# Patient Record
Sex: FEMALE | Race: WHITE | NOT HISPANIC OR LATINO | URBAN - METROPOLITAN AREA
[De-identification: names, ages, dates, MRNs, and addresses within clinical notes are randomized per-mention and may not be internally consistent; named-entity substitution may affect disease eponyms.]

---

## 2018-06-15 ENCOUNTER — OUTPATIENT (OUTPATIENT)
Dept: OUTPATIENT SERVICES | Facility: HOSPITAL | Age: 73
LOS: 1 days | Discharge: HOME | End: 2018-06-15

## 2018-06-15 DIAGNOSIS — I82.409 ACUTE EMBOLISM AND THROMBOSIS OF UNSPECIFIED DEEP VEINS OF UNSPECIFIED LOWER EXTREMITY: ICD-10-CM

## 2018-06-15 DIAGNOSIS — E10.9 TYPE 1 DIABETES MELLITUS WITHOUT COMPLICATIONS: ICD-10-CM

## 2018-06-15 DIAGNOSIS — E78.5 HYPERLIPIDEMIA, UNSPECIFIED: ICD-10-CM

## 2018-06-16 DIAGNOSIS — E78.5 HYPERLIPIDEMIA, UNSPECIFIED: ICD-10-CM

## 2018-06-19 ENCOUNTER — OUTPATIENT (OUTPATIENT)
Dept: OUTPATIENT SERVICES | Facility: HOSPITAL | Age: 73
LOS: 1 days | Discharge: HOME | End: 2018-06-19

## 2018-06-19 DIAGNOSIS — E11.9 TYPE 2 DIABETES MELLITUS WITHOUT COMPLICATIONS: ICD-10-CM

## 2018-09-11 ENCOUNTER — OUTPATIENT (OUTPATIENT)
Dept: OUTPATIENT SERVICES | Facility: HOSPITAL | Age: 73
LOS: 1 days | Discharge: HOME | End: 2018-09-11

## 2018-09-11 DIAGNOSIS — E11.9 TYPE 2 DIABETES MELLITUS WITHOUT COMPLICATIONS: ICD-10-CM

## 2018-11-14 ENCOUNTER — OUTPATIENT (OUTPATIENT)
Dept: OUTPATIENT SERVICES | Facility: HOSPITAL | Age: 73
LOS: 1 days | Discharge: HOME | End: 2018-11-14

## 2018-11-14 DIAGNOSIS — M81.0 AGE-RELATED OSTEOPOROSIS WITHOUT CURRENT PATHOLOGICAL FRACTURE: ICD-10-CM

## 2018-12-18 ENCOUNTER — OUTPATIENT (OUTPATIENT)
Dept: OUTPATIENT SERVICES | Facility: HOSPITAL | Age: 73
LOS: 1 days | Discharge: HOME | End: 2018-12-18

## 2018-12-18 DIAGNOSIS — E10.9 TYPE 1 DIABETES MELLITUS WITHOUT COMPLICATIONS: ICD-10-CM

## 2018-12-18 DIAGNOSIS — M81.0 AGE-RELATED OSTEOPOROSIS WITHOUT CURRENT PATHOLOGICAL FRACTURE: ICD-10-CM

## 2018-12-18 DIAGNOSIS — J40 BRONCHITIS, NOT SPECIFIED AS ACUTE OR CHRONIC: ICD-10-CM

## 2018-12-18 DIAGNOSIS — N13.9 OBSTRUCTIVE AND REFLUX UROPATHY, UNSPECIFIED: ICD-10-CM

## 2018-12-18 DIAGNOSIS — E78.5 HYPERLIPIDEMIA, UNSPECIFIED: ICD-10-CM

## 2019-01-02 ENCOUNTER — OUTPATIENT (OUTPATIENT)
Dept: OUTPATIENT SERVICES | Facility: HOSPITAL | Age: 74
LOS: 1 days | Discharge: HOME | End: 2019-01-02

## 2019-01-02 DIAGNOSIS — E10.9 TYPE 1 DIABETES MELLITUS WITHOUT COMPLICATIONS: ICD-10-CM

## 2019-02-22 ENCOUNTER — OUTPATIENT (OUTPATIENT)
Dept: OUTPATIENT SERVICES | Facility: HOSPITAL | Age: 74
LOS: 1 days | Discharge: HOME | End: 2019-02-22

## 2019-02-22 DIAGNOSIS — R50.9 FEVER, UNSPECIFIED: ICD-10-CM

## 2019-04-02 ENCOUNTER — OUTPATIENT (OUTPATIENT)
Dept: OUTPATIENT SERVICES | Facility: HOSPITAL | Age: 74
LOS: 1 days | Discharge: HOME | End: 2019-04-02

## 2019-04-02 DIAGNOSIS — N39.0 URINARY TRACT INFECTION, SITE NOT SPECIFIED: ICD-10-CM

## 2019-04-23 ENCOUNTER — OUTPATIENT (OUTPATIENT)
Dept: OUTPATIENT SERVICES | Facility: HOSPITAL | Age: 74
LOS: 1 days | Discharge: HOME | End: 2019-04-23

## 2019-04-23 DIAGNOSIS — E78.5 HYPERLIPIDEMIA, UNSPECIFIED: ICD-10-CM

## 2019-06-14 ENCOUNTER — OUTPATIENT (OUTPATIENT)
Dept: OUTPATIENT SERVICES | Facility: HOSPITAL | Age: 74
LOS: 1 days | Discharge: HOME | End: 2019-06-14

## 2019-06-14 DIAGNOSIS — N39.0 URINARY TRACT INFECTION, SITE NOT SPECIFIED: ICD-10-CM

## 2019-06-22 ENCOUNTER — OUTPATIENT (OUTPATIENT)
Dept: OUTPATIENT SERVICES | Facility: HOSPITAL | Age: 74
LOS: 1 days | Discharge: HOME | End: 2019-06-22

## 2019-06-22 DIAGNOSIS — R05 COUGH: ICD-10-CM

## 2019-06-25 ENCOUNTER — OUTPATIENT (OUTPATIENT)
Dept: OUTPATIENT SERVICES | Facility: HOSPITAL | Age: 74
LOS: 1 days | Discharge: HOME | End: 2019-06-25

## 2019-06-25 DIAGNOSIS — N17.9 ACUTE KIDNEY FAILURE, UNSPECIFIED: ICD-10-CM

## 2019-07-05 ENCOUNTER — OUTPATIENT (OUTPATIENT)
Dept: OUTPATIENT SERVICES | Facility: HOSPITAL | Age: 74
LOS: 1 days | Discharge: HOME | End: 2019-07-05

## 2019-07-05 DIAGNOSIS — N17.9 ACUTE KIDNEY FAILURE, UNSPECIFIED: ICD-10-CM

## 2019-07-16 ENCOUNTER — OUTPATIENT (OUTPATIENT)
Dept: OUTPATIENT SERVICES | Facility: HOSPITAL | Age: 74
LOS: 1 days | Discharge: HOME | End: 2019-07-16

## 2019-07-16 DIAGNOSIS — E78.5 HYPERLIPIDEMIA, UNSPECIFIED: ICD-10-CM

## 2019-07-18 ENCOUNTER — OUTPATIENT (OUTPATIENT)
Dept: OUTPATIENT SERVICES | Facility: HOSPITAL | Age: 74
LOS: 1 days | Discharge: HOME | End: 2019-07-18

## 2019-07-18 DIAGNOSIS — L02.31 CUTANEOUS ABSCESS OF BUTTOCK: ICD-10-CM

## 2019-07-27 ENCOUNTER — OUTPATIENT (OUTPATIENT)
Dept: OUTPATIENT SERVICES | Facility: HOSPITAL | Age: 74
LOS: 1 days | Discharge: HOME | End: 2019-07-27

## 2019-07-27 DIAGNOSIS — M79.661 PAIN IN RIGHT LOWER LEG: ICD-10-CM

## 2019-07-27 DIAGNOSIS — L02.31 CUTANEOUS ABSCESS OF BUTTOCK: ICD-10-CM

## 2019-08-24 ENCOUNTER — OUTPATIENT (OUTPATIENT)
Dept: OUTPATIENT SERVICES | Facility: HOSPITAL | Age: 74
LOS: 1 days | Discharge: HOME | End: 2019-08-24

## 2019-08-24 DIAGNOSIS — E10.9 TYPE 1 DIABETES MELLITUS WITHOUT COMPLICATIONS: ICD-10-CM

## 2019-08-24 DIAGNOSIS — D64.9 ANEMIA, UNSPECIFIED: ICD-10-CM

## 2019-09-01 ENCOUNTER — INPATIENT (INPATIENT)
Facility: HOSPITAL | Age: 74
LOS: 4 days | Discharge: NURSING, REHAB OR LONG-TERM CARE | End: 2019-09-06
Attending: INTERNAL MEDICINE | Admitting: INTERNAL MEDICINE
Payer: MEDICAID

## 2019-09-01 VITALS
TEMPERATURE: 98 F | RESPIRATION RATE: 18 BRPM | HEART RATE: 64 BPM | SYSTOLIC BLOOD PRESSURE: 115 MMHG | DIASTOLIC BLOOD PRESSURE: 58 MMHG | OXYGEN SATURATION: 99 %

## 2019-09-01 DIAGNOSIS — Z98.890 OTHER SPECIFIED POSTPROCEDURAL STATES: Chronic | ICD-10-CM

## 2019-09-01 LAB
ALBUMIN SERPL ELPH-MCNC: 4 G/DL — SIGNIFICANT CHANGE UP (ref 3.5–5.2)
ALP SERPL-CCNC: 63 U/L — SIGNIFICANT CHANGE UP (ref 30–115)
ALT FLD-CCNC: 10 U/L — SIGNIFICANT CHANGE UP (ref 0–41)
ANION GAP SERPL CALC-SCNC: 12 MMOL/L — SIGNIFICANT CHANGE UP (ref 7–14)
APTT BLD: 30.9 SEC — SIGNIFICANT CHANGE UP (ref 27–39.2)
AST SERPL-CCNC: 12 U/L — SIGNIFICANT CHANGE UP (ref 0–41)
BASOPHILS # BLD AUTO: 0.07 K/UL — SIGNIFICANT CHANGE UP (ref 0–0.2)
BASOPHILS NFR BLD AUTO: 0.6 % — SIGNIFICANT CHANGE UP (ref 0–1)
BILIRUB SERPL-MCNC: 0.5 MG/DL — SIGNIFICANT CHANGE UP (ref 0.2–1.2)
BLD GP AB SCN SERPL QL: SIGNIFICANT CHANGE UP
BUN SERPL-MCNC: 13 MG/DL — SIGNIFICANT CHANGE UP (ref 10–20)
CALCIUM SERPL-MCNC: 10 MG/DL — SIGNIFICANT CHANGE UP (ref 8.5–10.1)
CHLORIDE SERPL-SCNC: 104 MMOL/L — SIGNIFICANT CHANGE UP (ref 98–110)
CO2 SERPL-SCNC: 28 MMOL/L — SIGNIFICANT CHANGE UP (ref 17–32)
CREAT SERPL-MCNC: 1.3 MG/DL — SIGNIFICANT CHANGE UP (ref 0.7–1.5)
EOSINOPHIL # BLD AUTO: 0.31 K/UL — SIGNIFICANT CHANGE UP (ref 0–0.7)
EOSINOPHIL NFR BLD AUTO: 2.7 % — SIGNIFICANT CHANGE UP (ref 0–8)
GLUCOSE SERPL-MCNC: 88 MG/DL — SIGNIFICANT CHANGE UP (ref 70–99)
HCT VFR BLD CALC: 35.1 % — LOW (ref 37–47)
HGB BLD-MCNC: 10.9 G/DL — LOW (ref 12–16)
IMM GRANULOCYTES NFR BLD AUTO: 0.4 % — HIGH (ref 0.1–0.3)
INR BLD: 1.04 RATIO — SIGNIFICANT CHANGE UP (ref 0.65–1.3)
LYMPHOCYTES # BLD AUTO: 2.98 K/UL — SIGNIFICANT CHANGE UP (ref 1.2–3.4)
LYMPHOCYTES # BLD AUTO: 26.3 % — SIGNIFICANT CHANGE UP (ref 20.5–51.1)
MCHC RBC-ENTMCNC: 26.5 PG — LOW (ref 27–31)
MCHC RBC-ENTMCNC: 31.1 G/DL — LOW (ref 32–37)
MCV RBC AUTO: 85.4 FL — SIGNIFICANT CHANGE UP (ref 81–99)
MONOCYTES # BLD AUTO: 0.84 K/UL — HIGH (ref 0.1–0.6)
MONOCYTES NFR BLD AUTO: 7.4 % — SIGNIFICANT CHANGE UP (ref 1.7–9.3)
NEUTROPHILS # BLD AUTO: 7.08 K/UL — HIGH (ref 1.4–6.5)
NEUTROPHILS NFR BLD AUTO: 62.6 % — SIGNIFICANT CHANGE UP (ref 42.2–75.2)
NRBC # BLD: 0 /100 WBCS — SIGNIFICANT CHANGE UP (ref 0–0)
PLATELET # BLD AUTO: 263 K/UL — SIGNIFICANT CHANGE UP (ref 130–400)
POTASSIUM SERPL-MCNC: 4.3 MMOL/L — SIGNIFICANT CHANGE UP (ref 3.5–5)
POTASSIUM SERPL-SCNC: 4.3 MMOL/L — SIGNIFICANT CHANGE UP (ref 3.5–5)
PROT SERPL-MCNC: 7.2 G/DL — SIGNIFICANT CHANGE UP (ref 6–8)
PROTHROM AB SERPL-ACNC: 12 SEC — SIGNIFICANT CHANGE UP (ref 9.95–12.87)
RBC # BLD: 4.11 M/UL — LOW (ref 4.2–5.4)
RBC # FLD: 14.5 % — SIGNIFICANT CHANGE UP (ref 11.5–14.5)
SODIUM SERPL-SCNC: 144 MMOL/L — SIGNIFICANT CHANGE UP (ref 135–146)
WBC # BLD: 11.33 K/UL — HIGH (ref 4.8–10.8)
WBC # FLD AUTO: 11.33 K/UL — HIGH (ref 4.8–10.8)

## 2019-09-01 PROCEDURE — 99285 EMERGENCY DEPT VISIT HI MDM: CPT

## 2019-09-01 PROCEDURE — 72170 X-RAY EXAM OF PELVIS: CPT | Mod: 26

## 2019-09-01 PROCEDURE — 99223 1ST HOSP IP/OBS HIGH 75: CPT | Mod: AI

## 2019-09-01 PROCEDURE — 71045 X-RAY EXAM CHEST 1 VIEW: CPT | Mod: 26

## 2019-09-01 RX ORDER — LEVOTHYROXINE SODIUM 125 MCG
50 TABLET ORAL DAILY
Refills: 0 | Status: DISCONTINUED | OUTPATIENT
Start: 2019-09-01 | End: 2019-09-06

## 2019-09-01 RX ORDER — CEFEPIME 1 G/1
2000 INJECTION, POWDER, FOR SOLUTION INTRAMUSCULAR; INTRAVENOUS EVERY 12 HOURS
Refills: 0 | Status: DISCONTINUED | OUTPATIENT
Start: 2019-09-01 | End: 2019-09-02

## 2019-09-01 RX ORDER — ATORVASTATIN CALCIUM 80 MG/1
20 TABLET, FILM COATED ORAL AT BEDTIME
Refills: 0 | Status: DISCONTINUED | OUTPATIENT
Start: 2019-09-01 | End: 2019-09-06

## 2019-09-01 RX ORDER — ACETAMINOPHEN 500 MG
650 TABLET ORAL EVERY 6 HOURS
Refills: 0 | Status: DISCONTINUED | OUTPATIENT
Start: 2019-09-01 | End: 2019-09-01

## 2019-09-01 RX ORDER — OLANZAPINE 15 MG/1
20 TABLET, FILM COATED ORAL DAILY
Refills: 0 | Status: DISCONTINUED | OUTPATIENT
Start: 2019-09-01 | End: 2019-09-06

## 2019-09-01 RX ORDER — IBUPROFEN 200 MG
400 TABLET ORAL ONCE
Refills: 0 | Status: COMPLETED | OUTPATIENT
Start: 2019-09-01 | End: 2019-09-01

## 2019-09-01 RX ORDER — SENNA PLUS 8.6 MG/1
2 TABLET ORAL AT BEDTIME
Refills: 0 | Status: DISCONTINUED | OUTPATIENT
Start: 2019-09-01 | End: 2019-09-06

## 2019-09-01 RX ORDER — ACETAMINOPHEN 500 MG
2 TABLET ORAL
Qty: 0 | Refills: 0 | DISCHARGE

## 2019-09-01 RX ORDER — LANOLIN ALCOHOL/MO/W.PET/CERES
5 CREAM (GRAM) TOPICAL AT BEDTIME
Refills: 0 | Status: DISCONTINUED | OUTPATIENT
Start: 2019-09-01 | End: 2019-09-06

## 2019-09-01 RX ORDER — CHLORHEXIDINE GLUCONATE 213 G/1000ML
1 SOLUTION TOPICAL
Refills: 0 | Status: DISCONTINUED | OUTPATIENT
Start: 2019-09-01 | End: 2019-09-06

## 2019-09-01 RX ORDER — ACETAMINOPHEN 500 MG
650 TABLET ORAL EVERY 6 HOURS
Refills: 0 | Status: DISCONTINUED | OUTPATIENT
Start: 2019-09-01 | End: 2019-09-06

## 2019-09-01 RX ORDER — DOCUSATE SODIUM 100 MG
100 CAPSULE ORAL
Refills: 0 | Status: DISCONTINUED | OUTPATIENT
Start: 2019-09-01 | End: 2019-09-06

## 2019-09-01 RX ORDER — FAMOTIDINE 10 MG/ML
20 INJECTION INTRAVENOUS DAILY
Refills: 0 | Status: DISCONTINUED | OUTPATIENT
Start: 2019-09-01 | End: 2019-09-06

## 2019-09-01 RX ORDER — CEFEPIME 1 G/1
2000 INJECTION, POWDER, FOR SOLUTION INTRAMUSCULAR; INTRAVENOUS ONCE
Refills: 0 | Status: COMPLETED | OUTPATIENT
Start: 2019-09-01 | End: 2019-09-01

## 2019-09-01 RX ORDER — VANCOMYCIN HCL 1 G
1000 VIAL (EA) INTRAVENOUS ONCE
Refills: 0 | Status: COMPLETED | OUTPATIENT
Start: 2019-09-01 | End: 2019-09-01

## 2019-09-01 RX ORDER — ENOXAPARIN SODIUM 100 MG/ML
40 INJECTION SUBCUTANEOUS DAILY
Refills: 0 | Status: DISCONTINUED | OUTPATIENT
Start: 2019-09-01 | End: 2019-09-06

## 2019-09-01 RX ORDER — ASPIRIN/CALCIUM CARB/MAGNESIUM 324 MG
81 TABLET ORAL DAILY
Refills: 0 | Status: DISCONTINUED | OUTPATIENT
Start: 2019-09-01 | End: 2019-09-06

## 2019-09-01 RX ORDER — METRONIDAZOLE 500 MG
500 TABLET ORAL ONCE
Refills: 0 | Status: COMPLETED | OUTPATIENT
Start: 2019-09-01 | End: 2019-09-01

## 2019-09-01 RX ADMIN — Medication 5 MILLIGRAM(S): at 22:16

## 2019-09-01 RX ADMIN — ATORVASTATIN CALCIUM 20 MILLIGRAM(S): 80 TABLET, FILM COATED ORAL at 22:16

## 2019-09-01 RX ADMIN — Medication 500 MILLIGRAM(S): at 18:22

## 2019-09-01 RX ADMIN — Medication 250 MILLIGRAM(S): at 20:09

## 2019-09-01 RX ADMIN — Medication 100 MILLIGRAM(S): at 17:26

## 2019-09-01 RX ADMIN — CEFEPIME 100 MILLIGRAM(S): 1 INJECTION, POWDER, FOR SOLUTION INTRAMUSCULAR; INTRAVENOUS at 18:26

## 2019-09-01 RX ADMIN — CEFEPIME 2000 MILLIGRAM(S): 1 INJECTION, POWDER, FOR SOLUTION INTRAMUSCULAR; INTRAVENOUS at 19:15

## 2019-09-01 RX ADMIN — Medication 400 MILLIGRAM(S): at 22:26

## 2019-09-01 RX ADMIN — Medication 400 MILLIGRAM(S): at 22:16

## 2019-09-01 RX ADMIN — SENNA PLUS 2 TABLET(S): 8.6 TABLET ORAL at 22:16

## 2019-09-01 NOTE — H&P ADULT - ASSESSMENT
74 year old female with PMH of recent right buttock abscess s/p debridement, insulin dependent diabetes mellitus, schizophrenia, dementia, anxiety, CAD, hyperlipidemia, hypothyroidism, constipation, ovarian cyst brought in by EMS [facility] for foul smelling and expanding right buttock ulcer    # Worsening right buttock ulcer wound  - Sepsis ruled out on admission, WBC minimally elevated, afebrile  - s/p Cefepime, Flagyl and Vancomycin in ED  - Check ESR and CRP  - Will cover for Pseudomonas and E faecalis seen on wound culture in July  - Burn eval - needs source control with possible debridement  - ID eval to eval need for abx  - Tylenol for pain    # Insulin dependent diabetes mellitus  - On Januvia and Insulin at NH  - Hypoglycemic in ED  - Check fingerstick, and start insulin FSBG >180  - Carb consistent diet    # Normocytic anemia  - No evidence of active bleed  - Check Retic ct, iron and ferritin    # CAD - continue ASA  # Dyslipidemia - Continue Lipitor  # H/o Paranoid schizophrenia/Anxiety - Continue Olanzapine. On Haldol IM once monthly  # Hypothyroidism - Continue L-thyroxine    GI ppx/GERD - Famotidine  DVT ppx - Lovenox    CODE: FULL per NH documentation    Dispo: from [facility] [place] 74 year old female with PMH of recent right buttock abscess s/p debridement, insulin dependent diabetes mellitus, schizophrenia, dementia, anxiety, CAD, hyperlipidemia, hypothyroidism, constipation, ovarian cyst brought in by EMS from [facility], operated by [facility] for foul smelling and expanding right buttock ulcer    # Worsening right buttock ulcer wound  - Sepsis ruled out on admission, WBC minimally elevated, afebrile  - s/p Cefepime, Flagyl and Vancomycin in ED  - Check ESR and CRP  - Will cover for Pseudomonas and E faecalis seen on wound culture in July  - Burn eval - needs source control with possible debridement  - ID eval to eval need for abx  - Tylenol for pain    # Insulin dependent diabetes mellitus  - On Januvia and Insulin at NH  - Hypoglycemic in ED  - Check fingerstick, and start insulin FSBG >180  - Carb consistent diet    # Normocytic anemia  - No evidence of active bleed  - Check Retic ct, iron and ferritin    # CKD 3b - Monitor Cr    # CAD - continue ASA  # Dyslipidemia - Continue Lipitor  # H/o Paranoid schizophrenia/Anxiety - Continue Olanzapine. On Haldol IM once monthly  # Hypothyroidism - Continue L-thyroxine    GI ppx/GERD - Famotidine  DVT ppx - Lovenox    CODE: FULL per NH documentation    Dispo: from [facility], operated by [facility]

## 2019-09-01 NOTE — ED ADULT NURSE NOTE - INTERVENTIONS DEFINITIONS
[place] to call system/Provide visual cue, wrist band, yellow gown, etc./Monitor gait and stability/Call bell, personal items and telephone within reach/Monitor for mental status changes and reorient to person, place, and time/Physically safe environment: no spills, clutter or unnecessary equipment

## 2019-09-01 NOTE — ED PROVIDER NOTE - ATTENDING CONTRIBUTION TO CARE
73 yo female with PMH DM, schizophrenia,  sacral ulcer, DVT, HTN, HLD presented for evaluation of her right ischial wound. Pt with increasing erythema and foul smelling d/c.  + chills without any fevers, abd pain, N/V/D.  Pt without any c/o CP, SOB or palpitations. VS noted, agree with exam as above.  A/P: Ischial ulcer -labs, XR, IV abx, admit

## 2019-09-01 NOTE — H&P ADULT - ATTENDING COMMENTS
PHYSICAL EXAM:    CONSTITUTIONAL: NAD, nontoxic appearing  ENMT: PERRLA, No tonsillar erythema, exudates, or enlargement, neck supple, No JVD  PSYCH: Alert & Oriented X3  RESPIRATORY: Clear to percussion bilaterally; No rales, rhonchi, wheezing, or rubs  CARDIOVASCULAR: Regular rate and rhythm; No murmurs, rubs, or gallops, negative edema  GASTROINTESTINAL: Soft, Nontender, Nondistended; Bowel sounds present  EXTREMITIES:  2+ Peripheral Pulses, No clubbing, cyanosis  SKIN: 3x3 stage III ulcer over right buttock with surrounding erythema, malodorous, negative purulent drainage expressed    75 yo F with PMHx of Right Buttock Decubitus Ulcer and Abscess s/p  recent debridement, insulin dependent diabetes mellitus, schizophrenia, dementia, anxiety, CAD, CKD III, hyperlipidemia, hypothyroidism, constipation, ovarian cyst sent in from [facility] at request of PMD for further evaluation of nonhealing, expanding and malodorous right buttock decubitus ulcer. Patient is a poor historian, endorses pain in her right leg, unable to further elaborate, states she fell 2 days ago at the nursing home while trying to reach for something. Patient denies any other complaints.     #Right Buttock Decubitus Ulcer likely infected   -Culture previously grew Enterococcus faecalis sensitive to Cefepime  -Awaiting Burn Surgery evaluation  -Awaiting Infectious Disease  -F/U ESR, CRP  -Pain control    #Insulin dependent diabetes mellitus  -Hypoglycemic in ED down to 54  -Monitor fingersticks, start basal bolus if fingersticks are greater than 180    #Normocytic anemia  -No evidence of active bleed  -F/U Iron studies, maintain active type and screen    #CKD III  -Creatinine stable baseline (1.3-1.5)   -Monitor BMP, avoid nephrotoxins    #CAD  -Continue ASA    #Dyslipidemia   -Continue Lipitor    #Paranoid schizophrenia/Anxiety   -Continue Olanzapine. On Haldol IM once monthly    # Hypothyroidism   -Continue L-thyroxine    Code Status: Full Code    Disposition: Return to Nursing Home once medically stable. PHYSICAL EXAM:    CONSTITUTIONAL: NAD, nontoxic appearing  ENMT: PERRLA, No tonsillar erythema, exudates, or enlargement, neck supple, No JVD  PSYCH: Alert & Oriented X3  RESPIRATORY: Clear to percussion bilaterally; No rales, rhonchi, wheezing, or rubs  CARDIOVASCULAR: Regular rate and rhythm; No murmurs, rubs, or gallops, negative edema  GASTROINTESTINAL: Soft, Nontender, Nondistended; Bowel sounds present  EXTREMITIES:  2+ Peripheral Pulses, No clubbing, cyanosis  SKIN: 3x3 stage III ulcer over right buttock with surrounding erythema, malodorous, negative purulent drainage expressed    73 yo F with PMHx of Right Buttock Decubitus Ulcer and Abscess s/p  recent debridement, insulin dependent diabetes mellitus, schizophrenia, dementia, anxiety, CAD, CKD III, hyperlipidemia, hypothyroidism, constipation, ovarian cyst sent in from [facility] at request of PMD for further evaluation of nonhealing, expanding and malodorous right buttock decubitus ulcer. Patient is a poor historian, endorses pain in her right leg, unable to further elaborate, states she fell 2 days ago at the nursing home while trying to reach for something. Patient denies any other complaints.     #Right Buttock Decubitus Ulcer likely infected   -Culture previously grew Enterococcus faecalis sensitive to Cefepime  -Awaiting Burn Surgery evaluation  -Awaiting Infectious Disease  -F/U ESR, CRP  -Pain control    #Insulin dependent diabetes mellitus  -Hypoglycemic in ED down to 54  -Monitor fingersticks, start basal bolus if fingersticks are greater than 180    #Normocytic anemia  -No evidence of active bleed  -F/U Iron studies, maintain active type and screen    #CKD III  -Creatinine stable baseline (1.3-1.5)   -Monitor BMP, avoid nephrotoxins    #CAD  -Continue ASA    #Dyslipidemia   -Continue Lipitor    #Paranoid schizophrenia/Anxiety   -Continue Olanzapine. On Haldol IM once monthly    # Hypothyroidism   -Continue L-thyroxine    Code Status: Full Code.    Disposition: Return to Nursing Home once medically stable.

## 2019-09-01 NOTE — ED PROVIDER NOTE - CLINICAL SUMMARY MEDICAL DECISION MAKING FREE TEXT BOX
pt seen for right ischial wound increasing  and with d/c sent in for evaluation. labs unremarkable, pt started on IV abx and admitted for further workup and treatment

## 2019-09-01 NOTE — ED PROVIDER NOTE - OBJECTIVE STATEMENT
74 y.o female w/ hx of sacral ulcer, DM I, schizophrenia, DVT, HTN, HLD presents to the ED for evaluation of sacral ulcer.  Per paperwork pt has had ulcer of right buttock that they have debrided.  However ulcer still increasing in size now with surrounding erythema and discharge prompting visit to the ED. Admits to chills, denies fever, hip pain, abd pain, N/V/D.  No further complaints at this time.  pt is a poor historian.  I am unable to obtain a comprehensive history, review of systems, past medical history, and/or physical exam due to mental status.

## 2019-09-01 NOTE — ED PROVIDER NOTE - PHYSICAL EXAMINATION
CONST: Well appearing in NAD  EYES: Sclera and conjunctiva clear.  CARD: Normal S1 S2; Normal rate and rhythm  RESP: Equal BS B/L, No wheezes, rhonchi or rales. No distress  GI: Soft, non-tender, non-distended, no CVA tenderness  MS: Normal ROM in all extremities. No edema of lower extremities, no calf pain, radial pulses 2+ bilaterally  SKIN: half dollar size stage 3 pressure ulcer right buttocks, + surrounding erythema, no discharge  NEURO: A&Ox3, No focal deficits. Strength 5/5 with no sensory deficits.

## 2019-09-01 NOTE — H&P ADULT - NSHPREVIEWOFSYSTEMS_GEN_ALL_CORE
CONSTITUTIONAL: No weakness, fevers or chills  EYES/ENT: No visual changes;  No vertigo or throat pain   RESPIRATORY: No cough, wheezing, hemoptysis; No shortness of breath  CARDIOVASCULAR: No chest pain or palpitations  GASTROINTESTINAL: No abdominal or epigastric pain. No nausea, vomiting, or hematemesis; No diarrhea. Complains of constipation. No melena or hematochezia.  GENITOURINARY: No dysuria, frequency or hematuria  NEUROLOGICAL: No numbness or weakness  SKIN: No itching, rashes

## 2019-09-01 NOTE — H&P ADULT - NSHPPHYSICALEXAM_GEN_ALL_CORE
Vital Signs Last 24 Hrs  T(C): 37.6 (01 Sep 2019 16:39), Max: 37.6 (01 Sep 2019 16:39)  T(F): 99.6 (01 Sep 2019 16:39), Max: 99.6 (01 Sep 2019 16:39)  HR: 64 (01 Sep 2019 14:57) (64 - 64)  BP: 115/58 (01 Sep 2019 14:57) (115/58 - 115/58)  RR: 18 (01 Sep 2019 14:57) (18 - 18)  SpO2: 99% (01 Sep 2019 14:57) (99% - 99%)    ---------------    PHYSICAL EXAM:  GENERAL: NAD, speaks in full sentences, no signs of respiratory distress  HEAD:  Atraumatic, Normocephalic  EYES: EOMI, PERRLA, anicteric sclera  NECK: Supple, No JVD  CHEST/LUNG: Clear to auscultation bilaterally; No wheeze; No crackles; No accessory muscles used  HEART: Regular rate and rhythm; grade 2/6 systolic murmur  ABDOMEN: Soft, Nontender, Nondistended; Bowel sounds present; No guarding  EXTREMITIES:  2+ Peripheral Pulses, No cyanosis or edema  PSYCH: AAOx3  NEUROLOGY: non-focal  SKIN: Right buttock ulcer 2x3 cm, foul smelling, surrounding erythema, central necrosis

## 2019-09-01 NOTE — H&P ADULT - NSICDXPASTMEDICALHX_GEN_ALL_CORE_FT
PAST MEDICAL HISTORY:  Abscess of right buttock     Anxiety     Coronary artery disease     Dementia     Hyperlipidemia     Hypothyroidism     Insulin dependent diabetes mellitus     Paranoid schizophrenia

## 2019-09-01 NOTE — H&P ADULT - HISTORY OF PRESENT ILLNESS
74 year old female with PMH of recent right buttock abscess s/p debridement, insulin dependent diabetes mellitus, schizophrenia, dementia, anxiety, CAD, hyperlipidemia, hypothyroidism, constipation, ovarian cyst brought in by EMS from [facility] for foul smelling and expanding right buttock ulcer. Patient is a poor historian but as per documentation she had a debridement in July 2019 for a right buttock abscess. Patient is only complaining of some pain in the area of concern. She denies any fever, chills, dyspnea, chest pain, nausea, vomiting, diarrhea.     In ED: Vital signs stable, afebrile, WBC 11.33, XRay pelvis with no abnormalities. Given Cefepime, Flagyl and vancomycin x1

## 2019-09-01 NOTE — ED PROVIDER NOTE - NS ED ROS FT
I am unable to obtain a comprehensive history, review of systems, past medical history, and/or physical exam due to mental status.

## 2019-09-01 NOTE — H&P ADULT - NSHPLABSRESULTS_GEN_ALL_CORE
10.9   11.33 )-----------( 263      ( 01 Sep 2019 17:20 )             35.1       09-01    144  |  104  |  13  ----------------------------<  88  4.3   |  28  |  1.3    Ca    10.0      01 Sep 2019 17:20    TPro  7.2  /  Alb  4.0  /  TBili  0.5  /  DBili  x   /  AST  12  /  ALT  10  /  AlkPhos  63  09-01          PT/INR - ( 01 Sep 2019 17:20 )   PT: 12.00 sec;   INR: 1.04 ratio      PTT - ( 01 Sep 2019 17:20 )  PTT:30.9 sec    CAPILLARY BLOOD GLUCOSE  POCT Blood Glucose.: 79 mg/dL (01 Sep 2019 17:42)    -------    < from: Xray Pelvis AP only (09.01.19 @ 17:45) >    Findings: No bony or softtissue or articular abnormality is seen.   However, it should be noted that osteomyelitis/septic arthritis cannot be   excluded on this plain film study.    Impression:     No abnormality seen.    < end of copied text >

## 2019-09-02 LAB
ANION GAP SERPL CALC-SCNC: 13 MMOL/L — SIGNIFICANT CHANGE UP (ref 7–14)
BASOPHILS # BLD AUTO: 0.08 K/UL — SIGNIFICANT CHANGE UP (ref 0–0.2)
BASOPHILS NFR BLD AUTO: 0.8 % — SIGNIFICANT CHANGE UP (ref 0–1)
BUN SERPL-MCNC: 13 MG/DL — SIGNIFICANT CHANGE UP (ref 10–20)
CALCIUM SERPL-MCNC: 9.1 MG/DL — SIGNIFICANT CHANGE UP (ref 8.5–10.1)
CHLORIDE SERPL-SCNC: 106 MMOL/L — SIGNIFICANT CHANGE UP (ref 98–110)
CO2 SERPL-SCNC: 24 MMOL/L — SIGNIFICANT CHANGE UP (ref 17–32)
CREAT SERPL-MCNC: 1.1 MG/DL — SIGNIFICANT CHANGE UP (ref 0.7–1.5)
EOSINOPHIL # BLD AUTO: 0.37 K/UL — SIGNIFICANT CHANGE UP (ref 0–0.7)
EOSINOPHIL NFR BLD AUTO: 3.7 % — SIGNIFICANT CHANGE UP (ref 0–8)
ERYTHROCYTE [SEDIMENTATION RATE] IN BLOOD: 60 MM/HR — HIGH (ref 0–20)
ESTIMATED AVERAGE GLUCOSE: 137 MG/DL — HIGH (ref 68–114)
GLUCOSE BLDC GLUCOMTR-MCNC: 113 MG/DL — HIGH (ref 70–99)
GLUCOSE BLDC GLUCOMTR-MCNC: 155 MG/DL — HIGH (ref 70–99)
GLUCOSE BLDC GLUCOMTR-MCNC: 97 MG/DL — SIGNIFICANT CHANGE UP (ref 70–99)
GLUCOSE SERPL-MCNC: 109 MG/DL — HIGH (ref 70–99)
HBA1C BLD-MCNC: 6.4 % — HIGH (ref 4–5.6)
HCT VFR BLD CALC: 32 % — LOW (ref 37–47)
HGB BLD-MCNC: 9.8 G/DL — LOW (ref 12–16)
IMM GRANULOCYTES NFR BLD AUTO: 0.6 % — HIGH (ref 0.1–0.3)
LYMPHOCYTES # BLD AUTO: 2.98 K/UL — SIGNIFICANT CHANGE UP (ref 1.2–3.4)
LYMPHOCYTES # BLD AUTO: 29.8 % — SIGNIFICANT CHANGE UP (ref 20.5–51.1)
MAGNESIUM SERPL-MCNC: 1.9 MG/DL — SIGNIFICANT CHANGE UP (ref 1.8–2.4)
MCHC RBC-ENTMCNC: 26.1 PG — LOW (ref 27–31)
MCHC RBC-ENTMCNC: 30.6 G/DL — LOW (ref 32–37)
MCV RBC AUTO: 85.3 FL — SIGNIFICANT CHANGE UP (ref 81–99)
MONOCYTES # BLD AUTO: 0.91 K/UL — HIGH (ref 0.1–0.6)
MONOCYTES NFR BLD AUTO: 9.1 % — SIGNIFICANT CHANGE UP (ref 1.7–9.3)
NEUTROPHILS # BLD AUTO: 5.61 K/UL — SIGNIFICANT CHANGE UP (ref 1.4–6.5)
NEUTROPHILS NFR BLD AUTO: 56 % — SIGNIFICANT CHANGE UP (ref 42.2–75.2)
NRBC # BLD: 0 /100 WBCS — SIGNIFICANT CHANGE UP (ref 0–0)
PLATELET # BLD AUTO: 193 K/UL — SIGNIFICANT CHANGE UP (ref 130–400)
POTASSIUM SERPL-MCNC: 4.5 MMOL/L — SIGNIFICANT CHANGE UP (ref 3.5–5)
POTASSIUM SERPL-SCNC: 4.5 MMOL/L — SIGNIFICANT CHANGE UP (ref 3.5–5)
RBC # BLD: 3.75 M/UL — LOW (ref 4.2–5.4)
RBC # BLD: 3.75 M/UL — LOW (ref 4.2–5.4)
RBC # FLD: 14.5 % — SIGNIFICANT CHANGE UP (ref 11.5–14.5)
RETICS #: 51.8 K/UL — SIGNIFICANT CHANGE UP (ref 25–125)
RETICS/RBC NFR: 1.4 % — SIGNIFICANT CHANGE UP (ref 0.5–1.5)
SODIUM SERPL-SCNC: 143 MMOL/L — SIGNIFICANT CHANGE UP (ref 135–146)
WBC # BLD: 10.01 K/UL — SIGNIFICANT CHANGE UP (ref 4.8–10.8)
WBC # FLD AUTO: 10.01 K/UL — SIGNIFICANT CHANGE UP (ref 4.8–10.8)

## 2019-09-02 PROCEDURE — 99222 1ST HOSP IP/OBS MODERATE 55: CPT | Mod: 25

## 2019-09-02 PROCEDURE — 97602 WOUND(S) CARE NON-SELECTIVE: CPT

## 2019-09-02 PROCEDURE — 99233 SBSQ HOSP IP/OBS HIGH 50: CPT

## 2019-09-02 RX ORDER — KETOROLAC TROMETHAMINE 30 MG/ML
15 SYRINGE (ML) INJECTION ONCE
Refills: 0 | Status: DISCONTINUED | OUTPATIENT
Start: 2019-09-02 | End: 2019-09-02

## 2019-09-02 RX ORDER — SODIUM HYPOCHLORITE 0.125 %
1 SOLUTION, NON-ORAL MISCELLANEOUS
Refills: 0 | Status: DISCONTINUED | OUTPATIENT
Start: 2019-09-02 | End: 2019-09-06

## 2019-09-02 RX ORDER — KETOROLAC TROMETHAMINE 30 MG/ML
10 SYRINGE (ML) INJECTION DAILY
Refills: 0 | Status: DISCONTINUED | OUTPATIENT
Start: 2019-09-02 | End: 2019-09-02

## 2019-09-02 RX ORDER — COLLAGENASE CLOSTRIDIUM HIST. 250 UNIT/G
1 OINTMENT (GRAM) TOPICAL
Refills: 0 | Status: DISCONTINUED | OUTPATIENT
Start: 2019-09-02 | End: 2019-09-06

## 2019-09-02 RX ORDER — CEFEPIME 1 G/1
2000 INJECTION, POWDER, FOR SOLUTION INTRAMUSCULAR; INTRAVENOUS EVERY 24 HOURS
Refills: 0 | Status: DISCONTINUED | OUTPATIENT
Start: 2019-09-02 | End: 2019-09-04

## 2019-09-02 RX ADMIN — Medication 1 TABLET(S): at 11:08

## 2019-09-02 RX ADMIN — SENNA PLUS 2 TABLET(S): 8.6 TABLET ORAL at 21:02

## 2019-09-02 RX ADMIN — CEFEPIME 100 MILLIGRAM(S): 1 INJECTION, POWDER, FOR SOLUTION INTRAMUSCULAR; INTRAVENOUS at 05:30

## 2019-09-02 RX ADMIN — Medication 1 APPLICATION(S): at 17:18

## 2019-09-02 RX ADMIN — Medication 100 MILLIGRAM(S): at 05:30

## 2019-09-02 RX ADMIN — Medication 100 MILLIGRAM(S): at 17:17

## 2019-09-02 RX ADMIN — Medication 50 MICROGRAM(S): at 05:30

## 2019-09-02 RX ADMIN — CHLORHEXIDINE GLUCONATE 1 APPLICATION(S): 213 SOLUTION TOPICAL at 05:30

## 2019-09-02 RX ADMIN — Medication 81 MILLIGRAM(S): at 11:07

## 2019-09-02 RX ADMIN — Medication 5 MILLIGRAM(S): at 21:02

## 2019-09-02 RX ADMIN — Medication 15 MILLIGRAM(S): at 22:00

## 2019-09-02 RX ADMIN — OLANZAPINE 20 MILLIGRAM(S): 15 TABLET, FILM COATED ORAL at 11:08

## 2019-09-02 RX ADMIN — ENOXAPARIN SODIUM 40 MILLIGRAM(S): 100 INJECTION SUBCUTANEOUS at 11:08

## 2019-09-02 RX ADMIN — FAMOTIDINE 20 MILLIGRAM(S): 10 INJECTION INTRAVENOUS at 11:08

## 2019-09-02 RX ADMIN — Medication 1 TABLET(S): at 11:07

## 2019-09-02 RX ADMIN — Medication 15 MILLIGRAM(S): at 21:02

## 2019-09-02 RX ADMIN — Medication 1 APPLICATION(S): at 17:17

## 2019-09-02 RX ADMIN — ATORVASTATIN CALCIUM 20 MILLIGRAM(S): 80 TABLET, FILM COATED ORAL at 21:02

## 2019-09-02 NOTE — CONSULT NOTE ADULT - SUBJECTIVE AND OBJECTIVE BOX
bedridden pt with right buttock pressure sore    PE: right buttock--. [id number] pressure sore with adherent necrotic tissue--> culture sent

## 2019-09-02 NOTE — CONSULT NOTE ADULT - ASSESSMENT
right buttock pressure sore--.     soap and water bid, santyl ointment and dakins wet to dry dressing change bid, check culture, will debride this week

## 2019-09-02 NOTE — PHARMACOTHERAPY INTERVENTION NOTE - COMMENTS
Spoke with Dr. Llanos (ext: [phone]) regarding cefepime order. Patient's CrCl = 48 ( weight 142 lb as per flowsheets) recommended a dose reduction from cefepime 2 grams every 12 hours to cefepime 2 grams every 24 hours to treat a soft tissue infection on buttocks.

## 2019-09-02 NOTE — PROGRESS NOTE ADULT - SUBJECTIVE AND OBJECTIVE BOX
CC.  Right Buttock ulcer  HPI.  Patient appears to be pleasantly confused  unable to obtain ROS/HX    Vital Signs Last 24 Hrs  T(C): 36.4 (02 Sep 2019 07:24), Max: 37.7 (01 Sep 2019 22:00)  T(F): 97.5 (02 Sep 2019 07:24), Max: 99.9 (01 Sep 2019 22:00)  HR: 55 (02 Sep 2019 07:24) (55 - 72)  BP: 135/58 (02 Sep 2019 07:24) (115/58 - 138/63)  BP(mean): --  RR: 18 (02 Sep 2019 07:24) (18 - 19)  SpO2: 99% (01 Sep 2019 14:57) (99% - 99%)      PHYSICAL EXAM-  GENERAL: NAD Chronic ill appearing female  HEAD:  Atraumatic, Normocephalic  EYES: EOMI, PERRLA, conjunctiva and sclera clear  NECK: Supple, No JVD, Normal thyroid  NERVOUS SYSTEM:  Alert & Oriented X2.  Moving all extremities  CHEST/LUNG: Clear to percussion bilaterally; No rales, rhonchi, wheezing, or rubs  HEART: Regular rate and rhythm; No murmurs, rubs, or gallops  ABDOMEN: Soft, Nontender, Nondistended; Bowel sounds present  EXTREMITIES: No clubbing, cyanosis, or edema  SKIN: No rashes or lesions                              9.8    10.01 )-----------( 193      ( 02 Sep 2019 05:39 )             32.0     09-02    143  |  106  |  13  ----------------------------<  109<H>  4.5   |  24  |  1.1    Ca    9.1      02 Sep 2019 05:39  Mg     1.9     09-02    TPro  7.2  /  Alb  4.0  /  TBili  0.5  /  DBili  x   /  AST  12  /  ALT  10  /  AlkPhos  63  09-01            PT/INR - ( 01 Sep 2019 17:20 )   PT: 12.00 sec;   INR: 1.04 ratio         PTT - ( 01 Sep 2019 17:20 )  PTT:30.9 sec        MEDICATIONS  (STANDING):  aspirin enteric coated 81 milliGRAM(s) Oral daily  atorvastatin 20 milliGRAM(s) Oral at bedtime  calcium carbonate 1250 mG  + Vitamin D (OsCal 500 + D) 1 Tablet(s) Oral daily  cefepime   IVPB 2000 milliGRAM(s) IV Intermittent every 24 hours  chlorhexidine 4% Liquid 1 Application(s) Topical <User Schedule>  collagenase Ointment 1 Application(s) Topical two times a day  Dakins Solution - 1/2 Strength 1 Application(s) Topical two times a day  docusate sodium 100 milliGRAM(s) Oral two times a day  enoxaparin Injectable 40 milliGRAM(s) SubCutaneous daily  famotidine    Tablet 20 milliGRAM(s) Oral daily  levothyroxine 50 MICROGram(s) Oral daily  melatonin 5 milliGRAM(s) Oral at bedtime  multivitamin 1 Tablet(s) Oral daily  OLANZapine Disintegrating Tablet 20 milliGRAM(s) Oral daily  senna 2 Tablet(s) Oral at bedtime    MEDICATIONS  (PRN):  acetaminophen   Tablet .. 650 milliGRAM(s) Oral every 6 hours PRN Mild Pain (1 - 3)

## 2019-09-02 NOTE — PROGRESS NOTE ADULT - ASSESSMENT
74 year old female with PMH of recent right buttock abscess s/p debridement, insulin dependent diabetes mellitus, schizophrenia, dementia, anxiety, CAD, hyperlipidemia, hypothyroidism, constipation, ovarian cyst brought in by EMS from [facility] for foul smelling and expanding right buttock ulcer    # Worsening right buttock ulcer wound  -Continue with current antibiotic pending culture, and ID consult  -Wound care as per burn  -Possible wound debridement as per surgery  -     # Insulin dependent diabetes mellitus  - On Januvia and Insulin at NH  - Hypoglycemic in ED  - ISS, and monitor POC      # Normocytic anemia  - No evidence of active bleed  - Check Retic ct, iron and ferritin    # CKD 3b - Monitor Cr    # CAD - continue ASA  # Dyslipidemia - Continue Lipitor  # H/o Paranoid schizophrenia/Anxiety - Continue Olanzapine. On Haldol IM once monthly  # Hypothyroidism - Continue L-thyroxine    GI ppx/GERD - Famotidine  DVT ppx - Lovenox    CODE: FULL per NH documentation    Dispo: from [facility]

## 2019-09-02 NOTE — PROGRESS NOTE ADULT - SUBJECTIVE AND OBJECTIVE BOX
LENGTH OF HOSPITAL STAY: 1d      CHIEF COMPLAINT:   Patient is a 74y old  Female who presents with a chief complaint of Worsening right buttock ulcer (02 Sep 2019 11:05)      OVER Past 24hrs:  The patient was seen and examined at bedside there were no acute events during the night. Patient complains of sacral pain around ulcer.  She denies fever chills chest pain.         PAST MEDICAL & SURGICAL HISTORY  PAST MEDICAL & SURGICAL HISTORY:  Abscess of right buttock  Hypothyroidism  Hyperlipidemia  Paranoid schizophrenia  Insulin dependent diabetes mellitus  Dementia  Anxiety  Coronary artery disease  H/O abdominal surgery        REVIEW OF SYSTEMS  CONSTITUTIONAL: No fever  RESPIRATORY: No cough, wheezing, chills or hemoptysis; No shortness of breath  CARDIOVASCULAR: No chest pain, palpitations, dizziness, or leg swelling  GASTROINTESTINAL: No abdominal or epigastric pain.   GENITOURINARY: No dysuria,  SKIN: sacral ulcer pain        ALLERGIES:  No Known Allergies    MEDICATIONS:  STANDING MEDICATIONS  aspirin enteric coated 81 milliGRAM(s) Oral daily  atorvastatin 20 milliGRAM(s) Oral at bedtime  calcium carbonate 1250 mG  + Vitamin D (OsCal 500 + D) 1 Tablet(s) Oral daily  cefepime   IVPB 2000 milliGRAM(s) IV Intermittent every 24 hours  chlorhexidine 4% Liquid 1 Application(s) Topical <User Schedule>  collagenase Ointment 1 Application(s) Topical two times a day  Dakins Solution - 1/2 Strength 1 Application(s) Topical two times a day  docusate sodium 100 milliGRAM(s) Oral two times a day  enoxaparin Injectable 40 milliGRAM(s) SubCutaneous daily  famotidine    Tablet 20 milliGRAM(s) Oral daily  levothyroxine 50 MICROGram(s) Oral daily  melatonin 5 milliGRAM(s) Oral at bedtime  multivitamin 1 Tablet(s) Oral daily  OLANZapine Disintegrating Tablet 20 milliGRAM(s) Oral daily  senna 2 Tablet(s) Oral at bedtime    PRN MEDICATIONS  acetaminophen   Tablet .. 650 milliGRAM(s) Oral every 6 hours PRN    VITALS:   T(F): 97.5  HR: 55  BP: 135/58  RR: 18  SpO2: 99%    PHYSICAL EXAM:  General: No acute distress.  Alert, oriented, interactive, nonfocal. Elderly   Female     HEENT: Pupils equal, reactive to light symmetrically.    PULM: Clear to auscultation bilaterally, no significant sputum production.    CVS: Regular rate and rhythm, no murmurs, rubs, or gallops.    ABD: Soft, nondistended, nontender, no masses.    EXT: No edema, nontender.    SKIN:  3cm stage 3 sacral Ulcer foul smelling with purulence at base, NO surrounding erythema or signs of abscess formation.     LABS:                        9.8    10.01 )-----------( 193      ( 02 Sep 2019 05:39 )             32.0     09-02    143  |  106  |  13  ----------------------------<  109<H>  4.5   |  24  |  1.1    Ca    9.1      02 Sep 2019 05:39  Mg     1.9     09-02    TPro  7.2  /  Alb  4.0  /  TBili  0.5  /  DBili  x   /  AST  12  /  ALT  10  /  AlkPhos  63  09-01    PT/INR - ( 01 Sep 2019 17:20 )   PT: 12.00 sec;   INR: 1.04 ratio         PTT - ( 01 Sep 2019 17:20 )  PTT:30.9 sec              RADIOLOGY:        < from: Xray Pelvis AP only (09.01.19 @ 17:45) >    Impression:     No abnormality seen.    < end of copied text >

## 2019-09-02 NOTE — PROGRESS NOTE ADULT - ASSESSMENT
74 year old female with PMH of recent right buttock abscess s/p debridement, insulin dependent diabetes mellitus, schizophrenia, dementia, anxiety, CAD, hyperlipidemia, hypothyroidism, constipation, ovarian cyst brought in by EMS from [facility] for foul smelling and expanding right buttock ulcer. Patient is a poor historian but as per documentation she had a debridement in July 2019 for a right buttock abscess. Patient is only complaining of some pain in the area of concern. Admitted for  worsening Sacral Ulcer.  In ED: Vital signs stable, afebrile, WBC 11.33, XRay pelvis with no abnormalities. Given Cefepime, Flagyl and vancomycin x1 (01 Sep 2019 20:50)    IMPRESSION  Stage 3 Sacral Ulcer with tissue necrosis and mild purulence at base.   HO diabetes mellitus  HO schizophrenia  HO  dementia  HO  anxiety  HO  CAD  HO hyperlipidemia  HO  hypothyroidism    Plan  Stage 3 Sacral Ulcer with tissue necrosis and mild purulence at base.   -Continue with current antibiotic pending culture  - ID consult  -Wound care as per burn Burn saw patient today -  will debride this week  - Wound Care-soap and water bid, santyl ointment and dakins wet to dry dressing change bid, check culture,       HO  Insulin dependent diabetes mellitus ep of Hypo glycemia   - On Januvia and Insulin at NH  - Hypoglycemic in ED  CAPILLARY BLOOD GLUCOSE  POCT Blood Glucose.: 97 (09-02-19 @ 11:48)  POCT Blood Glucose.: 79 (09-01-19 @ 17:42)  POCT Blood Glucose.: 54 (09-01-19 @ 16:28)      Normocytic anemia  - f/u AM iron and ferritin studies   -Retic ct 1.1% 0.71 retic index raghavendra hypoproliferative possible MDS component        HO CKD 3b  now at baseline   - Monitor Cr    HO CAD   - continue ASA    HO  Dyslipidemia   -Continue Lipitor    HO Paranoid schizophrenia/Anxiety  - Continue Olanzapine. On Haldol IM once monthly    HO Hypothyroidism  - Continue L-thyroxine          Electrolyte Imbalances: Correct as needed  []  Hyponatremia   /   Hypernatremia  []   []  Hypokalemia   /   Hyperkalemia  []   []  Hypochlorhydria   /    Hypochlorhydria  []   []  Hypomagnesemia   /   Hypermagnesemia  []   []  Hypophosphatemia   /   Hyperphosphatemia  []       GI ppx:       on famotidine                             [] Not indicated   /   [] Pantoprazole 40mg PO Daily    DVT ppx:  [] Not indicated / [] Heparin 5000mg SubQ / [x] Lovenox 40mg SubQ / [] SCDs    Fluids:   [x] PO  |  [] IVF    Activity:  [X] Assisatnce needed   [X] Increase as Tolerated  /  [] OOB w/ assist  /  [] Bed Rest    BMI:  Height (cm): 160.02 (09-02)        DISPO:  Patient to be discharged when condition(s) optimized.  [ ] From Home     [x ] NH/SNF   [ ] 4A Rehab  [ ] Detox Clinic  [X] Plan Discussion with patient and/or family.  [X] Discussed Case and Plan with the Medical Attending.    CODE STATUS  [X] FULL   /    [] DNR

## 2019-09-03 LAB
ANION GAP SERPL CALC-SCNC: 14 MMOL/L — SIGNIFICANT CHANGE UP (ref 7–14)
BASOPHILS # BLD AUTO: 0.08 K/UL — SIGNIFICANT CHANGE UP (ref 0–0.2)
BASOPHILS NFR BLD AUTO: 0.8 % — SIGNIFICANT CHANGE UP (ref 0–1)
BUN SERPL-MCNC: 16 MG/DL — SIGNIFICANT CHANGE UP (ref 10–20)
CALCIUM SERPL-MCNC: 9.4 MG/DL — SIGNIFICANT CHANGE UP (ref 8.5–10.1)
CHLORIDE SERPL-SCNC: 103 MMOL/L — SIGNIFICANT CHANGE UP (ref 98–110)
CO2 SERPL-SCNC: 23 MMOL/L — SIGNIFICANT CHANGE UP (ref 17–32)
CREAT SERPL-MCNC: 1.3 MG/DL — SIGNIFICANT CHANGE UP (ref 0.7–1.5)
CRP SERPL-MCNC: 1.97 MG/DL — HIGH (ref 0–0.4)
EOSINOPHIL # BLD AUTO: 0.43 K/UL — SIGNIFICANT CHANGE UP (ref 0–0.7)
EOSINOPHIL NFR BLD AUTO: 4.3 % — SIGNIFICANT CHANGE UP (ref 0–8)
GLUCOSE BLDC GLUCOMTR-MCNC: 119 MG/DL — HIGH (ref 70–99)
GLUCOSE BLDC GLUCOMTR-MCNC: 150 MG/DL — HIGH (ref 70–99)
GLUCOSE BLDC GLUCOMTR-MCNC: 202 MG/DL — HIGH (ref 70–99)
GLUCOSE BLDC GLUCOMTR-MCNC: 204 MG/DL — HIGH (ref 70–99)
GLUCOSE BLDC GLUCOMTR-MCNC: 214 MG/DL — HIGH (ref 70–99)
GLUCOSE SERPL-MCNC: 124 MG/DL — HIGH (ref 70–99)
HCT VFR BLD CALC: 32.4 % — LOW (ref 37–47)
HCV AB S/CO SERPL IA: 0.26 S/CO — SIGNIFICANT CHANGE UP (ref 0–0.99)
HCV AB SERPL-IMP: SIGNIFICANT CHANGE UP
HGB BLD-MCNC: 10.2 G/DL — LOW (ref 12–16)
IMM GRANULOCYTES NFR BLD AUTO: 0.5 % — HIGH (ref 0.1–0.3)
LYMPHOCYTES # BLD AUTO: 3.52 K/UL — HIGH (ref 1.2–3.4)
LYMPHOCYTES # BLD AUTO: 35.4 % — SIGNIFICANT CHANGE UP (ref 20.5–51.1)
MCHC RBC-ENTMCNC: 26.5 PG — LOW (ref 27–31)
MCHC RBC-ENTMCNC: 31.5 G/DL — LOW (ref 32–37)
MCV RBC AUTO: 84.2 FL — SIGNIFICANT CHANGE UP (ref 81–99)
MONOCYTES # BLD AUTO: 0.93 K/UL — HIGH (ref 0.1–0.6)
MONOCYTES NFR BLD AUTO: 9.4 % — HIGH (ref 1.7–9.3)
NEUTROPHILS # BLD AUTO: 4.93 K/UL — SIGNIFICANT CHANGE UP (ref 1.4–6.5)
NEUTROPHILS NFR BLD AUTO: 49.6 % — SIGNIFICANT CHANGE UP (ref 42.2–75.2)
NRBC # BLD: 0 /100 WBCS — SIGNIFICANT CHANGE UP (ref 0–0)
PLATELET # BLD AUTO: 248 K/UL — SIGNIFICANT CHANGE UP (ref 130–400)
POTASSIUM SERPL-MCNC: 4.5 MMOL/L — SIGNIFICANT CHANGE UP (ref 3.5–5)
POTASSIUM SERPL-SCNC: 4.5 MMOL/L — SIGNIFICANT CHANGE UP (ref 3.5–5)
RBC # BLD: 3.85 M/UL — LOW (ref 4.2–5.4)
RBC # FLD: 14.1 % — SIGNIFICANT CHANGE UP (ref 11.5–14.5)
SODIUM SERPL-SCNC: 140 MMOL/L — SIGNIFICANT CHANGE UP (ref 135–146)
WBC # BLD: 9.94 K/UL — SIGNIFICANT CHANGE UP (ref 4.8–10.8)
WBC # FLD AUTO: 9.94 K/UL — SIGNIFICANT CHANGE UP (ref 4.8–10.8)

## 2019-09-03 PROCEDURE — 99233 SBSQ HOSP IP/OBS HIGH 50: CPT

## 2019-09-03 RX ORDER — METRONIDAZOLE 500 MG
500 TABLET ORAL EVERY 8 HOURS
Refills: 0 | Status: DISCONTINUED | OUTPATIENT
Start: 2019-09-03 | End: 2019-09-04

## 2019-09-03 RX ORDER — METRONIDAZOLE 500 MG
500 TABLET ORAL EVERY 8 HOURS
Refills: 0 | Status: DISCONTINUED | OUTPATIENT
Start: 2019-09-03 | End: 2019-09-03

## 2019-09-03 RX ADMIN — Medication 100 MILLIGRAM(S): at 21:44

## 2019-09-03 RX ADMIN — FAMOTIDINE 20 MILLIGRAM(S): 10 INJECTION INTRAVENOUS at 11:12

## 2019-09-03 RX ADMIN — ENOXAPARIN SODIUM 40 MILLIGRAM(S): 100 INJECTION SUBCUTANEOUS at 11:12

## 2019-09-03 RX ADMIN — SENNA PLUS 2 TABLET(S): 8.6 TABLET ORAL at 21:41

## 2019-09-03 RX ADMIN — Medication 100 MILLIGRAM(S): at 05:18

## 2019-09-03 RX ADMIN — OLANZAPINE 20 MILLIGRAM(S): 15 TABLET, FILM COATED ORAL at 11:12

## 2019-09-03 RX ADMIN — CEFEPIME 100 MILLIGRAM(S): 1 INJECTION, POWDER, FOR SOLUTION INTRAMUSCULAR; INTRAVENOUS at 05:17

## 2019-09-03 RX ADMIN — Medication 1 TABLET(S): at 11:12

## 2019-09-03 RX ADMIN — Medication 1 APPLICATION(S): at 05:18

## 2019-09-03 RX ADMIN — CHLORHEXIDINE GLUCONATE 1 APPLICATION(S): 213 SOLUTION TOPICAL at 05:18

## 2019-09-03 RX ADMIN — Medication 50 MICROGRAM(S): at 05:18

## 2019-09-03 RX ADMIN — Medication 5 MILLIGRAM(S): at 21:42

## 2019-09-03 RX ADMIN — Medication 1 APPLICATION(S): at 17:05

## 2019-09-03 RX ADMIN — ATORVASTATIN CALCIUM 20 MILLIGRAM(S): 80 TABLET, FILM COATED ORAL at 21:40

## 2019-09-03 RX ADMIN — Medication 100 MILLIGRAM(S): at 17:05

## 2019-09-03 RX ADMIN — Medication 81 MILLIGRAM(S): at 11:12

## 2019-09-03 RX ADMIN — Medication 1 APPLICATION(S): at 17:06

## 2019-09-03 NOTE — CONSULT NOTE ADULT - SUBJECTIVE AND OBJECTIVE BOX
GLORIA WATERS  74y, Female  Allergy: No Known Allergies      CHIEF COMPLAINT: Worsening right buttock ulcer (03 Sep 2019 09:58)      HPI:  74 year old female with PMH of recent right buttock abscess s/p debridement, insulin dependent diabetes mellitus, schizophrenia, dementia, anxiety, CAD, hyperlipidemia, hypothyroidism, constipation, ovarian cyst brought in by EMS from [facility] for foul smelling and expanding right buttock ulcer. Patient is a poor historian but as per documentation she had a debridement in July 2019 for a right buttock abscess. Patient is only complaining of some pain in the area of concern. She denies any fever, chills, dyspnea, chest pain, nausea, vomiting, diarrhea.     In ED: Vital signs stable, afebrile, WBC 11.33, XRay pelvis with no abnormalities. Given Cefepime, Flagyl and vancomycin x1 (01 Sep 2019 20:50)      INFECTIOUS DISEASE HISTORY:    PAST MEDICAL & SURGICAL HISTORY:  Abscess of right buttock  Hypothyroidism  Hyperlipidemia  Paranoid schizophrenia  Insulin dependent diabetes mellitus  Dementia  Anxiety  Coronary artery disease  H/O abdominal surgery      FAMILY HISTORY  No pertinent family history in first degree relatives      SOCIAL HISTORY    Social History (marital status, living situation, occupation, tobacco use, alcohol and drug use, and sexual history): Denies smoking, alcohol or drug use      ROS  General: Denies rigors, nightsweats  HEENT: Denies headache, rhinorrhea, sore throat, eye pain  CV: Denies CP, palpitations  PULM: Denies SOB, wheezing  GI: Denies hematemesis, hematochezia, melena  : Denies discharge, hematuria  MSK: Denies arthralgias, myalgias  SKIN: Denies rash, lesions  NEURO: Denies paresthesias, weakness  PSYCH: Denies depression, anxiety    VITALS:  T(F): 98.2, Max: 99.5 (09-03-19 @ 00:04)  HR: 55  BP: 130/62  RR: 18Vital Signs Last 24 Hrs  T(C): 36.8 (03 Sep 2019 07:30), Max: 37.5 (03 Sep 2019 00:04)  T(F): 98.2 (03 Sep 2019 07:30), Max: 99.5 (03 Sep 2019 00:04)  HR: 55 (03 Sep 2019 07:30) (55 - 65)  BP: 130/62 (03 Sep 2019 07:30) (129/59 - 134/63)  BP(mean): --  RR: 18 (03 Sep 2019 07:30) (18 - 18)  SpO2: 95% (02 Sep 2019 23:48) (95% - 95%)    PHYSICAL EXAM:  Gen: Elderly F NAD, resting in bed  HEENT: Normocephalic, atraumatic  Neck: supple, no lymphadenopathy  CV: Regular rate & regular rhythm  Lungs: decreased BS at bases, no fremitus  Abdomen: Soft, BS present  Ext: Warm, well perfused  Neuro: non focal, awake  Skin: necrotic sacral ulcer, s/p debridement  Lines: no phlebitis    TESTS & MEASUREMENTS:                        10.2   9.94  )-----------( 248      ( 03 Sep 2019 06:03 )             32.4     09-03    140  |  103  |  16  ----------------------------<  124<H>  4.5   |  23  |  1.3    Ca    9.4      03 Sep 2019 06:03  Mg     1.9     09-02    TPro  7.2  /  Alb  4.0  /  TBili  0.5  /  DBili  x   /  AST  12  /  ALT  10  /  AlkPhos  63  09-01    eGFR if Non African American: 41 mL/min/1.73M2 (09-03-19 @ 06:03)  eGFR if African American: 47 mL/min/1.73M2 (09-03-19 @ 06:03)    LIVER FUNCTIONS - ( 01 Sep 2019 17:20 )  Alb: 4.0 g/dL / Pro: 7.2 g/dL / ALK PHOS: 63 U/L / ALT: 10 U/L / AST: 12 U/L / GGT: x               Culture - Abscess with Gram Stain (collected 09-01-19 @ 21:55)  Source: .Abscess Buttock right  Preliminary Report (09-03-19 @ 11:23):    Moderate Proteus mirabilis    Unable to evaluate further due to Proteus overgrowth            INFECTIOUS DISEASES TESTING      RADIOLOGY & ADDITIONAL TESTS:  I have personally reviewed the last Chest xray  CXR  Xray Chest 1 View AP/PA:   EXAM:  XR CHEST FRONTAL 1V            PROCEDURE DATE:  09/01/2019            INTERPRETATION:  Clinical History / Reason for exam: Cough    Comparison : Chest radiograph None.    Technique/Positioning: Frontal chest radiograph.    Findings:    Support devices: None.    Cardiac/mediastinum/hilum: Unremarkable.    Lung parenchyma/Pleura: Within normal limits.    Skeleton/soft tissues: Unremarkable.    Impression:      No radiographic evidence of acute cardiopulmonary disease.                      PAMELA GUNN M.D., ATTENDING RADIOLOGIST  This document has been electronically signed. Sep  2 2019 12:58PM             (09-01-19 @ 17:45)      CT      CARDIOLOGY TESTING      MEDICATIONS  aspirin enteric coated 81  atorvastatin 20  calcium carbonate 1250 mG  + Vitamin D (OsCal 500 + D) 1  cefepime   IVPB 2000  chlorhexidine 4% Liquid 1  collagenase Ointment 1  Dakins Solution - 1/2 Strength 1  docusate sodium 100  enoxaparin Injectable 40  famotidine    Tablet 20  levothyroxine 50  melatonin 5  multivitamin 1  OLANZapine Disintegrating Tablet 20  senna 2      ANTIBIOTICS:  cefepime   IVPB 2000 milliGRAM(s) IV Intermittent every 24 hours      ALLERGIES:  No Known Allergies GLORIA WATERS  74y, Female  Allergy: No Known Allergies      CHIEF COMPLAINT: Worsening right buttock ulcer (03 Sep 2019 09:58)      HPI:  74 year old female with PMH of recent right buttock abscess s/p debridement, insulin dependent diabetes mellitus, schizophrenia, dementia, anxiety, CAD, hyperlipidemia, hypothyroidism, constipation, ovarian cyst brought in by EMS from [facility] for foul smelling and expanding right buttock ulcer. Patient is a poor historian but as per documentation she had a debridement in July 2019 for a right buttock abscess. Patient is only complaining of some pain in the area of concern. She denies any fever, chills, dyspnea, chest pain, nausea, vomiting, diarrhea.     In ED: Vital signs stable, afebrile, WBC 11.33, XRay pelvis with no abnormalities. Given Cefepime, Flagyl and vancomycin x1 (01 Sep 2019 20:50)      INFECTIOUS DISEASE HISTORY:  wcx proteus    PAST MEDICAL & SURGICAL HISTORY:  Abscess of right buttock  Hypothyroidism  Hyperlipidemia  Paranoid schizophrenia  Insulin dependent diabetes mellitus  Dementia  Anxiety  Coronary artery disease  H/O abdominal surgery      FAMILY HISTORY  No pertinent family history in first degree relatives      SOCIAL HISTORY    Social History (marital status, living situation, occupation, tobacco use, alcohol and drug use, and sexual history): Denies smoking, alcohol or drug use      ROS  General: Denies rigors, nightsweats  HEENT: Denies headache, rhinorrhea, sore throat, eye pain  CV: Denies CP, palpitations  PULM: Denies SOB, wheezing  GI: Denies hematemesis, hematochezia, melena  : Denies discharge, hematuria  MSK: Denies arthralgias, myalgias  SKIN: Denies rash, lesions  NEURO: Denies paresthesias, weakness  PSYCH: Denies depression, anxiety    VITALS:  T(F): 98.2, Max: 99.5 (09-03-19 @ 00:04)  HR: 55  BP: 130/62  RR: 18Vital Signs Last 24 Hrs  T(C): 36.8 (03 Sep 2019 07:30), Max: 37.5 (03 Sep 2019 00:04)  T(F): 98.2 (03 Sep 2019 07:30), Max: 99.5 (03 Sep 2019 00:04)  HR: 55 (03 Sep 2019 07:30) (55 - 65)  BP: 130/62 (03 Sep 2019 07:30) (129/59 - 134/63)  BP(mean): --  RR: 18 (03 Sep 2019 07:30) (18 - 18)  SpO2: 95% (02 Sep 2019 23:48) (95% - 95%)    PHYSICAL EXAM:  Gen: Elderly F NAD, resting in bed  HEENT: Normocephalic, atraumatic  Neck: supple, no lymphadenopathy  CV: Regular rate & regular rhythm  Lungs: decreased BS at bases, no fremitus  Abdomen: Soft, BS present  Ext: Warm, well perfused  Neuro: non focal, awake  Skin: necrotic sacral ulcer, s/p debridement  Lines: no phlebitis    TESTS & MEASUREMENTS:                        10.2   9.94  )-----------( 248      ( 03 Sep 2019 06:03 )             32.4     09-03    140  |  103  |  16  ----------------------------<  124<H>  4.5   |  23  |  1.3    Ca    9.4      03 Sep 2019 06:03  Mg     1.9     09-02    TPro  7.2  /  Alb  4.0  /  TBili  0.5  /  DBili  x   /  AST  12  /  ALT  10  /  AlkPhos  63  09-01    eGFR if Non African American: 41 mL/min/1.73M2 (09-03-19 @ 06:03)  eGFR if African American: 47 mL/min/1.73M2 (09-03-19 @ 06:03)    LIVER FUNCTIONS - ( 01 Sep 2019 17:20 )  Alb: 4.0 g/dL / Pro: 7.2 g/dL / ALK PHOS: 63 U/L / ALT: 10 U/L / AST: 12 U/L / GGT: x               Culture - Abscess with Gram Stain (collected 09-01-19 @ 21:55)  Source: .Abscess Buttock right  Preliminary Report (09-03-19 @ 11:23):    Moderate Proteus mirabilis    Unable to evaluate further due to Proteus overgrowth            INFECTIOUS DISEASES TESTING      RADIOLOGY & ADDITIONAL TESTS:  I have personally reviewed the last Chest xray  CXR  Xray Chest 1 View AP/PA:   EXAM:  XR CHEST FRONTAL 1V            PROCEDURE DATE:  09/01/2019            INTERPRETATION:  Clinical History / Reason for exam: Cough    Comparison : Chest radiograph None.    Technique/Positioning: Frontal chest radiograph.    Findings:    Support devices: None.    Cardiac/mediastinum/hilum: Unremarkable.    Lung parenchyma/Pleura: Within normal limits.    Skeleton/soft tissues: Unremarkable.    Impression:      No radiographic evidence of acute cardiopulmonary disease.                      PAMELA GUNN M.D., ATTENDING RADIOLOGIST  This document has been electronically signed. Sep  2 2019 12:58PM             (09-01-19 @ 17:45)      CT      CARDIOLOGY TESTING      MEDICATIONS  aspirin enteric coated 81  atorvastatin 20  calcium carbonate 1250 mG  + Vitamin D (OsCal 500 + D) 1  cefepime   IVPB 2000  chlorhexidine 4% Liquid 1  collagenase Ointment 1  Dakins Solution - 1/2 Strength 1  docusate sodium 100  enoxaparin Injectable 40  famotidine    Tablet 20  levothyroxine 50  melatonin 5  multivitamin 1  OLANZapine Disintegrating Tablet 20  senna 2      ANTIBIOTICS:  cefepime   IVPB 2000 milliGRAM(s) IV Intermittent every 24 hours      ALLERGIES:  No Known Allergies

## 2019-09-03 NOTE — CONSULT NOTE ADULT - ASSESSMENT
ASSESSMENT  74 year old female with PMH of recent right buttock abscess s/p debridement, insulin dependent diabetes mellitus, schizophrenia, dementia, anxiety, CAD, hyperlipidemia, hypothyroidism, constipation, ovarian cyst brought in by EMS from [facility] for foul smelling and expanding right buttock ulcer    IMPRESSION  #Sacral decub, Sepsis ruled out on admission     s/p debridement 9/1, WCX proteus    xray no OM    RECOMMENDATIONS  - Continue cefepie 2g q24h IV  - ADD flagyl 500mg q8h IV  - f/u proteus sensitivities   - likely plan for PO course to complete  - Burn following    Spectra [id number] ASSESSMENT  74 year old female with PMH of recent right buttock abscess s/p debridement, insulin dependent diabetes mellitus, schizophrenia, dementia, anxiety, CAD, hyperlipidemia, hypothyroidism, constipation, ovarian cyst brought in by EMS from [facility] for foul smelling and expanding right buttock ulcer    IMPRESSION  #Sacral decub, Sepsis ruled out on admission     s/p debridement 9/1, WCX proteus    xray no OM    RECOMMENDATIONS  - Continue cefepime 2g q24h IV  - ADD flagyl 500mg q8h IV  - f/u proteus sensitivities   - likely plan for PO course to complete  - Burn following- debridement    Spectra [id number]

## 2019-09-03 NOTE — DIETITIAN INITIAL EVALUATION ADULT. - ENERGY NEEDS
Calories: 0748-2498 kcal/day (30-35 kcal/kg IBW)--increased needs to promote wound healing   Protein: 62-78 gm/day (1.2-1.5 gm/kg IBW)--same as mentioned above   Fluid: 1 ml/kcal or per LIP

## 2019-09-03 NOTE — DIETITIAN INITIAL EVALUATION ADULT. - FEEDING SKILL
independent/Pt reports that her appetite is good, but she doesn't really like the food in the hospital. Pt is currently consuming <50% of meal trays at this time; agreeable to trying oral supplements for adequate nutrition.

## 2019-09-03 NOTE — PROGRESS NOTE ADULT - SUBJECTIVE AND OBJECTIVE BOX
LENGTH OF HOSPITAL STAY: 2d      CHIEF COMPLAINT:   Patient is a 74y old  Female who presents with a chief complaint of Worsening right buttock ulcer (03 Sep 2019 14:55)      OVER Past 24hrs:  The patient was seen and examined at bedside there were there were no acute events during the night. Patient still has sacral discomfort.       PAST MEDICAL & SURGICAL HISTORY  PAST MEDICAL & SURGICAL HISTORY:  Abscess of right buttock  Hypothyroidism  Hyperlipidemia  Paranoid schizophrenia  Insulin dependent diabetes mellitus  Dementia  Anxiety  Coronary artery disease  H/O abdominal surgery        REVIEW OF SYSTEMS  CONSTITUTIONAL: No fever  RESPIRATORY: No cough, wheezing, chills or hemoptysis; No shortness of breath  CARDIOVASCULAR: No chest pain, palpitations, dizziness, or leg swelling  GASTROINTESTINAL: No abdominal or epigastric pain.   GENITOURINARY: No dysuria,    ALLERGIES:  No Known Allergies    MEDICATIONS:  STANDING MEDICATIONS  aspirin enteric coated 81 milliGRAM(s) Oral daily  atorvastatin 20 milliGRAM(s) Oral at bedtime  calcium carbonate 1250 mG  + Vitamin D (OsCal 500 + D) 1 Tablet(s) Oral daily  cefepime   IVPB 2000 milliGRAM(s) IV Intermittent every 24 hours  chlorhexidine 4% Liquid 1 Application(s) Topical <User Schedule>  collagenase Ointment 1 Application(s) Topical two times a day  Dakins Solution - 1/2 Strength 1 Application(s) Topical two times a day  docusate sodium 100 milliGRAM(s) Oral two times a day  enoxaparin Injectable 40 milliGRAM(s) SubCutaneous daily  famotidine    Tablet 20 milliGRAM(s) Oral daily  levothyroxine 50 MICROGram(s) Oral daily  melatonin 5 milliGRAM(s) Oral at bedtime  metroNIDAZOLE    Tablet 500 milliGRAM(s) Oral every 8 hours  multivitamin 1 Tablet(s) Oral daily  OLANZapine Disintegrating Tablet 20 milliGRAM(s) Oral daily  senna 2 Tablet(s) Oral at bedtime    PRN MEDICATIONS  acetaminophen   Tablet .. 650 milliGRAM(s) Oral every 6 hours PRN    VITALS:   T(F): 98.2  HR: 55  BP: 130/62  RR: 18  SpO2: 95%    PHYSICAL EXAM:  General: No acute distress.  Alert, oriented, interactive, nonfocal.    General: No acute distress.  Alert, oriented, interactive, nonfocal. Elderly   Female     HEENT: Pupils equal, reactive to light symmetrically.    PULM: Clear to auscultation bilaterally, no significant sputum production.    CVS: Regular rate and rhythm, no murmurs, rubs, or gallops.    ABD: Soft, nondistended, nontender, no masses.    EXT: No edema, nontender.    SKIN:  Unstageable on Right gluteal  Ulcer foul smelling with purulence at base, NO surrounding erythema or signs of abscess formation.       LABS:                        10.2   9.94  )-----------( 248      ( 03 Sep 2019 06:03 )             32.4     09-03    140  |  103  |  16  ----------------------------<  124<H>  4.5   |  23  |  1.3    Ca    9.4      03 Sep 2019 06:03  Mg     1.9     09-02    TPro  7.2  /  Alb  4.0  /  TBili  0.5  /  DBili  x   /  AST  12  /  ALT  10  /  AlkPhos  63  09-01    PT/INR - ( 01 Sep 2019 17:20 )   PT: 12.00 sec;   INR: 1.04 ratio         PTT - ( 01 Sep 2019 17:20 )  PTT:30.9 sec          Culture - Abscess with Gram Stain (collected 01 Sep 2019 21:55)  Source: .Abscess Buttock right  Preliminary Report (03 Sep 2019 11:23):    Moderate Proteus mirabilis    Unable to evaluate further due to Proteus overgrowth          RADIOLOGY: LENGTH OF HOSPITAL STAY: 2d      CHIEF COMPLAINT:   Patient is a 74y old  Female who presents with a chief complaint of Worsening right buttock ulcer (03 Sep 2019 14:55)      OVER Past 24hrs:  The patient was seen and examined at bedside there were there were no acute events during the night. Patient still has sacral discomfort.       PAST MEDICAL & SURGICAL HISTORY  PAST MEDICAL & SURGICAL HISTORY:  Abscess of right buttock  Hypothyroidism  Hyperlipidemia  Paranoid schizophrenia  Insulin dependent diabetes mellitus  Dementia  Anxiety  Coronary artery disease  H/O abdominal surgery        REVIEW OF SYSTEMS  CONSTITUTIONAL: No fever  RESPIRATORY: No cough, wheezing, chills or hemoptysis; No shortness of breath  CARDIOVASCULAR: No chest pain, palpitations, dizziness, or leg swelling  GASTROINTESTINAL: No abdominal or epigastric pain.   GENITOURINARY: No dysuria,    ALLERGIES:  No Known Allergies    MEDICATIONS:  STANDING MEDICATIONS  aspirin enteric coated 81 milliGRAM(s) Oral daily  atorvastatin 20 milliGRAM(s) Oral at bedtime  calcium carbonate 1250 mG  + Vitamin D (OsCal 500 + D) 1 Tablet(s) Oral daily  cefepime   IVPB 2000 milliGRAM(s) IV Intermittent every 24 hours  chlorhexidine 4% Liquid 1 Application(s) Topical <User Schedule>  collagenase Ointment 1 Application(s) Topical two times a day  Dakins Solution - 1/2 Strength 1 Application(s) Topical two times a day  docusate sodium 100 milliGRAM(s) Oral two times a day  enoxaparin Injectable 40 milliGRAM(s) SubCutaneous daily  famotidine    Tablet 20 milliGRAM(s) Oral daily  levothyroxine 50 MICROGram(s) Oral daily  melatonin 5 milliGRAM(s) Oral at bedtime  metroNIDAZOLE    Tablet 500 milliGRAM(s) Oral every 8 hours  multivitamin 1 Tablet(s) Oral daily  OLANZapine Disintegrating Tablet 20 milliGRAM(s) Oral daily  senna 2 Tablet(s) Oral at bedtime    PRN MEDICATIONS  acetaminophen   Tablet .. 650 milliGRAM(s) Oral every 6 hours PRN    VITALS:   T(F): 98.2  HR: 55  BP: 130/62  RR: 18  SpO2: 95%    PHYSICAL EXAM:  General: No acute distress.  Alert, oriented, interactive, nonfocal.    General: No acute distress.  Alert, oriented, interactive, nonfocal. Elderly   Female     HEENT: Pupils equal, reactive to light symmetrically.    PULM: Clear to auscultation bilaterally, no significant sputum production.    CVS: Regular rate and rhythm, no murmurs, rubs, or gallops.    ABD: Soft, nondistended, nontender, no masses.    EXT: No edema, nontender.    SKIN:  Unstageable on Right gluteal  Ulcer foul smelling with purulence at base on abdmission, no surrounding erythema or signs of abscess formation.      LABS:                        10.2   9.94  )-----------( 248      ( 03 Sep 2019 06:03 )             32.4     09-03    140  |  103  |  16  ----------------------------<  124<H>  4.5   |  23  |  1.3    Ca    9.4      03 Sep 2019 06:03  Mg     1.9     09-02    TPro  7.2  /  Alb  4.0  /  TBili  0.5  /  DBili  x   /  AST  12  /  ALT  10  /  AlkPhos  63  09-01    PT/INR - ( 01 Sep 2019 17:20 )   PT: 12.00 sec;   INR: 1.04 ratio         PTT - ( 01 Sep 2019 17:20 )  PTT:30.9 sec          Culture - Abscess with Gram Stain (collected 01 Sep 2019 21:55)  Source: .Abscess Buttock right  Preliminary Report (03 Sep 2019 11:23):    Moderate Proteus mirabilis    Unable to evaluate further due to Proteus overgrowth LENGTH OF HOSPITAL STAY: 2d      CHIEF COMPLAINT:   Patient is a 74y old  Female who presents with a chief complaint of Worsening right buttock ulcer (03 Sep 2019 14:55)      OVER Past 24hrs:  The patient was seen and examined at bedside there were there were no acute events during the night. Patient still has sacral discomfort.       PAST MEDICAL & SURGICAL HISTORY  PAST MEDICAL & SURGICAL HISTORY:  Abscess of right buttock  Hypothyroidism  Hyperlipidemia  Paranoid schizophrenia  Insulin dependent diabetes mellitus  Dementia  Anxiety  Coronary artery disease  H/O abdominal surgery        REVIEW OF SYSTEMS  CONSTITUTIONAL: No fever  RESPIRATORY: No cough, wheezing, chills or hemoptysis; No shortness of breath  CARDIOVASCULAR: No chest pain, palpitations, dizziness, or leg swelling  GASTROINTESTINAL: No abdominal or epigastric pain.   GENITOURINARY: No dysuria,    ALLERGIES:  No Known Allergies    MEDICATIONS:  STANDING MEDICATIONS  aspirin enteric coated 81 milliGRAM(s) Oral daily  atorvastatin 20 milliGRAM(s) Oral at bedtime  calcium carbonate 1250 mG  + Vitamin D (OsCal 500 + D) 1 Tablet(s) Oral daily  cefepime   IVPB 2000 milliGRAM(s) IV Intermittent every 24 hours  chlorhexidine 4% Liquid 1 Application(s) Topical <User Schedule>  collagenase Ointment 1 Application(s) Topical two times a day  Dakins Solution - 1/2 Strength 1 Application(s) Topical two times a day  docusate sodium 100 milliGRAM(s) Oral two times a day  enoxaparin Injectable 40 milliGRAM(s) SubCutaneous daily  famotidine    Tablet 20 milliGRAM(s) Oral daily  levothyroxine 50 MICROGram(s) Oral daily  melatonin 5 milliGRAM(s) Oral at bedtime  metroNIDAZOLE    Tablet 500 milliGRAM(s) Oral every 8 hours  multivitamin 1 Tablet(s) Oral daily  OLANZapine Disintegrating Tablet 20 milliGRAM(s) Oral daily  senna 2 Tablet(s) Oral at bedtime    PRN MEDICATIONS  acetaminophen   Tablet .. 650 milliGRAM(s) Oral every 6 hours PRN    VITALS:   T(F): 98.2  HR: 55  BP: 130/62  RR: 18  SpO2: 95%    PHYSICAL EXAM:  General: No acute distress.  Alert, oriented, interactive, nonfocal.    General: No acute distress.  Alert, oriented, interactive, nonfocal. Elderly   Female     HEENT: Pupils equal, reactive to light symmetrically.    PULM: Clear to auscultation bilaterally, no significant sputum production.    CVS: Regular rate and rhythm, no murmurs, rubs, or gallops.    ABD: Soft, nondistended, nontender, no masses.    EXT: No edema, nontender.    SKIN:  Unstageable on Right gluteal  Ulcer foul smelling with purulence at base on admission no surrounding erythema or signs of abscess formation.      LABS:                        10.2   9.94  )-----------( 248      ( 03 Sep 2019 06:03 )             32.4     09-03    140  |  103  |  16  ----------------------------<  124<H>  4.5   |  23  |  1.3    Ca    9.4      03 Sep 2019 06:03  Mg     1.9     09-02    TPro  7.2  /  Alb  4.0  /  TBili  0.5  /  DBili  x   /  AST  12  /  ALT  10  /  AlkPhos  63  09-01    PT/INR - ( 01 Sep 2019 17:20 )   PT: 12.00 sec;   INR: 1.04 ratio         PTT - ( 01 Sep 2019 17:20 )  PTT:30.9 sec          Culture - Abscess with Gram Stain (collected 01 Sep 2019 21:55)  Source: .Abscess Buttock right  Preliminary Report (03 Sep 2019 11:23):    Moderate Proteus mirabilis    Unable to evaluate further due to Proteus overgrowth

## 2019-09-03 NOTE — PROGRESS NOTE ADULT - ASSESSMENT
74 year old female with PMH of recent right buttock abscess s/p debridement, insulin dependent diabetes mellitus, schizophrenia, dementia, anxiety, CAD, hyperlipidemia, hypothyroidism, constipation, ovarian cyst brought in by EMS from [facility] for foul smelling and expanding right buttock ulcer. Patient is a poor historian but as per documentation she had a debridement in July 2019 for a right buttock abscess. Patient is only complaining of some pain in the area of concern. Admitted for  worsening Sacral Ulcer.  In ED: Vital signs stable, afebrile, WBC 11.33, XRay pelvis with no abnormalities. Given Cefepime, Flagyl and vancomycin x1 (01 Sep 2019 20:50)    Todays Events:  Awaiting Burn debridement adding flagyl  for coverage. ID notes ronaldo    IMPRESSION  Unstageable on Right gluteal  Ulcer  HO diabetes mellitus  HO schizophrenia  HO  dementia  HO  anxiety  HO  CAD  HO hyperlipidemia  HO  hypothyroidism    Plan  Stage 3 Sacral Ulcer with tissue necrosis and mild purulence at base.   -Continue with current antibiotic pending culture  - ID consult ronaldo adding flagyl   - Wound care as per burn Burn saw patient today -  will debride today at bedside   - Wound Care-soap and water bid, santyl ointment and dakins wet to dry dressing change bid, check culture,       HO  Insulin dependent diabetes mellitus ep of Hypo glycemia   - On Januvia and Insulin at NH  - Hypoglycemic in ED  CAPILLARY BLOOD GLUCOSE  POCT Blood Glucose.: 202 (09-03-19 @ 11:28)  POCT Blood Glucose.: 204 (09-03-19 @ 11:12)  POCT Blood Glucose.: 119 (09-03-19 @ 07:43)  POCT Blood Glucose.: 155 (09-02-19 @ 21:02)  POCT Blood Glucose.: 113 (09-02-19 @ 16:48)  POCT Blood Glucose.: 97 (09-02-19 @ 11:48)  POCT Blood Glucose.: 79 (09-01-19 @ 17:42)  POCT Blood Glucose.: 54 (09-01-19 @ 16:28)        Normocytic anemia  - iron and ferritin studies   -Retic ct 1.1% 0.71 retic index raghavendra hypoproliferative possible MDS component        HO CKD 3b  now at baseline   - Monitor Cr    HO CAD   - continue ASA    HO  Dyslipidemia   -Continue Lipitor    HO Paranoid schizophrenia/Anxiety  - Continue Olanzapine. On Haldol IM once monthly    HO Hypothyroidism  - Continue L-thyroxine          Electrolyte Imbalances: Correct as needed  []  Hyponatremia   /   Hypernatremia  []   []  Hypokalemia   /   Hyperkalemia  []   []  Hypochlorhydria   /    Hypochlorhydria  []   []  Hypomagnesemia   /   Hypermagnesemia  []   []  Hypophosphatemia   /   Hyperphosphatemia  []       GI ppx:       on famotidine                             [] Not indicated   /   [] Pantoprazole 40mg PO Daily    DVT ppx:  [] Not indicated / [] Heparin 5000mg SubQ / [x] Lovenox 40mg SubQ / [] SCDs    Fluids:   [x] PO  |  [] IVF    Activity:  [X] Assisatnce needed   [X] Increase as Tolerated  /  [] OOB w/ assist  /  [] Bed Rest    BMI:  Height (cm): 160.02 (09-02)        DISPO:  Patient to be discharged when condition(s) optimized.  [ ] From Home     [x ] NH/SNF   [ ] 4A Rehab  [ ] Detox Clinic  [X] Plan Discussion with patient and/or family.  [X] Discussed Case and Plan with the Medical Attending.    CODE STATUS  [X] FULL   /    [] DNR 74 year old female with PMH of recent right buttock abscess s/p debridement, insulin dependent diabetes mellitus, schizophrenia, dementia, anxiety, CAD, hyperlipidemia, hypothyroidism, constipation, ovarian cyst brought in by EMS from [facility] for foul smelling and expanding right buttock ulcer. Patient is a poor historian but as per documentation she had a debridement in July 2019 for a right buttock abscess. Patient is only complaining of some pain in the area of concern. Admitted for  worsening Sacral Ulcer.  In ED: Vital signs stable, afebrile, WBC 11.33, XRay pelvis with no abnormalities. Given Cefepime, Flagyl and vancomycin x1 (01 Sep 2019 20:50)    Todays Events:  Awaiting Burn debridement adding flagyl  for coverage. ID notes ronaldo    IMPRESSION  Unstageable on Right gluteal  Ulcer  HO diabetes mellitus  HO schizophrenia  HO  dementia  HO  anxiety  HO  CAD  HO hyperlipidemia  HO  hypothyroidism    Plan  Stage 3 Sacral Ulcer with tissue necrosis and mild purulence at base.   -Continue with current antibiotic pending culture  - ID consult ronaldo adding flagyl   - Wound care as per burn Burn  -  will debride today at bedside   - Wound Care-soap and water bid, santyl ointment and dakins wet to dry dressing change bid, check culture,       HO  Insulin dependent diabetes mellitus ep of Hypo glycemia   - On Januvia and Insulin at NH  - Hypoglycemic in ED  CAPILLARY BLOOD GLUCOSE  POCT Blood Glucose.: 202 (09-03-19 @ 11:28)  POCT Blood Glucose.: 204 (09-03-19 @ 11:12)  POCT Blood Glucose.: 119 (09-03-19 @ 07:43)  POCT Blood Glucose.: 155 (09-02-19 @ 21:02)  POCT Blood Glucose.: 113 (09-02-19 @ 16:48)  POCT Blood Glucose.: 97 (09-02-19 @ 11:48)  POCT Blood Glucose.: 79 (09-01-19 @ 17:42)  POCT Blood Glucose.: 54 (09-01-19 @ 16:28)        Normocytic anemia  - iron and ferritin studies   -Retic ct 1.1% 0.71 retic index raghavendra hypoproliferative possible MDS component        HO CKD 3b  now at baseline   - Monitor Cr    HO CAD   - continue ASA    HO  Dyslipidemia   -Continue Lipitor    HO Paranoid schizophrenia/Anxiety  - Continue Olanzapine. On Haldol IM once monthly    HO Hypothyroidism  - Continue L-thyroxine          Electrolyte Imbalances: Correct as needed  []  Hyponatremia   /   Hypernatremia  []   []  Hypokalemia   /   Hyperkalemia  []   []  Hypochlorhydria   /    Hypochlorhydria  []   []  Hypomagnesemia   /   Hypermagnesemia  []   []  Hypophosphatemia   /   Hyperphosphatemia  []       GI ppx:       on famotidine                             [] Not indicated   /   [] Pantoprazole 40mg PO Daily    DVT ppx:  [] Not indicated / [] Heparin 5000mg SubQ / [x] Lovenox 40mg SubQ / [] SCDs    Fluids:   [x] PO  |  [] IVF    Activity:  [X] Assisatnce needed   [X] Increase as Tolerated  /  [] OOB w/ assist  /  [] Bed Rest    BMI:  Height (cm): 160.02 (09-02)        DISPO:  Patient to be discharged when condition(s) optimized.  [ ] From Home     [x ] NH/SNF   [ ] 4A Rehab  [ ] Detox Clinic  [X] Plan Discussion with patient and/or family.  [X] Discussed Case and Plan with the Medical Attending.    CODE STATUS  [X] FULL   /    [] DNR 74 year old female with PMH of recent right buttock abscess s/p debridement, insulin dependent diabetes mellitus, schizophrenia, dementia, anxiety, CAD, hyperlipidemia, hypothyroidism, constipation, ovarian cyst brought in by EMS from [facility] for foul smelling and expanding right buttock ulcer. Patient is a poor historian but as per documentation she had a debridement in July 2019 for a right buttock abscess. Patient is only complaining of some pain in the area of concern. Admitted for  worsening Sacral Ulcer.  In ED: Vital signs stable, afebrile, WBC 11.33, XRay pelvis with no abnormalities. Given Cefepime, Flagyl and vancomycin x1 (01 Sep 2019 20:50)    Today's Events:  Awaiting Burn debridement  today,  DC current  abx  coverage will start meropenem . ID notes ronaldo sensitivities obtained ESBL possible.     IMPRESSION  Unstageable on Right gluteal  Ulcer growing ESBL   HO diabetes mellitus  HO schizophrenia  HO  dementia  HO  anxiety  HO  CAD  HO hyperlipidemia  HO  hypothyroidism    Plan  Unstageable on Right gluteal  Ulcer  growing esnle   - starting Meropenem 1g q12h IV  likely need midline for Out patient ertapenem   -Continue with current antibiotic pending culture  - ID consult ronaldo starting   - Wound care as per burn Burn  -  will debride today at bedside   - Wound Care-soap and water bid, santyl ointment and dakins wet to dry dressing change bid, check culture,       HO  Insulin dependent diabetes mellitus ep of Hypo glycemia   - On Januvia and Insulin at NH  - Hypoglycemic in ED  CAPILLARY BLOOD GLUCOSE  POCT Blood Glucose.: 162 (09-04-19 @ 07:47)  POCT Blood Glucose.: 214 (09-03-19 @ 20:58)  POCT Blood Glucose.: 150 (09-03-19 @ 17:19)  POCT Blood Glucose.: 202 (09-03-19 @ 11:28)  POCT Blood Glucose.: 204 (09-03-19 @ 11:12)  POCT Blood Glucose.: 119 (09-03-19 @ 07:43)  POCT Blood Glucose.: 155 (09-02-19 @ 21:02)  POCT Blood Glucose.: 113 (09-02-19 @ 16:48)      Normocytic anemia  -Retic ct 1.1% 0.71 retic index kobeley hypoproliferative possible MDS component        HO CKD 3b  now at baseline   - Monitor Cr    HO CAD   - continue ASA    HO  Dyslipidemia   -Continue Lipitor    HO Paranoid schizophrenia/Anxiety  - Continue Olanzapine. On Haldol IM once monthly    HO Hypothyroidism  - Continue L-thyroxine          Electrolyte Imbalances: Correct as needed  []  Hyponatremia   /   Hypernatremia  []   []  Hypokalemia   /   Hyperkalemia  []   []  Hypochlorhydria   /    Hypochlorhydria  []   []  Hypomagnesemia   /   Hypermagnesemia  []   []  Hypophosphatemia   /   Hyperphosphatemia  []       GI ppx:       on famotidine                             [] Not indicated   /   [] Pantoprazole 40mg PO Daily    DVT ppx:  [] Not indicated / [] Heparin 5000mg SubQ / [x] Lovenox 40mg SubQ / [] SCDs    Fluids:   [x] PO  |  [] IVF    Activity:  [X] Assisatnce needed   [X] Increase as Tolerated  /  [] OOB w/ assist  /  [] Bed Rest    BMI:  Height (cm): 160.02 (09-02)        DISPO:  Patient to be discharged when condition(s) optimized.  [ ] From Home     [x ] NH/SNF   [ ] 4A Rehab  [ ] Detox Clinic  [X] Plan Discussion with patient and/or family.  [X] Discussed Case and Plan with the Medical Attending.    CODE STATUS  [X] FULL   /    [] DNR

## 2019-09-03 NOTE — PROGRESS NOTE ADULT - SUBJECTIVE AND OBJECTIVE BOX
GLORIA WATERS [id number]    Hospitalist Note  75yo F with Past Medical History Chronic Right buttock Abscess status post debridement, DMII, schizophrenia, dementia, anxiety, CAD, hyperlipidemia, hypothyroidism, chronic constipation, and prior ovarian cyst admitted from [facility] for worsening right buttock ulcer.    Overnight events/Updates: Wound cultures were found to be positive for Proteus.  The patient is scheduled to undergo bedside debridement by Burn Sx later this afternoon.    Vital Signs Last 24 Hrs  T(C): 37.4 (03 Sep 2019 15:00), Max: 37.5 (03 Sep 2019 00:04)  T(F): 99.3 (03 Sep 2019 15:00), Max: 99.5 (03 Sep 2019 00:04)  HR: 65 (03 Sep 2019 15:00) (55 - 65)  BP: 142/63 (03 Sep 2019 15:00) (129/59 - 142/63)  BP(mean): --  RR: 18 (03 Sep 2019 15:00) (18 - 18)  SpO2: 95% (02 Sep 2019 23:48) (95% - 95%)    Physical Examination:  General: AAO x 3  HEENT: PERRLA, EOMI  CV= S1 & S2 appreciated  Lungs= CTA BL  Abdominal Examination= + BS, Soft, NT/ND  Approximately 5-6cm ulcer to the right buttock.  Extremity Examination= No C/C/E    ROS: No chest pain, no shortness of breath.  All other systems reviewed and are within normal limits except for the complaints in the HPI.    MEDICATIONS  (STANDING):  aspirin enteric coated 81 milliGRAM(s) Oral daily  atorvastatin 20 milliGRAM(s) Oral at bedtime  calcium carbonate 1250 mG  + Vitamin D (OsCal 500 + D) 1 Tablet(s) Oral daily  cefepime   IVPB 2000 milliGRAM(s) IV Intermittent every 24 hours  chlorhexidine 4% Liquid 1 Application(s) Topical <User Schedule>  collagenase Ointment 1 Application(s) Topical two times a day  Dakins Solution - 1/2 Strength 1 Application(s) Topical two times a day  docusate sodium 100 milliGRAM(s) Oral two times a day  enoxaparin Injectable 40 milliGRAM(s) SubCutaneous daily  famotidine    Tablet 20 milliGRAM(s) Oral daily  levothyroxine 50 MICROGram(s) Oral daily  melatonin 5 milliGRAM(s) Oral at bedtime  metroNIDAZOLE    Tablet 500 milliGRAM(s) Oral every 8 hours  multivitamin 1 Tablet(s) Oral daily  OLANZapine Disintegrating Tablet 20 milliGRAM(s) Oral daily  senna 2 Tablet(s) Oral at bedtime    MEDICATIONS  (PRN):  acetaminophen   Tablet .. 650 milliGRAM(s) Oral every 6 hours PRN Mild Pain (1 - 3)                            10.2   9.94  )-----------( 248      ( 03 Sep 2019 06:03 )             32.4     09-03    140  |  103  |  16  ----------------------------<  124<H>  4.5   |  23  |  1.3    Ca    9.4      03 Sep 2019 06:03  Mg     1.9     09-02    TPro  7.2  /  Alb  4.0  /  TBili  0.5  /  DBili  x   /  AST  12  /  ALT  10  /  AlkPhos  63  09-01      Case discussed with housestaff & family  ISSAC Grewal [id number]

## 2019-09-03 NOTE — PROGRESS NOTE ADULT - ASSESSMENT
75yo F with Past Medical History Chronic Right buttock Abscess status post debridement, DMII, schizophrenia, dementia, anxiety, CAD, hyperlipidemia, hypothyroidism, chronic constipation, and prior ovarian cyst admitted from [facility] for worsening right buttock ulcer.    Acute on chronic right buttock ulcer: Burn Sx and Infectious Disease recommendations were reviewed; abx were increased to Cefepime and Flagyl.  Follow-up sensitivities from wound cultures.  The patient will benefit from bedside debridement later this afternoon.  DMII: FS have been stable off insulin.  Continue CHO consistent diet and monitor FS with meals.  Schizophrenia: continue Zyprexa 20mg PO q24  CAD: continue ASA and Lipitor as directed  Hypothyroidism: continue Synthroid as directed  GI/DVT prophylaxis    #Progress Note Handoff    Pending:  Consults: Burn Sx for debridement later this afternoon  Test Results: pending wound cx sensitivities    Future Disposition: Return to SNF

## 2019-09-03 NOTE — CONSULT NOTE ADULT - SUBJECTIVE AND OBJECTIVE BOX
NEPHROLOGY CONSULTATION NOTE    Patient is a 74 year old female from NH with PMH of recent right buttock abscess s/p debridement, schizophrenia with paranoid ideation, medicine non compliance, insulin dependent diabetes mellitus, dementia, anxiety, hyperlipidemia, hypothyroidism, constipation, ovarian cyst brought in by EMS from [facility] for foul smelling and expanding right buttock ulcer. Patient is a poor historian but as per documentation she had a debridement in July 2019 for a right buttock abscess. She is alert to baseline.    PAST MEDICAL & SURGICAL HISTORY:  Abscess of right buttock  Hypothyroidism  Hyperlipidemia  Paranoid schizophrenia  Insulin dependent diabetes mellitus  Dementia  Anxiety  Coronary artery disease  H/O abdominal surgery    Allergies:  No Known Allergies    Home Medications Reviewed  Hospital Medications:   MEDICATIONS  (STANDING):  aspirin enteric coated 81 milliGRAM(s) Oral daily  atorvastatin 20 milliGRAM(s) Oral at bedtime  calcium carbonate 1250 mG  + Vitamin D (OsCal 500 + D) 1 Tablet(s) Oral daily  cefepime   IVPB 2000 milliGRAM(s) IV Intermittent every 24 hours  chlorhexidine 4% Liquid 1 Application(s) Topical <User Schedule>  collagenase Ointment 1 Application(s) Topical two times a day  Dakins Solution - 1/2 Strength 1 Application(s) Topical two times a day  docusate sodium 100 milliGRAM(s) Oral two times a day  enoxaparin Injectable 40 milliGRAM(s) SubCutaneous daily  famotidine    Tablet 20 milliGRAM(s) Oral daily  levothyroxine 50 MICROGram(s) Oral daily  melatonin 5 milliGRAM(s) Oral at bedtime  multivitamin 1 Tablet(s) Oral daily  OLANZapine Disintegrating Tablet 20 milliGRAM(s) Oral daily  senna 2 Tablet(s) Oral at bedtime      SOCIAL HISTORY:  Denies ETOH,Smoking,   FAMILY HISTORY:  No pertinent family history in first degree relatives        REVIEW OF SYSTEMS:  no complaints but difficult to evaluate    All other review of systems is negative unless indicated above.    VITALS:  T(F): 98.2 (09-03-19 @ 07:30), Max: 99.5 (09-03-19 @ 00:04)  HR: 55 (09-03-19 @ 07:30)  BP: 130/62 (09-03-19 @ 07:30)  RR: 18 (09-03-19 @ 07:30)  SpO2: 95% (09-02-19 @ 23:48)    09-02 @ 07:01  -  09-03 @ 07:00  --------------------------------------------------------  IN: 0 mL / OUT: 800 mL / NET: -800 mL            I&O's Detail    02 Sep 2019 07:01  -  03 Sep 2019 07:00  --------------------------------------------------------  IN:  Total IN: 0 mL    OUT:    Voided: 800 mL  Total OUT: 800 mL    Total NET: -800 mL            PHYSICAL EXAM:  Constitutional: NAD  HEENT: anicteric sclera, oropharynx clear, MMM  Neck: No JVD  Respiratory: CTAB, no wheezes, rales or rhonchi  Cardiovascular: S1, S2, RRR  Gastrointestinal: BS+, soft, NT/ND  Extremities: No cyanosis or clubbing. No peripheral edema  Neurological: A/O x 3, no focal deficits  Psychiatric: Normal mood, normal affect  : No CVA tenderness. No amin.   Skin: refuses skin exam    LABS:  09-03    140  |  103  |  16  ----------------------------<  124<H>  4.5   |  23  |  1.3    Ca    9.4      03 Sep 2019 06:03  Mg     1.9     09-02    TPro  7.2  /  Alb  4.0  /  TBili  0.5  /  DBili      /  AST  12  /  ALT  10  /  AlkPhos  63  09-01    Creatinine Trend: 1.3 <--, 1.1 <--, 1.3 <--                        10.2   9.94  )-----------( 248      ( 03 Sep 2019 06:03 )             32.4   ·	TELMA mild likley associated with lower BP than usual, although still normotensive  ·	anemia - chronic  ·	BP low/normal ( low DBP)  ·	right buttocks wound - pt refused exam but follwed by Burn surgery  ·	on cefepime - ok with dose per renal function - this based on clx  of abscess aspirate    1) f/u Burn surgery for possible debridement of right buttocks wound  2) am labs  d/w housestaff

## 2019-09-03 NOTE — ADVANCED PRACTICE NURSE CONSULT - ASSESSMENT
Unstageable on Right gluteal   100 percent covered with black eschar  3.4cm X 4 cm .02 cm depth  slight redness surrounding area

## 2019-09-03 NOTE — ADVANCED PRACTICE NURSE CONSULT - RECOMMEDATIONS
Pressure relief -turn and position q2h  Moisture control incontinent care   as per MD orders Dakins and santyl to address eschar removal   Jamilah wound protection with skin Barrier.  Off load heels.  Patient education

## 2019-09-03 NOTE — DIETITIAN INITIAL EVALUATION ADULT. - OTHER INFO
Nutrition Hx PTA: Pt reports she has a good appetite and generally consumes 2 meals daily. States she's always been a picky eater, and denies use of oral nutrition supplements. NKFA. Pt follows a low carb diet at home.     Pt p/w worsening right buttock ulcer--continue with current antibiotic pending culture; ID consult. Wound care as per burn Burn saw patient today -  will debride this week.

## 2019-09-04 LAB
-  AMIKACIN: SIGNIFICANT CHANGE UP
-  AMOXICILLIN/CLAVULANIC ACID: SIGNIFICANT CHANGE UP
-  AMPICILLIN/SULBACTAM: SIGNIFICANT CHANGE UP
-  AMPICILLIN: SIGNIFICANT CHANGE UP
-  AZTREONAM: SIGNIFICANT CHANGE UP
-  CEFAZOLIN: SIGNIFICANT CHANGE UP
-  CEFEPIME: SIGNIFICANT CHANGE UP
-  CEFOXITIN: SIGNIFICANT CHANGE UP
-  CEFTRIAXONE: SIGNIFICANT CHANGE UP
-  CIPROFLOXACIN: SIGNIFICANT CHANGE UP
-  ERTAPENEM: SIGNIFICANT CHANGE UP
-  GENTAMICIN: SIGNIFICANT CHANGE UP
-  LEVOFLOXACIN: SIGNIFICANT CHANGE UP
-  MEROPENEM: SIGNIFICANT CHANGE UP
-  PIPERACILLIN/TAZOBACTAM: SIGNIFICANT CHANGE UP
-  TOBRAMYCIN: SIGNIFICANT CHANGE UP
-  TRIMETHOPRIM/SULFAMETHOXAZOLE: SIGNIFICANT CHANGE UP
CULTURE RESULTS: SIGNIFICANT CHANGE UP
GLUCOSE BLDC GLUCOMTR-MCNC: 162 MG/DL — HIGH (ref 70–99)
GLUCOSE BLDC GLUCOMTR-MCNC: 189 MG/DL — HIGH (ref 70–99)
METHOD TYPE: SIGNIFICANT CHANGE UP
ORGANISM # SPEC MICROSCOPIC CNT: SIGNIFICANT CHANGE UP
ORGANISM # SPEC MICROSCOPIC CNT: SIGNIFICANT CHANGE UP
SPECIMEN SOURCE: SIGNIFICANT CHANGE UP

## 2019-09-04 PROCEDURE — 11043 DBRDMT MUSC&/FSCA 1ST 20/<: CPT

## 2019-09-04 PROCEDURE — 99233 SBSQ HOSP IP/OBS HIGH 50: CPT

## 2019-09-04 PROCEDURE — 10061 I&D ABSCESS COMP/MULTIPLE: CPT | Mod: 59

## 2019-09-04 RX ORDER — MEROPENEM 1 G/30ML
1000 INJECTION INTRAVENOUS EVERY 12 HOURS
Refills: 0 | Status: DISCONTINUED | OUTPATIENT
Start: 2019-09-04 | End: 2019-09-06

## 2019-09-04 RX ORDER — LIDOCAINE HYDROCHLORIDE AND EPINEPHRINE 10; 10 MG/ML; UG/ML
3 INJECTION, SOLUTION INFILTRATION; PERINEURAL ONCE
Refills: 0 | Status: DISCONTINUED | OUTPATIENT
Start: 2019-09-04 | End: 2019-09-06

## 2019-09-04 RX ADMIN — Medication 1 TABLET(S): at 13:15

## 2019-09-04 RX ADMIN — Medication 1 APPLICATION(S): at 05:32

## 2019-09-04 RX ADMIN — Medication 100 MILLIGRAM(S): at 18:07

## 2019-09-04 RX ADMIN — CEFEPIME 100 MILLIGRAM(S): 1 INJECTION, POWDER, FOR SOLUTION INTRAMUSCULAR; INTRAVENOUS at 05:30

## 2019-09-04 RX ADMIN — Medication 100 MILLIGRAM(S): at 05:31

## 2019-09-04 RX ADMIN — OLANZAPINE 20 MILLIGRAM(S): 15 TABLET, FILM COATED ORAL at 13:17

## 2019-09-04 RX ADMIN — Medication 1 APPLICATION(S): at 18:08

## 2019-09-04 RX ADMIN — Medication 100 MILLIGRAM(S): at 05:36

## 2019-09-04 RX ADMIN — MEROPENEM 100 MILLIGRAM(S): 1 INJECTION INTRAVENOUS at 18:07

## 2019-09-04 RX ADMIN — Medication 5 MILLIGRAM(S): at 21:56

## 2019-09-04 RX ADMIN — Medication 50 MICROGRAM(S): at 05:31

## 2019-09-04 RX ADMIN — Medication 81 MILLIGRAM(S): at 13:15

## 2019-09-04 RX ADMIN — SENNA PLUS 2 TABLET(S): 8.6 TABLET ORAL at 21:57

## 2019-09-04 RX ADMIN — Medication 1 APPLICATION(S): at 18:07

## 2019-09-04 RX ADMIN — FAMOTIDINE 20 MILLIGRAM(S): 10 INJECTION INTRAVENOUS at 13:15

## 2019-09-04 RX ADMIN — ATORVASTATIN CALCIUM 20 MILLIGRAM(S): 80 TABLET, FILM COATED ORAL at 21:56

## 2019-09-04 RX ADMIN — ENOXAPARIN SODIUM 40 MILLIGRAM(S): 100 INJECTION SUBCUTANEOUS at 13:15

## 2019-09-04 NOTE — PROGRESS NOTE ADULT - ASSESSMENT
73yo F with Past Medical History Chronic Right buttock Abscess status post debridement, DMII, schizophrenia, dementia, anxiety, CAD, hyperlipidemia, hypothyroidism, chronic constipation, and prior ovarian cyst admitted from [facility] for worsening right buttock ulcer.    Acute on chronic right buttock ulcer (unstageable): Wound cultures were positive for MDR Proteus. Place on contact isolation.  IV abx coverage was switched to Merrem 1gm IV q12.  The patient will likely benefit from Ertapenem x1 week upon discharge.  Follow-up with Burn Sx for debridement of ulcer.  DMII: FS have been stable off insulin.  Continue CHO consistent diet and monitor FS with meals.  Schizophrenia: continue Zyprexa 20mg PO q24  CAD: continue ASA and Lipitor as directed  Hypothyroidism: continue Synthroid as directed  GI/DVT prophylaxis    #Progress Note Handoff    Pending:  Consults: Burn Sx for debridement later this afternoon    Future Disposition: Return to SNF

## 2019-09-04 NOTE — PROGRESS NOTE ADULT - SUBJECTIVE AND OBJECTIVE BOX
GLORIA WATERS  74y, Female  Allergy: No Known Allergies      CHIEF COMPLAINT: Worsening right buttock ulcer (03 Sep 2019 16:20)      INTERVAL EVENTS/HPI  - No acute events overnight, wcx Proteus mirabilis ESBL  - T(F): , Max: 99.3 (09-03-19 @ 15:00)  - Tolerating medication  -     ROS  unable to obtain ROS secondary to sedation or patient's mental status     VITALS:  T(F): 99, Max: 99.3 (09-03-19 @ 15:00)  HR: 65  BP: 135/83  RR: 18Vital Signs Last 24 Hrs  T(C): 37.2 (04 Sep 2019 07:30), Max: 37.4 (03 Sep 2019 15:00)  T(F): 99 (04 Sep 2019 07:30), Max: 99.3 (03 Sep 2019 15:00)  HR: 65 (04 Sep 2019 07:30) (63 - 65)  BP: 135/83 (04 Sep 2019 07:30) (133/65 - 142/63)  BP(mean): --  RR: 18 (04 Sep 2019 07:30) (18 - 18)  SpO2: --    PHYSICAL EXAM:  Gen: Elderly F NAD, resting in bed  HEENT: Normocephalic, atraumatic  Neck: supple, no lymphadenopathy  CV: Regular rate & regular rhythm  Lungs: decreased BS at bases, no fremitus  Abdomen: Soft, BS present  Ext: Warm, well perfused  Neuro: non focal, awake  Skin: necrotic sacral ulcer, s/p debridement  Lines: no phlebitis      FH: Non-contributory  Social Hx: Non-contributory    TESTS & MEASUREMENTS:                        10.2   9.94  )-----------( 248      ( 03 Sep 2019 06:03 )             32.4     09-03    140  |  103  |  16  ----------------------------<  124<H>  4.5   |  23  |  1.3    Ca    9.4      03 Sep 2019 06:03              Culture - Other (collected 09-02-19 @ 16:05)  Source: .Other right buttock wound  Preliminary Report (09-04-19 @ 08:18):    Normal enteric patricia isolated    Culture - Abscess with Gram Stain (collected 09-01-19 @ 21:55)  Source: .Abscess Buttock right  Preliminary Report (09-03-19 @ 11:23):    Moderate Proteus mirabilis    Unable to evaluate further due to Proteus overgrowth  Organism: Proteus mirabilis ESBL (09-04-19 @ 08:02)  Organism: Proteus mirabilis ESBL (09-04-19 @ 08:02)      -  Amikacin: S <=16      -  Amoxicillin/Clavulanic Acid: S <=8/4      -  Ampicillin: R >16 These ampicillin results predict results for amoxicillin      -  Ampicillin/Sulbactam: R <=4/2 Enterobacter, Citrobacter, and Serratia may develop resistance during prolonged therapy (3-4 days)      -  Aztreonam: R >16      -  Cefazolin: R >16 Enterobacter, Citrobacter, and Serratia may develop resistance during prolonged therapy (3-4 days)      -  Cefepime: R >16      -  Cefoxitin: S <=8      -  Ceftriaxone: R >32 Enterobacter, Citrobacter, and Serratia may develop resistance during prolonged therapy      -  Ciprofloxacin: R >2      -  Ertapenem: S <=0.5      -  Gentamicin: S <=2      -  Levofloxacin: I 4      -  Meropenem: S <=1      -  Piperacillin/Tazobactam: R <=8      -  Tobramycin: S <=2      -  Trimethoprim/Sulfamethoxazole: R >2/38      Method Type: [facility]            INFECTIOUS DISEASES TESTING      RADIOLOGY & ADDITIONAL TESTS:  I have personally reviewed the last Chest xray  CXR  Xray Chest 1 View AP/PA:   EXAM:  XR CHEST FRONTAL 1V            PROCEDURE DATE:  09/01/2019            INTERPRETATION:  Clinical History / Reason for exam: Cough    Comparison : Chest radiograph None.    Technique/Positioning: Frontal chest radiograph.    Findings:    Support devices: None.    Cardiac/mediastinum/hilum: Unremarkable.    Lung parenchyma/Pleura: Within normal limits.    Skeleton/soft tissues: Unremarkable.    Impression:      No radiographic evidence of acute cardiopulmonary disease.                      PAMELA GUNN M.D., ATTENDING RADIOLOGIST  This document has been electronically signed. Sep  2 2019 12:58PM             (09-01-19 @ 17:45)      CT      CARDIOLOGY TESTING      MEDICATIONS  aspirin enteric coated 81  atorvastatin 20  calcium carbonate 1250 mG  + Vitamin D (OsCal 500 + D) 1  cefepime   IVPB 2000  chlorhexidine 4% Liquid 1  collagenase Ointment 1  Dakins Solution - 1/2 Strength 1  docusate sodium 100  enoxaparin Injectable 40  famotidine    Tablet 20  levothyroxine 50  melatonin 5  metroNIDAZOLE  IVPB 500  multivitamin 1  OLANZapine Disintegrating Tablet 20  senna 2      ANTIBIOTICS:  cefepime   IVPB 2000 milliGRAM(s) IV Intermittent every 24 hours  metroNIDAZOLE  IVPB 500 milliGRAM(s) IV Intermittent every 8 hours      All available historical records have been reviewed

## 2019-09-04 NOTE — PROCEDURE NOTE - NSICDXPROCEDURE_GEN_ALL_CORE_FT
PROCEDURES:  Irrigation and debridement of perineum or buttock 04-Sep-2019 18:39:40 right buttock ulcer 5 x 4 x 4 cm to and including fascia Ina [place]

## 2019-09-04 NOTE — PROGRESS NOTE ADULT - SUBJECTIVE AND OBJECTIVE BOX
GLORIA WATERS [id number]    Hospitalist Note  73yo F with Past Medical History Chronic Right buttock Abscess status post debridement, DMII, schizophrenia, dementia, anxiety, CAD, hyperlipidemia, hypothyroidism, chronic constipation, and prior ovarian cyst admitted from [facility] for worsening right buttock ulcer.    Overnight events/Updates: Wound cultures were positive for MDR Proteus.   Infectious Disease advised switching to IV Merrem as inpatient.  Pending debridement by Burn Sx.      Vital Signs Last 24 Hrs  T(C): 37.2 (04 Sep 2019 07:30), Max: 37.4 (03 Sep 2019 15:00)  T(F): 99 (04 Sep 2019 07:30), Max: 99.3 (03 Sep 2019 15:00)  HR: 65 (04 Sep 2019 07:30) (63 - 65)  BP: 135/83 (04 Sep 2019 07:30) (133/65 - 142/63)  BP(mean): --  RR: 18 (04 Sep 2019 07:30) (18 - 18)  SpO2: --    Physical Examination:  General: AAO x 3; limited insight  HEENT: PERRLA, EOMI  CV= S1 & S2 appreciated  Lungs= CTA BL  Abdominal Examination= + BS, Soft, NT/ND, open ulcer to the right buttock  Extremity Examination= No C/C/E    ROS: No chest pain, no shortness of breath.  All other systems reviewed and are within normal limits except for the complaints in the HPI.    MEDICATIONS  (STANDING):  aspirin enteric coated 81 milliGRAM(s) Oral daily  atorvastatin 20 milliGRAM(s) Oral at bedtime  calcium carbonate 1250 mG  + Vitamin D (OsCal 500 + D) 1 Tablet(s) Oral daily  chlorhexidine 4% Liquid 1 Application(s) Topical <User Schedule>  collagenase Ointment 1 Application(s) Topical two times a day  Dakins Solution - 1/2 Strength 1 Application(s) Topical two times a day  docusate sodium 100 milliGRAM(s) Oral two times a day  enoxaparin Injectable 40 milliGRAM(s) SubCutaneous daily  famotidine    Tablet 20 milliGRAM(s) Oral daily  levothyroxine 50 MICROGram(s) Oral daily  lidocaine 1%/EPINEPHrine 1:100,000 Inj 3 milliLiter(s) Local Injection once  melatonin 5 milliGRAM(s) Oral at bedtime  meropenem  IVPB 1000 milliGRAM(s) IV Intermittent every 12 hours  multivitamin 1 Tablet(s) Oral daily  OLANZapine Disintegrating Tablet 20 milliGRAM(s) Oral daily  senna 2 Tablet(s) Oral at bedtime    MEDICATIONS  (PRN):  acetaminophen   Tablet .. 650 milliGRAM(s) Oral every 6 hours PRN Mild Pain (1 - 3)                            10.2   9.94  )-----------( 248      ( 03 Sep 2019 06:03 )             32.4     09-03    140  |  103  |  16  ----------------------------<  124<H>  4.5   |  23  |  1.3    Ca    9.4      03 Sep 2019 06:03        Case discussed with housestaff & family  ISSAC Grewal [id number]

## 2019-09-04 NOTE — PROGRESS NOTE ADULT - SUBJECTIVE AND OBJECTIVE BOX
Nephrology progress note     Patient is a 74 year old female from NH with PMH of recent right buttock abscess s/p debridement, schizophrenia with paranoid ideation, medicine non compliance, insulin dependent diabetes mellitus, dementia, anxiety, hyperlipidemia, hypothyroidism, constipation, ovarian cyst brought in by EMS from [facility] for foul smelling and expanding right buttock ulcer. Patient is a poor historian but as per documentation she had a debridement in July 2019 for a right buttock abscess. She is alert to baseline.  She had another debridment of right buttocks wound today and was seen by ID.    ON events/Subjective:     Allergies:  No Known Allergies    Hospital Medications:   MEDICATIONS  (STANDING):  aspirin enteric coated 81 milliGRAM(s) Oral daily  atorvastatin 20 milliGRAM(s) Oral at bedtime  calcium carbonate 1250 mG  + Vitamin D (OsCal 500 + D) 1 Tablet(s) Oral daily  chlorhexidine 4% Liquid 1 Application(s) Topical <User Schedule>  collagenase Ointment 1 Application(s) Topical two times a day  Dakins Solution - 1/2 Strength 1 Application(s) Topical two times a day  docusate sodium 100 milliGRAM(s) Oral two times a day  enoxaparin Injectable 40 milliGRAM(s) SubCutaneous daily  famotidine    Tablet 20 milliGRAM(s) Oral daily  levothyroxine 50 MICROGram(s) Oral daily  lidocaine 1%/EPINEPHrine 1:100,000 Inj 3 milliLiter(s) Local Injection once  melatonin 5 milliGRAM(s) Oral at bedtime  meropenem  IVPB 1000 milliGRAM(s) IV Intermittent every 12 hours  multivitamin 1 Tablet(s) Oral daily  OLANZapine Disintegrating Tablet 20 milliGRAM(s) Oral daily  senna 2 Tablet(s) Oral at bedtime    REVIEW OF SYSTEMS:  unable to obtain  All other review of systems is negative unless indicated above.    VITALS:  T(F): 99.1 (09-04-19 @ 15:24), Max: 99.1 (09-04-19 @ 15:24)  HR: 62 (09-04-19 @ 15:24)  BP: 154/69 (09-04-19 @ 15:24)  RR: 18 (09-04-19 @ 15:24)  SpO2: --  Wt(kg): --    09-02 @ 07:01  -  09-03 @ 07:00  --------------------------------------------------------  IN: 0 mL / OUT: 800 mL / NET: -800 mL    09-03 @ 07:01 - 09-04 @ 07:00  --------------------------------------------------------  IN: 120 mL / OUT: 4 mL / NET: 116 mL    09-04 @ 07:01 - 09-04 @ 21:20  --------------------------------------------------------  IN: 0 mL / OUT: 200 mL / NET: -200 mL        PHYSICAL EXAM:  Constitutional: NAD  HEENT: anicteric sclera, oropharynx clear, MMM  Neck: No JVD  Respiratory: CTAB, no wheezes, rales or rhonchi  Cardiovascular: S1, S2, RRR  Gastrointestinal: BS+, soft, NT/ND  Extremities: No cyanosis or clubbing. No peripheral edema  Neurological: A/O x 3, no focal deficits  Psychiatric: Normal mood, normal affect  : No CVA tenderness. No amin.   Skin: refuses wound exam  Vascular Access:    LABS:  09-03    140  |  103  |  16  ----------------------------<  124<H>  4.5   |  23  |  1.3    Ca    9.4      03 Sep 2019 06:03                            10.2   9.94  )-----------( 248      ( 03 Sep 2019 06:03 )             32.4       Urine Studies:  Creatinine Trend: 1.3<--, 1.1<--, 1.3<--    ·	TELMA mild likely associated with lower BP than usual, although still normotensive - no labs today  ·	anemia - chronic  ·	BP now stable  ·	right buttocks wound - had debridment with Burn surgery pt refused exam but follwed by Burn surgery  ·	per ID abx changed to merrem 1 gr q12e    1)please repeat labs in am

## 2019-09-04 NOTE — PROGRESS NOTE ADULT - ASSESSMENT
ASSESSMENT  74 year old female with PMH of recent right buttock abscess s/p debridement, insulin dependent diabetes mellitus, schizophrenia, dementia, anxiety, CAD, hyperlipidemia, hypothyroidism, constipation, ovarian cyst brought in by EMS from [facility] for foul smelling and expanding right buttock ulcer    IMPRESSION  #Sacral decub, Sepsis ruled out on admission     s/p debridement 9/1, WCX Proteus mirabilis ESBL    xray no OM    RECOMMENDATIONS  - D/C current abx and change to Meropenem 1g q12h IV for ESBL  - no PO option, on d/c will need midline and ertapenem 1g q24h IV, to complete 5-7 days post final debridement   - Burn following- debridement    Spectra [id number]

## 2019-09-05 LAB
ANION GAP SERPL CALC-SCNC: 11 MMOL/L — SIGNIFICANT CHANGE UP (ref 7–14)
BASOPHILS # BLD AUTO: 0.06 K/UL — SIGNIFICANT CHANGE UP (ref 0–0.2)
BASOPHILS NFR BLD AUTO: 0.8 % — SIGNIFICANT CHANGE UP (ref 0–1)
BUN SERPL-MCNC: 13 MG/DL — SIGNIFICANT CHANGE UP (ref 10–20)
CALCIUM SERPL-MCNC: 9.3 MG/DL — SIGNIFICANT CHANGE UP (ref 8.5–10.1)
CHLORIDE SERPL-SCNC: 104 MMOL/L — SIGNIFICANT CHANGE UP (ref 98–110)
CO2 SERPL-SCNC: 26 MMOL/L — SIGNIFICANT CHANGE UP (ref 17–32)
CREAT SERPL-MCNC: 1 MG/DL — SIGNIFICANT CHANGE UP (ref 0.7–1.5)
CULTURE RESULTS: SIGNIFICANT CHANGE UP
EOSINOPHIL # BLD AUTO: 0.41 K/UL — SIGNIFICANT CHANGE UP (ref 0–0.7)
EOSINOPHIL NFR BLD AUTO: 5.7 % — SIGNIFICANT CHANGE UP (ref 0–8)
GLUCOSE BLDC GLUCOMTR-MCNC: 154 MG/DL — HIGH (ref 70–99)
GLUCOSE BLDC GLUCOMTR-MCNC: 161 MG/DL — HIGH (ref 70–99)
GLUCOSE BLDC GLUCOMTR-MCNC: 178 MG/DL — HIGH (ref 70–99)
GLUCOSE BLDC GLUCOMTR-MCNC: 205 MG/DL — HIGH (ref 70–99)
GLUCOSE BLDC GLUCOMTR-MCNC: 220 MG/DL — HIGH (ref 70–99)
GLUCOSE BLDC GLUCOMTR-MCNC: 248 MG/DL — HIGH (ref 70–99)
GLUCOSE SERPL-MCNC: 150 MG/DL — HIGH (ref 70–99)
HCT VFR BLD CALC: 31.2 % — LOW (ref 37–47)
HGB BLD-MCNC: 10 G/DL — LOW (ref 12–16)
IMM GRANULOCYTES NFR BLD AUTO: 1 % — HIGH (ref 0.1–0.3)
LYMPHOCYTES # BLD AUTO: 2.52 K/UL — SIGNIFICANT CHANGE UP (ref 1.2–3.4)
LYMPHOCYTES # BLD AUTO: 35.3 % — SIGNIFICANT CHANGE UP (ref 20.5–51.1)
MAGNESIUM SERPL-MCNC: 2 MG/DL — SIGNIFICANT CHANGE UP (ref 1.8–2.4)
MCHC RBC-ENTMCNC: 26.6 PG — LOW (ref 27–31)
MCHC RBC-ENTMCNC: 32.1 G/DL — SIGNIFICANT CHANGE UP (ref 32–37)
MCV RBC AUTO: 83 FL — SIGNIFICANT CHANGE UP (ref 81–99)
MONOCYTES # BLD AUTO: 0.78 K/UL — HIGH (ref 0.1–0.6)
MONOCYTES NFR BLD AUTO: 10.9 % — HIGH (ref 1.7–9.3)
NEUTROPHILS # BLD AUTO: 3.3 K/UL — SIGNIFICANT CHANGE UP (ref 1.4–6.5)
NEUTROPHILS NFR BLD AUTO: 46.3 % — SIGNIFICANT CHANGE UP (ref 42.2–75.2)
NRBC # BLD: 0 /100 WBCS — SIGNIFICANT CHANGE UP (ref 0–0)
PHOSPHATE SERPL-MCNC: 3.1 MG/DL — SIGNIFICANT CHANGE UP (ref 2.1–4.9)
PLATELET # BLD AUTO: 230 K/UL — SIGNIFICANT CHANGE UP (ref 130–400)
POTASSIUM SERPL-MCNC: 4.7 MMOL/L — SIGNIFICANT CHANGE UP (ref 3.5–5)
POTASSIUM SERPL-SCNC: 4.7 MMOL/L — SIGNIFICANT CHANGE UP (ref 3.5–5)
RBC # BLD: 3.76 M/UL — LOW (ref 4.2–5.4)
RBC # FLD: 13.8 % — SIGNIFICANT CHANGE UP (ref 11.5–14.5)
SODIUM SERPL-SCNC: 141 MMOL/L — SIGNIFICANT CHANGE UP (ref 135–146)
SPECIMEN SOURCE: SIGNIFICANT CHANGE UP
WBC # BLD: 7.14 K/UL — SIGNIFICANT CHANGE UP (ref 4.8–10.8)
WBC # FLD AUTO: 7.14 K/UL — SIGNIFICANT CHANGE UP (ref 4.8–10.8)

## 2019-09-05 PROCEDURE — 99233 SBSQ HOSP IP/OBS HIGH 50: CPT

## 2019-09-05 RX ADMIN — Medication 81 MILLIGRAM(S): at 11:14

## 2019-09-05 RX ADMIN — ATORVASTATIN CALCIUM 20 MILLIGRAM(S): 80 TABLET, FILM COATED ORAL at 21:55

## 2019-09-05 RX ADMIN — FAMOTIDINE 20 MILLIGRAM(S): 10 INJECTION INTRAVENOUS at 11:14

## 2019-09-05 RX ADMIN — CHLORHEXIDINE GLUCONATE 1 APPLICATION(S): 213 SOLUTION TOPICAL at 05:41

## 2019-09-05 RX ADMIN — Medication 1 TABLET(S): at 11:15

## 2019-09-05 RX ADMIN — Medication 1 APPLICATION(S): at 05:41

## 2019-09-05 RX ADMIN — MEROPENEM 100 MILLIGRAM(S): 1 INJECTION INTRAVENOUS at 17:31

## 2019-09-05 RX ADMIN — Medication 5 MILLIGRAM(S): at 21:55

## 2019-09-05 RX ADMIN — Medication 1 APPLICATION(S): at 17:31

## 2019-09-05 RX ADMIN — MEROPENEM 100 MILLIGRAM(S): 1 INJECTION INTRAVENOUS at 05:41

## 2019-09-05 RX ADMIN — Medication 100 MILLIGRAM(S): at 17:31

## 2019-09-05 RX ADMIN — OLANZAPINE 20 MILLIGRAM(S): 15 TABLET, FILM COATED ORAL at 11:14

## 2019-09-05 RX ADMIN — Medication 100 MILLIGRAM(S): at 05:40

## 2019-09-05 RX ADMIN — Medication 650 MILLIGRAM(S): at 05:45

## 2019-09-05 RX ADMIN — SENNA PLUS 2 TABLET(S): 8.6 TABLET ORAL at 21:55

## 2019-09-05 RX ADMIN — ENOXAPARIN SODIUM 40 MILLIGRAM(S): 100 INJECTION SUBCUTANEOUS at 11:14

## 2019-09-05 RX ADMIN — Medication 50 MICROGRAM(S): at 05:40

## 2019-09-05 NOTE — PROGRESS NOTE ADULT - ASSESSMENT
73yo F with Past Medical History Chronic Right buttock Abscess status post debridement, DMII, schizophrenia, dementia, anxiety, CAD, hyperlipidemia, hypothyroidism, chronic constipation, and prior ovarian cyst admitted from [facility] for worsening right buttock ulcer.    Acute on chronic right buttock ulcer (unstageable): Wound cultures were positive for MDR Proteus (on contact isolation).  Continue Merrem 1gm IV q12, place midline, and switch to Ertapenem x1 week upon discharge.  Follow-up with Burn Sx for wound care and packing instructions.  DMII: FS have been stable off insulin.  Continue CHO consistent diet and monitor FS with meals.  Schizophrenia: continue Zyprexa 20mg PO q24  CAD: continue ASA and Lipitor as directed  Hypothyroidism: continue Synthroid as directed  GI/DVT prophylaxis    #Progress Note Handoff    Pending:  Consults: Follow-up with Burn for wound care    Future Disposition: Return to SNF

## 2019-09-05 NOTE — PROGRESS NOTE ADULT - SUBJECTIVE AND OBJECTIVE BOX
S/sp debridement yesterday   Pt stableo/n   Wound site dressing in place changed by nursing   no reported concerns

## 2019-09-05 NOTE — PROGRESS NOTE ADULT - SUBJECTIVE AND OBJECTIVE BOX
Nephrology progress note     Patient is a 74 year old female from NH with PMH of recent right buttock abscess s/p debridement, schizophrenia with paranoid ideation, medicine non compliance, insulin dependent diabetes mellitus, dementia, anxiety, hyperlipidemia, hypothyroidism, constipation, ovarian cyst brought in by EMS from [facility] for foul smelling and expanding right buttock ulcer. Patient is a poor historian but as per documentation she had a debridement in July 2019 for a right buttock abscess. She is alert to baseline.  She had another debridment of right buttocks wound today and is followed ID.    ON events/Subjective:     Allergies:  No Known Allergies    Hospital Medications:   MEDICATIONS  (STANDING):  aspirin enteric coated 81 milliGRAM(s) Oral daily  atorvastatin 20 milliGRAM(s) Oral at bedtime  calcium carbonate 1250 mG  + Vitamin D (OsCal 500 + D) 1 Tablet(s) Oral daily  chlorhexidine 4% Liquid 1 Application(s) Topical <User Schedule>  collagenase Ointment 1 Application(s) Topical two times a day  Dakins Solution - 1/2 Strength 1 Application(s) Topical two times a day  docusate sodium 100 milliGRAM(s) Oral two times a day  enoxaparin Injectable 40 milliGRAM(s) SubCutaneous daily  famotidine    Tablet 20 milliGRAM(s) Oral daily  levothyroxine 50 MICROGram(s) Oral daily  lidocaine 1%/EPINEPHrine 1:100,000 Inj 3 milliLiter(s) Local Injection once  melatonin 5 milliGRAM(s) Oral at bedtime  meropenem  IVPB 1000 milliGRAM(s) IV Intermittent every 12 hours  multivitamin 1 Tablet(s) Oral daily  OLANZapine Disintegrating Tablet 20 milliGRAM(s) Oral daily  senna 2 Tablet(s) Oral at bedtime    REVIEW OF SYSTEMS:  no complaints      All other review of systems is negative unless indicated above.    VITALS:  T(F): 98 (09-05-19 @ 07:30), Max: 99.1 (09-04-19 @ 15:24)  HR: 70 (09-05-19 @ 07:30)  BP: 145/66 (09-05-19 @ 07:30)  RR: 18 (09-05-19 @ 07:30)  SpO2: --  Wt(kg): --    09-03 @ 07:01  -  09-04 @ 07:00  --------------------------------------------------------  IN: 120 mL / OUT: 4 mL / NET: 116 mL    09-04 @ 07:01  -  09-05 @ 07:00  --------------------------------------------------------  IN: 0 mL / OUT: 200 mL / NET: -200 mL        PHYSICAL EXAM:  Constitutional: NAD  HEENT: anicteric sclera, oropharynx clear, MMM  Neck: No JVD  Respiratory: CTAB, no wheezes, rales or rhonchi  Cardiovascular: S1, S2, RRR  Gastrointestinal: BS+, soft, NT/ND  Extremities: No cyanosis or clubbing. No peripheral edema  Neurological: A/O x 3, no focal deficits  Psychiatric: Normal mood, normal affect  : No CVA tenderness. No amin.   Skin: pt refuses evaluation right buttocks wound  Vascular Access:    LABS:  09-05    141  |  104  |  13  ----------------------------<  150<H>  4.7   |  26  |  1.0    Ca    9.3      05 Sep 2019 06:32  Phos  3.1     09-05  Mg     2.0     09-05                            10.0   7.14  )-----------( 230      ( 05 Sep 2019 06:32 )             31.2       Urine Studies:  Creatinine Trend: 1.0<--, 1.3<--, 1.1<--, 1.3<--    ·	TELMA improved  ·	anemia - chronic  ·	BP now stable  ·	right buttocks wound - had debridement with Burn surgery per ID abx changed to merrem 1 gr q12e    1) abx per iD

## 2019-09-05 NOTE — PROCEDURE NOTE - NSINFORMCONSENT_GEN_A_CORE
telephone
Benefits, risks, and possible complications of procedure explained to patient/caregiver who verbalized understanding and gave verbal consent.

## 2019-09-05 NOTE — PROGRESS NOTE ADULT - SUBJECTIVE AND OBJECTIVE BOX
GLORIA WATERS [id number]    Hospitalist Note  73yo F with Past Medical History Chronic Right buttock Abscess status post debridement, DMII, schizophrenia, dementia, anxiety, CAD, hyperlipidemia, hypothyroidism, chronic constipation, and prior ovarian cyst admitted from [facility] for worsening right buttock ulcer.    Overnight events/Updates: Wound cultures were positive for MDR Proteus.  The patient underwent debridement and packing by Burn Sx yesterday afternoon    Vital Signs Last 24 Hrs  T(C): 36.7 (05 Sep 2019 07:30), Max: 37.3 (04 Sep 2019 15:24)  T(F): 98 (05 Sep 2019 07:30), Max: 99.1 (04 Sep 2019 15:24)  HR: 70 (05 Sep 2019 07:30) (62 - 70)  BP: 145/66 (05 Sep 2019 07:30) (145/66 - 154/69)  BP(mean): --  RR: 18 (05 Sep 2019 07:30) (18 - 18)  SpO2: --    Physical Examination:  General: AAO x 2-3; limited insight  HEENT: PERRLA, EOMI  CV= S1 & S2 appreciated  Lungs=CTA BL  Abdominal Examination= + BS, Soft, NT/ND, dressing to the right buttock  Extremity Examination= No C/C/E    ROS: No chest pain, no shortness of breath.  All other systems reviewed and are within normal limits except for the complaints in the HPI.    MEDICATIONS  (STANDING):  aspirin enteric coated 81 milliGRAM(s) Oral daily  atorvastatin 20 milliGRAM(s) Oral at bedtime  calcium carbonate 1250 mG  + Vitamin D (OsCal 500 + D) 1 Tablet(s) Oral daily  chlorhexidine 4% Liquid 1 Application(s) Topical <User Schedule>  collagenase Ointment 1 Application(s) Topical two times a day  Dakins Solution - 1/2 Strength 1 Application(s) Topical two times a day  docusate sodium 100 milliGRAM(s) Oral two times a day  enoxaparin Injectable 40 milliGRAM(s) SubCutaneous daily  famotidine    Tablet 20 milliGRAM(s) Oral daily  levothyroxine 50 MICROGram(s) Oral daily  lidocaine 1%/EPINEPHrine 1:100,000 Inj 3 milliLiter(s) Local Injection once  melatonin 5 milliGRAM(s) Oral at bedtime  meropenem  IVPB 1000 milliGRAM(s) IV Intermittent every 12 hours  multivitamin 1 Tablet(s) Oral daily  OLANZapine Disintegrating Tablet 20 milliGRAM(s) Oral daily  senna 2 Tablet(s) Oral at bedtime    MEDICATIONS  (PRN):  acetaminophen   Tablet .. 650 milliGRAM(s) Oral every 6 hours PRN Mild Pain (1 - 3)                            10.0   7.14  )-----------( 230      ( 05 Sep 2019 06:32 )             31.2     09-05    141  |  104  |  13  ----------------------------<  150<H>  4.7   |  26  |  1.0    Ca    9.3      05 Sep 2019 06:32  Phos  3.1     09-05  Mg     2.0     09-05        Case discussed with housestaff & family  ISSAC Grewal [id number]

## 2019-09-05 NOTE — PROGRESS NOTE ADULT - ASSESSMENT
Pressure ulcer of sacrum   S/P debridement   Continue wound packing and offloading Pressure ulcer of right buttock   S/P debridement   Continue wound packing and offloading

## 2019-09-05 NOTE — CHART NOTE - NSCHARTNOTEFT_GEN_A_CORE
Registered Dietitian Follow-Up     Patient Profile Reviewed                           Yes [x]   No []     Nutrition History Previously Obtained        Yes [x]  No []      Pertinent Medical Interventions: Wound cultures were positive for MDR Proteus.  The patient underwent debridement and packing by Burn Sx yesterday afternoon. Follow-up with Burn for wound care.    Diet order: Dysphagia 3--Soft w/ thins + CHO Consistent/HS--pt remains with ~50% po intake of meals; previous RD recs for oral supplements still not active. Will communicate with LIP to activate pending diet order from 9/3.     Anthropometrics:  - Ht. 63"  - Wt. no new wts   - %wt change  - BMI: 30.4  - IBW: 115#    Pertinent Lab Data: Reviewed (9/5): H/H 10.0/31.2, POCT 248, Glu 150, GFR 56     Pertinent Meds: aspirin, lovenox, abx, lipitor, [id number], colace, pepcid, synthroid, MVI, senna    Physical Findings:  - Appearance: confused, disoriented; no edema noted   - GI function: LBM 9/3   - Oral/Mouth cavity: denies difficulty swallowing/chewing  - Skin: unstageable pressure injury to R buttocks     Nutrition Requirements  Weight Used: IBW: 115#/52kg; needs continued from RD note on 9/2      Calories: 4993-2745 kcal/day (30-35 kcal/kg IBW)--increased needs to promote wound healing   Protein: 62-78 gm/day (1.2-1.5 gm/kg IBW)--same as mentioned above   Fluid: 1 ml/kcal or per LIP     Nutrient Intake: not meeting kcal/pro needs at this time      Previous Nutrition Diagnosis: Increased Nutrient Needs (ongoing)      Nutrition Intervention: meals and snacks, medical food supplements     Recommendations:  1. Activate pending diet order from 9/3     Goal/Expected Outcome: Pt to consume >50% of meals, snacks, and supplements within 4 days.     Indicator/Monitoring: RD to monitor diet order, energy intake, glucose profile, nutrition focused physical findings

## 2019-09-05 NOTE — PROGRESS NOTE ADULT - SUBJECTIVE AND OBJECTIVE BOX
LENGTH OF HOSPITAL STAY: 4d      CHIEF COMPLAINT:   Patient is a 74y old  Female who presents with a chief complaint of Worsening right buttock ulcer (05 Sep 2019 10:12)      OVER Past 24hrs:  The patient was seen and examined at bedside there were no acute events during the night. Patient chills chest  pain.         PAST MEDICAL & SURGICAL HISTORY  PAST MEDICAL & SURGICAL HISTORY:  Abscess of right buttock  Hypothyroidism  Hyperlipidemia  Paranoid schizophrenia  Insulin dependent diabetes mellitus  Dementia  Anxiety  Coronary artery disease  H/O abdominal surgery        REVIEW OF SYSTEMS  CONSTITUTIONAL: No fever  RESPIRATORY: No cough, wheezing, chills or hemoptysis; No shortness of breath  CARDIOVASCULAR: No chest pain, palpitations, dizziness, or leg swelling  GASTROINTESTINAL: No abdominal or epigastric pain.   GENITOURINARY: No dysuria,    ALLERGIES:  No Known Allergies    MEDICATIONS:  STANDING MEDICATIONS  aspirin enteric coated 81 milliGRAM(s) Oral daily  atorvastatin 20 milliGRAM(s) Oral at bedtime  calcium carbonate 1250 mG  + Vitamin D (OsCal 500 + D) 1 Tablet(s) Oral daily  chlorhexidine 4% Liquid 1 Application(s) Topical <User Schedule>  collagenase Ointment 1 Application(s) Topical two times a day  Dakins Solution - 1/2 Strength 1 Application(s) Topical two times a day  docusate sodium 100 milliGRAM(s) Oral two times a day  enoxaparin Injectable 40 milliGRAM(s) SubCutaneous daily  famotidine    Tablet 20 milliGRAM(s) Oral daily  levothyroxine 50 MICROGram(s) Oral daily  lidocaine 1%/EPINEPHrine 1:100,000 Inj 3 milliLiter(s) Local Injection once  melatonin 5 milliGRAM(s) Oral at bedtime  meropenem  IVPB 1000 milliGRAM(s) IV Intermittent every 12 hours  multivitamin 1 Tablet(s) Oral daily  OLANZapine Disintegrating Tablet 20 milliGRAM(s) Oral daily  senna 2 Tablet(s) Oral at bedtime    PRN MEDICATIONS  acetaminophen   Tablet .. 650 milliGRAM(s) Oral every 6 hours PRN    VITALS:   T(F): 98  HR: 70  BP: 145/66  RR: 18  SpO2: --    PHYSICAL EXAM:  General: No acute distress.  Alert, oriented, interactive, nonfocal.    General: No acute distress.  Alert, oriented, interactive, nonfocal. Elderly   Female     HEENT: Pupils equal, reactive to light symmetrically.    PULM: Clear to auscultation bilaterally, no significant sputum production.    CVS: Regular rate and rhythm, no murmurs, rubs, or gallops.    ABD: Soft, nondistended, nontender, no masses.    EXT: No edema, nontender.    SKIN:   Right gluteal  Ulcer s/p debridement 09/04    LABS:                        10.0   7.14  )-----------( 230      ( 05 Sep 2019 06:32 )             31.2     09-05    141  |  104  |  13  ----------------------------<  150<H>  4.7   |  26  |  1.0    Ca    9.3      05 Sep 2019 06:32  Phos  3.1     09-05  Mg     2.0     09-05                Culture - Other (collected 02 Sep 2019 16:05)  Source: .Other right buttock wound  Final Report (05 Sep 2019 06:50):    Normal enteric patricia isolated LENGTH OF HOSPITAL STAY: 4d      CHIEF COMPLAINT:   Patient is a 74y old  Female who presents with a chief complaint of Worsening right buttock ulcer (05 Sep 2019 10:12)      OVER Past 24hrs:  The patient was seen and examined at bedside there were no acute events during the night. Patient chills chest  pain.         PAST MEDICAL & SURGICAL HISTORY  PAST MEDICAL & SURGICAL HISTORY:  Abscess of right buttock  Hypothyroidism  Hyperlipidemia  Paranoid schizophrenia  Insulin dependent diabetes mellitus  Dementia  Anxiety  Coronary artery disease  H/O abdominal surgery        REVIEW OF SYSTEMS  CONSTITUTIONAL: No fever  RESPIRATORY: No cough, wheezing, chills or hemoptysis; No shortness of breath  CARDIOVASCULAR: No chest pain, palpitations, dizziness, or leg swelling  GASTROINTESTINAL: No abdominal or epigastric pain.   GENITOURINARY: No dysuria,    ALLERGIES:  No Known Allergies    MEDICATIONS:  STANDING MEDICATIONS  aspirin enteric coated 81 milliGRAM(s) Oral daily  atorvastatin 20 milliGRAM(s) Oral at bedtime  calcium carbonate 1250 mG  + Vitamin D (OsCal 500 + D) 1 Tablet(s) Oral daily  chlorhexidine 4% Liquid 1 Application(s) Topical <User Schedule>  collagenase Ointment 1 Application(s) Topical two times a day  Dakins Solution - 1/2 Strength 1 Application(s) Topical two times a day  docusate sodium 100 milliGRAM(s) Oral two times a day  enoxaparin Injectable 40 milliGRAM(s) SubCutaneous daily  famotidine    Tablet 20 milliGRAM(s) Oral daily  levothyroxine 50 MICROGram(s) Oral daily  lidocaine 1%/EPINEPHrine 1:100,000 Inj 3 milliLiter(s) Local Injection once  melatonin 5 milliGRAM(s) Oral at bedtime  meropenem  IVPB 1000 milliGRAM(s) IV Intermittent every 12 hours  multivitamin 1 Tablet(s) Oral daily  OLANZapine Disintegrating Tablet 20 milliGRAM(s) Oral daily  senna 2 Tablet(s) Oral at bedtime    PRN MEDICATIONS  acetaminophen   Tablet .. 650 milliGRAM(s) Oral every 6 hours PRN    VITALS:   T(F): 98  HR: 70  BP: 145/66  RR: 18  SpO2: --    PHYSICAL EXAM    General: No acute distress.  confused at times , oriented, interactive, nonfocal. Elderly   Female     HEENT: Pupils equal, reactive to light symmetrically.    PULM: Clear to auscultation bilaterally, no significant sputum production.    CVS: Regular rate and rhythm, no murmurs, rubs, or gallops.    ABD: Soft, nondistended, nontender, no masses.    EXT: No edema, nontender.    SKIN:   Right gluteal  Ulcer s/p debridement 09/04    LABS:                        10.0   7.14  )-----------( 230      ( 05 Sep 2019 06:32 )             31.2     09-05    141  |  104  |  13  ----------------------------<  150<H>  4.7   |  26  |  1.0    Ca    9.3      05 Sep 2019 06:32  Phos  3.1     09-05  Mg     2.0     09-05                Culture - Other (collected 02 Sep 2019 16:05)  Source: .Other right buttock wound  Final Report (05 Sep 2019 06:50):    Normal enteric patricia isolated

## 2019-09-06 ENCOUNTER — TRANSCRIPTION ENCOUNTER (OUTPATIENT)
Age: 74
End: 2019-09-06

## 2019-09-06 VITALS
HEART RATE: 60 BPM | DIASTOLIC BLOOD PRESSURE: 57 MMHG | TEMPERATURE: 98 F | RESPIRATION RATE: 18 BRPM | SYSTOLIC BLOOD PRESSURE: 122 MMHG

## 2019-09-06 LAB
GLUCOSE BLDC GLUCOMTR-MCNC: 142 MG/DL — HIGH (ref 70–99)
GLUCOSE BLDC GLUCOMTR-MCNC: 208 MG/DL — HIGH (ref 70–99)
GLUCOSE BLDC GLUCOMTR-MCNC: 217 MG/DL — HIGH (ref 70–99)

## 2019-09-06 PROCEDURE — 99233 SBSQ HOSP IP/OBS HIGH 50: CPT

## 2019-09-06 RX ORDER — ERTAPENEM SODIUM 1 G/1
1 INJECTION, POWDER, LYOPHILIZED, FOR SOLUTION INTRAMUSCULAR; INTRAVENOUS
Qty: 1000 | Refills: 0
Start: 2019-09-06 | End: 2019-09-10

## 2019-09-06 RX ORDER — SODIUM HYPOCHLORITE 0.125 %
1 SOLUTION, NON-ORAL MISCELLANEOUS
Qty: 0 | Refills: 0 | DISCHARGE
Start: 2019-09-06

## 2019-09-06 RX ORDER — COLLAGENASE CLOSTRIDIUM HIST. 250 UNIT/G
1 OINTMENT (GRAM) TOPICAL
Qty: 0 | Refills: 0 | DISCHARGE
Start: 2019-09-06

## 2019-09-06 RX ORDER — COLLAGENASE CLOSTRIDIUM HIST. 250 UNIT/G
1 OINTMENT (GRAM) TOPICAL
Qty: 0 | Refills: 0 | DISCHARGE

## 2019-09-06 RX ADMIN — Medication 1 TABLET(S): at 13:15

## 2019-09-06 RX ADMIN — Medication 650 MILLIGRAM(S): at 09:38

## 2019-09-06 RX ADMIN — OLANZAPINE 20 MILLIGRAM(S): 15 TABLET, FILM COATED ORAL at 13:15

## 2019-09-06 RX ADMIN — MEROPENEM 100 MILLIGRAM(S): 1 INJECTION INTRAVENOUS at 17:32

## 2019-09-06 RX ADMIN — Medication 1 APPLICATION(S): at 05:25

## 2019-09-06 RX ADMIN — Medication 1 APPLICATION(S): at 05:23

## 2019-09-06 RX ADMIN — MEROPENEM 100 MILLIGRAM(S): 1 INJECTION INTRAVENOUS at 05:23

## 2019-09-06 RX ADMIN — Medication 650 MILLIGRAM(S): at 10:08

## 2019-09-06 RX ADMIN — ENOXAPARIN SODIUM 40 MILLIGRAM(S): 100 INJECTION SUBCUTANEOUS at 13:15

## 2019-09-06 RX ADMIN — CHLORHEXIDINE GLUCONATE 1 APPLICATION(S): 213 SOLUTION TOPICAL at 05:25

## 2019-09-06 RX ADMIN — Medication 1 APPLICATION(S): at 17:20

## 2019-09-06 RX ADMIN — Medication 100 MILLIGRAM(S): at 05:22

## 2019-09-06 RX ADMIN — Medication 81 MILLIGRAM(S): at 13:15

## 2019-09-06 RX ADMIN — Medication 1 APPLICATION(S): at 17:19

## 2019-09-06 RX ADMIN — FAMOTIDINE 20 MILLIGRAM(S): 10 INJECTION INTRAVENOUS at 13:15

## 2019-09-06 RX ADMIN — Medication 100 MILLIGRAM(S): at 17:33

## 2019-09-06 RX ADMIN — Medication 50 MICROGRAM(S): at 05:22

## 2019-09-06 NOTE — DISCHARGE NOTE PROVIDER - NSDCFUADDAPPT_GEN_ALL_CORE_FT
Please, with in two weeks of discharge follow up with Dr. West  Please, with in two weeks of discharge follow up with Dr. Prather

## 2019-09-06 NOTE — PROVIDER CONTACT NOTE (OTHER) - ACTION/TREATMENT ORDERED:
MD aware no further intervention at this time
MD aware, no insulin coverage to be ordered.
MD aware. no further intervention at this time
will order something else
As per MD Llanos, no intervention at this time

## 2019-09-06 NOTE — PROGRESS NOTE ADULT - REASON FOR ADMISSION
Worsening right buttock ulcer

## 2019-09-06 NOTE — CHART NOTE - NSCHARTNOTEFT_GEN_A_CORE
<<<RESIDENT DISCHARGE NOTE>>>     GLORIA WATERS  [id number]  74 year old female with PMH of recent right buttock abscess s/p debridement, insulin dependent diabetes mellitus, schizophrenia, dementia, anxiety, CAD, hyperlipidemia, hypothyroidism, constipation, ovarian cyst brought in by EMS from [facility] for foul smelling and expanding right buttock ulcer. Patient is a poor historian but as per documentation she had a debridement in July 2019 for a right buttock abscess. Patient is only complaining of some pain in the area of concern. Admitted for  worsening Sacral Ulcer.  In ED: Vital signs stable, afebrile, WBC 11.33, XRay pelvis with no abnormalities. Given Cefepime, Flagyl and vancomycin x1 (01 Sep 2019 20:50)    Recent Events: s/p debridement 09/04,    DC on ertapenem 1g q24h IV, to complete 5-7 days post final debridement,  Midline placed, DC to NH. Patient is hemodynamically stable with no systemic signs of infection and stable for discharge today.       IMPRESSION   Right gluteal  Ulcer s/p debridement 09/04 growing ESBL   HO diabetes mellitus  HO schizophrenia  HO  dementia  HO  anxiety  HO  CAD  HO hyperlipidemia  HO  hypothyroidism    Plan  Unstageable on Right gluteal  Ulcer  growing esnle   - starting Meropenem 1g q12h IV  likely need midline for Out patient ertapenem   - ID consult ronaldo DC on ertapenem 1g q24h IV, to complete 5-7 days post final debridement   - Wound Care- clean with soap and water. santyl, dakins wtd, abd, tape       HO  Insulin dependent diabetes mellitus controlled   - On Januvia and Insulin at NH  - Hypoglycemic in ED  CAPILLARY BLOOD GLUCOSE  POCT Blood Glucose.: 208 (09-06-19 @ 12:40)  POCT Blood Glucose.: 142 (09-06-19 @ 07:48)  POCT Blood Glucose.: 178 (09-05-19 @ 20:49)  POCT Blood Glucose.: 161 (09-05-19 @ 16:49)  POCT Blood Glucose.: 248 (09-05-19 @ 11:26)  POCT Blood Glucose.: 154 (09-05-19 @ 07:33)  POCT Blood Glucose.: 205 (09-04-19 @ 21:26)  POCT Blood Glucose.: 220 (09-04-19 @ 21:02)      Normocytic anemia  -Retic ct 1.1% 0.71 retic index kobeley hypoproliferative possible MDS component        HO CKD 3b  now at baseline   - Monitor Cr    HO CAD   - continue ASA    HO  Dyslipidemia   -Continue Lipitor    HO Paranoid schizophrenia/Anxiety  - Continue Olanzapine. On Haldol IM once monthly    HO Hypothyroidism  - Continue L-thyroxine        VITAL SIGNS:  T(F): 98.2 (09-06-19 @ 07:34), Max: 98.2 (09-06-19 @ 07:34)  HR: 59 (09-06-19 @ 07:34)  BP: 134/61 (09-06-19 @ 07:34)  SpO2: --      PHYSICAL EXAMINATION:  General:   Head & Neck:  Pulmonary:  Cardiovascular:  Gastrointestinal/Abdomen & Pelvis:  Neurologic/Motor:    TEST RESULTS:                        10.0   7.14  )-----------( 230      ( 05 Sep 2019 06:32 )             31.2       09-05    141  |  104  |  13  ----------------------------<  150<H>  4.7   |  26  |  1.0    Ca    9.3      05 Sep 2019 06:32  Phos  3.1     09-05  Mg     2.0     09-05        FINAL DISCHARGE INTERVIEW:  Resident(s) Present: (Name:___Flora Coffey __________), RN Present: (Name:  ____Bhavana Alvarado  (RN),______)    DISCHARGE MEDICATION RECONCILIATION  reviewed with Attending (Name:___Dr. Guan ________)    DISPOSITION:   [  ] Home,    [  ] Home with Visiting Nursing Services,   [   x <<<RESIDENT DISCHARGE NOTE>>>     GLORIA WATERS  [id number]  74 year old female with PMH of recent right buttock abscess s/p debridement, insulin dependent diabetes mellitus, schizophrenia, dementia, anxiety, CAD, hyperlipidemia, hypothyroidism, constipation, ovarian cyst brought in by EMS from [facility] for foul smelling and expanding right buttock ulcer. Patient is a poor historian but as per documentation she had a debridement in July 2019 for a right buttock abscess. Patient is only complaining of some pain in the area of concern. Admitted for  worsening Sacral Ulcer.  In ED: Vital signs stable, afebrile, WBC 11.33, XRay pelvis with no abnormalities. Given Cefepime, Flagyl and vancomycin x1 (01 Sep 2019 20:50)    Recent Events: s/p debridement 09/04,    DC on ertapenem 1g q24h IV, to complete 5-7 days post final debridement,  Midline placed, DC to NH. Patient is hemodynamically stable with no systemic signs of infection and stable for discharge today.       IMPRESSION   Right gluteal  Ulcer s/p debridement 09/04 growing ESBL   HO diabetes mellitus  HO schizophrenia  HO  dementia  HO  anxiety  HO  CAD  HO hyperlipidemia  HO  hypothyroidism    Plan  Unstageable on Right gluteal  Ulcer  growing esnle   - starting Meropenem 1g q12h IV  likely need midline for Out patient ertapenem   - ID consult ronaldo DC on ertapenem 1g q24h IV, to complete 5-7 days post final debridement   - Wound Care- clean with soap and water. santyl, dakins wtd, abd, tape       HO  Insulin dependent diabetes mellitus controlled   - On Januvia and Insulin at NH  - Hypoglycemic in ED  CAPILLARY BLOOD GLUCOSE  POCT Blood Glucose.: 208 (09-06-19 @ 12:40)  POCT Blood Glucose.: 142 (09-06-19 @ 07:48)  POCT Blood Glucose.: 178 (09-05-19 @ 20:49)  POCT Blood Glucose.: 161 (09-05-19 @ 16:49)  POCT Blood Glucose.: 248 (09-05-19 @ 11:26)  POCT Blood Glucose.: 154 (09-05-19 @ 07:33)  POCT Blood Glucose.: 205 (09-04-19 @ 21:26)  POCT Blood Glucose.: 220 (09-04-19 @ 21:02)      Normocytic anemia  -Retic ct 1.1% 0.71 retic index raghavendra hypoproliferative possible MDS component        HO CKD 3b  now at baseline   - Monitor Cr    HO CAD   - continue ASA    HO  Dyslipidemia   -Continue Lipitor    HO Paranoid schizophrenia/Anxiety  - Continue Olanzapine. On Haldol IM once monthly    HO Hypothyroidism  - Continue L-thyroxine        VITAL SIGNS:  T(F): 98.2 (09-06-19 @ 07:34), Max: 98.2 (09-06-19 @ 07:34)  HR: 59 (09-06-19 @ 07:34)  BP: 134/61 (09-06-19 @ 07:34)  SpO2: --      PHYSICAL EXAMINATION:  General: No acute distress.  confused at times , oriented, interactive, nonfocal. Elderly   Female     HEENT: Pupils equal, reactive to light symmetrically.    PULM: Clear to auscultation bilaterally, no significant sputum production.    CVS: Regular rate and rhythm, no murmurs, rubs, or gallops.    ABD: Soft, nondistended, nontender, no masses.    EXT: No edema, nontender.    SKIN:   Right gluteal  Ulcer s/p debridement 09/04    TEST RESULTS:                        10.0   7.14  )-----------( 230      ( 05 Sep 2019 06:32 )             31.2       09-05    141  |  104  |  13  ----------------------------<  150<H>  4.7   |  26  |  1.0    Ca    9.3      05 Sep 2019 06:32  Phos  3.1     09-05  Mg     2.0     09-05        FINAL DISCHARGE INTERVIEW:  Resident(s) Present: (Name:___Flora Coffey __________), RN Present: (Name:  ____Bhavana Alvarado  (RN),______)    DISCHARGE MEDICATION RECONCILIATION  reviewed with Attending (Name:___Dr. Guan ________)    DISPOSITION:   [  ] Home,    [  ] Home with Visiting Nursing Services,   [   x

## 2019-09-06 NOTE — DISCHARGE NOTE PROVIDER - HOSPITAL COURSE
74 year old female with PMH of recent right buttock abscess s/p debridement, insulin dependent diabetes mellitus, schizophrenia, dementia, anxiety, CAD, hyperlipidemia, hypothyroidism, constipation, ovarian cyst brought in by EMS from [facility] for foul smelling and expanding right buttock ulcer. Patient is a poor historian but as per documentation she had a debridement in July 2019 for a right buttock abscess. Patient is only complaining of some pain in the area of concern. Admitted for  worsening Sacral Ulcer.    In ED: Vital signs stable, afebrile, WBC 11.33, XRay pelvis with no abnormalities. Given Cefepime, Flagyl and vancomycin x1 (01 Sep 2019 20:50)        Recent Events: s/p debridement 09/04,    DC on ertapenem 1g q24h IV, to complete 5-7 days post final debridement,  Midline placed, DC to NH. Patient is hemodynamically stable with no systemic signs of infection and stable for discharge today.             IMPRESSION     Right gluteal  Ulcer s/p debridement 09/04 growing ESBL     HO diabetes mellitus    HO schizophrenia    HO  dementia    HO  anxiety    HO  CAD    HO hyperlipidemia    HO  hypothyroidism        Plan    Unstageable on Right gluteal  Ulcer  growing esnle     - starting Meropenem 1g q12h IV  likely need midline for Out patient ertapenem     - ID consult ronaldo DC on ertapenem 1g q24h IV, to complete 5-7 days post final debridement     - Wound Care- clean with soap and water. santyl, dakins wtd, abd, tape             HO  Insulin dependent diabetes mellitus controlled     - On Januvia and Insulin at NH    - Hypoglycemic in ED    CAPILLARY BLOOD GLUCOSE    POCT Blood Glucose.: 208 (09-06-19 @ 12:40)    POCT Blood Glucose.: 142 (09-06-19 @ 07:48)    POCT Blood Glucose.: 178 (09-05-19 @ 20:49)    POCT Blood Glucose.: 161 (09-05-19 @ 16:49)    POCT Blood Glucose.: 248 (09-05-19 @ 11:26)    POCT Blood Glucose.: 154 (09-05-19 @ 07:33)    POCT Blood Glucose.: 205 (09-04-19 @ 21:26)    POCT Blood Glucose.: 220 (09-04-19 @ 21:02)            Normocytic anemia    -Retic ct 1.1% 0.71 retic index raghavendra hypoproliferative possible MDS component              HO CKD 3b  now at baseline     - Monitor Cr        HO CAD     - continue ASA        HO  Dyslipidemia     -Continue Lipitor        HO Paranoid schizophrenia/Anxiety    - Continue Olanzapine. On Haldol IM once monthly        HO Hypothyroidism    - Continue L-thyroxine

## 2019-09-06 NOTE — PROGRESS NOTE ADULT - SUBJECTIVE AND OBJECTIVE BOX
GLORIA WATERS [id number]    Hospitalist Note  73yo F with Past Medical History Chronic Right buttock Abscess status post debridement, DMII, schizophrenia, dementia, anxiety, CAD, hyperlipidemia, hypothyroidism, chronic constipation, and prior ovarian cyst admitted from [facility] for worsening right buttock ulcer.    Overnight events/Updates: Wound cultures were positive for MDR Proteus.  No acute events over the past 24 hours.  The patient is confused to self, which, according to her family, is a normal finding.    Vital Signs Last 24 Hrs  T(C): 36.8 (06 Sep 2019 07:34), Max: 36.8 (06 Sep 2019 07:34)  T(F): 98.2 (06 Sep 2019 07:34), Max: 98.2 (06 Sep 2019 07:34)  HR: 59 (06 Sep 2019 07:34) (58 - 67)  BP: 134/61 (06 Sep 2019 07:34) (130/63 - 155/68)  BP(mean): --  RR: 18 (06 Sep 2019 07:34) (18 - 18)  SpO2: --    Physical Examination:  General: AAO x 1 (place)  HEENT: PERRLA, EOMI  CV= S1 & S2 appreciated  Lungs= CTA BL  Abdominal Examination= + BS, dressing to the right buttock, Soft, NT  Extremity Examination= No C/C/E    ROS: No chest pain, no shortness of breath.  All other systems reviewed and are within normal limits except for the complaints in the HPI.    MEDICATIONS  (STANDING):  aspirin enteric coated 81 milliGRAM(s) Oral daily  atorvastatin 20 milliGRAM(s) Oral at bedtime  calcium carbonate 1250 mG  + Vitamin D (OsCal 500 + D) 1 Tablet(s) Oral daily  chlorhexidine 4% Liquid 1 Application(s) Topical <User Schedule>  collagenase Ointment 1 Application(s) Topical two times a day  Dakins Solution - 1/2 Strength 1 Application(s) Topical two times a day  docusate sodium 100 milliGRAM(s) Oral two times a day  enoxaparin Injectable 40 milliGRAM(s) SubCutaneous daily  famotidine    Tablet 20 milliGRAM(s) Oral daily  levothyroxine 50 MICROGram(s) Oral daily  lidocaine 1%/EPINEPHrine 1:100,000 Inj 3 milliLiter(s) Local Injection once  melatonin 5 milliGRAM(s) Oral at bedtime  meropenem  IVPB 1000 milliGRAM(s) IV Intermittent every 12 hours  multivitamin 1 Tablet(s) Oral daily  OLANZapine Disintegrating Tablet 20 milliGRAM(s) Oral daily  senna 2 Tablet(s) Oral at bedtime    MEDICATIONS  (PRN):  acetaminophen   Tablet .. 650 milliGRAM(s) Oral every 6 hours PRN Mild Pain (1 - 3)                            10.0   7.14  )-----------( 230      ( 05 Sep 2019 06:32 )             31.2     09-05    141  |  104  |  13  ----------------------------<  150<H>  4.7   |  26  |  1.0    Ca    9.3      05 Sep 2019 06:32  Phos  3.1     09-05  Mg     2.0     09-05        Case discussed with housestaff & family  LOISZaina Grewal [id number]

## 2019-09-06 NOTE — DISCHARGE NOTE PROVIDER - CARE PROVIDERS DIRECT ADDRESSES
[email or web address]

## 2019-09-06 NOTE — DISCHARGE NOTE PROVIDER - CARE PROVIDER_API CALL
Daljit Prather)  Internal Medicine  [address], [address]  [place] [address]  Phone: [phone]  Fax: [phone]  Follow Up Time:     Ina West)  Plastic Surgery  [address]  [place] [address]  Phone: [phone]  Fax: [phone]  Follow Up Time:

## 2019-09-06 NOTE — PROGRESS NOTE ADULT - SUBJECTIVE AND OBJECTIVE BOX
Nephrology progress note     Patient is a 74 year old female from NH with PMH of recent right buttock abscess s/p debridement, schizophrenia with paranoid ideation, medicine non compliance, insulin dependent diabetes mellitus, dementia, anxiety, hyperlipidemia, hypothyroidism, constipation, ovarian cyst brought in by EMS from [facility] for foul smelling and expanding right buttock ulcer. Patient is a poor historian but as per documentation she had a debridement in July 2019 for a right buttock abscess. She is alert to baseline.  She had another debridment of right buttocks wound today and is followed ID.  she had a midline palced for abx    ON events/Subjective:     Allergies:  No Known Allergies    Hospital Medications:   MEDICATIONS  (STANDING):  aspirin enteric coated 81 milliGRAM(s) Oral daily  atorvastatin 20 milliGRAM(s) Oral at bedtime  calcium carbonate 1250 mG  + Vitamin D (OsCal 500 + D) 1 Tablet(s) Oral daily  chlorhexidine 4% Liquid 1 Application(s) Topical <User Schedule>  collagenase Ointment 1 Application(s) Topical two times a day  Dakins Solution - 1/2 Strength 1 Application(s) Topical two times a day  docusate sodium 100 milliGRAM(s) Oral two times a day  enoxaparin Injectable 40 milliGRAM(s) SubCutaneous daily  famotidine    Tablet 20 milliGRAM(s) Oral daily  levothyroxine 50 MICROGram(s) Oral daily  lidocaine 1%/EPINEPHrine 1:100,000 Inj 3 milliLiter(s) Local Injection once  melatonin 5 milliGRAM(s) Oral at bedtime  meropenem  IVPB 1000 milliGRAM(s) IV Intermittent every 12 hours  multivitamin 1 Tablet(s) Oral daily  OLANZapine Disintegrating Tablet 20 milliGRAM(s) Oral daily  senna 2 Tablet(s) Oral at bedtime    REVIEW OF SYSTEMS:  difficult to obtain but no specific complaints    All other review of systems is negative unless indicated above.    VITALS:  T(F): 98.2 (09-06-19 @ 07:34), Max: 98.2 (09-06-19 @ 07:34)  HR: 59 (09-06-19 @ 07:34)  BP: 134/61 (09-06-19 @ 07:34)  RR: 18 (09-06-19 @ 07:34)  SpO2: --  Wt(kg): --    09-04 @ 07:01 - 09-05 @ 07:00  --------------------------------------------------------  IN: 0 mL / OUT: 200 mL / NET: -200 mL        PHYSICAL EXAM:  Constitutional: NAD  HEENT: anicteric sclera, oropharynx clear, MMM  Neck: No JVD  Respiratory: CTAB, no wheezes, rales or rhonchi  Cardiovascular: S1, S2, RRR  Gastrointestinal: BS+, soft, NT/ND  Extremities: No cyanosis or clubbing. No peripheral edema  Neurological: A/O x 3, no focal deficits  Psychiatric: Normal mood, normal affect  : No CVA tenderness. No amin.   Skin: No rashes  Vascular Access:    LABS:  09-05    141  |  104  |  13  ----------------------------<  150<H>  4.7   |  26  |  1.0    Ca    9.3      05 Sep 2019 06:32  Phos  3.1     09-05  Mg     2.0     09-05                            10.0   7.14  )-----------( 230      ( 05 Sep 2019 06:32 )             31.2       Urine Studies:  Creatinine Trend: 1.0<--, 1.3<--, 1.1<--, 1.3<--      ·	TELMA improved  ·	anemia - chronic  ·	BP now stable  ·	right buttocks wound - had debridement with Burn surgery per ID abx changed to merrem 1 gr q12  ·	now with midline and per housestaff awaiting transfer to NH/rehab with iv bx and wound care    1) abx per iD

## 2019-09-06 NOTE — DISCHARGE NOTE NURSING/CASE MANAGEMENT/SOCIAL WORK - PATIENT PORTAL LINK FT
You can access the FollowMyHealth Patient Portal offered by [facility] by registering at the following website: [email or web address]. By joining [employer]’s FollowMyHealth portal, you will also be able to view your health information using other applications (apps) compatible with our system.

## 2019-09-06 NOTE — PROVIDER CONTACT NOTE (OTHER) - SITUATION
pt complained of pain in her R leg. pt state pain is a 12 on a scale of 0-10. pt is refusing Tylenol stating that Tylenol does not do anything. pt is refusing T&P due to pain
I am calling to let you know that pt's BG was 217. there is no insulin coverage ordered. pt is D/C'ed and awaiting transport to nursing home. afternoon FS was 208 and coverage was not ordered by MD
Pt with , no insulin order noted
pt has refused to get up oob  today despite teaching and education by RN regarding blood clots and pressure ulcers.
pt's BG is currently 208. there is no insulin coverage ordered.

## 2019-09-06 NOTE — DISCHARGE NOTE PROVIDER - NSDCCPCAREPLAN_GEN_ALL_CORE_FT
PRINCIPAL DISCHARGE DIAGNOSIS  Diagnosis: Pressure ulcer of buttock  Assessment and Plan of Treatment: Status post debridement.  Wound cultures were positive for MDR Proteus.  Continue daily dressing changes by cleaning with soap and water, santyl, dakins, wet-to-dry dressing, abdominal pad, and tape.  Complete treatment with IV Ertapenem x1 week.

## 2019-09-06 NOTE — PROGRESS NOTE ADULT - ASSESSMENT
73yo F with Past Medical History Chronic Right buttock Abscess status post debridement, DMII, schizophrenia, dementia, anxiety, CAD, hyperlipidemia, hypothyroidism, chronic constipation, and prior ovarian cyst admitted from [facility] for worsening right buttock ulcer.    Acute on chronic right buttock ulcer (unstageable): Wound cultures were positive for MDR Proteus (on contact isolation).  Status post midline placement.  Complete treatment with IV Ertapenem x1 week duration.  Continue local wound care with clean with soap and water, santyl, dakins, wet-to-dry dressing, abdominal pad, and tape.  Follow-up with Burn Sx-Dr. West as outpatient.  DMII: FS have been stable off insulin.  Continue CHO consistent diet and monitor FS with meals.  Schizophrenia: continue Zyprexa 20mg PO q24  CAD: continue ASA and Lipitor as directed  Hypothyroidism: continue Synthroid as directed  GI/DVT prophylaxis    #Progress Note Handoff    Pending:    Future Disposition: Discharge back to SNF; time spent = 35 minutes

## 2019-09-06 NOTE — PROGRESS NOTE ADULT - PROVIDER SPECIALTY LIST ADULT
Burn
Hospitalist
Infectious Disease
Internal Medicine
Nephrology

## 2019-09-07 LAB
-  AMIKACIN: SIGNIFICANT CHANGE UP
-  AMOXICILLIN/CLAVULANIC ACID: SIGNIFICANT CHANGE UP
-  AMPICILLIN/SULBACTAM: SIGNIFICANT CHANGE UP
-  AMPICILLIN: SIGNIFICANT CHANGE UP
-  AZTREONAM: SIGNIFICANT CHANGE UP
-  CEFAZOLIN: SIGNIFICANT CHANGE UP
-  CEFEPIME: SIGNIFICANT CHANGE UP
-  CEFOXITIN: SIGNIFICANT CHANGE UP
-  CEFTRIAXONE: SIGNIFICANT CHANGE UP
-  CIPROFLOXACIN: SIGNIFICANT CHANGE UP
-  ERTAPENEM: SIGNIFICANT CHANGE UP
-  GENTAMICIN: SIGNIFICANT CHANGE UP
-  LEVOFLOXACIN: SIGNIFICANT CHANGE UP
-  MEROPENEM: SIGNIFICANT CHANGE UP
-  PIPERACILLIN/TAZOBACTAM: SIGNIFICANT CHANGE UP
-  TOBRAMYCIN: SIGNIFICANT CHANGE UP
-  TRIMETHOPRIM/SULFAMETHOXAZOLE: SIGNIFICANT CHANGE UP
CULTURE RESULTS: SIGNIFICANT CHANGE UP
METHOD TYPE: SIGNIFICANT CHANGE UP
ORGANISM # SPEC MICROSCOPIC CNT: SIGNIFICANT CHANGE UP
ORGANISM # SPEC MICROSCOPIC CNT: SIGNIFICANT CHANGE UP
SPECIMEN SOURCE: SIGNIFICANT CHANGE UP

## 2019-09-09 ENCOUNTER — OUTPATIENT (OUTPATIENT)
Dept: OUTPATIENT SERVICES | Facility: HOSPITAL | Age: 74
LOS: 1 days | Discharge: HOME | End: 2019-09-09

## 2019-09-09 DIAGNOSIS — Z98.890 OTHER SPECIFIED POSTPROCEDURAL STATES: Chronic | ICD-10-CM

## 2019-09-09 DIAGNOSIS — E10.9 TYPE 1 DIABETES MELLITUS WITHOUT COMPLICATIONS: ICD-10-CM

## 2019-09-09 PROBLEM — E03.9 HYPOTHYROIDISM, UNSPECIFIED: Chronic | Status: ACTIVE | Noted: 2019-09-01

## 2019-09-09 PROBLEM — F41.9 ANXIETY DISORDER, UNSPECIFIED: Chronic | Status: ACTIVE | Noted: 2019-09-01

## 2019-09-09 PROBLEM — I25.10 ATHEROSCLEROTIC HEART DISEASE OF NATIVE CORONARY ARTERY WITHOUT ANGINA PECTORIS: Chronic | Status: ACTIVE | Noted: 2019-09-01

## 2019-09-09 PROBLEM — E78.5 HYPERLIPIDEMIA, UNSPECIFIED: Chronic | Status: ACTIVE | Noted: 2019-09-01

## 2019-09-09 PROBLEM — F03.90 UNSPECIFIED DEMENTIA WITHOUT BEHAVIORAL DISTURBANCE: Chronic | Status: ACTIVE | Noted: 2019-09-01

## 2019-09-09 PROBLEM — E11.9 TYPE 2 DIABETES MELLITUS WITHOUT COMPLICATIONS: Chronic | Status: ACTIVE | Noted: 2019-09-01

## 2019-09-09 PROBLEM — F20.0 PARANOID SCHIZOPHRENIA: Chronic | Status: ACTIVE | Noted: 2019-09-01

## 2019-09-09 PROBLEM — L02.31 CUTANEOUS ABSCESS OF BUTTOCK: Chronic | Status: ACTIVE | Noted: 2019-09-01

## 2019-09-12 DIAGNOSIS — I25.10 ATHEROSCLEROTIC HEART DISEASE OF NATIVE CORONARY ARTERY WITHOUT ANGINA PECTORIS: ICD-10-CM

## 2019-09-12 DIAGNOSIS — D64.9 ANEMIA, UNSPECIFIED: ICD-10-CM

## 2019-09-12 DIAGNOSIS — F41.9 ANXIETY DISORDER, UNSPECIFIED: ICD-10-CM

## 2019-09-12 DIAGNOSIS — I96 GANGRENE, NOT ELSEWHERE CLASSIFIED: ICD-10-CM

## 2019-09-12 DIAGNOSIS — L98.418 NON-PRESSURE CHRONIC ULCER OF BUTTOCK WITH OTHER SPECIFIED SEVERITY: ICD-10-CM

## 2019-09-12 DIAGNOSIS — I12.9 HYPERTENSIVE CHRONIC KIDNEY DISEASE WITH STAGE 1 THROUGH STAGE 4 CHRONIC KIDNEY DISEASE, OR UNSPECIFIED CHRONIC KIDNEY DISEASE: ICD-10-CM

## 2019-09-12 DIAGNOSIS — E03.9 HYPOTHYROIDISM, UNSPECIFIED: ICD-10-CM

## 2019-09-12 DIAGNOSIS — N17.9 ACUTE KIDNEY FAILURE, UNSPECIFIED: ICD-10-CM

## 2019-09-12 DIAGNOSIS — L02.31 CUTANEOUS ABSCESS OF BUTTOCK: ICD-10-CM

## 2019-09-12 DIAGNOSIS — F20.0 PARANOID SCHIZOPHRENIA: ICD-10-CM

## 2019-09-12 DIAGNOSIS — N18.3 CHRONIC KIDNEY DISEASE, STAGE 3 (MODERATE): ICD-10-CM

## 2019-09-12 DIAGNOSIS — R13.10 DYSPHAGIA, UNSPECIFIED: ICD-10-CM

## 2019-09-12 DIAGNOSIS — K59.00 CONSTIPATION, UNSPECIFIED: ICD-10-CM

## 2019-09-12 DIAGNOSIS — L89.313 PRESSURE ULCER OF RIGHT BUTTOCK, STAGE 3: ICD-10-CM

## 2019-09-12 DIAGNOSIS — F03.90 UNSPECIFIED DEMENTIA WITHOUT BEHAVIORAL DISTURBANCE: ICD-10-CM

## 2019-09-12 DIAGNOSIS — E78.5 HYPERLIPIDEMIA, UNSPECIFIED: ICD-10-CM

## 2019-09-12 DIAGNOSIS — Z79.4 LONG TERM (CURRENT) USE OF INSULIN: ICD-10-CM

## 2019-09-12 DIAGNOSIS — E11.649 TYPE 2 DIABETES MELLITUS WITH HYPOGLYCEMIA WITHOUT COMA: ICD-10-CM

## 2019-09-12 DIAGNOSIS — B96.4 PROTEUS (MIRABILIS) (MORGANII) AS THE CAUSE OF DISEASES CLASSIFIED ELSEWHERE: ICD-10-CM

## 2019-09-12 DIAGNOSIS — Z74.01 BED CONFINEMENT STATUS: ICD-10-CM

## 2019-09-12 DIAGNOSIS — E11.22 TYPE 2 DIABETES MELLITUS WITH DIABETIC CHRONIC KIDNEY DISEASE: ICD-10-CM

## 2019-09-12 DIAGNOSIS — Z16.24 RESISTANCE TO MULTIPLE ANTIBIOTICS: ICD-10-CM

## 2019-10-01 ENCOUNTER — OUTPATIENT (OUTPATIENT)
Dept: OUTPATIENT SERVICES | Facility: HOSPITAL | Age: 74
LOS: 1 days | Discharge: HOME | End: 2019-10-01

## 2019-10-01 DIAGNOSIS — Z98.890 OTHER SPECIFIED POSTPROCEDURAL STATES: Chronic | ICD-10-CM

## 2019-10-01 DIAGNOSIS — D64.9 ANEMIA, UNSPECIFIED: ICD-10-CM

## 2019-10-08 ENCOUNTER — OUTPATIENT (OUTPATIENT)
Dept: OUTPATIENT SERVICES | Facility: HOSPITAL | Age: 74
LOS: 1 days | Discharge: HOME | End: 2019-10-08

## 2019-10-08 DIAGNOSIS — Z98.890 OTHER SPECIFIED POSTPROCEDURAL STATES: Chronic | ICD-10-CM

## 2019-10-08 DIAGNOSIS — D64.9 ANEMIA, UNSPECIFIED: ICD-10-CM

## 2022-01-01 ENCOUNTER — TRANSCRIPTION ENCOUNTER (OUTPATIENT)
Age: 77
End: 2022-01-01

## 2022-01-01 ENCOUNTER — INPATIENT (INPATIENT)
Facility: HOSPITAL | Age: 77
LOS: 1 days | End: 2022-07-20
Attending: INTERNAL MEDICINE | Admitting: INTERNAL MEDICINE

## 2022-01-01 ENCOUNTER — APPOINTMENT (OUTPATIENT)
Dept: BURN CARE | Facility: CLINIC | Age: 77
End: 2022-01-01

## 2022-01-01 ENCOUNTER — RESULT REVIEW (OUTPATIENT)
Age: 77
End: 2022-01-01

## 2022-01-01 ENCOUNTER — INPATIENT (INPATIENT)
Facility: HOSPITAL | Age: 77
LOS: 15 days | Discharge: HOME | End: 2022-06-04
Attending: INTERNAL MEDICINE | Admitting: INTERNAL MEDICINE
Payer: MEDICAID

## 2022-01-01 VITALS
HEART RATE: 78 BPM | HEIGHT: 63 IN | TEMPERATURE: 99 F | DIASTOLIC BLOOD PRESSURE: 39 MMHG | SYSTOLIC BLOOD PRESSURE: 88 MMHG | RESPIRATION RATE: 20 BRPM | OXYGEN SATURATION: 98 %

## 2022-01-01 VITALS
HEIGHT: 63 IN | DIASTOLIC BLOOD PRESSURE: 54 MMHG | SYSTOLIC BLOOD PRESSURE: 106 MMHG | OXYGEN SATURATION: 96 % | RESPIRATION RATE: 16 BRPM | HEART RATE: 87 BPM

## 2022-01-01 VITALS — HEART RATE: 86 BPM | OXYGEN SATURATION: 100 % | DIASTOLIC BLOOD PRESSURE: 70 MMHG | SYSTOLIC BLOOD PRESSURE: 161 MMHG

## 2022-01-01 DIAGNOSIS — Z98.890 OTHER SPECIFIED POSTPROCEDURAL STATES: Chronic | ICD-10-CM

## 2022-01-01 DIAGNOSIS — N17.9 ACUTE KIDNEY FAILURE, UNSPECIFIED: ICD-10-CM

## 2022-01-01 DIAGNOSIS — Z66 DO NOT RESUSCITATE: ICD-10-CM

## 2022-01-01 DIAGNOSIS — U07.1 COVID-19: ICD-10-CM

## 2022-01-01 DIAGNOSIS — F20.0 PARANOID SCHIZOPHRENIA: ICD-10-CM

## 2022-01-01 DIAGNOSIS — N39.0 URINARY TRACT INFECTION, SITE NOT SPECIFIED: ICD-10-CM

## 2022-01-01 DIAGNOSIS — Z51.5 ENCOUNTER FOR PALLIATIVE CARE: ICD-10-CM

## 2022-01-01 DIAGNOSIS — F03.90 UNSPECIFIED DEMENTIA WITHOUT BEHAVIORAL DISTURBANCE: ICD-10-CM

## 2022-01-01 DIAGNOSIS — Z16.21 RESISTANCE TO VANCOMYCIN: ICD-10-CM

## 2022-01-01 DIAGNOSIS — F41.9 ANXIETY DISORDER, UNSPECIFIED: ICD-10-CM

## 2022-01-01 DIAGNOSIS — N17.0 ACUTE KIDNEY FAILURE WITH TUBULAR NECROSIS: ICD-10-CM

## 2022-01-01 DIAGNOSIS — E78.5 HYPERLIPIDEMIA, UNSPECIFIED: ICD-10-CM

## 2022-01-01 DIAGNOSIS — L89.154 PRESSURE ULCER OF SACRAL REGION, STAGE 4: ICD-10-CM

## 2022-01-01 DIAGNOSIS — D62 ACUTE POSTHEMORRHAGIC ANEMIA: ICD-10-CM

## 2022-01-01 DIAGNOSIS — E87.0 HYPEROSMOLALITY AND HYPERNATREMIA: ICD-10-CM

## 2022-01-01 DIAGNOSIS — Z93.1 GASTROSTOMY STATUS: ICD-10-CM

## 2022-01-01 DIAGNOSIS — Z79.4 LONG TERM (CURRENT) USE OF INSULIN: ICD-10-CM

## 2022-01-01 DIAGNOSIS — E11.9 TYPE 2 DIABETES MELLITUS WITHOUT COMPLICATIONS: ICD-10-CM

## 2022-01-01 DIAGNOSIS — A41.9 SEPSIS, UNSPECIFIED ORGANISM: ICD-10-CM

## 2022-01-01 DIAGNOSIS — R65.21 SEVERE SEPSIS WITH SEPTIC SHOCK: ICD-10-CM

## 2022-01-01 DIAGNOSIS — A41.89 OTHER SPECIFIED SEPSIS: ICD-10-CM

## 2022-01-01 DIAGNOSIS — R19.7 DIARRHEA, UNSPECIFIED: ICD-10-CM

## 2022-01-01 DIAGNOSIS — R63.8 OTHER SYMPTOMS AND SIGNS CONCERNING FOOD AND FLUID INTAKE: ICD-10-CM

## 2022-01-01 DIAGNOSIS — E03.9 HYPOTHYROIDISM, UNSPECIFIED: ICD-10-CM

## 2022-01-01 DIAGNOSIS — I25.10 ATHEROSCLEROTIC HEART DISEASE OF NATIVE CORONARY ARTERY WITHOUT ANGINA PECTORIS: ICD-10-CM

## 2022-01-01 DIAGNOSIS — Z74.01 BED CONFINEMENT STATUS: ICD-10-CM

## 2022-01-01 DIAGNOSIS — R53.83 OTHER FATIGUE: ICD-10-CM

## 2022-01-01 DIAGNOSIS — E87.8 OTHER DISORDERS OF ELECTROLYTE AND FLUID BALANCE, NOT ELSEWHERE CLASSIFIED: ICD-10-CM

## 2022-01-01 DIAGNOSIS — E87.1 HYPO-OSMOLALITY AND HYPONATREMIA: ICD-10-CM

## 2022-01-01 DIAGNOSIS — G93.41 METABOLIC ENCEPHALOPATHY: ICD-10-CM

## 2022-01-01 DIAGNOSIS — D50.9 IRON DEFICIENCY ANEMIA, UNSPECIFIED: ICD-10-CM

## 2022-01-01 DIAGNOSIS — J96.01 ACUTE RESPIRATORY FAILURE WITH HYPOXIA: ICD-10-CM

## 2022-01-01 DIAGNOSIS — E87.2 ACIDOSIS: ICD-10-CM

## 2022-01-01 DIAGNOSIS — D64.9 ANEMIA, UNSPECIFIED: ICD-10-CM

## 2022-01-01 DIAGNOSIS — L89.159 PRESSURE ULCER OF SACRAL REGION, UNSPECIFIED STAGE: ICD-10-CM

## 2022-01-01 DIAGNOSIS — E87.5 HYPERKALEMIA: ICD-10-CM

## 2022-01-01 DIAGNOSIS — B96.5 PSEUDOMONAS (AERUGINOSA) (MALLEI) (PSEUDOMALLEI) AS THE CAUSE OF DISEASES CLASSIFIED ELSEWHERE: ICD-10-CM

## 2022-01-01 DIAGNOSIS — J12.82 PNEUMONIA DUE TO CORONAVIRUS DISEASE 2019: ICD-10-CM

## 2022-01-01 DIAGNOSIS — M86.9 OSTEOMYELITIS, UNSPECIFIED: ICD-10-CM

## 2022-01-01 DIAGNOSIS — E11.65 TYPE 2 DIABETES MELLITUS WITH HYPERGLYCEMIA: ICD-10-CM

## 2022-01-01 DIAGNOSIS — R65.20 SEVERE SEPSIS WITHOUT SEPTIC SHOCK: ICD-10-CM

## 2022-01-01 DIAGNOSIS — J96.22 ACUTE AND CHRONIC RESPIRATORY FAILURE WITH HYPERCAPNIA: ICD-10-CM

## 2022-01-01 DIAGNOSIS — R53.2 FUNCTIONAL QUADRIPLEGIA: ICD-10-CM

## 2022-01-01 DIAGNOSIS — Z79.82 LONG TERM (CURRENT) USE OF ASPIRIN: ICD-10-CM

## 2022-01-01 DIAGNOSIS — E86.0 DEHYDRATION: ICD-10-CM

## 2022-01-01 DIAGNOSIS — G91.0 COMMUNICATING HYDROCEPHALUS: ICD-10-CM

## 2022-01-01 DIAGNOSIS — E11.00 TYPE 2 DIABETES MELLITUS WITH HYPEROSMOLARITY WITHOUT NONKETOTIC HYPERGLYCEMIC-HYPEROSMOLAR COMA (NKHHC): ICD-10-CM

## 2022-01-01 DIAGNOSIS — R06.00 DYSPNEA, UNSPECIFIED: ICD-10-CM

## 2022-01-01 LAB
-  AMIKACIN: SIGNIFICANT CHANGE UP
-  AMOXICILLIN/CLAVULANIC ACID: SIGNIFICANT CHANGE UP
-  AMOXICILLIN/CLAVULANIC ACID: SIGNIFICANT CHANGE UP
-  AMPICILLIN/SULBACTAM: SIGNIFICANT CHANGE UP
-  AMPICILLIN/SULBACTAM: SIGNIFICANT CHANGE UP
-  AMPICILLIN: SIGNIFICANT CHANGE UP
-  AZTREONAM: SIGNIFICANT CHANGE UP
-  CEFAZOLIN: SIGNIFICANT CHANGE UP
-  CEFAZOLIN: SIGNIFICANT CHANGE UP
-  CEFEPIME: SIGNIFICANT CHANGE UP
-  CEFOXITIN: SIGNIFICANT CHANGE UP
-  CEFTAZIDIME/AVIBACTAM: SIGNIFICANT CHANGE UP
-  CEFTAZIDIME: SIGNIFICANT CHANGE UP
-  CEFTOLOZANE/TAZOBACTAM: SIGNIFICANT CHANGE UP
-  CEFTRIAXONE: SIGNIFICANT CHANGE UP
-  CEFTRIAXONE: SIGNIFICANT CHANGE UP
-  CIPROFLOXACIN: SIGNIFICANT CHANGE UP
-  DAPTOMYCIN: SIGNIFICANT CHANGE UP
-  DAPTOMYCIN: SIGNIFICANT CHANGE UP
-  ERTAPENEM: SIGNIFICANT CHANGE UP
-  ERTAPENEM: SIGNIFICANT CHANGE UP
-  GENTAMICIN: SIGNIFICANT CHANGE UP
-  IMIPENEM: SIGNIFICANT CHANGE UP
-  IMIPENEM: SIGNIFICANT CHANGE UP
-  LEVOFLOXACIN: SIGNIFICANT CHANGE UP
-  LINEZOLID: SIGNIFICANT CHANGE UP
-  LINEZOLID: SIGNIFICANT CHANGE UP
-  MEROPENEM: SIGNIFICANT CHANGE UP
-  PIPERACILLIN/TAZOBACTAM: SIGNIFICANT CHANGE UP
-  TETRACYCLINE: SIGNIFICANT CHANGE UP
-  TETRACYCLINE: SIGNIFICANT CHANGE UP
-  TOBRAMYCIN: SIGNIFICANT CHANGE UP
-  TRIMETHOPRIM/SULFAMETHOXAZOLE: SIGNIFICANT CHANGE UP
-  TRIMETHOPRIM/SULFAMETHOXAZOLE: SIGNIFICANT CHANGE UP
-  VANCOMYCIN: SIGNIFICANT CHANGE UP
-  VANCOMYCIN: SIGNIFICANT CHANGE UP
A1C WITH ESTIMATED AVERAGE GLUCOSE RESULT: 9.8 % — HIGH (ref 4–5.6)
ABO RH CONFIRMATION: SIGNIFICANT CHANGE UP
ALBUMIN SERPL ELPH-MCNC: 1.6 G/DL — LOW (ref 3.5–5.2)
ALBUMIN SERPL ELPH-MCNC: 2.2 G/DL — LOW (ref 3.5–5.2)
ALBUMIN SERPL ELPH-MCNC: 2.3 G/DL — LOW (ref 3.5–5.2)
ALBUMIN SERPL ELPH-MCNC: 2.4 G/DL — LOW (ref 3.5–5.2)
ALBUMIN SERPL ELPH-MCNC: 2.5 G/DL — LOW (ref 3.5–5.2)
ALBUMIN SERPL ELPH-MCNC: 2.6 G/DL — LOW (ref 3.5–5.2)
ALBUMIN SERPL ELPH-MCNC: 2.6 G/DL — LOW (ref 3.5–5.2)
ALBUMIN SERPL ELPH-MCNC: 2.7 G/DL — LOW (ref 3.5–5.2)
ALP SERPL-CCNC: 100 U/L — SIGNIFICANT CHANGE UP (ref 30–115)
ALP SERPL-CCNC: 109 U/L — SIGNIFICANT CHANGE UP (ref 30–115)
ALP SERPL-CCNC: 114 U/L — SIGNIFICANT CHANGE UP (ref 30–115)
ALP SERPL-CCNC: 115 U/L — SIGNIFICANT CHANGE UP (ref 30–115)
ALP SERPL-CCNC: 157 U/L — HIGH (ref 30–115)
ALP SERPL-CCNC: 191 U/L — HIGH (ref 30–115)
ALP SERPL-CCNC: 65 U/L — SIGNIFICANT CHANGE UP (ref 30–115)
ALP SERPL-CCNC: 65 U/L — SIGNIFICANT CHANGE UP (ref 30–115)
ALP SERPL-CCNC: 67 U/L — SIGNIFICANT CHANGE UP (ref 30–115)
ALP SERPL-CCNC: 69 U/L — SIGNIFICANT CHANGE UP (ref 30–115)
ALP SERPL-CCNC: 74 U/L — SIGNIFICANT CHANGE UP (ref 30–115)
ALP SERPL-CCNC: 77 U/L — SIGNIFICANT CHANGE UP (ref 30–115)
ALP SERPL-CCNC: 78 U/L — SIGNIFICANT CHANGE UP (ref 30–115)
ALP SERPL-CCNC: 81 U/L — SIGNIFICANT CHANGE UP (ref 30–115)
ALP SERPL-CCNC: 81 U/L — SIGNIFICANT CHANGE UP (ref 30–115)
ALP SERPL-CCNC: 84 U/L — SIGNIFICANT CHANGE UP (ref 30–115)
ALP SERPL-CCNC: 85 U/L — SIGNIFICANT CHANGE UP (ref 30–115)
ALP SERPL-CCNC: 87 U/L — SIGNIFICANT CHANGE UP (ref 30–115)
ALP SERPL-CCNC: 88 U/L — SIGNIFICANT CHANGE UP (ref 30–115)
ALP SERPL-CCNC: 94 U/L — SIGNIFICANT CHANGE UP (ref 30–115)
ALP SERPL-CCNC: 96 U/L — SIGNIFICANT CHANGE UP (ref 30–115)
ALP SERPL-CCNC: 99 U/L — SIGNIFICANT CHANGE UP (ref 30–115)
ALT FLD-CCNC: 10 U/L — SIGNIFICANT CHANGE UP (ref 0–41)
ALT FLD-CCNC: 11 U/L — SIGNIFICANT CHANGE UP (ref 0–41)
ALT FLD-CCNC: 12 U/L — SIGNIFICANT CHANGE UP (ref 0–41)
ALT FLD-CCNC: 13 U/L — SIGNIFICANT CHANGE UP (ref 0–41)
ALT FLD-CCNC: 13 U/L — SIGNIFICANT CHANGE UP (ref 0–41)
ALT FLD-CCNC: 14 U/L — SIGNIFICANT CHANGE UP (ref 0–41)
ALT FLD-CCNC: 15 U/L — SIGNIFICANT CHANGE UP (ref 0–41)
ALT FLD-CCNC: 16 U/L — SIGNIFICANT CHANGE UP (ref 0–41)
ALT FLD-CCNC: 18 U/L — SIGNIFICANT CHANGE UP (ref 0–41)
ALT FLD-CCNC: 20 U/L — SIGNIFICANT CHANGE UP (ref 0–41)
ALT FLD-CCNC: 21 U/L — SIGNIFICANT CHANGE UP (ref 0–41)
ALT FLD-CCNC: 8 U/L — SIGNIFICANT CHANGE UP (ref 0–41)
ALT FLD-CCNC: 8 U/L — SIGNIFICANT CHANGE UP (ref 0–41)
ANION GAP SERPL CALC-SCNC: 10 MMOL/L — SIGNIFICANT CHANGE UP (ref 7–14)
ANION GAP SERPL CALC-SCNC: 10 MMOL/L — SIGNIFICANT CHANGE UP (ref 7–14)
ANION GAP SERPL CALC-SCNC: 11 MMOL/L — SIGNIFICANT CHANGE UP (ref 7–14)
ANION GAP SERPL CALC-SCNC: 12 MMOL/L — SIGNIFICANT CHANGE UP (ref 7–14)
ANION GAP SERPL CALC-SCNC: 13 MMOL/L — SIGNIFICANT CHANGE UP (ref 7–14)
ANION GAP SERPL CALC-SCNC: 14 MMOL/L — SIGNIFICANT CHANGE UP (ref 7–14)
ANION GAP SERPL CALC-SCNC: 15 MMOL/L — HIGH (ref 7–14)
ANION GAP SERPL CALC-SCNC: 16 MMOL/L — HIGH (ref 7–14)
ANION GAP SERPL CALC-SCNC: 17 MMOL/L — HIGH (ref 7–14)
ANION GAP SERPL CALC-SCNC: 17 MMOL/L — HIGH (ref 7–14)
ANION GAP SERPL CALC-SCNC: 19 MMOL/L — HIGH (ref 7–14)
ANION GAP SERPL CALC-SCNC: 26 MMOL/L — HIGH (ref 7–14)
ANION GAP SERPL CALC-SCNC: 9 MMOL/L — SIGNIFICANT CHANGE UP (ref 7–14)
ANION GAP SERPL CALC-SCNC: 9 MMOL/L — SIGNIFICANT CHANGE UP (ref 7–14)
ANION GAP SERPL CALC-SCNC: <10 MMOL/L — SIGNIFICANT CHANGE UP (ref 7–14)
ANISOCYTOSIS BLD QL: SIGNIFICANT CHANGE UP
APPEARANCE UR: ABNORMAL
APTT BLD: 23.1 SEC — CRITICAL LOW (ref 27–39.2)
APTT BLD: 29.2 SEC — SIGNIFICANT CHANGE UP (ref 27–39.2)
AST SERPL-CCNC: 14 U/L — SIGNIFICANT CHANGE UP (ref 0–41)
AST SERPL-CCNC: 16 U/L — SIGNIFICANT CHANGE UP (ref 0–41)
AST SERPL-CCNC: 17 U/L — SIGNIFICANT CHANGE UP (ref 0–41)
AST SERPL-CCNC: 18 U/L — SIGNIFICANT CHANGE UP (ref 0–41)
AST SERPL-CCNC: 18 U/L — SIGNIFICANT CHANGE UP (ref 0–41)
AST SERPL-CCNC: 19 U/L — SIGNIFICANT CHANGE UP (ref 0–41)
AST SERPL-CCNC: 20 U/L — SIGNIFICANT CHANGE UP (ref 0–41)
AST SERPL-CCNC: 20 U/L — SIGNIFICANT CHANGE UP (ref 0–41)
AST SERPL-CCNC: 21 U/L — SIGNIFICANT CHANGE UP (ref 0–41)
AST SERPL-CCNC: 27 U/L — SIGNIFICANT CHANGE UP (ref 0–41)
AST SERPL-CCNC: 30 U/L — SIGNIFICANT CHANGE UP (ref 0–41)
AST SERPL-CCNC: 30 U/L — SIGNIFICANT CHANGE UP (ref 0–41)
AST SERPL-CCNC: 33 U/L — SIGNIFICANT CHANGE UP (ref 0–41)
AST SERPL-CCNC: 41 U/L — SIGNIFICANT CHANGE UP (ref 0–41)
AST SERPL-CCNC: 62 U/L — HIGH (ref 0–41)
B-OH-BUTYR SERPL-SCNC: 0.2 MMOL/L — SIGNIFICANT CHANGE UP
B-OH-BUTYR SERPL-SCNC: 0.3 MMOL/L — SIGNIFICANT CHANGE UP
BACTERIA # UR AUTO: ABNORMAL
BASE EXCESS BLDV CALC-SCNC: 2.5 MMOL/L — SIGNIFICANT CHANGE UP (ref -2–3)
BASE EXCESS BLDV CALC-SCNC: 4 MMOL/L — HIGH (ref -2–3)
BASE EXCESS BLDV CALC-SCNC: 7 MMOL/L — HIGH (ref -2–3)
BASOPHILS # BLD AUTO: 0 K/UL — SIGNIFICANT CHANGE UP (ref 0–0.2)
BASOPHILS # BLD AUTO: 0.04 K/UL — SIGNIFICANT CHANGE UP (ref 0–0.2)
BASOPHILS # BLD AUTO: 0.04 K/UL — SIGNIFICANT CHANGE UP (ref 0–0.2)
BASOPHILS # BLD AUTO: 0.05 K/UL — SIGNIFICANT CHANGE UP (ref 0–0.2)
BASOPHILS # BLD AUTO: 0.06 K/UL — SIGNIFICANT CHANGE UP (ref 0–0.2)
BASOPHILS # BLD AUTO: 0.06 K/UL — SIGNIFICANT CHANGE UP (ref 0–0.2)
BASOPHILS # BLD AUTO: 0.08 K/UL — SIGNIFICANT CHANGE UP (ref 0–0.2)
BASOPHILS # BLD AUTO: 0.09 K/UL — SIGNIFICANT CHANGE UP (ref 0–0.2)
BASOPHILS # BLD AUTO: 0.09 K/UL — SIGNIFICANT CHANGE UP (ref 0–0.2)
BASOPHILS # BLD AUTO: 0.1 K/UL — SIGNIFICANT CHANGE UP (ref 0–0.2)
BASOPHILS NFR BLD AUTO: 0 % — SIGNIFICANT CHANGE UP (ref 0–1)
BASOPHILS NFR BLD AUTO: 0.2 % — SIGNIFICANT CHANGE UP (ref 0–1)
BASOPHILS NFR BLD AUTO: 0.2 % — SIGNIFICANT CHANGE UP (ref 0–1)
BASOPHILS NFR BLD AUTO: 0.3 % — SIGNIFICANT CHANGE UP (ref 0–1)
BASOPHILS NFR BLD AUTO: 0.4 % — SIGNIFICANT CHANGE UP (ref 0–1)
BASOPHILS NFR BLD AUTO: 0.5 % — SIGNIFICANT CHANGE UP (ref 0–1)
BASOPHILS NFR BLD AUTO: 0.6 % — SIGNIFICANT CHANGE UP (ref 0–1)
BASOPHILS NFR BLD AUTO: 0.6 % — SIGNIFICANT CHANGE UP (ref 0–1)
BASOPHILS NFR BLD AUTO: 0.7 % — SIGNIFICANT CHANGE UP (ref 0–1)
BILIRUB DIRECT SERPL-MCNC: <0.2 MG/DL — SIGNIFICANT CHANGE UP (ref 0–0.3)
BILIRUB INDIRECT FLD-MCNC: >0 MG/DL — LOW (ref 0.2–1.2)
BILIRUB SERPL-MCNC: 0.2 MG/DL — SIGNIFICANT CHANGE UP (ref 0.2–1.2)
BILIRUB SERPL-MCNC: 0.3 MG/DL — SIGNIFICANT CHANGE UP (ref 0.2–1.2)
BILIRUB SERPL-MCNC: 0.4 MG/DL — SIGNIFICANT CHANGE UP (ref 0.2–1.2)
BILIRUB SERPL-MCNC: 0.5 MG/DL — SIGNIFICANT CHANGE UP (ref 0.2–1.2)
BILIRUB SERPL-MCNC: <0.2 MG/DL — SIGNIFICANT CHANGE UP (ref 0.2–1.2)
BILIRUB UR-MCNC: ABNORMAL
BLD GP AB SCN SERPL QL: SIGNIFICANT CHANGE UP
BLD GP AB SCN SERPL QL: SIGNIFICANT CHANGE UP
BUN SERPL-MCNC: 115 MG/DL — CRITICAL HIGH (ref 10–20)
BUN SERPL-MCNC: 115 MG/DL — CRITICAL HIGH (ref 10–20)
BUN SERPL-MCNC: 117 MG/DL — CRITICAL HIGH (ref 10–20)
BUN SERPL-MCNC: 133 MG/DL — CRITICAL HIGH (ref 10–20)
BUN SERPL-MCNC: 138 MG/DL — CRITICAL HIGH (ref 10–20)
BUN SERPL-MCNC: 14 MG/DL — SIGNIFICANT CHANGE UP (ref 10–20)
BUN SERPL-MCNC: 141 MG/DL — CRITICAL HIGH (ref 10–20)
BUN SERPL-MCNC: 154 MG/DL — CRITICAL HIGH (ref 10–20)
BUN SERPL-MCNC: 20 MG/DL — SIGNIFICANT CHANGE UP (ref 10–20)
BUN SERPL-MCNC: 21 MG/DL — HIGH (ref 10–20)
BUN SERPL-MCNC: 22 MG/DL — HIGH (ref 10–20)
BUN SERPL-MCNC: 24 MG/DL — HIGH (ref 10–20)
BUN SERPL-MCNC: 25 MG/DL — HIGH (ref 10–20)
BUN SERPL-MCNC: 26 MG/DL — HIGH (ref 10–20)
BUN SERPL-MCNC: 27 MG/DL — HIGH (ref 10–20)
BUN SERPL-MCNC: 28 MG/DL — HIGH (ref 10–20)
BUN SERPL-MCNC: 28 MG/DL — HIGH (ref 10–20)
BUN SERPL-MCNC: 41 MG/DL — HIGH (ref 10–20)
BUN SERPL-MCNC: 46 MG/DL — HIGH (ref 10–20)
BUN SERPL-MCNC: 60 MG/DL — HIGH (ref 10–20)
BUN SERPL-MCNC: 88 MG/DL — CRITICAL HIGH (ref 10–20)
BUN SERPL-MCNC: 92 MG/DL — CRITICAL HIGH (ref 10–20)
CA-I SERPL-SCNC: 1.05 MMOL/L — LOW (ref 1.15–1.33)
CA-I SERPL-SCNC: 1.1 MMOL/L — LOW (ref 1.15–1.33)
CA-I SERPL-SCNC: 1.2 MMOL/L — SIGNIFICANT CHANGE UP (ref 1.15–1.33)
CALCIUM SERPL-MCNC: 6.2 MG/DL — LOW (ref 8.5–10.1)
CALCIUM SERPL-MCNC: 7.4 MG/DL — LOW (ref 8.5–10.1)
CALCIUM SERPL-MCNC: 7.9 MG/DL — LOW (ref 8.5–10.1)
CALCIUM SERPL-MCNC: 7.9 MG/DL — LOW (ref 8.5–10.1)
CALCIUM SERPL-MCNC: 8 MG/DL — LOW (ref 8.5–10.1)
CALCIUM SERPL-MCNC: 8.2 MG/DL — LOW (ref 8.5–10.1)
CALCIUM SERPL-MCNC: 8.2 MG/DL — LOW (ref 8.5–10.1)
CALCIUM SERPL-MCNC: 8.3 MG/DL — LOW (ref 8.5–10.1)
CALCIUM SERPL-MCNC: 8.3 MG/DL — LOW (ref 8.5–10.1)
CALCIUM SERPL-MCNC: 8.4 MG/DL — LOW (ref 8.5–10.1)
CALCIUM SERPL-MCNC: 8.5 MG/DL — SIGNIFICANT CHANGE UP (ref 8.5–10.1)
CALCIUM SERPL-MCNC: 8.5 MG/DL — SIGNIFICANT CHANGE UP (ref 8.5–10.1)
CALCIUM SERPL-MCNC: 8.6 MG/DL — SIGNIFICANT CHANGE UP (ref 8.5–10.1)
CALCIUM SERPL-MCNC: 8.7 MG/DL — SIGNIFICANT CHANGE UP (ref 8.5–10.1)
CALCIUM SERPL-MCNC: 8.8 MG/DL — SIGNIFICANT CHANGE UP (ref 8.5–10.1)
CALCIUM SERPL-MCNC: 8.9 MG/DL — SIGNIFICANT CHANGE UP (ref 8.5–10.1)
CALCIUM SERPL-MCNC: 8.9 MG/DL — SIGNIFICANT CHANGE UP (ref 8.5–10.1)
CALCIUM SERPL-MCNC: 9 MG/DL — SIGNIFICANT CHANGE UP (ref 8.5–10.1)
CALCIUM SERPL-MCNC: 9.2 MG/DL — SIGNIFICANT CHANGE UP (ref 8.5–10.1)
CALCIUM SERPL-MCNC: 9.3 MG/DL — SIGNIFICANT CHANGE UP (ref 8.5–10.1)
CHLORIDE SERPL-SCNC: 100 MMOL/L — SIGNIFICANT CHANGE UP (ref 98–110)
CHLORIDE SERPL-SCNC: 100 MMOL/L — SIGNIFICANT CHANGE UP (ref 98–110)
CHLORIDE SERPL-SCNC: 103 MMOL/L — SIGNIFICANT CHANGE UP (ref 98–110)
CHLORIDE SERPL-SCNC: 104 MMOL/L — SIGNIFICANT CHANGE UP (ref 98–110)
CHLORIDE SERPL-SCNC: 106 MMOL/L — SIGNIFICANT CHANGE UP (ref 98–110)
CHLORIDE SERPL-SCNC: 106 MMOL/L — SIGNIFICANT CHANGE UP (ref 98–110)
CHLORIDE SERPL-SCNC: 109 MMOL/L — SIGNIFICANT CHANGE UP (ref 98–110)
CHLORIDE SERPL-SCNC: 110 MMOL/L — SIGNIFICANT CHANGE UP (ref 98–110)
CHLORIDE SERPL-SCNC: 111 MMOL/L — HIGH (ref 98–110)
CHLORIDE SERPL-SCNC: 114 MMOL/L — HIGH (ref 98–110)
CHLORIDE SERPL-SCNC: 115 MMOL/L — HIGH (ref 98–110)
CHLORIDE SERPL-SCNC: 117 MMOL/L — HIGH (ref 98–110)
CHLORIDE SERPL-SCNC: 117 MMOL/L — HIGH (ref 98–110)
CHLORIDE SERPL-SCNC: 94 MMOL/L — LOW (ref 98–110)
CHLORIDE SERPL-SCNC: 95 MMOL/L — LOW (ref 98–110)
CHLORIDE SERPL-SCNC: 95 MMOL/L — LOW (ref 98–110)
CHLORIDE SERPL-SCNC: 96 MMOL/L — LOW (ref 98–110)
CHLORIDE SERPL-SCNC: 97 MMOL/L — LOW (ref 98–110)
CHLORIDE SERPL-SCNC: 98 MMOL/L — SIGNIFICANT CHANGE UP (ref 98–110)
CHLORIDE SERPL-SCNC: 98 MMOL/L — SIGNIFICANT CHANGE UP (ref 98–110)
CHLORIDE SERPL-SCNC: 99 MMOL/L — SIGNIFICANT CHANGE UP (ref 98–110)
CHLORIDE UR-SCNC: <20 — SIGNIFICANT CHANGE UP
CK SERPL-CCNC: 240 U/L — HIGH (ref 0–225)
CK SERPL-CCNC: 39 U/L — SIGNIFICANT CHANGE UP (ref 0–225)
CO2 SERPL-SCNC: 17 MMOL/L — SIGNIFICANT CHANGE UP (ref 17–32)
CO2 SERPL-SCNC: 19 MMOL/L — SIGNIFICANT CHANGE UP (ref 17–32)
CO2 SERPL-SCNC: 20 MMOL/L — SIGNIFICANT CHANGE UP (ref 17–32)
CO2 SERPL-SCNC: 21 MMOL/L — SIGNIFICANT CHANGE UP (ref 17–32)
CO2 SERPL-SCNC: 22 MMOL/L — SIGNIFICANT CHANGE UP (ref 17–32)
CO2 SERPL-SCNC: 23 MMOL/L — SIGNIFICANT CHANGE UP (ref 17–32)
CO2 SERPL-SCNC: 24 MMOL/L — SIGNIFICANT CHANGE UP (ref 17–32)
CO2 SERPL-SCNC: 25 MMOL/L — SIGNIFICANT CHANGE UP (ref 17–32)
CO2 SERPL-SCNC: 26 MMOL/L — SIGNIFICANT CHANGE UP (ref 17–32)
CO2 SERPL-SCNC: 30 MMOL/L — SIGNIFICANT CHANGE UP (ref 17–32)
CO2 SERPL-SCNC: 31 MMOL/L — SIGNIFICANT CHANGE UP (ref 17–32)
CO2 SERPL-SCNC: >50 MMOL/L — CRITICAL HIGH (ref 17–32)
COLOR SPEC: ABNORMAL
CREAT ?TM UR-MCNC: 67 MG/DL — SIGNIFICANT CHANGE UP
CREAT SERPL-MCNC: 0.5 MG/DL — LOW (ref 0.7–1.5)
CREAT SERPL-MCNC: 0.6 MG/DL — LOW (ref 0.7–1.5)
CREAT SERPL-MCNC: 0.7 MG/DL — SIGNIFICANT CHANGE UP (ref 0.7–1.5)
CREAT SERPL-MCNC: 0.7 MG/DL — SIGNIFICANT CHANGE UP (ref 0.7–1.5)
CREAT SERPL-MCNC: 0.8 MG/DL — SIGNIFICANT CHANGE UP (ref 0.7–1.5)
CREAT SERPL-MCNC: 0.9 MG/DL — SIGNIFICANT CHANGE UP (ref 0.7–1.5)
CREAT SERPL-MCNC: 0.9 MG/DL — SIGNIFICANT CHANGE UP (ref 0.7–1.5)
CREAT SERPL-MCNC: 1 MG/DL — SIGNIFICANT CHANGE UP (ref 0.7–1.5)
CREAT SERPL-MCNC: 1 MG/DL — SIGNIFICANT CHANGE UP (ref 0.7–1.5)
CREAT SERPL-MCNC: 1.1 MG/DL — SIGNIFICANT CHANGE UP (ref 0.7–1.5)
CREAT SERPL-MCNC: 1.4 MG/DL — SIGNIFICANT CHANGE UP (ref 0.7–1.5)
CREAT SERPL-MCNC: 1.6 MG/DL — HIGH (ref 0.7–1.5)
CREAT SERPL-MCNC: 1.6 MG/DL — HIGH (ref 0.7–1.5)
CREAT SERPL-MCNC: 2 MG/DL — HIGH (ref 0.7–1.5)
CREAT SERPL-MCNC: 2.1 MG/DL — HIGH (ref 0.7–1.5)
CREAT SERPL-MCNC: 2.4 MG/DL — HIGH (ref 0.7–1.5)
CREAT SERPL-MCNC: 2.8 MG/DL — HIGH (ref 0.7–1.5)
CREAT SERPL-MCNC: 2.9 MG/DL — HIGH (ref 0.7–1.5)
CREAT SERPL-MCNC: 3 MG/DL — HIGH (ref 0.7–1.5)
CREAT SERPL-MCNC: 3.5 MG/DL — HIGH (ref 0.7–1.5)
CRP SERPL-MCNC: 191.5 MG/L — HIGH
CRP SERPL-MCNC: 39 MG/L — HIGH
CULTURE RESULTS: SIGNIFICANT CHANGE UP
D DIMER BLD IA.RAPID-MCNC: 3935 NG/ML DDU — HIGH (ref 0–230)
DIFF PNL FLD: ABNORMAL
EGFR: 13 ML/MIN/1.73M2 — LOW
EGFR: 16 ML/MIN/1.73M2 — LOW
EGFR: 16 ML/MIN/1.73M2 — LOW
EGFR: 17 ML/MIN/1.73M2 — LOW
EGFR: 21 ML/MIN/1.73M2 — LOW
EGFR: 24 ML/MIN/1.73M2 — LOW
EGFR: 26 ML/MIN/1.73M2 — LOW
EGFR: 33 ML/MIN/1.73M2 — LOW
EGFR: 34 ML/MIN/1.73M2 — LOW
EGFR: 39 ML/MIN/1.73M2 — LOW
EGFR: 53 ML/MIN/1.73M2 — LOW
EGFR: 59 ML/MIN/1.73M2 — LOW
EGFR: 59 ML/MIN/1.73M2 — LOW
EGFR: 67 ML/MIN/1.73M2 — SIGNIFICANT CHANGE UP
EGFR: 67 ML/MIN/1.73M2 — SIGNIFICANT CHANGE UP
EGFR: 77 ML/MIN/1.73M2 — SIGNIFICANT CHANGE UP
EGFR: 91 ML/MIN/1.73M2 — SIGNIFICANT CHANGE UP
EGFR: 91 ML/MIN/1.73M2 — SIGNIFICANT CHANGE UP
EGFR: 94 ML/MIN/1.73M2 — SIGNIFICANT CHANGE UP
EGFR: 98 ML/MIN/1.73M2 — SIGNIFICANT CHANGE UP
EOSINOPHIL # BLD AUTO: 0 K/UL — SIGNIFICANT CHANGE UP (ref 0–0.7)
EOSINOPHIL # BLD AUTO: 0 K/UL — SIGNIFICANT CHANGE UP (ref 0–0.7)
EOSINOPHIL # BLD AUTO: 0.02 K/UL — SIGNIFICANT CHANGE UP (ref 0–0.7)
EOSINOPHIL # BLD AUTO: 0.02 K/UL — SIGNIFICANT CHANGE UP (ref 0–0.7)
EOSINOPHIL # BLD AUTO: 0.2 K/UL — SIGNIFICANT CHANGE UP (ref 0–0.7)
EOSINOPHIL # BLD AUTO: 0.23 K/UL — SIGNIFICANT CHANGE UP (ref 0–0.7)
EOSINOPHIL # BLD AUTO: 0.26 K/UL — SIGNIFICANT CHANGE UP (ref 0–0.7)
EOSINOPHIL # BLD AUTO: 0.27 K/UL — SIGNIFICANT CHANGE UP (ref 0–0.7)
EOSINOPHIL # BLD AUTO: 0.29 K/UL — SIGNIFICANT CHANGE UP (ref 0–0.7)
EOSINOPHIL # BLD AUTO: 0.3 K/UL — SIGNIFICANT CHANGE UP (ref 0–0.7)
EOSINOPHIL # BLD AUTO: 0.3 K/UL — SIGNIFICANT CHANGE UP (ref 0–0.7)
EOSINOPHIL # BLD AUTO: 0.33 K/UL — SIGNIFICANT CHANGE UP (ref 0–0.7)
EOSINOPHIL # BLD AUTO: 0.51 K/UL — SIGNIFICANT CHANGE UP (ref 0–0.7)
EOSINOPHIL # BLD AUTO: 0.63 K/UL — SIGNIFICANT CHANGE UP (ref 0–0.7)
EOSINOPHIL # BLD AUTO: 0.69 K/UL — SIGNIFICANT CHANGE UP (ref 0–0.7)
EOSINOPHIL NFR BLD AUTO: 0 % — SIGNIFICANT CHANGE UP (ref 0–8)
EOSINOPHIL NFR BLD AUTO: 0 % — SIGNIFICANT CHANGE UP (ref 0–8)
EOSINOPHIL NFR BLD AUTO: 0.1 % — SIGNIFICANT CHANGE UP (ref 0–8)
EOSINOPHIL NFR BLD AUTO: 0.1 % — SIGNIFICANT CHANGE UP (ref 0–8)
EOSINOPHIL NFR BLD AUTO: 1.5 % — SIGNIFICANT CHANGE UP (ref 0–8)
EOSINOPHIL NFR BLD AUTO: 1.7 % — SIGNIFICANT CHANGE UP (ref 0–8)
EOSINOPHIL NFR BLD AUTO: 1.7 % — SIGNIFICANT CHANGE UP (ref 0–8)
EOSINOPHIL NFR BLD AUTO: 1.8 % — SIGNIFICANT CHANGE UP (ref 0–8)
EOSINOPHIL NFR BLD AUTO: 1.8 % — SIGNIFICANT CHANGE UP (ref 0–8)
EOSINOPHIL NFR BLD AUTO: 1.9 % — SIGNIFICANT CHANGE UP (ref 0–8)
EOSINOPHIL NFR BLD AUTO: 2.1 % — SIGNIFICANT CHANGE UP (ref 0–8)
EOSINOPHIL NFR BLD AUTO: 2.3 % — SIGNIFICANT CHANGE UP (ref 0–8)
EOSINOPHIL NFR BLD AUTO: 4.2 % — SIGNIFICANT CHANGE UP (ref 0–8)
EOSINOPHIL NFR BLD AUTO: 5.8 % — SIGNIFICANT CHANGE UP (ref 0–8)
EOSINOPHIL NFR BLD AUTO: 7.2 % — SIGNIFICANT CHANGE UP (ref 0–8)
EPI CELLS # UR: 1 /HPF — SIGNIFICANT CHANGE UP (ref 0–5)
ERYTHROCYTE [SEDIMENTATION RATE] IN BLOOD: 121 MM/HR — HIGH (ref 0–20)
ESTIMATED AVERAGE GLUCOSE: 235 MG/DL — HIGH (ref 68–114)
FERRITIN SERPL-MCNC: 562 NG/ML — HIGH (ref 15–150)
GAS PNL BLDA: SIGNIFICANT CHANGE UP
GAS PNL BLDV: 136 MMOL/L — SIGNIFICANT CHANGE UP (ref 136–145)
GAS PNL BLDV: 137 MMOL/L — SIGNIFICANT CHANGE UP (ref 136–145)
GAS PNL BLDV: 149 MMOL/L — HIGH (ref 136–145)
GAS PNL BLDV: SIGNIFICANT CHANGE UP
GIANT PLATELETS BLD QL SMEAR: PRESENT — SIGNIFICANT CHANGE UP
GLUCOSE BLDC GLUCOMTR-MCNC: 100 MG/DL — HIGH (ref 70–99)
GLUCOSE BLDC GLUCOMTR-MCNC: 101 MG/DL — HIGH (ref 70–99)
GLUCOSE BLDC GLUCOMTR-MCNC: 103 MG/DL — HIGH (ref 70–99)
GLUCOSE BLDC GLUCOMTR-MCNC: 103 MG/DL — HIGH (ref 70–99)
GLUCOSE BLDC GLUCOMTR-MCNC: 104 MG/DL — HIGH (ref 70–99)
GLUCOSE BLDC GLUCOMTR-MCNC: 105 MG/DL — HIGH (ref 70–99)
GLUCOSE BLDC GLUCOMTR-MCNC: 106 MG/DL — HIGH (ref 70–99)
GLUCOSE BLDC GLUCOMTR-MCNC: 112 MG/DL — HIGH (ref 70–99)
GLUCOSE BLDC GLUCOMTR-MCNC: 112 MG/DL — HIGH (ref 70–99)
GLUCOSE BLDC GLUCOMTR-MCNC: 115 MG/DL — HIGH (ref 70–99)
GLUCOSE BLDC GLUCOMTR-MCNC: 115 MG/DL — HIGH (ref 70–99)
GLUCOSE BLDC GLUCOMTR-MCNC: 116 MG/DL — HIGH (ref 70–99)
GLUCOSE BLDC GLUCOMTR-MCNC: 116 MG/DL — HIGH (ref 70–99)
GLUCOSE BLDC GLUCOMTR-MCNC: 117 MG/DL — HIGH (ref 70–99)
GLUCOSE BLDC GLUCOMTR-MCNC: 119 MG/DL — HIGH (ref 70–99)
GLUCOSE BLDC GLUCOMTR-MCNC: 119 MG/DL — HIGH (ref 70–99)
GLUCOSE BLDC GLUCOMTR-MCNC: 120 MG/DL — HIGH (ref 70–99)
GLUCOSE BLDC GLUCOMTR-MCNC: 121 MG/DL — HIGH (ref 70–99)
GLUCOSE BLDC GLUCOMTR-MCNC: 123 MG/DL — HIGH (ref 70–99)
GLUCOSE BLDC GLUCOMTR-MCNC: 125 MG/DL — HIGH (ref 70–99)
GLUCOSE BLDC GLUCOMTR-MCNC: 126 MG/DL — HIGH (ref 70–99)
GLUCOSE BLDC GLUCOMTR-MCNC: 127 MG/DL — HIGH (ref 70–99)
GLUCOSE BLDC GLUCOMTR-MCNC: 128 MG/DL — HIGH (ref 70–99)
GLUCOSE BLDC GLUCOMTR-MCNC: 129 MG/DL — HIGH (ref 70–99)
GLUCOSE BLDC GLUCOMTR-MCNC: 130 MG/DL — HIGH (ref 70–99)
GLUCOSE BLDC GLUCOMTR-MCNC: 132 MG/DL — HIGH (ref 70–99)
GLUCOSE BLDC GLUCOMTR-MCNC: 132 MG/DL — HIGH (ref 70–99)
GLUCOSE BLDC GLUCOMTR-MCNC: 133 MG/DL — HIGH (ref 70–99)
GLUCOSE BLDC GLUCOMTR-MCNC: 133 MG/DL — HIGH (ref 70–99)
GLUCOSE BLDC GLUCOMTR-MCNC: 134 MG/DL — HIGH (ref 70–99)
GLUCOSE BLDC GLUCOMTR-MCNC: 134 MG/DL — HIGH (ref 70–99)
GLUCOSE BLDC GLUCOMTR-MCNC: 135 MG/DL — HIGH (ref 70–99)
GLUCOSE BLDC GLUCOMTR-MCNC: 135 MG/DL — HIGH (ref 70–99)
GLUCOSE BLDC GLUCOMTR-MCNC: 136 MG/DL — HIGH (ref 70–99)
GLUCOSE BLDC GLUCOMTR-MCNC: 140 MG/DL — HIGH (ref 70–99)
GLUCOSE BLDC GLUCOMTR-MCNC: 141 MG/DL — HIGH (ref 70–99)
GLUCOSE BLDC GLUCOMTR-MCNC: 141 MG/DL — HIGH (ref 70–99)
GLUCOSE BLDC GLUCOMTR-MCNC: 143 MG/DL — HIGH (ref 70–99)
GLUCOSE BLDC GLUCOMTR-MCNC: 144 MG/DL — HIGH (ref 70–99)
GLUCOSE BLDC GLUCOMTR-MCNC: 145 MG/DL — HIGH (ref 70–99)
GLUCOSE BLDC GLUCOMTR-MCNC: 147 MG/DL — HIGH (ref 70–99)
GLUCOSE BLDC GLUCOMTR-MCNC: 149 MG/DL — HIGH (ref 70–99)
GLUCOSE BLDC GLUCOMTR-MCNC: 162 MG/DL — HIGH (ref 70–99)
GLUCOSE BLDC GLUCOMTR-MCNC: 163 MG/DL — HIGH (ref 70–99)
GLUCOSE BLDC GLUCOMTR-MCNC: 163 MG/DL — HIGH (ref 70–99)
GLUCOSE BLDC GLUCOMTR-MCNC: 166 MG/DL — HIGH (ref 70–99)
GLUCOSE BLDC GLUCOMTR-MCNC: 168 MG/DL — HIGH (ref 70–99)
GLUCOSE BLDC GLUCOMTR-MCNC: 168 MG/DL — HIGH (ref 70–99)
GLUCOSE BLDC GLUCOMTR-MCNC: 170 MG/DL — HIGH (ref 70–99)
GLUCOSE BLDC GLUCOMTR-MCNC: 173 MG/DL — HIGH (ref 70–99)
GLUCOSE BLDC GLUCOMTR-MCNC: 174 MG/DL — HIGH (ref 70–99)
GLUCOSE BLDC GLUCOMTR-MCNC: 179 MG/DL — HIGH (ref 70–99)
GLUCOSE BLDC GLUCOMTR-MCNC: 181 MG/DL — HIGH (ref 70–99)
GLUCOSE BLDC GLUCOMTR-MCNC: 182 MG/DL — HIGH (ref 70–99)
GLUCOSE BLDC GLUCOMTR-MCNC: 183 MG/DL — HIGH (ref 70–99)
GLUCOSE BLDC GLUCOMTR-MCNC: 184 MG/DL — HIGH (ref 70–99)
GLUCOSE BLDC GLUCOMTR-MCNC: 193 MG/DL — HIGH (ref 70–99)
GLUCOSE BLDC GLUCOMTR-MCNC: 202 MG/DL — HIGH (ref 70–99)
GLUCOSE BLDC GLUCOMTR-MCNC: 206 MG/DL — HIGH (ref 70–99)
GLUCOSE BLDC GLUCOMTR-MCNC: 208 MG/DL — HIGH (ref 70–99)
GLUCOSE BLDC GLUCOMTR-MCNC: 211 MG/DL — HIGH (ref 70–99)
GLUCOSE BLDC GLUCOMTR-MCNC: 214 MG/DL — HIGH (ref 70–99)
GLUCOSE BLDC GLUCOMTR-MCNC: 219 MG/DL — HIGH (ref 70–99)
GLUCOSE BLDC GLUCOMTR-MCNC: 231 MG/DL — HIGH (ref 70–99)
GLUCOSE BLDC GLUCOMTR-MCNC: 236 MG/DL — HIGH (ref 70–99)
GLUCOSE BLDC GLUCOMTR-MCNC: 239 MG/DL — HIGH (ref 70–99)
GLUCOSE BLDC GLUCOMTR-MCNC: 240 MG/DL — HIGH (ref 70–99)
GLUCOSE BLDC GLUCOMTR-MCNC: 242 MG/DL — HIGH (ref 70–99)
GLUCOSE BLDC GLUCOMTR-MCNC: 245 MG/DL — HIGH (ref 70–99)
GLUCOSE BLDC GLUCOMTR-MCNC: 245 MG/DL — HIGH (ref 70–99)
GLUCOSE BLDC GLUCOMTR-MCNC: 260 MG/DL — HIGH (ref 70–99)
GLUCOSE BLDC GLUCOMTR-MCNC: 267 MG/DL — HIGH (ref 70–99)
GLUCOSE BLDC GLUCOMTR-MCNC: 277 MG/DL — HIGH (ref 70–99)
GLUCOSE BLDC GLUCOMTR-MCNC: 293 MG/DL — HIGH (ref 70–99)
GLUCOSE BLDC GLUCOMTR-MCNC: 306 MG/DL — HIGH (ref 70–99)
GLUCOSE BLDC GLUCOMTR-MCNC: 320 MG/DL — HIGH (ref 70–99)
GLUCOSE BLDC GLUCOMTR-MCNC: 366 MG/DL — HIGH (ref 70–99)
GLUCOSE BLDC GLUCOMTR-MCNC: 397 MG/DL — HIGH (ref 70–99)
GLUCOSE BLDC GLUCOMTR-MCNC: 410 MG/DL — HIGH (ref 70–99)
GLUCOSE BLDC GLUCOMTR-MCNC: 415 MG/DL — HIGH (ref 70–99)
GLUCOSE BLDC GLUCOMTR-MCNC: 445 MG/DL — HIGH (ref 70–99)
GLUCOSE BLDC GLUCOMTR-MCNC: 446 MG/DL — HIGH (ref 70–99)
GLUCOSE BLDC GLUCOMTR-MCNC: 459 MG/DL — CRITICAL HIGH (ref 70–99)
GLUCOSE BLDC GLUCOMTR-MCNC: 462 MG/DL — CRITICAL HIGH (ref 70–99)
GLUCOSE BLDC GLUCOMTR-MCNC: 480 MG/DL — CRITICAL HIGH (ref 70–99)
GLUCOSE BLDC GLUCOMTR-MCNC: 494 MG/DL — CRITICAL HIGH (ref 70–99)
GLUCOSE BLDC GLUCOMTR-MCNC: 50 MG/DL — CRITICAL LOW (ref 70–99)
GLUCOSE BLDC GLUCOMTR-MCNC: 515 MG/DL — CRITICAL HIGH (ref 70–99)
GLUCOSE BLDC GLUCOMTR-MCNC: 518 MG/DL — CRITICAL HIGH (ref 70–99)
GLUCOSE BLDC GLUCOMTR-MCNC: 57 MG/DL — LOW (ref 70–99)
GLUCOSE BLDC GLUCOMTR-MCNC: 70 MG/DL — SIGNIFICANT CHANGE UP (ref 70–99)
GLUCOSE BLDC GLUCOMTR-MCNC: 75 MG/DL — SIGNIFICANT CHANGE UP (ref 70–99)
GLUCOSE BLDC GLUCOMTR-MCNC: 76 MG/DL — SIGNIFICANT CHANGE UP (ref 70–99)
GLUCOSE BLDC GLUCOMTR-MCNC: 84 MG/DL — SIGNIFICANT CHANGE UP (ref 70–99)
GLUCOSE BLDC GLUCOMTR-MCNC: 84 MG/DL — SIGNIFICANT CHANGE UP (ref 70–99)
GLUCOSE BLDC GLUCOMTR-MCNC: 85 MG/DL — SIGNIFICANT CHANGE UP (ref 70–99)
GLUCOSE BLDC GLUCOMTR-MCNC: 86 MG/DL — SIGNIFICANT CHANGE UP (ref 70–99)
GLUCOSE BLDC GLUCOMTR-MCNC: 88 MG/DL — SIGNIFICANT CHANGE UP (ref 70–99)
GLUCOSE BLDC GLUCOMTR-MCNC: 94 MG/DL — SIGNIFICANT CHANGE UP (ref 70–99)
GLUCOSE BLDC GLUCOMTR-MCNC: 95 MG/DL — SIGNIFICANT CHANGE UP (ref 70–99)
GLUCOSE BLDC GLUCOMTR-MCNC: 96 MG/DL — SIGNIFICANT CHANGE UP (ref 70–99)
GLUCOSE BLDC GLUCOMTR-MCNC: 97 MG/DL — SIGNIFICANT CHANGE UP (ref 70–99)
GLUCOSE SERPL-MCNC: 101 MG/DL — HIGH (ref 70–99)
GLUCOSE SERPL-MCNC: 105 MG/DL — HIGH (ref 70–99)
GLUCOSE SERPL-MCNC: 125 MG/DL — HIGH (ref 70–99)
GLUCOSE SERPL-MCNC: 125 MG/DL — HIGH (ref 70–99)
GLUCOSE SERPL-MCNC: 133 MG/DL — HIGH (ref 70–99)
GLUCOSE SERPL-MCNC: 133 MG/DL — HIGH (ref 70–99)
GLUCOSE SERPL-MCNC: 146 MG/DL — HIGH (ref 70–99)
GLUCOSE SERPL-MCNC: 149 MG/DL — HIGH (ref 70–99)
GLUCOSE SERPL-MCNC: 157 MG/DL — HIGH (ref 70–99)
GLUCOSE SERPL-MCNC: 176 MG/DL — HIGH (ref 70–99)
GLUCOSE SERPL-MCNC: 201 MG/DL — HIGH (ref 70–99)
GLUCOSE SERPL-MCNC: 203 MG/DL — HIGH (ref 70–99)
GLUCOSE SERPL-MCNC: 212 MG/DL — HIGH (ref 70–99)
GLUCOSE SERPL-MCNC: 228 MG/DL — HIGH (ref 70–99)
GLUCOSE SERPL-MCNC: 294 MG/DL — HIGH (ref 70–99)
GLUCOSE SERPL-MCNC: 302 MG/DL — HIGH (ref 70–99)
GLUCOSE SERPL-MCNC: 384 MG/DL — HIGH (ref 70–99)
GLUCOSE SERPL-MCNC: 477 MG/DL — CRITICAL HIGH (ref 70–99)
GLUCOSE SERPL-MCNC: 498 MG/DL — CRITICAL HIGH (ref 70–99)
GLUCOSE SERPL-MCNC: 525 MG/DL — CRITICAL HIGH (ref 70–99)
GLUCOSE SERPL-MCNC: 563 MG/DL — CRITICAL HIGH (ref 70–99)
GLUCOSE SERPL-MCNC: 72 MG/DL — SIGNIFICANT CHANGE UP (ref 70–99)
GLUCOSE SERPL-MCNC: 75 MG/DL — SIGNIFICANT CHANGE UP (ref 70–99)
GLUCOSE SERPL-MCNC: 76 MG/DL — SIGNIFICANT CHANGE UP (ref 70–99)
GLUCOSE SERPL-MCNC: 91 MG/DL — SIGNIFICANT CHANGE UP (ref 70–99)
GLUCOSE SERPL-MCNC: 92 MG/DL — SIGNIFICANT CHANGE UP (ref 70–99)
GLUCOSE SERPL-MCNC: >1400 MG/DL — CRITICAL HIGH (ref 70–99)
GLUCOSE UR QL: ABNORMAL
GRAM STN FLD: SIGNIFICANT CHANGE UP
HCO3 BLDV-SCNC: 28 MMOL/L — SIGNIFICANT CHANGE UP (ref 22–29)
HCO3 BLDV-SCNC: 31 MMOL/L — HIGH (ref 22–29)
HCO3 BLDV-SCNC: 33 MMOL/L — HIGH (ref 22–29)
HCT VFR BLD CALC: 23.3 % — LOW (ref 37–47)
HCT VFR BLD CALC: 24.3 % — LOW (ref 37–47)
HCT VFR BLD CALC: 24.3 % — LOW (ref 37–47)
HCT VFR BLD CALC: 24.5 % — LOW (ref 37–47)
HCT VFR BLD CALC: 24.9 % — LOW (ref 37–47)
HCT VFR BLD CALC: 25.3 % — LOW (ref 37–47)
HCT VFR BLD CALC: 25.3 % — LOW (ref 37–47)
HCT VFR BLD CALC: 26.2 % — LOW (ref 37–47)
HCT VFR BLD CALC: 26.5 % — LOW (ref 37–47)
HCT VFR BLD CALC: 26.7 % — LOW (ref 37–47)
HCT VFR BLD CALC: 26.9 % — LOW (ref 37–47)
HCT VFR BLD CALC: 27.6 % — LOW (ref 37–47)
HCT VFR BLD CALC: 27.9 % — LOW (ref 37–47)
HCT VFR BLD CALC: 27.9 % — LOW (ref 37–47)
HCT VFR BLD CALC: 28 % — LOW (ref 37–47)
HCT VFR BLD CALC: 28.1 % — LOW (ref 37–47)
HCT VFR BLD CALC: 28.2 % — LOW (ref 37–47)
HCT VFR BLD CALC: 28.4 % — LOW (ref 37–47)
HCT VFR BLD CALC: 28.7 % — LOW (ref 37–47)
HCT VFR BLD CALC: 28.8 % — LOW (ref 37–47)
HCT VFR BLD CALC: 29.4 % — LOW (ref 37–47)
HCT VFR BLDA CALC: 23 % — LOW (ref 39–51)
HCT VFR BLDA CALC: 40 % — SIGNIFICANT CHANGE UP (ref 39–51)
HCT VFR BLDA CALC: 41 % — SIGNIFICANT CHANGE UP (ref 39–51)
HGB BLD CALC-MCNC: 13.4 G/DL — SIGNIFICANT CHANGE UP (ref 12.6–17.4)
HGB BLD CALC-MCNC: 13.6 G/DL — SIGNIFICANT CHANGE UP (ref 12.6–17.4)
HGB BLD CALC-MCNC: 7.7 G/DL — LOW (ref 12.6–17.4)
HGB BLD-MCNC: 6.4 G/DL — CRITICAL LOW (ref 12–16)
HGB BLD-MCNC: 7.2 G/DL — LOW (ref 12–16)
HGB BLD-MCNC: 7.4 G/DL — LOW (ref 12–16)
HGB BLD-MCNC: 7.6 G/DL — LOW (ref 12–16)
HGB BLD-MCNC: 7.7 G/DL — LOW (ref 12–16)
HGB BLD-MCNC: 7.8 G/DL — LOW (ref 12–16)
HGB BLD-MCNC: 7.8 G/DL — LOW (ref 12–16)
HGB BLD-MCNC: 7.9 G/DL — LOW (ref 12–16)
HGB BLD-MCNC: 7.9 G/DL — LOW (ref 12–16)
HGB BLD-MCNC: 8 G/DL — LOW (ref 12–16)
HGB BLD-MCNC: 8.1 G/DL — LOW (ref 12–16)
HGB BLD-MCNC: 8.1 G/DL — LOW (ref 12–16)
HGB BLD-MCNC: 8.2 G/DL — LOW (ref 12–16)
HGB BLD-MCNC: 8.2 G/DL — LOW (ref 12–16)
HGB BLD-MCNC: 8.4 G/DL — LOW (ref 12–16)
HGB BLD-MCNC: 8.9 G/DL — LOW (ref 12–16)
HYALINE CASTS # UR AUTO: 7 /LPF — SIGNIFICANT CHANGE UP (ref 0–7)
IMM GRANULOCYTES NFR BLD AUTO: 0.9 % — HIGH (ref 0.1–0.3)
IMM GRANULOCYTES NFR BLD AUTO: 1 % — HIGH (ref 0.1–0.3)
IMM GRANULOCYTES NFR BLD AUTO: 1.3 % — HIGH (ref 0.1–0.3)
IMM GRANULOCYTES NFR BLD AUTO: 1.7 % — HIGH (ref 0.1–0.3)
IMM GRANULOCYTES NFR BLD AUTO: 2 % — HIGH (ref 0.1–0.3)
IMM GRANULOCYTES NFR BLD AUTO: 2.1 % — HIGH (ref 0.1–0.3)
IMM GRANULOCYTES NFR BLD AUTO: 2.2 % — HIGH (ref 0.1–0.3)
IMM GRANULOCYTES NFR BLD AUTO: 2.3 % — HIGH (ref 0.1–0.3)
IMM GRANULOCYTES NFR BLD AUTO: 2.3 % — HIGH (ref 0.1–0.3)
IMM GRANULOCYTES NFR BLD AUTO: 2.4 % — HIGH (ref 0.1–0.3)
IMM GRANULOCYTES NFR BLD AUTO: 2.7 % — HIGH (ref 0.1–0.3)
IMM GRANULOCYTES NFR BLD AUTO: 3.5 % — HIGH (ref 0.1–0.3)
IMM GRANULOCYTES NFR BLD AUTO: 3.8 % — HIGH (ref 0.1–0.3)
IMM GRANULOCYTES NFR BLD AUTO: 5.2 % — HIGH (ref 0.1–0.3)
INR BLD: 0.9 RATIO — SIGNIFICANT CHANGE UP (ref 0.65–1.3)
INR BLD: 0.95 RATIO — SIGNIFICANT CHANGE UP (ref 0.65–1.3)
INR BLD: 1.15 RATIO — SIGNIFICANT CHANGE UP (ref 0.65–1.3)
IRON SATN MFR SERPL: 21 UG/DL — LOW (ref 35–150)
IRON SATN MFR SERPL: 9 % — LOW (ref 15–50)
KETONES UR-MCNC: SIGNIFICANT CHANGE UP
LACTATE BLDV-MCNC: 2.7 MMOL/L — HIGH (ref 0.5–2)
LACTATE BLDV-MCNC: 2.7 MMOL/L — HIGH (ref 0.5–2)
LACTATE BLDV-MCNC: 3.1 MMOL/L — HIGH (ref 0.5–2)
LACTATE SERPL-SCNC: 2.2 MMOL/L — HIGH (ref 0.7–2)
LACTATE SERPL-SCNC: 2.9 MMOL/L — HIGH (ref 0.7–2)
LDH SERPL L TO P-CCNC: 472 — HIGH (ref 50–242)
LEUKOCYTE ESTERASE UR-ACNC: ABNORMAL
LYMPHOCYTES # BLD AUTO: 1.5 K/UL — SIGNIFICANT CHANGE UP (ref 1.2–3.4)
LYMPHOCYTES # BLD AUTO: 1.61 K/UL — SIGNIFICANT CHANGE UP (ref 1.2–3.4)
LYMPHOCYTES # BLD AUTO: 1.77 K/UL — SIGNIFICANT CHANGE UP (ref 1.2–3.4)
LYMPHOCYTES # BLD AUTO: 1.83 K/UL — SIGNIFICANT CHANGE UP (ref 1.2–3.4)
LYMPHOCYTES # BLD AUTO: 10.6 % — LOW (ref 20.5–51.1)
LYMPHOCYTES # BLD AUTO: 11 % — LOW (ref 20.5–51.1)
LYMPHOCYTES # BLD AUTO: 11.6 % — LOW (ref 20.5–51.1)
LYMPHOCYTES # BLD AUTO: 12.9 % — LOW (ref 20.5–51.1)
LYMPHOCYTES # BLD AUTO: 13 % — LOW (ref 20.5–51.1)
LYMPHOCYTES # BLD AUTO: 14.9 % — LOW (ref 20.5–51.1)
LYMPHOCYTES # BLD AUTO: 16 % — LOW (ref 20.5–51.1)
LYMPHOCYTES # BLD AUTO: 18.8 % — LOW (ref 20.5–51.1)
LYMPHOCYTES # BLD AUTO: 19.2 % — LOW (ref 20.5–51.1)
LYMPHOCYTES # BLD AUTO: 19.8 % — LOW (ref 20.5–51.1)
LYMPHOCYTES # BLD AUTO: 19.9 % — LOW (ref 20.5–51.1)
LYMPHOCYTES # BLD AUTO: 2.03 K/UL — SIGNIFICANT CHANGE UP (ref 1.2–3.4)
LYMPHOCYTES # BLD AUTO: 2.03 K/UL — SIGNIFICANT CHANGE UP (ref 1.2–3.4)
LYMPHOCYTES # BLD AUTO: 2.22 K/UL — SIGNIFICANT CHANGE UP (ref 1.2–3.4)
LYMPHOCYTES # BLD AUTO: 2.31 K/UL — SIGNIFICANT CHANGE UP (ref 1.2–3.4)
LYMPHOCYTES # BLD AUTO: 2.36 K/UL — SIGNIFICANT CHANGE UP (ref 1.2–3.4)
LYMPHOCYTES # BLD AUTO: 2.41 K/UL — SIGNIFICANT CHANGE UP (ref 1.2–3.4)
LYMPHOCYTES # BLD AUTO: 2.5 K/UL — SIGNIFICANT CHANGE UP (ref 1.2–3.4)
LYMPHOCYTES # BLD AUTO: 2.52 K/UL — SIGNIFICANT CHANGE UP (ref 1.2–3.4)
LYMPHOCYTES # BLD AUTO: 2.6 K/UL — SIGNIFICANT CHANGE UP (ref 1.2–3.4)
LYMPHOCYTES # BLD AUTO: 2.71 K/UL — SIGNIFICANT CHANGE UP (ref 1.2–3.4)
LYMPHOCYTES # BLD AUTO: 2.75 K/UL — SIGNIFICANT CHANGE UP (ref 1.2–3.4)
LYMPHOCYTES # BLD AUTO: 21.2 % — SIGNIFICANT CHANGE UP (ref 20.5–51.1)
LYMPHOCYTES # BLD AUTO: 25.3 % — SIGNIFICANT CHANGE UP (ref 20.5–51.1)
LYMPHOCYTES # BLD AUTO: 6.9 % — LOW (ref 20.5–51.1)
LYMPHOCYTES # BLD AUTO: 9.7 % — LOW (ref 20.5–51.1)
MAGNESIUM SERPL-MCNC: 1.9 MG/DL — SIGNIFICANT CHANGE UP (ref 1.8–2.4)
MAGNESIUM SERPL-MCNC: 1.9 MG/DL — SIGNIFICANT CHANGE UP (ref 1.8–2.4)
MAGNESIUM SERPL-MCNC: 2 MG/DL — SIGNIFICANT CHANGE UP (ref 1.8–2.4)
MAGNESIUM SERPL-MCNC: 2.1 MG/DL — SIGNIFICANT CHANGE UP (ref 1.8–2.4)
MAGNESIUM SERPL-MCNC: 2.2 MG/DL — SIGNIFICANT CHANGE UP (ref 1.8–2.4)
MAGNESIUM SERPL-MCNC: 2.4 MG/DL — SIGNIFICANT CHANGE UP (ref 1.8–2.4)
MAGNESIUM SERPL-MCNC: 3 MG/DL — HIGH (ref 1.8–2.4)
MAGNESIUM SERPL-MCNC: 3.2 MG/DL — CRITICAL HIGH (ref 1.8–2.4)
MAGNESIUM SERPL-MCNC: 3.3 MG/DL — CRITICAL HIGH (ref 1.8–2.4)
MAGNESIUM SERPL-MCNC: 3.3 MG/DL — CRITICAL HIGH (ref 1.8–2.4)
MANUAL SMEAR VERIFICATION: SIGNIFICANT CHANGE UP
MCHC RBC-ENTMCNC: 24.2 PG — LOW (ref 27–31)
MCHC RBC-ENTMCNC: 24.5 PG — LOW (ref 27–31)
MCHC RBC-ENTMCNC: 24.5 PG — LOW (ref 27–31)
MCHC RBC-ENTMCNC: 24.7 PG — LOW (ref 27–31)
MCHC RBC-ENTMCNC: 24.7 PG — LOW (ref 27–31)
MCHC RBC-ENTMCNC: 24.8 PG — LOW (ref 27–31)
MCHC RBC-ENTMCNC: 24.8 PG — LOW (ref 27–31)
MCHC RBC-ENTMCNC: 24.9 PG — LOW (ref 27–31)
MCHC RBC-ENTMCNC: 25 PG — LOW (ref 27–31)
MCHC RBC-ENTMCNC: 25.1 PG — LOW (ref 27–31)
MCHC RBC-ENTMCNC: 25.2 PG — LOW (ref 27–31)
MCHC RBC-ENTMCNC: 25.2 PG — LOW (ref 27–31)
MCHC RBC-ENTMCNC: 25.3 PG — LOW (ref 27–31)
MCHC RBC-ENTMCNC: 25.4 PG — LOW (ref 27–31)
MCHC RBC-ENTMCNC: 25.5 PG — LOW (ref 27–31)
MCHC RBC-ENTMCNC: 25.5 PG — LOW (ref 27–31)
MCHC RBC-ENTMCNC: 26 PG — LOW (ref 27–31)
MCHC RBC-ENTMCNC: 26.3 PG — LOW (ref 27–31)
MCHC RBC-ENTMCNC: 26.5 PG — LOW (ref 27–31)
MCHC RBC-ENTMCNC: 27.5 G/DL — LOW (ref 32–37)
MCHC RBC-ENTMCNC: 27.8 G/DL — LOW (ref 32–37)
MCHC RBC-ENTMCNC: 27.9 G/DL — LOW (ref 32–37)
MCHC RBC-ENTMCNC: 28.6 G/DL — LOW (ref 32–37)
MCHC RBC-ENTMCNC: 28.7 G/DL — LOW (ref 32–37)
MCHC RBC-ENTMCNC: 29 G/DL — LOW (ref 32–37)
MCHC RBC-ENTMCNC: 29.2 G/DL — LOW (ref 32–37)
MCHC RBC-ENTMCNC: 29.4 G/DL — LOW (ref 32–37)
MCHC RBC-ENTMCNC: 29.7 G/DL — LOW (ref 32–37)
MCHC RBC-ENTMCNC: 29.9 G/DL — LOW (ref 32–37)
MCHC RBC-ENTMCNC: 30 G/DL — LOW (ref 32–37)
MCHC RBC-ENTMCNC: 30 G/DL — LOW (ref 32–37)
MCHC RBC-ENTMCNC: 30.1 G/DL — LOW (ref 32–37)
MCHC RBC-ENTMCNC: 30.2 G/DL — LOW (ref 32–37)
MCHC RBC-ENTMCNC: 30.2 G/DL — LOW (ref 32–37)
MCHC RBC-ENTMCNC: 30.5 G/DL — LOW (ref 32–37)
MCHC RBC-ENTMCNC: 30.5 G/DL — LOW (ref 32–37)
MCHC RBC-ENTMCNC: 31.2 G/DL — LOW (ref 32–37)
MCHC RBC-ENTMCNC: 31.3 G/DL — LOW (ref 32–37)
MCHC RBC-ENTMCNC: 31.3 G/DL — LOW (ref 32–37)
MCHC RBC-ENTMCNC: 31.7 G/DL — LOW (ref 32–37)
MCV RBC AUTO: 80.3 FL — LOW (ref 81–99)
MCV RBC AUTO: 81.3 FL — SIGNIFICANT CHANGE UP (ref 81–99)
MCV RBC AUTO: 82.3 FL — SIGNIFICANT CHANGE UP (ref 81–99)
MCV RBC AUTO: 82.9 FL — SIGNIFICANT CHANGE UP (ref 81–99)
MCV RBC AUTO: 83 FL — SIGNIFICANT CHANGE UP (ref 81–99)
MCV RBC AUTO: 83.5 FL — SIGNIFICANT CHANGE UP (ref 81–99)
MCV RBC AUTO: 83.5 FL — SIGNIFICANT CHANGE UP (ref 81–99)
MCV RBC AUTO: 83.8 FL — SIGNIFICANT CHANGE UP (ref 81–99)
MCV RBC AUTO: 83.9 FL — SIGNIFICANT CHANGE UP (ref 81–99)
MCV RBC AUTO: 84.5 FL — SIGNIFICANT CHANGE UP (ref 81–99)
MCV RBC AUTO: 84.5 FL — SIGNIFICANT CHANGE UP (ref 81–99)
MCV RBC AUTO: 84.6 FL — SIGNIFICANT CHANGE UP (ref 81–99)
MCV RBC AUTO: 84.6 FL — SIGNIFICANT CHANGE UP (ref 81–99)
MCV RBC AUTO: 84.9 FL — SIGNIFICANT CHANGE UP (ref 81–99)
MCV RBC AUTO: 86 FL — SIGNIFICANT CHANGE UP (ref 81–99)
MCV RBC AUTO: 86.3 FL — SIGNIFICANT CHANGE UP (ref 81–99)
MCV RBC AUTO: 86.6 FL — SIGNIFICANT CHANGE UP (ref 81–99)
MCV RBC AUTO: 86.7 FL — SIGNIFICANT CHANGE UP (ref 81–99)
MCV RBC AUTO: 87 FL — SIGNIFICANT CHANGE UP (ref 81–99)
MCV RBC AUTO: 87.8 FL — SIGNIFICANT CHANGE UP (ref 81–99)
MCV RBC AUTO: 91 FL — SIGNIFICANT CHANGE UP (ref 81–99)
METAMYELOCYTES # FLD: 1.8 % — HIGH (ref 0–0)
METHOD TYPE: SIGNIFICANT CHANGE UP
MICROCYTES BLD QL: SIGNIFICANT CHANGE UP
MONOCYTES # BLD AUTO: 0.27 K/UL — SIGNIFICANT CHANGE UP (ref 0.1–0.6)
MONOCYTES # BLD AUTO: 0.35 K/UL — SIGNIFICANT CHANGE UP (ref 0.1–0.6)
MONOCYTES # BLD AUTO: 0.42 K/UL — SIGNIFICANT CHANGE UP (ref 0.1–0.6)
MONOCYTES # BLD AUTO: 0.71 K/UL — HIGH (ref 0.1–0.6)
MONOCYTES # BLD AUTO: 0.74 K/UL — HIGH (ref 0.1–0.6)
MONOCYTES # BLD AUTO: 0.75 K/UL — HIGH (ref 0.1–0.6)
MONOCYTES # BLD AUTO: 0.75 K/UL — HIGH (ref 0.1–0.6)
MONOCYTES # BLD AUTO: 0.81 K/UL — HIGH (ref 0.1–0.6)
MONOCYTES # BLD AUTO: 0.81 K/UL — HIGH (ref 0.1–0.6)
MONOCYTES # BLD AUTO: 0.83 K/UL — HIGH (ref 0.1–0.6)
MONOCYTES # BLD AUTO: 0.84 K/UL — HIGH (ref 0.1–0.6)
MONOCYTES # BLD AUTO: 0.91 K/UL — HIGH (ref 0.1–0.6)
MONOCYTES # BLD AUTO: 0.93 K/UL — HIGH (ref 0.1–0.6)
MONOCYTES # BLD AUTO: 0.97 K/UL — HIGH (ref 0.1–0.6)
MONOCYTES # BLD AUTO: 1.05 K/UL — HIGH (ref 0.1–0.6)
MONOCYTES NFR BLD AUTO: 1.8 % — SIGNIFICANT CHANGE UP (ref 1.7–9.3)
MONOCYTES NFR BLD AUTO: 1.9 % — SIGNIFICANT CHANGE UP (ref 1.7–9.3)
MONOCYTES NFR BLD AUTO: 2 % — SIGNIFICANT CHANGE UP (ref 1.7–9.3)
MONOCYTES NFR BLD AUTO: 2.8 % — SIGNIFICANT CHANGE UP (ref 1.7–9.3)
MONOCYTES NFR BLD AUTO: 5.1 % — SIGNIFICANT CHANGE UP (ref 1.7–9.3)
MONOCYTES NFR BLD AUTO: 5.3 % — SIGNIFICANT CHANGE UP (ref 1.7–9.3)
MONOCYTES NFR BLD AUTO: 5.3 % — SIGNIFICANT CHANGE UP (ref 1.7–9.3)
MONOCYTES NFR BLD AUTO: 5.5 % — SIGNIFICANT CHANGE UP (ref 1.7–9.3)
MONOCYTES NFR BLD AUTO: 5.6 % — SIGNIFICANT CHANGE UP (ref 1.7–9.3)
MONOCYTES NFR BLD AUTO: 6 % — SIGNIFICANT CHANGE UP (ref 1.7–9.3)
MONOCYTES NFR BLD AUTO: 6.3 % — SIGNIFICANT CHANGE UP (ref 1.7–9.3)
MONOCYTES NFR BLD AUTO: 6.8 % — SIGNIFICANT CHANGE UP (ref 1.7–9.3)
MONOCYTES NFR BLD AUTO: 7 % — SIGNIFICANT CHANGE UP (ref 1.7–9.3)
MONOCYTES NFR BLD AUTO: 8.4 % — SIGNIFICANT CHANGE UP (ref 1.7–9.3)
MONOCYTES NFR BLD AUTO: 8.6 % — SIGNIFICANT CHANGE UP (ref 1.7–9.3)
MRSA PCR RESULT.: POSITIVE
MYELOCYTES NFR BLD: 0.9 % — HIGH (ref 0–0)
NEUTROPHILS # BLD AUTO: 10.28 K/UL — HIGH (ref 1.4–6.5)
NEUTROPHILS # BLD AUTO: 10.51 K/UL — HIGH (ref 1.4–6.5)
NEUTROPHILS # BLD AUTO: 11.93 K/UL — HIGH (ref 1.4–6.5)
NEUTROPHILS # BLD AUTO: 12.16 K/UL — HIGH (ref 1.4–6.5)
NEUTROPHILS # BLD AUTO: 13.19 K/UL — HIGH (ref 1.4–6.5)
NEUTROPHILS # BLD AUTO: 14.19 K/UL — HIGH (ref 1.4–6.5)
NEUTROPHILS # BLD AUTO: 14.71 K/UL — HIGH (ref 1.4–6.5)
NEUTROPHILS # BLD AUTO: 20.83 K/UL — HIGH (ref 1.4–6.5)
NEUTROPHILS # BLD AUTO: 21.97 K/UL — HIGH (ref 1.4–6.5)
NEUTROPHILS # BLD AUTO: 5.03 K/UL — SIGNIFICANT CHANGE UP (ref 1.4–6.5)
NEUTROPHILS # BLD AUTO: 7.64 K/UL — HIGH (ref 1.4–6.5)
NEUTROPHILS # BLD AUTO: 7.98 K/UL — HIGH (ref 1.4–6.5)
NEUTROPHILS # BLD AUTO: 8.65 K/UL — HIGH (ref 1.4–6.5)
NEUTROPHILS # BLD AUTO: 9.21 K/UL — HIGH (ref 1.4–6.5)
NEUTROPHILS # BLD AUTO: 9.24 K/UL — HIGH (ref 1.4–6.5)
NEUTROPHILS NFR BLD AUTO: 57.1 % — SIGNIFICANT CHANGE UP (ref 42.2–75.2)
NEUTROPHILS NFR BLD AUTO: 64.5 % — SIGNIFICANT CHANGE UP (ref 42.2–75.2)
NEUTROPHILS NFR BLD AUTO: 65.7 % — SIGNIFICANT CHANGE UP (ref 42.2–75.2)
NEUTROPHILS NFR BLD AUTO: 67.1 % — SIGNIFICANT CHANGE UP (ref 42.2–75.2)
NEUTROPHILS NFR BLD AUTO: 68.3 % — SIGNIFICANT CHANGE UP (ref 42.2–75.2)
NEUTROPHILS NFR BLD AUTO: 70.2 % — SIGNIFICANT CHANGE UP (ref 42.2–75.2)
NEUTROPHILS NFR BLD AUTO: 72.8 % — SIGNIFICANT CHANGE UP (ref 42.2–75.2)
NEUTROPHILS NFR BLD AUTO: 72.9 % — SIGNIFICANT CHANGE UP (ref 42.2–75.2)
NEUTROPHILS NFR BLD AUTO: 77.2 % — HIGH (ref 42.2–75.2)
NEUTROPHILS NFR BLD AUTO: 77.9 % — HIGH (ref 42.2–75.2)
NEUTROPHILS NFR BLD AUTO: 79 % — HIGH (ref 42.2–75.2)
NEUTROPHILS NFR BLD AUTO: 83.9 % — HIGH (ref 42.2–75.2)
NEUTROPHILS NFR BLD AUTO: 84.4 % — HIGH (ref 42.2–75.2)
NEUTROPHILS NFR BLD AUTO: 85.4 % — HIGH (ref 42.2–75.2)
NEUTROPHILS NFR BLD AUTO: 88.7 % — HIGH (ref 42.2–75.2)
NEUTROPHILS NFR BLD AUTO: SIGNIFICANT CHANGE UP % (ref 42.2–75.2)
NIGHT BLUE STAIN TISS: SIGNIFICANT CHANGE UP
NITRITE UR-MCNC: NEGATIVE — SIGNIFICANT CHANGE UP
NRBC # BLD: 0 /100 WBCS — SIGNIFICANT CHANGE UP (ref 0–0)
ORGANISM # SPEC MICROSCOPIC CNT: SIGNIFICANT CHANGE UP
OSMOLALITY UR: 452 MOS/KG — SIGNIFICANT CHANGE UP (ref 50–1200)
PCO2 BLDV: 45 MMHG — HIGH (ref 39–42)
PCO2 BLDV: 54 MMHG — HIGH (ref 39–42)
PCO2 BLDV: 65 MMHG — HIGH (ref 39–42)
PH BLDV: 7.29 — LOW (ref 7.32–7.43)
PH BLDV: 7.4 — SIGNIFICANT CHANGE UP (ref 7.32–7.43)
PH BLDV: 7.4 — SIGNIFICANT CHANGE UP (ref 7.32–7.43)
PH UR: 5.5 — SIGNIFICANT CHANGE UP (ref 5–8)
PHOSPHATE SERPL-MCNC: 3.6 MG/DL — SIGNIFICANT CHANGE UP (ref 2.1–4.9)
PHOSPHATE SERPL-MCNC: 4.4 MG/DL — SIGNIFICANT CHANGE UP (ref 2.1–4.9)
PLAT MORPH BLD: ABNORMAL
PLATELET # BLD AUTO: 122 K/UL — LOW (ref 130–400)
PLATELET # BLD AUTO: 143 K/UL — SIGNIFICANT CHANGE UP (ref 130–400)
PLATELET # BLD AUTO: 143 K/UL — SIGNIFICANT CHANGE UP (ref 130–400)
PLATELET # BLD AUTO: 158 K/UL — SIGNIFICANT CHANGE UP (ref 130–400)
PLATELET # BLD AUTO: 158 K/UL — SIGNIFICANT CHANGE UP (ref 130–400)
PLATELET # BLD AUTO: 160 K/UL — SIGNIFICANT CHANGE UP (ref 130–400)
PLATELET # BLD AUTO: 186 K/UL — SIGNIFICANT CHANGE UP (ref 130–400)
PLATELET # BLD AUTO: 236 K/UL — SIGNIFICANT CHANGE UP (ref 130–400)
PLATELET # BLD AUTO: 301 K/UL — SIGNIFICANT CHANGE UP (ref 130–400)
PLATELET # BLD AUTO: 307 K/UL — SIGNIFICANT CHANGE UP (ref 130–400)
PLATELET # BLD AUTO: 322 K/UL — SIGNIFICANT CHANGE UP (ref 130–400)
PLATELET # BLD AUTO: 351 K/UL — SIGNIFICANT CHANGE UP (ref 130–400)
PLATELET # BLD AUTO: 361 K/UL — SIGNIFICANT CHANGE UP (ref 130–400)
PLATELET # BLD AUTO: 380 K/UL — SIGNIFICANT CHANGE UP (ref 130–400)
PLATELET # BLD AUTO: 396 K/UL — SIGNIFICANT CHANGE UP (ref 130–400)
PLATELET # BLD AUTO: 397 K/UL — SIGNIFICANT CHANGE UP (ref 130–400)
PLATELET # BLD AUTO: 401 K/UL — HIGH (ref 130–400)
PLATELET # BLD AUTO: 409 K/UL — HIGH (ref 130–400)
PLATELET # BLD AUTO: 434 K/UL — HIGH (ref 130–400)
PLATELET # BLD AUTO: 494 K/UL — HIGH (ref 130–400)
PLATELET # BLD AUTO: SIGNIFICANT CHANGE UP K/UL (ref 130–400)
PO2 BLDV: 14 MMHG — SIGNIFICANT CHANGE UP
PO2 BLDV: 24 MMHG — SIGNIFICANT CHANGE UP
PO2 BLDV: 33 MMHG — SIGNIFICANT CHANGE UP
POLYCHROMASIA BLD QL SMEAR: SLIGHT — SIGNIFICANT CHANGE UP
POTASSIUM BLDV-SCNC: 3.3 MMOL/L — LOW (ref 3.5–5.1)
POTASSIUM BLDV-SCNC: 4.9 MMOL/L — SIGNIFICANT CHANGE UP (ref 3.5–5.1)
POTASSIUM BLDV-SCNC: 5.7 MMOL/L — HIGH (ref 3.5–5.1)
POTASSIUM SERPL-MCNC: 3.4 MMOL/L — LOW (ref 3.5–5)
POTASSIUM SERPL-MCNC: 3.7 MMOL/L — SIGNIFICANT CHANGE UP (ref 3.5–5)
POTASSIUM SERPL-MCNC: 3.9 MMOL/L — SIGNIFICANT CHANGE UP (ref 3.5–5)
POTASSIUM SERPL-MCNC: 4.2 MMOL/L — SIGNIFICANT CHANGE UP (ref 3.5–5)
POTASSIUM SERPL-MCNC: 4.2 MMOL/L — SIGNIFICANT CHANGE UP (ref 3.5–5)
POTASSIUM SERPL-MCNC: 4.3 MMOL/L — SIGNIFICANT CHANGE UP (ref 3.5–5)
POTASSIUM SERPL-MCNC: 4.4 MMOL/L — SIGNIFICANT CHANGE UP (ref 3.5–5)
POTASSIUM SERPL-MCNC: 4.7 MMOL/L — SIGNIFICANT CHANGE UP (ref 3.5–5)
POTASSIUM SERPL-MCNC: 4.7 MMOL/L — SIGNIFICANT CHANGE UP (ref 3.5–5)
POTASSIUM SERPL-MCNC: 4.8 MMOL/L — SIGNIFICANT CHANGE UP (ref 3.5–5)
POTASSIUM SERPL-MCNC: 4.8 MMOL/L — SIGNIFICANT CHANGE UP (ref 3.5–5)
POTASSIUM SERPL-MCNC: 4.9 MMOL/L — SIGNIFICANT CHANGE UP (ref 3.5–5)
POTASSIUM SERPL-MCNC: 5 MMOL/L — SIGNIFICANT CHANGE UP (ref 3.5–5)
POTASSIUM SERPL-MCNC: 5.1 MMOL/L — HIGH (ref 3.5–5)
POTASSIUM SERPL-MCNC: 5.6 MMOL/L — HIGH (ref 3.5–5)
POTASSIUM SERPL-MCNC: 5.6 MMOL/L — HIGH (ref 3.5–5)
POTASSIUM SERPL-MCNC: 5.8 MMOL/L — HIGH (ref 3.5–5)
POTASSIUM SERPL-MCNC: 5.9 MMOL/L — HIGH (ref 3.5–5)
POTASSIUM SERPL-MCNC: 6 MMOL/L — CRITICAL HIGH (ref 3.5–5)
POTASSIUM SERPL-MCNC: 6.5 MMOL/L — CRITICAL HIGH (ref 3.5–5)
POTASSIUM SERPL-MCNC: 7.8 MMOL/L — CRITICAL HIGH (ref 3.5–5)
POTASSIUM SERPL-SCNC: 3.4 MMOL/L — LOW (ref 3.5–5)
POTASSIUM SERPL-SCNC: 3.7 MMOL/L — SIGNIFICANT CHANGE UP (ref 3.5–5)
POTASSIUM SERPL-SCNC: 3.9 MMOL/L — SIGNIFICANT CHANGE UP (ref 3.5–5)
POTASSIUM SERPL-SCNC: 4.2 MMOL/L — SIGNIFICANT CHANGE UP (ref 3.5–5)
POTASSIUM SERPL-SCNC: 4.2 MMOL/L — SIGNIFICANT CHANGE UP (ref 3.5–5)
POTASSIUM SERPL-SCNC: 4.3 MMOL/L — SIGNIFICANT CHANGE UP (ref 3.5–5)
POTASSIUM SERPL-SCNC: 4.4 MMOL/L — SIGNIFICANT CHANGE UP (ref 3.5–5)
POTASSIUM SERPL-SCNC: 4.7 MMOL/L — SIGNIFICANT CHANGE UP (ref 3.5–5)
POTASSIUM SERPL-SCNC: 4.7 MMOL/L — SIGNIFICANT CHANGE UP (ref 3.5–5)
POTASSIUM SERPL-SCNC: 4.8 MMOL/L — SIGNIFICANT CHANGE UP (ref 3.5–5)
POTASSIUM SERPL-SCNC: 4.8 MMOL/L — SIGNIFICANT CHANGE UP (ref 3.5–5)
POTASSIUM SERPL-SCNC: 4.9 MMOL/L — SIGNIFICANT CHANGE UP (ref 3.5–5)
POTASSIUM SERPL-SCNC: 5 MMOL/L — SIGNIFICANT CHANGE UP (ref 3.5–5)
POTASSIUM SERPL-SCNC: 5.1 MMOL/L — HIGH (ref 3.5–5)
POTASSIUM SERPL-SCNC: 5.6 MMOL/L — HIGH (ref 3.5–5)
POTASSIUM SERPL-SCNC: 5.6 MMOL/L — HIGH (ref 3.5–5)
POTASSIUM SERPL-SCNC: 5.8 MMOL/L — HIGH (ref 3.5–5)
POTASSIUM SERPL-SCNC: 5.9 MMOL/L — HIGH (ref 3.5–5)
POTASSIUM SERPL-SCNC: 6 MMOL/L — CRITICAL HIGH (ref 3.5–5)
POTASSIUM SERPL-SCNC: 6.5 MMOL/L — CRITICAL HIGH (ref 3.5–5)
POTASSIUM SERPL-SCNC: 7.8 MMOL/L — CRITICAL HIGH (ref 3.5–5)
PROCALCITONIN SERPL-MCNC: 3.04 NG/ML — HIGH (ref 0.02–0.1)
PROCALCITONIN SERPL-MCNC: 3.14 NG/ML — HIGH (ref 0.02–0.1)
PROT ?TM UR-MCNC: 128 MG/DLG/24H — SIGNIFICANT CHANGE UP
PROT SERPL-MCNC: 4.2 G/DL — LOW (ref 6–8)
PROT SERPL-MCNC: 5.1 G/DL — LOW (ref 6–8)
PROT SERPL-MCNC: 5.1 G/DL — LOW (ref 6–8)
PROT SERPL-MCNC: 5.2 G/DL — LOW (ref 6–8)
PROT SERPL-MCNC: 5.3 G/DL — LOW (ref 6–8)
PROT SERPL-MCNC: 5.4 G/DL — LOW (ref 6–8)
PROT SERPL-MCNC: 5.4 G/DL — LOW (ref 6–8)
PROT SERPL-MCNC: 5.5 G/DL — LOW (ref 6–8)
PROT SERPL-MCNC: 5.6 G/DL — LOW (ref 6–8)
PROT SERPL-MCNC: 5.7 G/DL — LOW (ref 6–8)
PROT SERPL-MCNC: 5.9 G/DL — LOW (ref 6–8)
PROT SERPL-MCNC: 6 G/DL — SIGNIFICANT CHANGE UP (ref 6–8)
PROT SERPL-MCNC: 6.1 G/DL — SIGNIFICANT CHANGE UP (ref 6–8)
PROT SERPL-MCNC: 6.1 G/DL — SIGNIFICANT CHANGE UP (ref 6–8)
PROT SERPL-MCNC: 6.3 G/DL — SIGNIFICANT CHANGE UP (ref 6–8)
PROT SERPL-MCNC: 6.3 G/DL — SIGNIFICANT CHANGE UP (ref 6–8)
PROT UR-MCNC: ABNORMAL
PROTHROM AB SERPL-ACNC: 10.4 SEC — SIGNIFICANT CHANGE UP (ref 9.95–12.87)
PROTHROM AB SERPL-ACNC: 10.9 SEC — SIGNIFICANT CHANGE UP (ref 9.95–12.87)
PROTHROM AB SERPL-ACNC: 13.2 SEC — HIGH (ref 9.95–12.87)
RAPID RVP RESULT: SIGNIFICANT CHANGE UP
RBC # BLD: 2.56 M/UL — LOW (ref 4.2–5.4)
RBC # BLD: 2.84 M/UL — LOW (ref 4.2–5.4)
RBC # BLD: 2.91 M/UL — LOW (ref 4.2–5.4)
RBC # BLD: 2.93 M/UL — LOW (ref 4.2–5.4)
RBC # BLD: 2.98 M/UL — LOW (ref 4.2–5.4)
RBC # BLD: 3.03 M/UL — LOW (ref 4.2–5.4)
RBC # BLD: 3.1 M/UL — LOW (ref 4.2–5.4)
RBC # BLD: 3.1 M/UL — LOW (ref 4.2–5.4)
RBC # BLD: 3.16 M/UL — LOW (ref 4.2–5.4)
RBC # BLD: 3.21 M/UL — LOW (ref 4.2–5.4)
RBC # BLD: 3.22 M/UL — LOW (ref 4.2–5.4)
RBC # BLD: 3.26 M/UL — LOW (ref 4.2–5.4)
RBC # BLD: 3.27 M/UL — LOW (ref 4.2–5.4)
RBC # BLD: 3.28 M/UL — LOW (ref 4.2–5.4)
RBC # BLD: 3.29 M/UL — LOW (ref 4.2–5.4)
RBC # BLD: 3.31 M/UL — LOW (ref 4.2–5.4)
RBC # BLD: 3.31 M/UL — LOW (ref 4.2–5.4)
RBC # BLD: 3.32 M/UL — LOW (ref 4.2–5.4)
RBC # BLD: 3.39 M/UL — LOW (ref 4.2–5.4)
RBC # BLD: 3.39 M/UL — LOW (ref 4.2–5.4)
RBC # BLD: 3.42 M/UL — LOW (ref 4.2–5.4)
RBC # FLD: 14.8 % — HIGH (ref 11.5–14.5)
RBC # FLD: 14.8 % — HIGH (ref 11.5–14.5)
RBC # FLD: 15.2 % — HIGH (ref 11.5–14.5)
RBC # FLD: 15.3 % — HIGH (ref 11.5–14.5)
RBC # FLD: 15.4 % — HIGH (ref 11.5–14.5)
RBC # FLD: 15.5 % — HIGH (ref 11.5–14.5)
RBC # FLD: 15.9 % — HIGH (ref 11.5–14.5)
RBC # FLD: 16.7 % — HIGH (ref 11.5–14.5)
RBC # FLD: 17.5 % — HIGH (ref 11.5–14.5)
RBC # FLD: 17.8 % — HIGH (ref 11.5–14.5)
RBC # FLD: 17.9 % — HIGH (ref 11.5–14.5)
RBC # FLD: 18.1 % — HIGH (ref 11.5–14.5)
RBC # FLD: 18.6 % — HIGH (ref 11.5–14.5)
RBC # FLD: 18.8 % — HIGH (ref 11.5–14.5)
RBC BLD AUTO: ABNORMAL
RBC CASTS # UR COMP ASSIST: 1 /HPF — SIGNIFICANT CHANGE UP (ref 0–4)
SAO2 % BLDV: 16.6 % — SIGNIFICANT CHANGE UP
SAO2 % BLDV: 27.9 % — SIGNIFICANT CHANGE UP
SAO2 % BLDV: 55.7 % — SIGNIFICANT CHANGE UP
SARS-COV-2 RNA SPEC QL NAA+PROBE: DETECTED
SARS-COV-2 RNA SPEC QL NAA+PROBE: SIGNIFICANT CHANGE UP
SMUDGE CELLS # BLD: PRESENT — SIGNIFICANT CHANGE UP
SODIUM SERPL-SCNC: 130 MMOL/L — LOW (ref 135–146)
SODIUM SERPL-SCNC: 131 MMOL/L — LOW (ref 135–146)
SODIUM SERPL-SCNC: 132 MMOL/L — LOW (ref 135–146)
SODIUM SERPL-SCNC: 133 MMOL/L — LOW (ref 135–146)
SODIUM SERPL-SCNC: 134 MMOL/L — LOW (ref 135–146)
SODIUM SERPL-SCNC: 135 MMOL/L — SIGNIFICANT CHANGE UP (ref 135–146)
SODIUM SERPL-SCNC: 138 MMOL/L — SIGNIFICANT CHANGE UP (ref 135–146)
SODIUM SERPL-SCNC: 138 MMOL/L — SIGNIFICANT CHANGE UP (ref 135–146)
SODIUM SERPL-SCNC: 140 MMOL/L — SIGNIFICANT CHANGE UP (ref 135–146)
SODIUM SERPL-SCNC: 142 MMOL/L — SIGNIFICANT CHANGE UP (ref 135–146)
SODIUM SERPL-SCNC: 144 MMOL/L — SIGNIFICANT CHANGE UP (ref 135–146)
SODIUM SERPL-SCNC: 147 MMOL/L — HIGH (ref 135–146)
SODIUM SERPL-SCNC: 147 MMOL/L — HIGH (ref 135–146)
SODIUM SERPL-SCNC: 148 MMOL/L — HIGH (ref 135–146)
SODIUM SERPL-SCNC: 150 MMOL/L — HIGH (ref 135–146)
SODIUM SERPL-SCNC: 152 MMOL/L — HIGH (ref 135–146)
SODIUM SERPL-SCNC: 152 MMOL/L — HIGH (ref 135–146)
SODIUM SERPL-SCNC: 153 MMOL/L — HIGH (ref 135–146)
SODIUM SERPL-SCNC: 155 MMOL/L — HIGH (ref 135–146)
SODIUM SERPL-SCNC: 156 MMOL/L — HIGH (ref 135–146)
SODIUM SERPL-SCNC: 158 MMOL/L — HIGH (ref 135–146)
SODIUM SERPL-SCNC: 159 MMOL/L — HIGH (ref 135–146)
SODIUM UR-SCNC: 26 MMOL/L — SIGNIFICANT CHANGE UP
SP GR SPEC: 1.02 — SIGNIFICANT CHANGE UP (ref 1.01–1.03)
SPECIMEN SOURCE: SIGNIFICANT CHANGE UP
SURGICAL PATHOLOGY STUDY: SIGNIFICANT CHANGE UP
TIBC SERPL-MCNC: 225 UG/DL — SIGNIFICANT CHANGE UP (ref 220–430)
TROPONIN T SERPL-MCNC: 0.02 NG/ML — HIGH
TROPONIN T SERPL-MCNC: 0.05 NG/ML — CRITICAL HIGH
UIBC SERPL-MCNC: 204 UG/DL — SIGNIFICANT CHANGE UP (ref 110–370)
UROBILINOGEN FLD QL: ABNORMAL
VARIANT LYMPHS # BLD: 0.9 % — SIGNIFICANT CHANGE UP (ref 0–5)
WBC # BLD: 11.09 K/UL — HIGH (ref 4.8–10.8)
WBC # BLD: 11.86 K/UL — HIGH (ref 4.8–10.8)
WBC # BLD: 11.88 K/UL — HIGH (ref 4.8–10.8)
WBC # BLD: 12.14 K/UL — HIGH (ref 4.8–10.8)
WBC # BLD: 13.19 K/UL — HIGH (ref 4.8–10.8)
WBC # BLD: 13.37 K/UL — HIGH (ref 4.8–10.8)
WBC # BLD: 13.54 K/UL — HIGH (ref 4.8–10.8)
WBC # BLD: 13.72 K/UL — HIGH (ref 4.8–10.8)
WBC # BLD: 14.14 K/UL — HIGH (ref 4.8–10.8)
WBC # BLD: 14.42 K/UL — HIGH (ref 4.8–10.8)
WBC # BLD: 14.65 K/UL — HIGH (ref 4.8–10.8)
WBC # BLD: 15.75 K/UL — HIGH (ref 4.8–10.8)
WBC # BLD: 16.69 K/UL — HIGH (ref 4.8–10.8)
WBC # BLD: 17.53 K/UL — HIGH (ref 4.8–10.8)
WBC # BLD: 18.2 K/UL — HIGH (ref 4.8–10.8)
WBC # BLD: 19.16 K/UL — HIGH (ref 4.8–10.8)
WBC # BLD: 23.47 K/UL — HIGH (ref 4.8–10.8)
WBC # BLD: 25.72 K/UL — HIGH (ref 4.8–10.8)
WBC # BLD: 8.79 K/UL — SIGNIFICANT CHANGE UP (ref 4.8–10.8)
WBC # BLD: 8.81 K/UL — SIGNIFICANT CHANGE UP (ref 4.8–10.8)
WBC # BLD: SIGNIFICANT CHANGE UP K/UL (ref 4.8–10.8)
WBC # FLD AUTO: 11.09 K/UL — HIGH (ref 4.8–10.8)
WBC # FLD AUTO: 11.86 K/UL — HIGH (ref 4.8–10.8)
WBC # FLD AUTO: 11.88 K/UL — HIGH (ref 4.8–10.8)
WBC # FLD AUTO: 12.14 K/UL — HIGH (ref 4.8–10.8)
WBC # FLD AUTO: 13.19 K/UL — HIGH (ref 4.8–10.8)
WBC # FLD AUTO: 13.37 K/UL — HIGH (ref 4.8–10.8)
WBC # FLD AUTO: 13.54 K/UL — HIGH (ref 4.8–10.8)
WBC # FLD AUTO: 13.72 K/UL — HIGH (ref 4.8–10.8)
WBC # FLD AUTO: 14.14 K/UL — HIGH (ref 4.8–10.8)
WBC # FLD AUTO: 14.42 K/UL — HIGH (ref 4.8–10.8)
WBC # FLD AUTO: 14.65 K/UL — HIGH (ref 4.8–10.8)
WBC # FLD AUTO: 15.75 K/UL — HIGH (ref 4.8–10.8)
WBC # FLD AUTO: 16.69 K/UL — HIGH (ref 4.8–10.8)
WBC # FLD AUTO: 17.53 K/UL — HIGH (ref 4.8–10.8)
WBC # FLD AUTO: 18.2 K/UL — HIGH (ref 4.8–10.8)
WBC # FLD AUTO: 19.16 K/UL — HIGH (ref 4.8–10.8)
WBC # FLD AUTO: 23.47 K/UL — HIGH (ref 4.8–10.8)
WBC # FLD AUTO: 25.72 K/UL — HIGH (ref 4.8–10.8)
WBC # FLD AUTO: 8.79 K/UL — SIGNIFICANT CHANGE UP (ref 4.8–10.8)
WBC # FLD AUTO: 8.81 K/UL — SIGNIFICANT CHANGE UP (ref 4.8–10.8)
WBC # FLD AUTO: SIGNIFICANT CHANGE UP K/UL (ref 4.8–10.8)
WBC UR QL: 128 /HPF — HIGH (ref 0–5)

## 2022-01-01 PROCEDURE — 88304 TISSUE EXAM BY PATHOLOGIST: CPT | Mod: 26

## 2022-01-01 PROCEDURE — 99232 SBSQ HOSP IP/OBS MODERATE 35: CPT

## 2022-01-01 PROCEDURE — 99497 ADVNCD CARE PLAN 30 MIN: CPT | Mod: 25

## 2022-01-01 PROCEDURE — 11043 DBRDMT MUSC&/FSCA 1ST 20/<: CPT

## 2022-01-01 PROCEDURE — 99231 SBSQ HOSP IP/OBS SF/LOW 25: CPT

## 2022-01-01 PROCEDURE — 93010 ELECTROCARDIOGRAM REPORT: CPT

## 2022-01-01 PROCEDURE — 99233 SBSQ HOSP IP/OBS HIGH 50: CPT

## 2022-01-01 PROCEDURE — 11046 DBRDMT MUSC&/FSCA EA ADDL: CPT

## 2022-01-01 PROCEDURE — 99291 CRITICAL CARE FIRST HOUR: CPT

## 2022-01-01 PROCEDURE — 71045 X-RAY EXAM CHEST 1 VIEW: CPT | Mod: 26

## 2022-01-01 PROCEDURE — 99285 EMERGENCY DEPT VISIT HI MDM: CPT

## 2022-01-01 PROCEDURE — 99232 SBSQ HOSP IP/OBS MODERATE 35: CPT | Mod: 25

## 2022-01-01 PROCEDURE — 99223 1ST HOSP IP/OBS HIGH 75: CPT

## 2022-01-01 PROCEDURE — 99221 1ST HOSP IP/OBS SF/LOW 40: CPT | Mod: GC

## 2022-01-01 PROCEDURE — 97608 NEG PRS WND THER NDME>50SQCM: CPT

## 2022-01-01 PROCEDURE — 99239 HOSP IP/OBS DSCHRG MGMT >30: CPT

## 2022-01-01 PROCEDURE — 99231 SBSQ HOSP IP/OBS SF/LOW 25: CPT | Mod: 1L,GC

## 2022-01-01 PROCEDURE — 11047 DBRDMT BONE EACH ADDL: CPT

## 2022-01-01 PROCEDURE — 76770 US EXAM ABDO BACK WALL COMP: CPT | Mod: 26

## 2022-01-01 PROCEDURE — 11044 DBRDMT BONE 1ST 20 SQ CM/<: CPT

## 2022-01-01 PROCEDURE — 99221 1ST HOSP IP/OBS SF/LOW 40: CPT

## 2022-01-01 PROCEDURE — 70450 CT HEAD/BRAIN W/O DYE: CPT | Mod: 26,MA

## 2022-01-01 RX ORDER — SODIUM CHLORIDE 9 MG/ML
500 INJECTION, SOLUTION INTRAVENOUS ONCE
Refills: 0 | Status: COMPLETED | OUTPATIENT
Start: 2022-01-01 | End: 2022-01-01

## 2022-01-01 RX ORDER — LACTOBACILLUS ACIDOPHILUS 100MM CELL
1 CAPSULE ORAL
Qty: 4 | Refills: 0
Start: 2022-01-01 | End: 2022-01-01

## 2022-01-01 RX ORDER — DEXTROSE 50 % IN WATER 50 %
12.5 SYRINGE (ML) INTRAVENOUS ONCE
Refills: 0 | Status: DISCONTINUED | OUTPATIENT
Start: 2022-01-01 | End: 2022-01-01

## 2022-01-01 RX ORDER — ALBUTEROL 90 UG/1
2.5 AEROSOL, METERED ORAL ONCE
Refills: 0 | Status: COMPLETED | OUTPATIENT
Start: 2022-01-01 | End: 2022-01-01

## 2022-01-01 RX ORDER — ATORVASTATIN CALCIUM 80 MG/1
20 TABLET, FILM COATED ORAL AT BEDTIME
Refills: 0 | Status: DISCONTINUED | OUTPATIENT
Start: 2022-01-01 | End: 2022-01-01

## 2022-01-01 RX ORDER — INSULIN GLARGINE 100 [IU]/ML
45 INJECTION, SOLUTION SUBCUTANEOUS
Qty: 0 | Refills: 0 | DISCHARGE

## 2022-01-01 RX ORDER — SENNA PLUS 8.6 MG/1
1 TABLET ORAL AT BEDTIME
Refills: 0 | Status: DISCONTINUED | OUTPATIENT
Start: 2022-01-01 | End: 2022-01-01

## 2022-01-01 RX ORDER — SODIUM CHLORIDE 9 MG/ML
1000 INJECTION, SOLUTION INTRAVENOUS ONCE
Refills: 0 | Status: COMPLETED | OUTPATIENT
Start: 2022-01-01 | End: 2022-01-01

## 2022-01-01 RX ORDER — DEXTROSE 50 % IN WATER 50 %
25 SYRINGE (ML) INTRAVENOUS ONCE
Refills: 0 | Status: DISCONTINUED | OUTPATIENT
Start: 2022-01-01 | End: 2022-01-01

## 2022-01-01 RX ORDER — INSULIN GLARGINE 100 [IU]/ML
45 INJECTION, SOLUTION SUBCUTANEOUS ONCE
Refills: 0 | Status: COMPLETED | OUTPATIENT
Start: 2022-01-01 | End: 2022-01-01

## 2022-01-01 RX ORDER — INSULIN GLARGINE 100 [IU]/ML
45 INJECTION, SOLUTION SUBCUTANEOUS AT BEDTIME
Refills: 0 | Status: DISCONTINUED | OUTPATIENT
Start: 2022-01-01 | End: 2022-01-01

## 2022-01-01 RX ORDER — INSULIN HUMAN 100 [IU]/ML
5 INJECTION, SOLUTION SUBCUTANEOUS
Qty: 100 | Refills: 0 | Status: DISCONTINUED | OUTPATIENT
Start: 2022-01-01 | End: 2022-01-01

## 2022-01-01 RX ORDER — IRON SUCROSE 20 MG/ML
200 INJECTION, SOLUTION INTRAVENOUS EVERY 24 HOURS
Refills: 0 | Status: DISCONTINUED | OUTPATIENT
Start: 2022-01-01 | End: 2022-01-01

## 2022-01-01 RX ORDER — HALOPERIDOL DECANOATE 100 MG/ML
37.5 INJECTION INTRAMUSCULAR
Refills: 0 | Status: CANCELLED | OUTPATIENT
Start: 2022-01-01 | End: 2022-01-01

## 2022-01-01 RX ORDER — MORPHINE SULFATE 50 MG/1
2 CAPSULE, EXTENDED RELEASE ORAL EVERY 6 HOURS
Refills: 0 | Status: DISCONTINUED | OUTPATIENT
Start: 2022-01-01 | End: 2022-01-01

## 2022-01-01 RX ORDER — POTASSIUM CHLORIDE 20 MEQ
20 PACKET (EA) ORAL ONCE
Refills: 0 | Status: COMPLETED | OUTPATIENT
Start: 2022-01-01 | End: 2022-01-01

## 2022-01-01 RX ORDER — SODIUM CHLORIDE 9 MG/ML
1000 INJECTION, SOLUTION INTRAVENOUS
Refills: 0 | Status: DISCONTINUED | OUTPATIENT
Start: 2022-01-01 | End: 2022-01-01

## 2022-01-01 RX ORDER — ASPIRIN/CALCIUM CARB/MAGNESIUM 324 MG
81 TABLET ORAL DAILY
Refills: 0 | Status: DISCONTINUED | OUTPATIENT
Start: 2022-01-01 | End: 2022-01-01

## 2022-01-01 RX ORDER — HYDROMORPHONE HYDROCHLORIDE 2 MG/ML
1 INJECTION INTRAMUSCULAR; INTRAVENOUS; SUBCUTANEOUS
Refills: 0 | Status: DISCONTINUED | OUTPATIENT
Start: 2022-01-01 | End: 2022-01-01

## 2022-01-01 RX ORDER — ZINC SULFATE TAB 220 MG (50 MG ZINC EQUIVALENT) 220 (50 ZN) MG
220 TAB ORAL DAILY
Refills: 0 | Status: DISCONTINUED | OUTPATIENT
Start: 2022-01-01 | End: 2022-01-01

## 2022-01-01 RX ORDER — TIGECYCLINE 50 MG/5ML
50 INJECTION, POWDER, LYOPHILIZED, FOR SOLUTION INTRAVENOUS
Qty: 4 | Refills: 0
Start: 2022-01-01 | End: 2022-01-01

## 2022-01-01 RX ORDER — INSULIN HUMAN 100 [IU]/ML
10 INJECTION, SOLUTION SUBCUTANEOUS ONCE
Refills: 0 | Status: COMPLETED | OUTPATIENT
Start: 2022-01-01 | End: 2022-01-01

## 2022-01-01 RX ORDER — CHLORHEXIDINE GLUCONATE 213 G/1000ML
2 SOLUTION TOPICAL
Refills: 0 | Status: DISCONTINUED | OUTPATIENT
Start: 2022-01-01 | End: 2022-01-01

## 2022-01-01 RX ORDER — SODIUM HYPOCHLORITE 0.125 %
1 SOLUTION, NON-ORAL MISCELLANEOUS
Refills: 0 | Status: DISCONTINUED | OUTPATIENT
Start: 2022-01-01 | End: 2022-01-01

## 2022-01-01 RX ORDER — OLANZAPINE 15 MG/1
1 TABLET, FILM COATED ORAL
Qty: 0 | Refills: 0 | DISCHARGE

## 2022-01-01 RX ORDER — INSULIN ASPART 100 [IU]/ML
0 INJECTION, SOLUTION SUBCUTANEOUS
Qty: 0 | Refills: 0 | DISCHARGE

## 2022-01-01 RX ORDER — IRON SUCROSE 20 MG/ML
200 INJECTION, SOLUTION INTRAVENOUS EVERY 24 HOURS
Refills: 0 | Status: COMPLETED | OUTPATIENT
Start: 2022-01-01 | End: 2022-01-01

## 2022-01-01 RX ORDER — GLUCAGON INJECTION, SOLUTION 0.5 MG/.1ML
1 INJECTION, SOLUTION SUBCUTANEOUS ONCE
Refills: 0 | Status: DISCONTINUED | OUTPATIENT
Start: 2022-01-01 | End: 2022-01-01

## 2022-01-01 RX ORDER — LACTOBACILLUS ACIDOPHILUS 100MM CELL
1 CAPSULE ORAL THREE TIMES A DAY
Refills: 0 | Status: DISCONTINUED | OUTPATIENT
Start: 2022-01-01 | End: 2022-01-01

## 2022-01-01 RX ORDER — CEFTRIAXONE 500 MG/1
INJECTION, POWDER, FOR SOLUTION INTRAMUSCULAR; INTRAVENOUS
Refills: 0 | Status: DISCONTINUED | OUTPATIENT
Start: 2022-01-01 | End: 2022-01-01

## 2022-01-01 RX ORDER — LINEZOLID 600 MG/300ML
600 INJECTION, SOLUTION INTRAVENOUS EVERY 12 HOURS
Refills: 0 | Status: DISCONTINUED | OUTPATIENT
Start: 2022-01-01 | End: 2022-01-01

## 2022-01-01 RX ORDER — TIGECYCLINE 50 MG/5ML
100 INJECTION, POWDER, LYOPHILIZED, FOR SOLUTION INTRAVENOUS ONCE
Refills: 0 | Status: COMPLETED | OUTPATIENT
Start: 2022-01-01 | End: 2022-01-01

## 2022-01-01 RX ORDER — SENNA PLUS 8.6 MG/1
2 TABLET ORAL AT BEDTIME
Refills: 0 | Status: DISCONTINUED | OUTPATIENT
Start: 2022-01-01 | End: 2022-01-01

## 2022-01-01 RX ORDER — MORPHINE SULFATE 50 MG/1
1 CAPSULE, EXTENDED RELEASE ORAL EVERY 4 HOURS
Refills: 0 | Status: DISCONTINUED | OUTPATIENT
Start: 2022-01-01 | End: 2022-01-01

## 2022-01-01 RX ORDER — METRONIDAZOLE 500 MG
500 TABLET ORAL EVERY 8 HOURS
Refills: 0 | Status: DISCONTINUED | OUTPATIENT
Start: 2022-01-01 | End: 2022-01-01

## 2022-01-01 RX ORDER — LACTOBACILLUS ACIDOPHILUS 100MM CELL
0 CAPSULE ORAL
Qty: 0 | Refills: 0 | DISCHARGE
Start: 2022-01-01

## 2022-01-01 RX ORDER — ACETAMINOPHEN 500 MG
650 TABLET ORAL EVERY 6 HOURS
Refills: 0 | Status: DISCONTINUED | OUTPATIENT
Start: 2022-01-01 | End: 2022-01-01

## 2022-01-01 RX ORDER — SITAGLIPTIN 50 MG/1
1 TABLET, FILM COATED ORAL
Qty: 0 | Refills: 0 | DISCHARGE

## 2022-01-01 RX ORDER — SODIUM ZIRCONIUM CYCLOSILICATE 10 G/10G
5 POWDER, FOR SUSPENSION ORAL ONCE
Refills: 0 | Status: DISCONTINUED | OUTPATIENT
Start: 2022-01-01 | End: 2022-01-01

## 2022-01-01 RX ORDER — HEPARIN SODIUM 5000 [USP'U]/ML
5000 INJECTION INTRAVENOUS; SUBCUTANEOUS EVERY 12 HOURS
Refills: 0 | Status: DISCONTINUED | OUTPATIENT
Start: 2022-01-01 | End: 2022-01-01

## 2022-01-01 RX ORDER — SODIUM CHLORIDE 9 MG/ML
1000 INJECTION INTRAMUSCULAR; INTRAVENOUS; SUBCUTANEOUS
Refills: 0 | Status: DISCONTINUED | OUTPATIENT
Start: 2022-01-01 | End: 2022-01-01

## 2022-01-01 RX ORDER — HALOPERIDOL DECANOATE 100 MG/ML
37.5 INJECTION INTRAMUSCULAR
Qty: 0 | Refills: 0 | DISCHARGE

## 2022-01-01 RX ORDER — ONDANSETRON 8 MG/1
4 TABLET, FILM COATED ORAL ONCE
Refills: 0 | Status: DISCONTINUED | OUTPATIENT
Start: 2022-01-01 | End: 2022-01-01

## 2022-01-01 RX ORDER — MORPHINE SULFATE 50 MG/1
2 CAPSULE, EXTENDED RELEASE ORAL EVERY 4 HOURS
Refills: 0 | Status: DISCONTINUED | OUTPATIENT
Start: 2022-01-01 | End: 2022-01-01

## 2022-01-01 RX ORDER — INSULIN LISPRO 100/ML
VIAL (ML) SUBCUTANEOUS
Refills: 0 | Status: DISCONTINUED | OUTPATIENT
Start: 2022-01-01 | End: 2022-01-01

## 2022-01-01 RX ORDER — DEXTROSE 50 % IN WATER 50 %
15 SYRINGE (ML) INTRAVENOUS ONCE
Refills: 0 | Status: DISCONTINUED | OUTPATIENT
Start: 2022-01-01 | End: 2022-01-01

## 2022-01-01 RX ORDER — ACETAMINOPHEN 500 MG
2 TABLET ORAL
Qty: 0 | Refills: 0 | DISCHARGE

## 2022-01-01 RX ORDER — ROBINUL 0.2 MG/ML
0.2 INJECTION INTRAMUSCULAR; INTRAVENOUS EVERY 6 HOURS
Refills: 0 | Status: DISCONTINUED | OUTPATIENT
Start: 2022-01-01 | End: 2022-01-01

## 2022-01-01 RX ORDER — FAMOTIDINE 10 MG/ML
20 INJECTION INTRAVENOUS DAILY
Refills: 0 | Status: DISCONTINUED | OUTPATIENT
Start: 2022-01-01 | End: 2022-01-01

## 2022-01-01 RX ORDER — COLLAGENASE CLOSTRIDIUM HIST. 250 UNIT/G
1 OINTMENT (GRAM) TOPICAL
Refills: 0 | Status: DISCONTINUED | OUTPATIENT
Start: 2022-01-01 | End: 2022-01-01

## 2022-01-01 RX ORDER — DEXTROSE 10 % IN WATER 10 %
250 INTRAVENOUS SOLUTION INTRAVENOUS ONCE
Refills: 0 | Status: COMPLETED | OUTPATIENT
Start: 2022-01-01 | End: 2022-01-01

## 2022-01-01 RX ORDER — SENNA PLUS 8.6 MG/1
2 TABLET ORAL
Qty: 0 | Refills: 0 | DISCHARGE

## 2022-01-01 RX ORDER — MORPHINE SULFATE 50 MG/1
2 CAPSULE, EXTENDED RELEASE ORAL ONCE
Refills: 0 | Status: DISCONTINUED | OUTPATIENT
Start: 2022-01-01 | End: 2022-01-01

## 2022-01-01 RX ORDER — LANOLIN ALCOHOL/MO/W.PET/CERES
5 CREAM (GRAM) TOPICAL AT BEDTIME
Refills: 0 | Status: DISCONTINUED | OUTPATIENT
Start: 2022-01-01 | End: 2022-01-01

## 2022-01-01 RX ORDER — INSULIN LISPRO 100/ML
10 VIAL (ML) SUBCUTANEOUS EVERY 6 HOURS
Refills: 0 | Status: DISCONTINUED | OUTPATIENT
Start: 2022-01-01 | End: 2022-01-01

## 2022-01-01 RX ORDER — INSULIN GLARGINE 100 [IU]/ML
50 INJECTION, SOLUTION SUBCUTANEOUS AT BEDTIME
Refills: 0 | Status: DISCONTINUED | OUTPATIENT
Start: 2022-01-01 | End: 2022-01-01

## 2022-01-01 RX ORDER — INSULIN GLARGINE 100 [IU]/ML
40 INJECTION, SOLUTION SUBCUTANEOUS
Qty: 0 | Refills: 0 | DISCHARGE
Start: 2022-01-01

## 2022-01-01 RX ORDER — DOCUSATE SODIUM 100 MG
2 CAPSULE ORAL
Qty: 0 | Refills: 0 | DISCHARGE

## 2022-01-01 RX ORDER — INSULIN GLARGINE 100 [IU]/ML
55 INJECTION, SOLUTION SUBCUTANEOUS
Qty: 0 | Refills: 0 | DISCHARGE

## 2022-01-01 RX ORDER — INSULIN GLARGINE 100 [IU]/ML
40 INJECTION, SOLUTION SUBCUTANEOUS AT BEDTIME
Refills: 0 | Status: DISCONTINUED | OUTPATIENT
Start: 2022-01-01 | End: 2022-01-01

## 2022-01-01 RX ORDER — OXYCODONE HYDROCHLORIDE 5 MG/1
5 TABLET ORAL THREE TIMES A DAY
Refills: 0 | Status: DISCONTINUED | OUTPATIENT
Start: 2022-01-01 | End: 2022-01-01

## 2022-01-01 RX ORDER — CALCIUM GLUCONATE 100 MG/ML
1 VIAL (ML) INTRAVENOUS ONCE
Refills: 0 | Status: COMPLETED | OUTPATIENT
Start: 2022-01-01 | End: 2022-01-01

## 2022-01-01 RX ORDER — VANCOMYCIN HCL 1 G
1000 VIAL (EA) INTRAVENOUS ONCE
Refills: 0 | Status: COMPLETED | OUTPATIENT
Start: 2022-01-01 | End: 2022-01-01

## 2022-01-01 RX ORDER — CEFEPIME 1 G/1
1000 INJECTION, POWDER, FOR SOLUTION INTRAMUSCULAR; INTRAVENOUS ONCE
Refills: 0 | Status: COMPLETED | OUTPATIENT
Start: 2022-01-01 | End: 2022-01-01

## 2022-01-01 RX ORDER — INSULIN LISPRO 100/ML
10 VIAL (ML) SUBCUTANEOUS
Qty: 1200 | Refills: 0
Start: 2022-01-01 | End: 2022-01-01

## 2022-01-01 RX ORDER — CALCIUM GLUCONATE 100 MG/ML
2 VIAL (ML) INTRAVENOUS ONCE
Refills: 0 | Status: COMPLETED | OUTPATIENT
Start: 2022-01-01 | End: 2022-01-01

## 2022-01-01 RX ORDER — SODIUM CHLORIDE 9 MG/ML
1000 INJECTION INTRAMUSCULAR; INTRAVENOUS; SUBCUTANEOUS ONCE
Refills: 0 | Status: COMPLETED | OUTPATIENT
Start: 2022-01-01 | End: 2022-01-01

## 2022-01-01 RX ORDER — SENNA PLUS 8.6 MG/1
1 TABLET ORAL
Qty: 0 | Refills: 0 | DISCHARGE

## 2022-01-01 RX ORDER — REMDESIVIR 5 MG/ML
200 INJECTION INTRAVENOUS EVERY 24 HOURS
Refills: 0 | Status: COMPLETED | OUTPATIENT
Start: 2022-01-01 | End: 2022-01-01

## 2022-01-01 RX ORDER — SODIUM ZIRCONIUM CYCLOSILICATE 10 G/10G
10 POWDER, FOR SUSPENSION ORAL ONCE
Refills: 0 | Status: COMPLETED | OUTPATIENT
Start: 2022-01-01 | End: 2022-01-01

## 2022-01-01 RX ORDER — LEVOTHYROXINE SODIUM 125 MCG
50 TABLET ORAL DAILY
Refills: 0 | Status: DISCONTINUED | OUTPATIENT
Start: 2022-01-01 | End: 2022-01-01

## 2022-01-01 RX ORDER — INSULIN LISPRO 100/ML
10 VIAL (ML) SUBCUTANEOUS ONCE
Refills: 0 | Status: COMPLETED | OUTPATIENT
Start: 2022-01-01 | End: 2022-01-01

## 2022-01-01 RX ORDER — DEXTROSE 10 % IN WATER 10 %
250 INTRAVENOUS SOLUTION INTRAVENOUS
Refills: 0 | Status: DISCONTINUED | OUTPATIENT
Start: 2022-01-01 | End: 2022-01-01

## 2022-01-01 RX ORDER — HYDROMORPHONE HYDROCHLORIDE 2 MG/ML
0.5 INJECTION INTRAMUSCULAR; INTRAVENOUS; SUBCUTANEOUS
Refills: 0 | Status: DISCONTINUED | OUTPATIENT
Start: 2022-01-01 | End: 2022-01-01

## 2022-01-01 RX ORDER — DEXAMETHASONE 0.5 MG/5ML
6 ELIXIR ORAL DAILY
Refills: 0 | Status: DISCONTINUED | OUTPATIENT
Start: 2022-01-01 | End: 2022-01-01

## 2022-01-01 RX ORDER — ASCORBIC ACID 60 MG
500 TABLET,CHEWABLE ORAL DAILY
Refills: 0 | Status: DISCONTINUED | OUTPATIENT
Start: 2022-01-01 | End: 2022-01-01

## 2022-01-01 RX ORDER — CEFEPIME 1 G/1
INJECTION, POWDER, FOR SOLUTION INTRAMUSCULAR; INTRAVENOUS
Refills: 0 | Status: DISCONTINUED | OUTPATIENT
Start: 2022-01-01 | End: 2022-01-01

## 2022-01-01 RX ORDER — REMDESIVIR 5 MG/ML
100 INJECTION INTRAVENOUS EVERY 24 HOURS
Refills: 0 | Status: DISCONTINUED | OUTPATIENT
Start: 2022-01-01 | End: 2022-01-01

## 2022-01-01 RX ORDER — MEROPENEM 1 G/30ML
INJECTION INTRAVENOUS
Refills: 0 | Status: DISCONTINUED | OUTPATIENT
Start: 2022-01-01 | End: 2022-01-01

## 2022-01-01 RX ORDER — LEVOTHYROXINE SODIUM 125 MCG
1 TABLET ORAL
Qty: 0 | Refills: 0 | DISCHARGE

## 2022-01-01 RX ORDER — FAMOTIDINE 10 MG/ML
1 INJECTION INTRAVENOUS
Qty: 0 | Refills: 0 | DISCHARGE

## 2022-01-01 RX ORDER — CEFTRIAXONE 500 MG/1
1000 INJECTION, POWDER, FOR SOLUTION INTRAMUSCULAR; INTRAVENOUS EVERY 24 HOURS
Refills: 0 | Status: DISCONTINUED | OUTPATIENT
Start: 2022-01-01 | End: 2022-01-01

## 2022-01-01 RX ORDER — DEXTROSE 50 % IN WATER 50 %
50 SYRINGE (ML) INTRAVENOUS ONCE
Refills: 0 | Status: DISCONTINUED | OUTPATIENT
Start: 2022-01-01 | End: 2022-01-01

## 2022-01-01 RX ORDER — POLYETHYLENE GLYCOL 3350 17 G/17G
17 POWDER, FOR SOLUTION ORAL DAILY
Refills: 0 | Status: DISCONTINUED | OUTPATIENT
Start: 2022-01-01 | End: 2022-01-01

## 2022-01-01 RX ORDER — MEROPENEM 1 G/30ML
1000 INJECTION INTRAVENOUS ONCE
Refills: 0 | Status: COMPLETED | OUTPATIENT
Start: 2022-01-01 | End: 2022-01-01

## 2022-01-01 RX ORDER — MEPERIDINE HYDROCHLORIDE 50 MG/ML
12.5 INJECTION INTRAMUSCULAR; INTRAVENOUS; SUBCUTANEOUS
Refills: 0 | Status: DISCONTINUED | OUTPATIENT
Start: 2022-01-01 | End: 2022-01-01

## 2022-01-01 RX ORDER — SCOPALAMINE 1 MG/3D
1 PATCH, EXTENDED RELEASE TRANSDERMAL
Refills: 0 | Status: DISCONTINUED | OUTPATIENT
Start: 2022-01-01 | End: 2022-01-01

## 2022-01-01 RX ORDER — MEROPENEM 1 G/30ML
1 INJECTION INTRAVENOUS
Qty: 12 | Refills: 0
Start: 2022-01-01 | End: 2022-01-01

## 2022-01-01 RX ORDER — COLLAGENASE CLOSTRIDIUM HIST. 250 UNIT/G
1 OINTMENT (GRAM) TOPICAL DAILY
Refills: 0 | Status: DISCONTINUED | OUTPATIENT
Start: 2022-01-01 | End: 2022-01-01

## 2022-01-01 RX ORDER — SODIUM HYPOCHLORITE 0.125 %
1 SOLUTION, NON-ORAL MISCELLANEOUS DAILY
Refills: 0 | Status: DISCONTINUED | OUTPATIENT
Start: 2022-01-01 | End: 2022-01-01

## 2022-01-01 RX ORDER — VANCOMYCIN HCL 1 G
1000 VIAL (EA) INTRAVENOUS EVERY 24 HOURS
Refills: 0 | Status: DISCONTINUED | OUTPATIENT
Start: 2022-01-01 | End: 2022-01-01

## 2022-01-01 RX ORDER — INSULIN HUMAN 100 [IU]/ML
3 INJECTION, SOLUTION SUBCUTANEOUS
Qty: 0 | Refills: 0 | DISCHARGE

## 2022-01-01 RX ORDER — TIGECYCLINE 50 MG/5ML
50 INJECTION, POWDER, LYOPHILIZED, FOR SOLUTION INTRAVENOUS EVERY 12 HOURS
Refills: 0 | Status: DISCONTINUED | OUTPATIENT
Start: 2022-01-01 | End: 2022-01-01

## 2022-01-01 RX ORDER — SODIUM ZIRCONIUM CYCLOSILICATE 10 G/10G
5 POWDER, FOR SUSPENSION ORAL THREE TIMES A DAY
Refills: 0 | Status: DISCONTINUED | OUTPATIENT
Start: 2022-01-01 | End: 2022-01-01

## 2022-01-01 RX ORDER — DEXTROSE 50 % IN WATER 50 %
50 SYRINGE (ML) INTRAVENOUS ONCE
Refills: 0 | Status: COMPLETED | OUTPATIENT
Start: 2022-01-01 | End: 2022-01-01

## 2022-01-01 RX ORDER — MEROPENEM 1 G/30ML
1000 INJECTION INTRAVENOUS EVERY 8 HOURS
Refills: 0 | Status: DISCONTINUED | OUTPATIENT
Start: 2022-01-01 | End: 2022-01-01

## 2022-01-01 RX ORDER — LANOLIN ALCOHOL/MO/W.PET/CERES
1 CREAM (GRAM) TOPICAL
Qty: 0 | Refills: 0 | DISCHARGE

## 2022-01-01 RX ORDER — INSULIN GLARGINE 100 [IU]/ML
35 INJECTION, SOLUTION SUBCUTANEOUS
Qty: 0 | Refills: 0 | DISCHARGE

## 2022-01-01 RX ORDER — TIGECYCLINE 50 MG/5ML
INJECTION, POWDER, LYOPHILIZED, FOR SOLUTION INTRAVENOUS
Refills: 0 | Status: DISCONTINUED | OUTPATIENT
Start: 2022-01-01 | End: 2022-01-01

## 2022-01-01 RX ORDER — ASPIRIN/CALCIUM CARB/MAGNESIUM 324 MG
1 TABLET ORAL
Qty: 0 | Refills: 0 | DISCHARGE

## 2022-01-01 RX ORDER — HYDROMORPHONE HYDROCHLORIDE 2 MG/ML
0.2 INJECTION INTRAMUSCULAR; INTRAVENOUS; SUBCUTANEOUS
Refills: 0 | Status: DISCONTINUED | OUTPATIENT
Start: 2022-01-01 | End: 2022-01-01

## 2022-01-01 RX ORDER — ACETAMINOPHEN 500 MG
975 TABLET ORAL ONCE
Refills: 0 | Status: COMPLETED | OUTPATIENT
Start: 2022-01-01 | End: 2022-01-01

## 2022-01-01 RX ORDER — CEFEPIME 1 G/1
1000 INJECTION, POWDER, FOR SOLUTION INTRAMUSCULAR; INTRAVENOUS EVERY 8 HOURS
Refills: 0 | Status: DISCONTINUED | OUTPATIENT
Start: 2022-01-01 | End: 2022-01-01

## 2022-01-01 RX ORDER — ASCORBIC ACID 60 MG
1 TABLET,CHEWABLE ORAL
Qty: 0 | Refills: 0 | DISCHARGE

## 2022-01-01 RX ORDER — FLUTICASONE PROPIONATE 50 MCG
1 SPRAY, SUSPENSION NASAL EVERY 12 HOURS
Refills: 0 | Status: DISCONTINUED | OUTPATIENT
Start: 2022-01-01 | End: 2022-01-01

## 2022-01-01 RX ORDER — MEROPENEM 1 G/30ML
1000 INJECTION INTRAVENOUS EVERY 12 HOURS
Refills: 0 | Status: DISCONTINUED | OUTPATIENT
Start: 2022-01-01 | End: 2022-01-01

## 2022-01-01 RX ORDER — ATORVASTATIN CALCIUM 80 MG/1
1 TABLET, FILM COATED ORAL
Qty: 0 | Refills: 0 | DISCHARGE

## 2022-01-01 RX ORDER — CEFTRIAXONE 500 MG/1
1000 INJECTION, POWDER, FOR SOLUTION INTRAMUSCULAR; INTRAVENOUS ONCE
Refills: 0 | Status: COMPLETED | OUTPATIENT
Start: 2022-01-01 | End: 2022-01-01

## 2022-01-01 RX ORDER — REMDESIVIR 5 MG/ML
INJECTION INTRAVENOUS
Refills: 0 | Status: DISCONTINUED | OUTPATIENT
Start: 2022-01-01 | End: 2022-01-01

## 2022-01-01 RX ORDER — PIPERACILLIN AND TAZOBACTAM 4; .5 G/20ML; G/20ML
3.38 INJECTION, POWDER, LYOPHILIZED, FOR SOLUTION INTRAVENOUS ONCE
Refills: 0 | Status: COMPLETED | OUTPATIENT
Start: 2022-01-01 | End: 2022-01-01

## 2022-01-01 RX ADMIN — TIGECYCLINE 105 MILLIGRAM(S): 50 INJECTION, POWDER, LYOPHILIZED, FOR SOLUTION INTRAVENOUS at 05:56

## 2022-01-01 RX ADMIN — Medication 1 TABLET(S): at 14:29

## 2022-01-01 RX ADMIN — HYDROMORPHONE HYDROCHLORIDE 0.5 MILLIGRAM(S): 2 INJECTION INTRAMUSCULAR; INTRAVENOUS; SUBCUTANEOUS at 23:35

## 2022-01-01 RX ADMIN — Medication 1 APPLICATION(S): at 05:29

## 2022-01-01 RX ADMIN — HEPARIN SODIUM 5000 UNIT(S): 5000 INJECTION INTRAVENOUS; SUBCUTANEOUS at 17:47

## 2022-01-01 RX ADMIN — Medication 5 MILLIGRAM(S): at 22:10

## 2022-01-01 RX ADMIN — ATORVASTATIN CALCIUM 20 MILLIGRAM(S): 80 TABLET, FILM COATED ORAL at 22:12

## 2022-01-01 RX ADMIN — ZINC SULFATE TAB 220 MG (50 MG ZINC EQUIVALENT) 220 MILLIGRAM(S): 220 (50 ZN) TAB at 12:19

## 2022-01-01 RX ADMIN — FAMOTIDINE 20 MILLIGRAM(S): 10 INJECTION INTRAVENOUS at 11:14

## 2022-01-01 RX ADMIN — Medication 1 TABLET(S): at 12:50

## 2022-01-01 RX ADMIN — Medication 6 MILLIGRAM(S): at 06:49

## 2022-01-01 RX ADMIN — SODIUM CHLORIDE 1000 MILLILITER(S): 9 INJECTION, SOLUTION INTRAVENOUS at 18:57

## 2022-01-01 RX ADMIN — Medication 0: at 21:09

## 2022-01-01 RX ADMIN — HEPARIN SODIUM 5000 UNIT(S): 5000 INJECTION INTRAVENOUS; SUBCUTANEOUS at 05:21

## 2022-01-01 RX ADMIN — Medication 4: at 22:01

## 2022-01-01 RX ADMIN — Medication 4: at 17:02

## 2022-01-01 RX ADMIN — Medication 975 MILLIGRAM(S): at 00:40

## 2022-01-01 RX ADMIN — Medication 650 MILLIGRAM(S): at 11:21

## 2022-01-01 RX ADMIN — MEROPENEM 100 MILLIGRAM(S): 1 INJECTION INTRAVENOUS at 17:16

## 2022-01-01 RX ADMIN — Medication 500 MILLIGRAM(S): at 13:24

## 2022-01-01 RX ADMIN — Medication 650 MILLIGRAM(S): at 06:45

## 2022-01-01 RX ADMIN — IRON SUCROSE 110 MILLIGRAM(S): 20 INJECTION, SOLUTION INTRAVENOUS at 17:01

## 2022-01-01 RX ADMIN — Medication 650 MILLIGRAM(S): at 05:34

## 2022-01-01 RX ADMIN — Medication 1 APPLICATION(S): at 05:08

## 2022-01-01 RX ADMIN — Medication 1 TABLET(S): at 11:17

## 2022-01-01 RX ADMIN — FAMOTIDINE 20 MILLIGRAM(S): 10 INJECTION INTRAVENOUS at 11:36

## 2022-01-01 RX ADMIN — Medication 81 MILLIGRAM(S): at 14:13

## 2022-01-01 RX ADMIN — Medication 50 MICROGRAM(S): at 06:49

## 2022-01-01 RX ADMIN — MEROPENEM 100 MILLIGRAM(S): 1 INJECTION INTRAVENOUS at 05:09

## 2022-01-01 RX ADMIN — HEPARIN SODIUM 5000 UNIT(S): 5000 INJECTION INTRAVENOUS; SUBCUTANEOUS at 05:44

## 2022-01-01 RX ADMIN — INSULIN HUMAN 10 UNIT(S): 100 INJECTION, SOLUTION SUBCUTANEOUS at 01:59

## 2022-01-01 RX ADMIN — Medication 4: at 08:22

## 2022-01-01 RX ADMIN — HEPARIN SODIUM 5000 UNIT(S): 5000 INJECTION INTRAVENOUS; SUBCUTANEOUS at 18:27

## 2022-01-01 RX ADMIN — Medication 81 MILLIGRAM(S): at 12:02

## 2022-01-01 RX ADMIN — Medication 1 APPLICATION(S): at 06:06

## 2022-01-01 RX ADMIN — Medication 1 TABLET(S): at 13:13

## 2022-01-01 RX ADMIN — INSULIN GLARGINE 40 UNIT(S): 100 INJECTION, SOLUTION SUBCUTANEOUS at 21:59

## 2022-01-01 RX ADMIN — Medication 10 UNIT(S): at 00:01

## 2022-01-01 RX ADMIN — Medication 1 APPLICATION(S): at 17:13

## 2022-01-01 RX ADMIN — Medication 1 TABLET(S): at 05:35

## 2022-01-01 RX ADMIN — HEPARIN SODIUM 5000 UNIT(S): 5000 INJECTION INTRAVENOUS; SUBCUTANEOUS at 05:12

## 2022-01-01 RX ADMIN — Medication 650 MILLIGRAM(S): at 23:44

## 2022-01-01 RX ADMIN — Medication 650 MILLIGRAM(S): at 06:16

## 2022-01-01 RX ADMIN — Medication 1 TABLET(S): at 05:07

## 2022-01-01 RX ADMIN — Medication 1 TABLET(S): at 12:19

## 2022-01-01 RX ADMIN — ATORVASTATIN CALCIUM 20 MILLIGRAM(S): 80 TABLET, FILM COATED ORAL at 22:02

## 2022-01-01 RX ADMIN — Medication 10 UNIT(S): at 17:30

## 2022-01-01 RX ADMIN — Medication 100 GRAM(S): at 15:09

## 2022-01-01 RX ADMIN — Medication 500 MILLIGRAM(S): at 13:07

## 2022-01-01 RX ADMIN — Medication 50 MICROGRAM(S): at 05:16

## 2022-01-01 RX ADMIN — MEROPENEM 100 MILLIGRAM(S): 1 INJECTION INTRAVENOUS at 00:31

## 2022-01-01 RX ADMIN — Medication 2: at 07:57

## 2022-01-01 RX ADMIN — CEFEPIME 100 MILLIGRAM(S): 1 INJECTION, POWDER, FOR SOLUTION INTRAMUSCULAR; INTRAVENOUS at 05:46

## 2022-01-01 RX ADMIN — Medication 10 UNIT(S): at 12:45

## 2022-01-01 RX ADMIN — Medication 10 UNIT(S): at 18:13

## 2022-01-01 RX ADMIN — Medication 650 MILLIGRAM(S): at 06:33

## 2022-01-01 RX ADMIN — ATORVASTATIN CALCIUM 20 MILLIGRAM(S): 80 TABLET, FILM COATED ORAL at 21:45

## 2022-01-01 RX ADMIN — Medication 5 MILLIGRAM(S): at 22:11

## 2022-01-01 RX ADMIN — MORPHINE SULFATE 2 MILLIGRAM(S): 50 CAPSULE, EXTENDED RELEASE ORAL at 09:50

## 2022-01-01 RX ADMIN — Medication 4: at 16:40

## 2022-01-01 RX ADMIN — Medication 4: at 08:03

## 2022-01-01 RX ADMIN — ATORVASTATIN CALCIUM 20 MILLIGRAM(S): 80 TABLET, FILM COATED ORAL at 00:16

## 2022-01-01 RX ADMIN — Medication 1 APPLICATION(S): at 17:52

## 2022-01-01 RX ADMIN — MEROPENEM 100 MILLIGRAM(S): 1 INJECTION INTRAVENOUS at 22:14

## 2022-01-01 RX ADMIN — PIPERACILLIN AND TAZOBACTAM 200 GRAM(S): 4; .5 INJECTION, POWDER, LYOPHILIZED, FOR SOLUTION INTRAVENOUS at 22:49

## 2022-01-01 RX ADMIN — Medication 650 MILLIGRAM(S): at 00:06

## 2022-01-01 RX ADMIN — Medication 10 UNIT(S): at 17:18

## 2022-01-01 RX ADMIN — Medication 1 APPLICATION(S): at 17:57

## 2022-01-01 RX ADMIN — HEPARIN SODIUM 5000 UNIT(S): 5000 INJECTION INTRAVENOUS; SUBCUTANEOUS at 17:52

## 2022-01-01 RX ADMIN — Medication 1 TABLET(S): at 05:40

## 2022-01-01 RX ADMIN — Medication 500 MILLILITER(S): at 05:58

## 2022-01-01 RX ADMIN — Medication 81 MILLIGRAM(S): at 12:15

## 2022-01-01 RX ADMIN — Medication 650 MILLIGRAM(S): at 12:21

## 2022-01-01 RX ADMIN — SODIUM ZIRCONIUM CYCLOSILICATE 5 GRAM(S): 10 POWDER, FOR SUSPENSION ORAL at 22:08

## 2022-01-01 RX ADMIN — Medication 650 MILLIGRAM(S): at 17:20

## 2022-01-01 RX ADMIN — Medication 50 MICROGRAM(S): at 06:10

## 2022-01-01 RX ADMIN — SODIUM CHLORIDE 100 MILLILITER(S): 9 INJECTION, SOLUTION INTRAVENOUS at 09:38

## 2022-01-01 RX ADMIN — Medication 650 MILLIGRAM(S): at 17:27

## 2022-01-01 RX ADMIN — Medication 1 APPLICATION(S): at 07:03

## 2022-01-01 RX ADMIN — Medication 10 UNIT(S): at 17:01

## 2022-01-01 RX ADMIN — SODIUM CHLORIDE 2000 MILLILITER(S): 9 INJECTION INTRAMUSCULAR; INTRAVENOUS; SUBCUTANEOUS at 14:54

## 2022-01-01 RX ADMIN — Medication 50 MICROGRAM(S): at 05:12

## 2022-01-01 RX ADMIN — Medication 5 MILLIGRAM(S): at 22:00

## 2022-01-01 RX ADMIN — FAMOTIDINE 20 MILLIGRAM(S): 10 INJECTION INTRAVENOUS at 14:13

## 2022-01-01 RX ADMIN — Medication 10 UNIT(S): at 06:20

## 2022-01-01 RX ADMIN — INSULIN HUMAN 5 UNIT(S)/HR: 100 INJECTION, SOLUTION SUBCUTANEOUS at 07:00

## 2022-01-01 RX ADMIN — Medication 650 MILLIGRAM(S): at 17:47

## 2022-01-01 RX ADMIN — FAMOTIDINE 20 MILLIGRAM(S): 10 INJECTION INTRAVENOUS at 11:42

## 2022-01-01 RX ADMIN — Medication 10 UNIT(S): at 05:44

## 2022-01-01 RX ADMIN — Medication 650 MILLIGRAM(S): at 11:17

## 2022-01-01 RX ADMIN — SODIUM ZIRCONIUM CYCLOSILICATE 10 GRAM(S): 10 POWDER, FOR SUSPENSION ORAL at 00:24

## 2022-01-01 RX ADMIN — INSULIN GLARGINE 40 UNIT(S): 100 INJECTION, SOLUTION SUBCUTANEOUS at 22:10

## 2022-01-01 RX ADMIN — TIGECYCLINE 105 MILLIGRAM(S): 50 INJECTION, POWDER, LYOPHILIZED, FOR SOLUTION INTRAVENOUS at 17:51

## 2022-01-01 RX ADMIN — CEFTRIAXONE 100 MILLIGRAM(S): 500 INJECTION, POWDER, FOR SOLUTION INTRAMUSCULAR; INTRAVENOUS at 06:52

## 2022-01-01 RX ADMIN — Medication 1 TABLET(S): at 22:11

## 2022-01-01 RX ADMIN — Medication 10 UNIT(S): at 17:28

## 2022-01-01 RX ADMIN — Medication 10 UNIT(S): at 00:43

## 2022-01-01 RX ADMIN — SODIUM CHLORIDE 100 MILLILITER(S): 9 INJECTION, SOLUTION INTRAVENOUS at 15:09

## 2022-01-01 RX ADMIN — Medication 1 SPRAY(S): at 05:34

## 2022-01-01 RX ADMIN — Medication 6 MILLIGRAM(S): at 05:26

## 2022-01-01 RX ADMIN — LINEZOLID 600 MILLIGRAM(S): 600 INJECTION, SOLUTION INTRAVENOUS at 05:44

## 2022-01-01 RX ADMIN — FAMOTIDINE 20 MILLIGRAM(S): 10 INJECTION INTRAVENOUS at 12:19

## 2022-01-01 RX ADMIN — Medication 5 MILLIGRAM(S): at 21:32

## 2022-01-01 RX ADMIN — SODIUM ZIRCONIUM CYCLOSILICATE 10 GRAM(S): 10 POWDER, FOR SUSPENSION ORAL at 13:24

## 2022-01-01 RX ADMIN — IRON SUCROSE 110 MILLIGRAM(S): 20 INJECTION, SOLUTION INTRAVENOUS at 17:27

## 2022-01-01 RX ADMIN — Medication 1 APPLICATION(S): at 17:53

## 2022-01-01 RX ADMIN — Medication 1 TABLET(S): at 11:14

## 2022-01-01 RX ADMIN — Medication 1000 MILLILITER(S): at 13:19

## 2022-01-01 RX ADMIN — Medication 650 MILLIGRAM(S): at 23:07

## 2022-01-01 RX ADMIN — FAMOTIDINE 20 MILLIGRAM(S): 10 INJECTION INTRAVENOUS at 11:11

## 2022-01-01 RX ADMIN — ATORVASTATIN CALCIUM 20 MILLIGRAM(S): 80 TABLET, FILM COATED ORAL at 21:08

## 2022-01-01 RX ADMIN — Medication 50 MICROGRAM(S): at 06:20

## 2022-01-01 RX ADMIN — INSULIN GLARGINE 40 UNIT(S): 100 INJECTION, SOLUTION SUBCUTANEOUS at 22:28

## 2022-01-01 RX ADMIN — Medication 650 MILLIGRAM(S): at 17:05

## 2022-01-01 RX ADMIN — FAMOTIDINE 20 MILLIGRAM(S): 10 INJECTION INTRAVENOUS at 11:17

## 2022-01-01 RX ADMIN — SODIUM CHLORIDE 75 MILLILITER(S): 9 INJECTION INTRAMUSCULAR; INTRAVENOUS; SUBCUTANEOUS at 12:36

## 2022-01-01 RX ADMIN — MEROPENEM 100 MILLIGRAM(S): 1 INJECTION INTRAVENOUS at 06:11

## 2022-01-01 RX ADMIN — SODIUM CHLORIDE 1000 MILLILITER(S): 9 INJECTION, SOLUTION INTRAVENOUS at 13:20

## 2022-01-01 RX ADMIN — ATORVASTATIN CALCIUM 20 MILLIGRAM(S): 80 TABLET, FILM COATED ORAL at 21:32

## 2022-01-01 RX ADMIN — LINEZOLID 600 MILLIGRAM(S): 600 INJECTION, SOLUTION INTRAVENOUS at 05:11

## 2022-01-01 RX ADMIN — Medication 50 MICROGRAM(S): at 05:09

## 2022-01-01 RX ADMIN — INSULIN GLARGINE 45 UNIT(S): 100 INJECTION, SOLUTION SUBCUTANEOUS at 21:54

## 2022-01-01 RX ADMIN — TIGECYCLINE 110 MILLIGRAM(S): 50 INJECTION, POWDER, LYOPHILIZED, FOR SOLUTION INTRAVENOUS at 11:24

## 2022-01-01 RX ADMIN — MEROPENEM 100 MILLIGRAM(S): 1 INJECTION INTRAVENOUS at 07:04

## 2022-01-01 RX ADMIN — INSULIN GLARGINE 40 UNIT(S): 100 INJECTION, SOLUTION SUBCUTANEOUS at 21:10

## 2022-01-01 RX ADMIN — Medication 1 APPLICATION(S): at 12:40

## 2022-01-01 RX ADMIN — MEROPENEM 100 MILLIGRAM(S): 1 INJECTION INTRAVENOUS at 05:21

## 2022-01-01 RX ADMIN — Medication 81 MILLIGRAM(S): at 11:43

## 2022-01-01 RX ADMIN — Medication 1 TABLET(S): at 13:01

## 2022-01-01 RX ADMIN — Medication 10 UNIT(S): at 18:02

## 2022-01-01 RX ADMIN — FAMOTIDINE 20 MILLIGRAM(S): 10 INJECTION INTRAVENOUS at 11:21

## 2022-01-01 RX ADMIN — Medication 250 MILLIGRAM(S): at 20:42

## 2022-01-01 RX ADMIN — Medication 1 TABLET(S): at 21:16

## 2022-01-01 RX ADMIN — REMDESIVIR 500 MILLIGRAM(S): 5 INJECTION INTRAVENOUS at 17:56

## 2022-01-01 RX ADMIN — Medication 50 MICROGRAM(S): at 05:14

## 2022-01-01 RX ADMIN — Medication 81 MILLIGRAM(S): at 14:29

## 2022-01-01 RX ADMIN — HEPARIN SODIUM 5000 UNIT(S): 5000 INJECTION INTRAVENOUS; SUBCUTANEOUS at 17:14

## 2022-01-01 RX ADMIN — Medication 10 UNIT(S): at 12:52

## 2022-01-01 RX ADMIN — Medication 50 MICROGRAM(S): at 05:06

## 2022-01-01 RX ADMIN — Medication 81 MILLIGRAM(S): at 11:11

## 2022-01-01 RX ADMIN — IRON SUCROSE 110 MILLIGRAM(S): 20 INJECTION, SOLUTION INTRAVENOUS at 17:23

## 2022-01-01 RX ADMIN — Medication 1 TABLET(S): at 05:43

## 2022-01-01 RX ADMIN — INSULIN HUMAN 5 UNIT(S)/HR: 100 INJECTION, SOLUTION SUBCUTANEOUS at 04:45

## 2022-01-01 RX ADMIN — Medication 500 MILLIGRAM(S): at 17:07

## 2022-01-01 RX ADMIN — Medication 1 TABLET(S): at 21:08

## 2022-01-01 RX ADMIN — Medication 1 SPRAY(S): at 17:48

## 2022-01-01 RX ADMIN — Medication 1 TABLET(S): at 11:22

## 2022-01-01 RX ADMIN — Medication 1 APPLICATION(S): at 17:14

## 2022-01-01 RX ADMIN — Medication 1 TABLET(S): at 05:12

## 2022-01-01 RX ADMIN — SENNA PLUS 2 TABLET(S): 8.6 TABLET ORAL at 21:45

## 2022-01-01 RX ADMIN — Medication 1 TABLET(S): at 15:47

## 2022-01-01 RX ADMIN — HEPARIN SODIUM 5000 UNIT(S): 5000 INJECTION INTRAVENOUS; SUBCUTANEOUS at 05:26

## 2022-01-01 RX ADMIN — SODIUM CHLORIDE 1000 MILLILITER(S): 9 INJECTION, SOLUTION INTRAVENOUS at 23:29

## 2022-01-01 RX ADMIN — HEPARIN SODIUM 5000 UNIT(S): 5000 INJECTION INTRAVENOUS; SUBCUTANEOUS at 17:49

## 2022-01-01 RX ADMIN — Medication 5 MILLIGRAM(S): at 21:08

## 2022-01-01 RX ADMIN — MEROPENEM 100 MILLIGRAM(S): 1 INJECTION INTRAVENOUS at 17:45

## 2022-01-01 RX ADMIN — SODIUM CHLORIDE 1000 MILLILITER(S): 9 INJECTION, SOLUTION INTRAVENOUS at 00:23

## 2022-01-01 RX ADMIN — INSULIN GLARGINE 40 UNIT(S): 100 INJECTION, SOLUTION SUBCUTANEOUS at 22:00

## 2022-01-01 RX ADMIN — Medication 650 MILLIGRAM(S): at 06:21

## 2022-01-01 RX ADMIN — TIGECYCLINE 105 MILLIGRAM(S): 50 INJECTION, POWDER, LYOPHILIZED, FOR SOLUTION INTRAVENOUS at 05:06

## 2022-01-01 RX ADMIN — HEPARIN SODIUM 5000 UNIT(S): 5000 INJECTION INTRAVENOUS; SUBCUTANEOUS at 05:18

## 2022-01-01 RX ADMIN — HEPARIN SODIUM 5000 UNIT(S): 5000 INJECTION INTRAVENOUS; SUBCUTANEOUS at 17:44

## 2022-01-01 RX ADMIN — HEPARIN SODIUM 5000 UNIT(S): 5000 INJECTION INTRAVENOUS; SUBCUTANEOUS at 05:13

## 2022-01-01 RX ADMIN — Medication 3: at 17:52

## 2022-01-01 RX ADMIN — Medication 1 TABLET(S): at 11:11

## 2022-01-01 RX ADMIN — Medication 4: at 17:29

## 2022-01-01 RX ADMIN — Medication 1 APPLICATION(S): at 17:15

## 2022-01-01 RX ADMIN — FAMOTIDINE 20 MILLIGRAM(S): 10 INJECTION INTRAVENOUS at 12:16

## 2022-01-01 RX ADMIN — Medication 500 MILLIGRAM(S): at 05:40

## 2022-01-01 RX ADMIN — HEPARIN SODIUM 5000 UNIT(S): 5000 INJECTION INTRAVENOUS; SUBCUTANEOUS at 17:53

## 2022-01-01 RX ADMIN — Medication 5 MILLIGRAM(S): at 00:16

## 2022-01-01 RX ADMIN — Medication 10 UNIT(S): at 17:13

## 2022-01-01 RX ADMIN — TIGECYCLINE 105 MILLIGRAM(S): 50 INJECTION, POWDER, LYOPHILIZED, FOR SOLUTION INTRAVENOUS at 05:09

## 2022-01-01 RX ADMIN — Medication 1 TABLET(S): at 11:36

## 2022-01-01 RX ADMIN — INSULIN GLARGINE 40 UNIT(S): 100 INJECTION, SOLUTION SUBCUTANEOUS at 21:49

## 2022-01-01 RX ADMIN — INSULIN HUMAN 10 UNIT(S): 100 INJECTION, SOLUTION SUBCUTANEOUS at 19:11

## 2022-01-01 RX ADMIN — MEROPENEM 100 MILLIGRAM(S): 1 INJECTION INTRAVENOUS at 06:07

## 2022-01-01 RX ADMIN — HEPARIN SODIUM 5000 UNIT(S): 5000 INJECTION INTRAVENOUS; SUBCUTANEOUS at 17:13

## 2022-01-01 RX ADMIN — Medication 500 MILLIGRAM(S): at 21:32

## 2022-01-01 RX ADMIN — Medication 650 MILLIGRAM(S): at 19:04

## 2022-01-01 RX ADMIN — MEROPENEM 100 MILLIGRAM(S): 1 INJECTION INTRAVENOUS at 17:53

## 2022-01-01 RX ADMIN — Medication 81 MILLIGRAM(S): at 11:14

## 2022-01-01 RX ADMIN — INSULIN GLARGINE 45 UNIT(S): 100 INJECTION, SOLUTION SUBCUTANEOUS at 05:36

## 2022-01-01 RX ADMIN — Medication 10 UNIT(S): at 00:03

## 2022-01-01 RX ADMIN — ATORVASTATIN CALCIUM 20 MILLIGRAM(S): 80 TABLET, FILM COATED ORAL at 21:17

## 2022-01-01 RX ADMIN — Medication 6: at 08:06

## 2022-01-01 RX ADMIN — Medication 650 MILLIGRAM(S): at 17:50

## 2022-01-01 RX ADMIN — Medication 2: at 12:45

## 2022-01-01 RX ADMIN — Medication 1 TABLET(S): at 14:13

## 2022-01-01 RX ADMIN — Medication 50 MICROGRAM(S): at 05:20

## 2022-01-01 RX ADMIN — Medication 1 APPLICATION(S): at 17:06

## 2022-01-01 RX ADMIN — INSULIN HUMAN 10 UNIT(S): 100 INJECTION, SOLUTION SUBCUTANEOUS at 22:51

## 2022-01-01 RX ADMIN — MEROPENEM 100 MILLIGRAM(S): 1 INJECTION INTRAVENOUS at 21:15

## 2022-01-01 RX ADMIN — Medication 10 UNIT(S): at 05:33

## 2022-01-01 RX ADMIN — Medication 10 UNIT(S): at 05:09

## 2022-01-01 RX ADMIN — Medication 81 MILLIGRAM(S): at 11:21

## 2022-01-01 RX ADMIN — Medication 2: at 08:45

## 2022-01-01 RX ADMIN — MORPHINE SULFATE 2 MILLIGRAM(S): 50 CAPSULE, EXTENDED RELEASE ORAL at 09:24

## 2022-01-01 RX ADMIN — Medication 10 UNIT(S): at 12:36

## 2022-01-01 RX ADMIN — MEROPENEM 100 MILLIGRAM(S): 1 INJECTION INTRAVENOUS at 11:23

## 2022-01-01 RX ADMIN — HEPARIN SODIUM 5000 UNIT(S): 5000 INJECTION INTRAVENOUS; SUBCUTANEOUS at 05:35

## 2022-01-01 RX ADMIN — LINEZOLID 600 MILLIGRAM(S): 600 INJECTION, SOLUTION INTRAVENOUS at 05:28

## 2022-01-01 RX ADMIN — Medication 1 APPLICATION(S): at 05:37

## 2022-01-01 RX ADMIN — SODIUM CHLORIDE 100 MILLILITER(S): 9 INJECTION, SOLUTION INTRAVENOUS at 06:12

## 2022-01-01 RX ADMIN — Medication 50 MICROGRAM(S): at 05:35

## 2022-01-01 RX ADMIN — Medication 1 SPRAY(S): at 05:18

## 2022-01-01 RX ADMIN — SENNA PLUS 2 TABLET(S): 8.6 TABLET ORAL at 21:42

## 2022-01-01 RX ADMIN — Medication 10 UNIT(S): at 17:52

## 2022-01-01 RX ADMIN — Medication 0.5 MILLIGRAM(S): at 15:01

## 2022-01-01 RX ADMIN — SODIUM CHLORIDE 100 MILLILITER(S): 9 INJECTION INTRAMUSCULAR; INTRAVENOUS; SUBCUTANEOUS at 18:56

## 2022-01-01 RX ADMIN — Medication 650 MILLIGRAM(S): at 11:10

## 2022-01-01 RX ADMIN — TIGECYCLINE 105 MILLIGRAM(S): 50 INJECTION, POWDER, LYOPHILIZED, FOR SOLUTION INTRAVENOUS at 17:05

## 2022-01-01 RX ADMIN — Medication 10 UNIT(S): at 12:40

## 2022-01-01 RX ADMIN — HEPARIN SODIUM 5000 UNIT(S): 5000 INJECTION INTRAVENOUS; SUBCUTANEOUS at 18:01

## 2022-01-01 RX ADMIN — Medication 1 TABLET(S): at 11:53

## 2022-01-01 RX ADMIN — Medication 10 UNIT(S): at 06:14

## 2022-01-01 RX ADMIN — ATORVASTATIN CALCIUM 20 MILLIGRAM(S): 80 TABLET, FILM COATED ORAL at 22:32

## 2022-01-01 RX ADMIN — LINEZOLID 600 MILLIGRAM(S): 600 INJECTION, SOLUTION INTRAVENOUS at 05:21

## 2022-01-01 RX ADMIN — Medication 500 MILLIGRAM(S): at 22:03

## 2022-01-01 RX ADMIN — SCOPALAMINE 1 PATCH: 1 PATCH, EXTENDED RELEASE TRANSDERMAL at 19:35

## 2022-01-01 RX ADMIN — Medication 1 TABLET(S): at 12:16

## 2022-01-01 RX ADMIN — LINEZOLID 600 MILLIGRAM(S): 600 INJECTION, SOLUTION INTRAVENOUS at 05:40

## 2022-01-01 RX ADMIN — Medication 81 MILLIGRAM(S): at 12:46

## 2022-01-01 RX ADMIN — FAMOTIDINE 20 MILLIGRAM(S): 10 INJECTION INTRAVENOUS at 11:53

## 2022-01-01 RX ADMIN — HEPARIN SODIUM 5000 UNIT(S): 5000 INJECTION INTRAVENOUS; SUBCUTANEOUS at 17:20

## 2022-01-01 RX ADMIN — SODIUM CHLORIDE 70 MILLILITER(S): 9 INJECTION, SOLUTION INTRAVENOUS at 11:30

## 2022-01-01 RX ADMIN — Medication 1 TABLET(S): at 05:14

## 2022-01-01 RX ADMIN — Medication 50 MICROGRAM(S): at 05:28

## 2022-01-01 RX ADMIN — MEROPENEM 100 MILLIGRAM(S): 1 INJECTION INTRAVENOUS at 06:04

## 2022-01-01 RX ADMIN — Medication 500 MILLIGRAM(S): at 05:28

## 2022-01-01 RX ADMIN — Medication 4: at 18:13

## 2022-01-01 RX ADMIN — ZINC SULFATE TAB 220 MG (50 MG ZINC EQUIVALENT) 220 MILLIGRAM(S): 220 (50 ZN) TAB at 12:02

## 2022-01-01 RX ADMIN — Medication 650 MILLIGRAM(S): at 11:22

## 2022-01-01 RX ADMIN — LINEZOLID 600 MILLIGRAM(S): 600 INJECTION, SOLUTION INTRAVENOUS at 05:13

## 2022-01-01 RX ADMIN — Medication 650 MILLIGRAM(S): at 17:48

## 2022-01-01 RX ADMIN — IRON SUCROSE 110 MILLIGRAM(S): 20 INJECTION, SOLUTION INTRAVENOUS at 18:01

## 2022-01-01 RX ADMIN — Medication 5 MILLIGRAM(S): at 22:32

## 2022-01-01 RX ADMIN — Medication 975 MILLIGRAM(S): at 20:43

## 2022-01-01 RX ADMIN — Medication 650 MILLIGRAM(S): at 01:25

## 2022-01-01 RX ADMIN — Medication 1 TABLET(S): at 11:43

## 2022-01-01 RX ADMIN — MEROPENEM 100 MILLIGRAM(S): 1 INJECTION INTRAVENOUS at 18:27

## 2022-01-01 RX ADMIN — SODIUM CHLORIDE 100 MILLILITER(S): 9 INJECTION, SOLUTION INTRAVENOUS at 00:37

## 2022-01-01 RX ADMIN — Medication 650 MILLIGRAM(S): at 01:26

## 2022-01-01 RX ADMIN — Medication 10 UNIT(S): at 07:32

## 2022-01-01 RX ADMIN — Medication 650 MILLIGRAM(S): at 23:01

## 2022-01-01 RX ADMIN — SENNA PLUS 2 TABLET(S): 8.6 TABLET ORAL at 00:14

## 2022-01-01 RX ADMIN — SODIUM CHLORIDE 1000 MILLILITER(S): 9 INJECTION, SOLUTION INTRAVENOUS at 08:36

## 2022-01-01 RX ADMIN — Medication 81 MILLIGRAM(S): at 11:22

## 2022-01-01 RX ADMIN — POLYETHYLENE GLYCOL 3350 17 GRAM(S): 17 POWDER, FOR SOLUTION ORAL at 11:22

## 2022-01-01 RX ADMIN — FAMOTIDINE 20 MILLIGRAM(S): 10 INJECTION INTRAVENOUS at 14:29

## 2022-01-01 RX ADMIN — Medication 500 MILLIGRAM(S): at 12:19

## 2022-01-01 RX ADMIN — INSULIN GLARGINE 40 UNIT(S): 100 INJECTION, SOLUTION SUBCUTANEOUS at 21:32

## 2022-01-01 RX ADMIN — HEPARIN SODIUM 5000 UNIT(S): 5000 INJECTION INTRAVENOUS; SUBCUTANEOUS at 17:01

## 2022-01-01 RX ADMIN — Medication 10 UNIT(S): at 05:36

## 2022-01-01 RX ADMIN — Medication 50 MICROGRAM(S): at 05:21

## 2022-01-01 RX ADMIN — Medication 650 MILLIGRAM(S): at 12:59

## 2022-01-01 RX ADMIN — Medication 50 MICROGRAM(S): at 05:33

## 2022-01-01 RX ADMIN — HEPARIN SODIUM 5000 UNIT(S): 5000 INJECTION INTRAVENOUS; SUBCUTANEOUS at 17:51

## 2022-01-01 RX ADMIN — Medication 1 APPLICATION(S): at 06:02

## 2022-01-01 RX ADMIN — Medication 500 MILLIGRAM(S): at 13:00

## 2022-01-01 RX ADMIN — INSULIN GLARGINE 40 UNIT(S): 100 INJECTION, SOLUTION SUBCUTANEOUS at 21:41

## 2022-01-01 RX ADMIN — Medication 10 UNIT(S): at 17:45

## 2022-01-01 RX ADMIN — HEPARIN SODIUM 5000 UNIT(S): 5000 INJECTION INTRAVENOUS; SUBCUTANEOUS at 06:06

## 2022-01-01 RX ADMIN — Medication 1 SPRAY(S): at 05:13

## 2022-01-01 RX ADMIN — TIGECYCLINE 105 MILLIGRAM(S): 50 INJECTION, POWDER, LYOPHILIZED, FOR SOLUTION INTRAVENOUS at 18:01

## 2022-01-01 RX ADMIN — HEPARIN SODIUM 5000 UNIT(S): 5000 INJECTION INTRAVENOUS; SUBCUTANEOUS at 17:27

## 2022-01-01 RX ADMIN — Medication 500 MILLIGRAM(S): at 05:09

## 2022-01-01 RX ADMIN — Medication 1 TABLET(S): at 13:24

## 2022-01-01 RX ADMIN — ALBUTEROL 2.5 MILLIGRAM(S): 90 AEROSOL, METERED ORAL at 19:08

## 2022-01-01 RX ADMIN — SENNA PLUS 2 TABLET(S): 8.6 TABLET ORAL at 22:12

## 2022-01-01 RX ADMIN — Medication 650 MILLIGRAM(S): at 12:15

## 2022-01-01 RX ADMIN — MORPHINE SULFATE 2 MILLIGRAM(S): 50 CAPSULE, EXTENDED RELEASE ORAL at 15:31

## 2022-01-01 RX ADMIN — HYDROMORPHONE HYDROCHLORIDE 1 MILLIGRAM(S): 2 INJECTION INTRAMUSCULAR; INTRAVENOUS; SUBCUTANEOUS at 14:52

## 2022-01-01 RX ADMIN — Medication 10 UNIT(S): at 12:17

## 2022-01-01 RX ADMIN — LINEZOLID 600 MILLIGRAM(S): 600 INJECTION, SOLUTION INTRAVENOUS at 17:17

## 2022-01-01 RX ADMIN — HEPARIN SODIUM 5000 UNIT(S): 5000 INJECTION INTRAVENOUS; SUBCUTANEOUS at 07:03

## 2022-01-01 RX ADMIN — SODIUM ZIRCONIUM CYCLOSILICATE 5 GRAM(S): 10 POWDER, FOR SUSPENSION ORAL at 05:36

## 2022-01-01 RX ADMIN — ATORVASTATIN CALCIUM 20 MILLIGRAM(S): 80 TABLET, FILM COATED ORAL at 22:11

## 2022-01-01 RX ADMIN — FAMOTIDINE 20 MILLIGRAM(S): 10 INJECTION INTRAVENOUS at 11:23

## 2022-01-01 RX ADMIN — HEPARIN SODIUM 5000 UNIT(S): 5000 INJECTION INTRAVENOUS; SUBCUTANEOUS at 05:33

## 2022-01-01 RX ADMIN — Medication 650 MILLIGRAM(S): at 05:09

## 2022-01-01 RX ADMIN — FAMOTIDINE 20 MILLIGRAM(S): 10 INJECTION INTRAVENOUS at 12:02

## 2022-01-01 RX ADMIN — SCOPALAMINE 1 PATCH: 1 PATCH, EXTENDED RELEASE TRANSDERMAL at 14:07

## 2022-01-01 RX ADMIN — SCOPALAMINE 1 PATCH: 1 PATCH, EXTENDED RELEASE TRANSDERMAL at 20:40

## 2022-01-01 RX ADMIN — INSULIN GLARGINE 45 UNIT(S): 100 INJECTION, SOLUTION SUBCUTANEOUS at 00:17

## 2022-01-01 RX ADMIN — SENNA PLUS 2 TABLET(S): 8.6 TABLET ORAL at 21:33

## 2022-01-01 RX ADMIN — Medication 2: at 08:00

## 2022-01-01 RX ADMIN — Medication 10 UNIT(S): at 00:31

## 2022-01-01 RX ADMIN — Medication 6: at 13:08

## 2022-01-01 RX ADMIN — LINEZOLID 600 MILLIGRAM(S): 600 INJECTION, SOLUTION INTRAVENOUS at 17:12

## 2022-01-01 RX ADMIN — Medication 650 MILLIGRAM(S): at 12:00

## 2022-01-01 RX ADMIN — HYDROMORPHONE HYDROCHLORIDE 1 MILLIGRAM(S): 2 INJECTION INTRAMUSCULAR; INTRAVENOUS; SUBCUTANEOUS at 15:18

## 2022-01-01 RX ADMIN — Medication 10 UNIT(S): at 07:10

## 2022-01-01 RX ADMIN — MEROPENEM 100 MILLIGRAM(S): 1 INJECTION INTRAVENOUS at 23:37

## 2022-01-01 RX ADMIN — MEROPENEM 100 MILLIGRAM(S): 1 INJECTION INTRAVENOUS at 13:01

## 2022-01-01 RX ADMIN — ATORVASTATIN CALCIUM 20 MILLIGRAM(S): 80 TABLET, FILM COATED ORAL at 21:41

## 2022-01-01 RX ADMIN — SODIUM CHLORIDE 1000 MILLILITER(S): 9 INJECTION, SOLUTION INTRAVENOUS at 07:38

## 2022-01-01 RX ADMIN — SODIUM ZIRCONIUM CYCLOSILICATE 5 GRAM(S): 10 POWDER, FOR SUSPENSION ORAL at 12:56

## 2022-01-01 RX ADMIN — Medication 650 MILLIGRAM(S): at 11:37

## 2022-01-01 RX ADMIN — SENNA PLUS 2 TABLET(S): 8.6 TABLET ORAL at 21:58

## 2022-01-01 RX ADMIN — Medication 50 MICROGRAM(S): at 06:44

## 2022-01-01 RX ADMIN — Medication 650 MILLIGRAM(S): at 23:24

## 2022-01-01 RX ADMIN — SENNA PLUS 2 TABLET(S): 8.6 TABLET ORAL at 21:32

## 2022-01-01 RX ADMIN — Medication 1 TABLET(S): at 14:00

## 2022-01-01 RX ADMIN — Medication 50 MICROGRAM(S): at 07:04

## 2022-01-01 RX ADMIN — Medication 500 MILLIGRAM(S): at 21:41

## 2022-01-01 RX ADMIN — Medication 500 MILLIGRAM(S): at 05:43

## 2022-01-01 RX ADMIN — LINEZOLID 600 MILLIGRAM(S): 600 INJECTION, SOLUTION INTRAVENOUS at 17:02

## 2022-01-01 RX ADMIN — Medication 650 MILLIGRAM(S): at 05:07

## 2022-01-01 RX ADMIN — Medication 500 MILLIGRAM(S): at 22:11

## 2022-01-01 RX ADMIN — Medication 1 SPRAY(S): at 18:01

## 2022-01-01 RX ADMIN — Medication 650 MILLIGRAM(S): at 17:03

## 2022-01-01 RX ADMIN — Medication 500 MILLIGRAM(S): at 12:02

## 2022-01-01 RX ADMIN — MEROPENEM 100 MILLIGRAM(S): 1 INJECTION INTRAVENOUS at 15:47

## 2022-01-01 RX ADMIN — Medication 500 MILLIGRAM(S): at 05:13

## 2022-01-01 RX ADMIN — Medication 1 TABLET(S): at 12:12

## 2022-01-01 RX ADMIN — SENNA PLUS 2 TABLET(S): 8.6 TABLET ORAL at 21:51

## 2022-01-01 RX ADMIN — Medication 4: at 09:49

## 2022-01-01 RX ADMIN — ROBINUL 0.2 MILLIGRAM(S): 0.2 INJECTION INTRAMUSCULAR; INTRAVENOUS at 14:53

## 2022-01-01 RX ADMIN — IRON SUCROSE 110 MILLIGRAM(S): 20 INJECTION, SOLUTION INTRAVENOUS at 19:21

## 2022-01-01 RX ADMIN — Medication 4: at 18:02

## 2022-01-01 RX ADMIN — HEPARIN SODIUM 5000 UNIT(S): 5000 INJECTION INTRAVENOUS; SUBCUTANEOUS at 05:28

## 2022-01-01 RX ADMIN — Medication 100 GRAM(S): at 13:48

## 2022-01-01 RX ADMIN — Medication 1 TABLET(S): at 22:12

## 2022-01-01 RX ADMIN — MEROPENEM 100 MILLIGRAM(S): 1 INJECTION INTRAVENOUS at 17:14

## 2022-01-01 RX ADMIN — Medication 100 GRAM(S): at 19:08

## 2022-01-01 RX ADMIN — Medication 81 MILLIGRAM(S): at 12:19

## 2022-01-01 RX ADMIN — Medication 10 UNIT(S): at 05:46

## 2022-01-01 RX ADMIN — Medication 81 MILLIGRAM(S): at 11:54

## 2022-01-01 RX ADMIN — Medication 5 MILLIGRAM(S): at 21:44

## 2022-01-01 RX ADMIN — Medication 5 MILLIGRAM(S): at 21:41

## 2022-01-01 RX ADMIN — HEPARIN SODIUM 5000 UNIT(S): 5000 INJECTION INTRAVENOUS; SUBCUTANEOUS at 06:49

## 2022-01-01 RX ADMIN — INSULIN HUMAN 10 UNIT(S): 100 INJECTION, SOLUTION SUBCUTANEOUS at 13:18

## 2022-01-01 RX ADMIN — Medication 650 MILLIGRAM(S): at 06:09

## 2022-01-01 RX ADMIN — Medication 10 UNIT(S): at 05:41

## 2022-01-01 RX ADMIN — Medication 4: at 09:18

## 2022-01-01 RX ADMIN — Medication 10 UNIT(S): at 17:14

## 2022-01-01 RX ADMIN — Medication 81 MILLIGRAM(S): at 11:35

## 2022-01-01 RX ADMIN — Medication 5 MILLIGRAM(S): at 22:02

## 2022-01-01 RX ADMIN — Medication 650 MILLIGRAM(S): at 05:14

## 2022-01-01 RX ADMIN — IRON SUCROSE 110 MILLIGRAM(S): 20 INJECTION, SOLUTION INTRAVENOUS at 17:59

## 2022-01-01 RX ADMIN — INSULIN GLARGINE 40 UNIT(S): 100 INJECTION, SOLUTION SUBCUTANEOUS at 21:15

## 2022-01-01 RX ADMIN — POLYETHYLENE GLYCOL 3350 17 GRAM(S): 17 POWDER, FOR SOLUTION ORAL at 12:21

## 2022-01-01 RX ADMIN — Medication 2: at 12:53

## 2022-01-01 RX ADMIN — Medication 81 MILLIGRAM(S): at 11:17

## 2022-01-01 RX ADMIN — Medication 10 UNIT(S): at 00:19

## 2022-01-01 RX ADMIN — TIGECYCLINE 105 MILLIGRAM(S): 50 INJECTION, POWDER, LYOPHILIZED, FOR SOLUTION INTRAVENOUS at 17:52

## 2022-01-01 RX ADMIN — Medication 50 MICROGRAM(S): at 05:44

## 2022-01-01 RX ADMIN — Medication 650 MILLIGRAM(S): at 05:12

## 2022-01-01 RX ADMIN — Medication 650 MILLIGRAM(S): at 05:44

## 2022-01-01 RX ADMIN — LINEZOLID 600 MILLIGRAM(S): 600 INJECTION, SOLUTION INTRAVENOUS at 19:21

## 2022-01-01 RX ADMIN — Medication 10 UNIT(S): at 05:10

## 2022-01-01 RX ADMIN — HEPARIN SODIUM 5000 UNIT(S): 5000 INJECTION INTRAVENOUS; SUBCUTANEOUS at 05:46

## 2022-01-01 RX ADMIN — Medication 650 MILLIGRAM(S): at 00:01

## 2022-01-01 RX ADMIN — FAMOTIDINE 20 MILLIGRAM(S): 10 INJECTION INTRAVENOUS at 12:46

## 2022-01-01 RX ADMIN — INSULIN HUMAN 10 UNIT(S): 100 INJECTION, SOLUTION SUBCUTANEOUS at 00:24

## 2022-01-01 RX ADMIN — Medication 1 SPRAY(S): at 05:12

## 2022-01-01 RX ADMIN — MEROPENEM 100 MILLIGRAM(S): 1 INJECTION INTRAVENOUS at 05:35

## 2022-01-01 RX ADMIN — SODIUM CHLORIDE 150 MILLILITER(S): 9 INJECTION, SOLUTION INTRAVENOUS at 00:54

## 2022-01-01 RX ADMIN — HYDROMORPHONE HYDROCHLORIDE 0.5 MILLIGRAM(S): 2 INJECTION INTRAMUSCULAR; INTRAVENOUS; SUBCUTANEOUS at 23:05

## 2022-01-01 RX ADMIN — HEPARIN SODIUM 5000 UNIT(S): 5000 INJECTION INTRAVENOUS; SUBCUTANEOUS at 17:05

## 2022-01-01 RX ADMIN — INSULIN GLARGINE 45 UNIT(S): 100 INJECTION, SOLUTION SUBCUTANEOUS at 21:50

## 2022-01-01 RX ADMIN — Medication 650 MILLIGRAM(S): at 00:49

## 2022-01-01 RX ADMIN — Medication 650 MILLIGRAM(S): at 23:38

## 2022-01-01 RX ADMIN — Medication 500 MILLIGRAM(S): at 22:00

## 2022-01-01 RX ADMIN — Medication 2: at 12:20

## 2022-01-01 RX ADMIN — Medication 2: at 12:14

## 2022-01-01 RX ADMIN — SODIUM CHLORIDE 1000 MILLILITER(S): 9 INJECTION, SOLUTION INTRAVENOUS at 04:00

## 2022-01-01 RX ADMIN — HEPARIN SODIUM 5000 UNIT(S): 5000 INJECTION INTRAVENOUS; SUBCUTANEOUS at 05:08

## 2022-01-01 RX ADMIN — Medication 1 TABLET(S): at 21:41

## 2022-01-01 RX ADMIN — MORPHINE SULFATE 2 MILLIGRAM(S): 50 CAPSULE, EXTENDED RELEASE ORAL at 10:58

## 2022-01-01 RX ADMIN — Medication 650 MILLIGRAM(S): at 23:48

## 2022-01-01 RX ADMIN — Medication 10 UNIT(S): at 05:18

## 2022-01-01 RX ADMIN — Medication 5 MILLIGRAM(S): at 21:15

## 2022-01-01 RX ADMIN — Medication 1 TABLET(S): at 11:21

## 2022-01-01 RX ADMIN — HEPARIN SODIUM 5000 UNIT(S): 5000 INJECTION INTRAVENOUS; SUBCUTANEOUS at 05:40

## 2022-01-01 RX ADMIN — MEROPENEM 100 MILLIGRAM(S): 1 INJECTION INTRAVENOUS at 05:06

## 2022-01-01 RX ADMIN — Medication 650 MILLIGRAM(S): at 00:26

## 2022-01-01 RX ADMIN — CEFTRIAXONE 100 MILLIGRAM(S): 500 INJECTION, POWDER, FOR SOLUTION INTRAMUSCULAR; INTRAVENOUS at 17:42

## 2022-01-01 RX ADMIN — ATORVASTATIN CALCIUM 20 MILLIGRAM(S): 80 TABLET, FILM COATED ORAL at 21:58

## 2022-01-01 RX ADMIN — Medication 650 MILLIGRAM(S): at 12:39

## 2022-01-01 RX ADMIN — Medication 650 MILLIGRAM(S): at 00:32

## 2022-01-01 RX ADMIN — Medication 50 MICROGRAM(S): at 05:13

## 2022-01-01 RX ADMIN — Medication 650 MILLIGRAM(S): at 05:16

## 2022-01-01 RX ADMIN — POLYETHYLENE GLYCOL 3350 17 GRAM(S): 17 POWDER, FOR SOLUTION ORAL at 12:13

## 2022-01-01 RX ADMIN — LINEZOLID 600 MILLIGRAM(S): 600 INJECTION, SOLUTION INTRAVENOUS at 17:27

## 2022-01-01 RX ADMIN — Medication 650 MILLIGRAM(S): at 17:57

## 2022-01-01 RX ADMIN — Medication 5 MILLIGRAM(S): at 21:51

## 2022-01-01 RX ADMIN — Medication 650 MILLIGRAM(S): at 12:46

## 2022-01-01 RX ADMIN — Medication 1 SPRAY(S): at 17:47

## 2022-01-01 RX ADMIN — ATORVASTATIN CALCIUM 20 MILLIGRAM(S): 80 TABLET, FILM COATED ORAL at 21:51

## 2022-01-01 RX ADMIN — Medication 1 TABLET(S): at 22:00

## 2022-01-01 RX ADMIN — INSULIN GLARGINE 40 UNIT(S): 100 INJECTION, SOLUTION SUBCUTANEOUS at 22:32

## 2022-01-01 RX ADMIN — Medication 500 MILLIGRAM(S): at 05:21

## 2022-01-11 NOTE — ED ADULT NURSE NOTE - ISOLATION TYPE:
How Many Skin Cancers Have You Had?: more than one What Is The Reason For Today's Visit?: History of Non-Melanoma Skin Cancer None

## 2022-05-19 NOTE — ED PROVIDER NOTE - ATTENDING CONTRIBUTION TO CARE
I personally evaluated patient. I agree with the findings and plan with all documentation on chart except as documented  in my note.    History, ROS, physical exam limited by patient condition and severity of illness.      Patient is a 76-year-old female with past medical history of diabetes, CAD, anxiety, hypothyroidism, schizophrenia, dementia, who presents to the emergency department for elevated white blood cell count and infected sacral ulcer.  Additional history obtained from nursing home documentation and phone call with PMD Dr. Dumont.    Patient was doing well until couple months ago and had a prolonged hospitalization at [facility].  Patient may have had a psychotic break but was not eating and drinking and required PEG tube placement.  Patient has significant decompensation after this.  Patient was sent to nursing home after admission.  At nursing home, patient has been more lethargic and out of it and bedbound.  Patient found to have an elevated white blood cell count and was in acute kidney injury and developed a large sacral ulcer.  This was debrided this past week and patient has been on IV vancomycin.  Symptoms worsening and patient sent to the emergency department.    On exam, vital signs reviewed.  Patient is frail and chronically ill-appearing.  Patient appears moderately dehydrated.  Patient is moving all extremities but is not speaking or answering questions.  Patient has a very deep sacral ulcer with exposed bone which is malodorous and has serosanguineous drainage.  IV placed, sepsis labs sent.  Patient given broad-spectrum antibiotics with cefepime and continued vancomycin.  Patient has white blood cell count of 18,000 and has significant uremia with BUN above 100 creatinine 1.6.  Initial lactate 2.7.  Given change in mental status, head CT performed.  COVID swab sent chest x-ray done.  Cup reviewed patient remains hemodynamically stable will admit for infected sacral ulcer, acute kidney injury, lethargy.  Dr. Dumont to follow for renal.  Burn consulted for care for infected decubitus ulcer.

## 2022-05-19 NOTE — ED PROVIDER NOTE - PROGRESS NOTE DETAILS
paco- patient BP improved with fluids. paco- Burn consulted will follow inpatient Sepsis suspected at this time. Will send appropriate labs and cultures. 30 cc/kg fluid bolus given based on ideal body weight. Will give broad spectrum antibiotics after blood cultures are drawn. Will follow up initial lactate and repeat after fluids if lactate > 2. Repeat sepsis perfusion exam performed. Patient has warm skin and normal cap refill, strong pulses. Mental status unchanged from arrival. Initial lactate was elevated and was repeated and is improving to 2.7.

## 2022-05-19 NOTE — ED ADULT NURSE NOTE - SUICIDE SCREENING QUESTION 2
Cardiology for Preister    cc: follow-up cardiology evaluation and management of left heart failure    interval - no new complaints    PAST MEDICAL & SURGICAL HISTORY:  COPD (chronic obstructive pulmonary disease)  Hypertension  Obstructive sleep apnea syndrome  Prostate cancer metastatic to bone  PC (prostate cancer): with seed placement    MEDICATIONS  (STANDING):  atorvastatin 80 milliGRAM(s) Oral at bedtime  belladonna 16.2 mG/opium 60 mg Suppository 1 Suppository(s) Rectal once  cefepime  IVPB 2000 milliGRAM(s) IV Intermittent once  cefepime  IVPB 2000 milliGRAM(s) IV Intermittent every 12 hours  cefepime  IVPB      chlorhexidine 0.12% Liquid 15 milliLiter(s) Swish and Spit two times a day  dextrose 5%. 1000 milliLiter(s) (50 mL/Hr) IV Continuous <Continuous>  docusate sodium 100 milliGRAM(s) Oral daily  finasteride 5 milliGRAM(s) Oral daily  furosemide    Tablet 40 milliGRAM(s) Oral every 12 hours  heparin  flush 100 Units/mL Injectable 100 Unit(s) IV Push every other day  iohexol 350 mG (iodine)/mL Oral Solution 50 milliLiter(s) Oral once  lidocaine 2% Jelly 5 milliLiter(s) IntraUrethral once  melatonin 3 milliGRAM(s) Oral at bedtime  nystatin Powder 1 Application(s) Topical two times a day  pantoprazole    Tablet 40 milliGRAM(s) Oral before breakfast  polyethylene glycol 3350 17 Gram(s) Oral daily  potassium chloride  10 mEq/100 mL IVPB 10 milliEquivalent(s) IV Intermittent every 1 hour  senna 2 Tablet(s) Oral at bedtime  tamsulosin 0.8 milliGRAM(s) Oral at bedtime    MEDICATIONS  (PRN):  guaiFENesin    Syrup 200 milliGRAM(s) Oral every 6 hours PRN Cough  lidocaine 2% Gel 1 Application(s) Topical every 3 hours PRN catheter related discomfort at tip of penis  simethicone 80 milliGRAM(s) Chew three times a day PRN Gas      Vital Signs Last 24 Hrs  T(C): 36.3 (30 Oct 2017 17:56), Max: 36.9 (29 Oct 2017 21:17)  T(F): 97.4 (30 Oct 2017 17:56), Max: 98.5 (29 Oct 2017 21:17)  HR: 74 (30 Oct 2017 17:56) (60 - 82)  BP: 147/77 (30 Oct 2017 17:56) (127/67 - 147/77)  BP(mean): --  RR: 20 (30 Oct 2017 17:56) (14 - 20)  SpO2: 99% (30 Oct 2017 17:56) (93% - 99%)    physical exam  nad  supine  conversant  breath sounds decreased at bases  rr; s1/s2 present  soft abd  less le edema   warm le     I&O's Detail    29 Oct 2017 07:01  -  30 Oct 2017 07:00  --------------------------------------------------------  IN:  Total IN: 0 mL    OUT:    Indwelling Catheter - Urethral: 900 mL  Total OUT: 900 mL    Total NET: -900 mL      30 Oct 2017 07:01  -  30 Oct 2017 21:03  --------------------------------------------------------  IN:    IV PiggyBack: 50 mL    Solution: 300 mL    Solution: 100 mL  Total IN: 450 mL    OUT:    Ureteral Catheter: 1000 mL  Total OUT: 1000 mL    Total NET: -550 mL            labs                                                                              8.9    6.0   )-----------( 112      ( 30 Oct 2017 07:20 )             27.8   10-30    142  |  97  |  19  ----------------------------<  84  3.4<L>   |  29  |  0.94    Ca    9.1      30 Oct 2017 07:20  Mg     1.8     10-30      I&O's Summary    29 Oct 2017 07:01  -  30 Oct 2017 07:00  --------------------------------------------------------  IN: 0 mL / OUT: 900 mL / NET: -900 mL    30 Oct 2017 07:01  -  30 Oct 2017 21:03  --------------------------------------------------------  IN: 450 mL / OUT: 1000 mL / NET: -550 mL      ecg performed 10- or 10- not seen    radiology    < from: Xray Chest 1 View AP -PORTABLE-Routine (10.28.17 @ 08:05) >  EXAM:  XR CHEST 1 VIEW PORT ROUTINE                          PROCEDURE DATE:  10/28/2017                     INTERPRETATION:  PORTABLE CHEST X-RAY     HISTORY: SOB    PRIOR STUDIES: 10/27/2017.    FINDINGS: Size of cardiac silhouette unchanged. Right-sided PICC line   with tip overlying the right innominate vein/SVC junction. The lungs are   unchanged. Right lung grossly clear. Linear scarring versus linear   atelectasis left base. No pleural effusion or pneumothorax.    IMPRESSION:  No change.            "Thank you for the opportunity to participate in the care of this   patient."        SD COATS M.D., ATTENDING RADIOLOGIST  This document has been electronically signed. Oct 30 2017 11:30AM    < end of copied text >        < from: Echocardiogram (10.16.17 @ 10:31) >    EXAM:  ECHOCARDIOGRAM (CARDIOL)                          PROCEDURE DATE:  10/16/2017                        INTERPRETATION:  Patient Height: 172.0 cm  Patient Weight: 103.0 kg  Heart Rate: 74 bpm  Systolic Pressure: 140 mmHg  Diastolic Pressure: 70mmHg  BSA: 2.2 m^2  Interpretation Summary  The left ventricle is mildly dilated.Hypokinesis of the basal inferior and   septal region.The left ventricular ejection fraction is estimated to be   45-50%The mitral inflow pattern is consistent with impaired left   ventricular   relaxation with mildly elevated left atrial pressure (8-14mmHg).  The   left   atrial size is normal. The right atrium is dilated.The right ventricle is   moderately dilated. The right ventricular systolic function is mildly   reduced.    There is mild aortic valve thickening.There is mild mitral valve   thickening.   There is mild mitral regurgitation.There is trace tricuspid   regurgitation.There was insufficient TR detected from which to calculate   pulmonary artery systolic pressure.  No aortic root dilatation.The   inferior   vena cava is normal in size (<2.1 cm) with normal inspiratory collapse   (>50%)   consistent with normal right atrial pressure.  There is no pericardial   effusion.    < end of copied text >  IN: 650 mL / OUT: 2875 mL / NET: -2225 mL          < from: Xray Chest 1 View AP -PORTABLE-Routine (10.09.17 @ 05:37) >  EXAM:  XR CHEST 1 VIEW PORT ROUTINE                          PROCEDURE DATE:  10/09/2017                     INTERPRETATION:    Portable AP Radiograph dated 10/9/2017 5:37 AM    CLINICAL INFORMATION: 76 years, Male, Intubated, PNA    PRIOR STUDIES:October 8, 2017    FINDINGS: Support tubes and lines are unchanged. Left pleural effusion is   stable. Right pleural effusion has increased. Pulmonary vascular   congestion has increased.    IMPRESSION:  Interval increase of right pleural effusion and pulmonary vascular   congestion.            "Thank you for the opportunity to participate in the care of this   patient."        KATHY PABLO M.D., RADIOLOGY ATTENDING  This document has been electronically signed. Oct  9 2017  8:33AM    < end of copied text > Patient unable to complete

## 2022-05-19 NOTE — ED PROVIDER NOTE - CARE PLAN
Principal Discharge DX:	Sepsis  Secondary Diagnosis:	Pressure injury of deep tissue of sacral region   1 Principal Discharge DX:	Sepsis  Secondary Diagnosis:	Pressure injury of deep tissue of sacral region  Secondary Diagnosis:	Uremia  Secondary Diagnosis:	TELMA (acute kidney injury)   Principal Discharge DX:	Severe sepsis with lactic acidosis  Secondary Diagnosis:	Pressure injury of deep tissue of sacral region  Secondary Diagnosis:	Uremia  Secondary Diagnosis:	TELMA (acute kidney injury)

## 2022-05-19 NOTE — ED ADULT NURSE REASSESSMENT NOTE - NS ED NURSE REASSESS COMMENT FT1
Pt received from previous RN. Pt assessed. Pt is awake; ax0x1. Pt has unstageable pressure ulcer above sacrum; dressing placed. Incontinence care rendered. Pt is on3L NC sat99. Pt has PEG tube. Cardiac and 02 monitoring maintained. Fluids given. Fall precaution maintained. Safety and comfort maintained

## 2022-05-19 NOTE — ED PROVIDER NOTE - PHYSICAL EXAMINATION
PHYSICAL EXAM: I have reviewed current vital signs.  GENERAL: NAD, well-nourished; well-developed.  HEAD:  Normocephalic, atraumatic.  EYES: EOMI, PERRL, conjunctiva and sclera clear.  ENT: MMM, no erythema/exudates.  NECK: Supple, no JVD.  CHEST/LUNG: Clear to auscultation bilaterally; no wheezes, rales, or rhonchi.  HEART: Regular rate and rhythm, normal S1 and S2; no murmurs, rubs, or gallops.  ABDOMEN: Soft, nontender, nondistended.  EXTREMITIES:  2+ peripheral pulses; no clubbing, cyanosis, or edema.  neuro: not cooperating with neuro eval.  patient maintaining respiratory drive.  SKIN: Warm and dry.  stage 4 deep sacral ulcer.

## 2022-05-19 NOTE — ED PROVIDER NOTE - CLINICAL SUMMARY MEDICAL DECISION MAKING FREE TEXT BOX
History, ROS, physical exam limited by patient condition and severity of illness.      Patient is a 76-year-old female with past medical history of diabetes, CAD, anxiety, hypothyroidism, schizophrenia, dementia, who presents to the emergency department for elevated white blood cell count and infected sacral ulcer.  Additional history obtained from nursing home documentation and phone call with PMD Dr. Dumont.    Patient was doing well until couple months ago and had a prolonged hospitalization at [facility].  Patient may have had a psychotic break but was not eating and drinking and required PEG tube placement.  Patient has significant decompensation after this.  Patient was sent to nursing home after admission.  At nursing home, patient has been more lethargic and out of it and bedbound.  Patient found to have an elevated white blood cell count and was in acute kidney injury and developed a large sacral ulcer.  This was debrided this past week and patient has been on IV vancomycin.  Symptoms worsening and patient sent to the emergency department.    On exam, vital signs reviewed.  Patient is frail and chronically ill-appearing.  Patient appears moderately dehydrated.  Patient is moving all extremities but is not speaking or answering questions.  Patient has a very deep sacral ulcer with exposed bone which is malodorous and has serosanguineous drainage.  IV placed, sepsis labs sent.  Patient given broad-spectrum antibiotics with cefepime and continued vancomycin.  Patient has white blood cell count of 18,000 and has significant uremia with BUN above 100 creatinine 1.6.  Initial lactate 2.7.  Given change in mental status, head CT performed.  COVID swab sent chest x-ray done.  Cup reviewed patient remains hemodynamically stable will admit for infected sacral ulcer, acute kidney injury, lethargy.  Dr. Dumont to follow for renal.  Burn consulted for care for infected decubitus ulcer.

## 2022-05-19 NOTE — ED PROVIDER NOTE - OBJECTIVE STATEMENT
76 y female with PMH of anxiety CAD, hypothyroidism, IDDM, Schizophrenia, Dementia per EMS AAOX2 at baseline presents minimally responsive (AAOx0 on presentation not responding to questions) and hypotensive 100/44 on presentation.  sent in from [facility] with concerns from sepsis 2/2 to sacral wound.

## 2022-05-20 NOTE — CONSULT NOTE ADULT - SUBJECTIVE AND OBJECTIVE BOX
GLORIA WATERS  76y, Female  Allergy: No Known Allergies      CHIEF COMPLAINT:       LOS      HPI  HPI:  75 y/o F w/ pmhx of Anxiety, CAD, Hypothyroidism, IDDM, Schizophrenia, Dementia sent to ED from [facility] for concern over infected sacral ulcer.     Unable to obtain HPI from patient (AOx0):  As per ED note, patient is AOx2 at baseline    Patient has a G-tube; Done in [facility] (as per NH paperwork); However no indication why; Poor PO intake??    Unable to complete ROS due to mental status    ED:   - VS: 99F, HR 71, /53, 99% on RA;  - Labs: WBC 18, Cr. 1.6 (improved to 1.4 w/ IVF), Lac 2.7;  - CXR: Left basilar linear subsegmental atelectasis. Otherwise, no evidence of acute pulmonary process..  - CTH: chest communicating hydrocephalus. Chronic microvascular ischemic changes.    Admit to Medicine;  (20 May 2022 03:31)      INFECTIOUS DISEASE HISTORY:  ID consulted for decub  Afebrile  WBC 18  hx ESBL, changed to meropenem  WBC 18    PMH  PAST MEDICAL & SURGICAL HISTORY:  Coronary artery disease      Anxiety      Dementia      Insulin dependent diabetes mellitus      Paranoid schizophrenia      Hyperlipidemia      Hypothyroidism      Abscess of right buttock      H/O abdominal surgery          FAMILY HISTORY  No pertinent family history in first degree relatives        SOCIAL HISTORY  Social History:  Denies smoking, alcohol or drug use (01 Sep 2019 20:50)        ROS  ***    VITALS:  T(F): 97, Max: 99.1 (05-19-22 @ 20:05)  HR: 70  BP: 112/57  RR: 16Vital Signs Last 24 Hrs  T(C): 36.1 (20 May 2022 08:02), Max: 37.3 (19 May 2022 20:05)  T(F): 97 (20 May 2022 08:02), Max: 99.1 (19 May 2022 20:05)  HR: 70 (20 May 2022 08:02) (68 - 78)  BP: 112/57 (20 May 2022 08:02) (88/39 - 115/56)  BP(mean): 77 (20 May 2022 03:04) (77 - 79)  RR: 16 (20 May 2022 08:02) (16 - 20)  SpO2: 100% (20 May 2022 08:02) (97% - 100%)    PHYSICAL EXAM:  ***    TESTS & MEASUREMENTS:                        8.4    18.20 )-----------( 160      ( 19 May 2022 21:22 )             28.1     05-20    142  |  106  |  92<HH>  ----------------------------<  294<H>  3.7   |  21  |  1.4    Ca    7.9<L>      20 May 2022 01:18    TPro  5.2<L>  /  Alb  2.2<L>  /  TBili  0.3  /  DBili  x   /  AST  30  /  ALT  15  /  AlkPhos  115  05-20      LIVER FUNCTIONS - ( 20 May 2022 01:18 )  Alb: 2.2 g/dL / Pro: 5.2 g/dL / ALK PHOS: 115 U/L / ALT: 15 U/L / AST: 30 U/L / GGT: x                 Blood Gas Venous - Lactate: 2.70 mmol/L (05-20-22 @ 01:02)  Blood Gas Venous - Lactate: 3.10 mmol/L (05-19-22 @ 20:20)      INFECTIOUS DISEASES TESTING  Rapid RVP Result: NotDetec (05-19-22 @ 21:22)      INFLAMMATORY MARKERS      RADIOLOGY & ADDITIONAL TESTS:  I have personally reviewed the last Chest xray  CXR      CT      CARDIOLOGY TESTING  12 Lead ECG:   Ventricular Rate 79 BPM    Atrial Rate 79 BPM    P-R Interval 192 ms    QRS Duration 80 ms    Q-T Interval 394 ms    QTC Calculation(Bazett) 451 ms    P Axis 75 degrees    R Axis -7 degrees    T Axis 51 degrees    Diagnosis Line Normal sinus rhythm  Low voltage QRS  Cannot rule out Anterior infarct , age undetermined  Abnormal ECG    Confirmed by Ramon Beck ([id number]) on 5/20/2022 7:50:19 AM (05-19-22 @ 21:37)      MEDICATIONS  aspirin  chewable 81 Oral daily  atorvastatin 20 Oral at bedtime  calcium carbonate 1250 mG  + Vitamin D (OsCal 500 + D) 1 Oral daily  dextrose 5%. 1000 IV Continuous <Continuous>  dextrose 5%. 1000 IV Continuous <Continuous>  dextrose 50% Injectable 25 IV Push once  dextrose 50% Injectable 12.5 IV Push once  dextrose 50% Injectable 25 IV Push once  famotidine    Tablet 20 Oral daily  glucagon  Injectable 1 IntraMuscular once  heparin   Injectable 5000 SubCutaneous every 12 hours  insulin glargine Injectable (LANTUS) 45 SubCutaneous at bedtime  insulin lispro (ADMELOG) corrective regimen sliding scale  SubCutaneous three times a day before meals  insulin lispro Injectable (ADMELOG) 10 SubCutaneous every 6 hours  lactated ringers. 1000 IV Continuous <Continuous>  levothyroxine 50 Oral daily  melatonin 5 Oral at bedtime  meropenem  IVPB 1000 IV Intermittent every 12 hours  senna 2 Oral at bedtime  vancomycin  IVPB 1000 IV Intermittent every 24 hours        ANTIBIOTICS:  meropenem  IVPB 1000 milliGRAM(s) IV Intermittent every 12 hours  vancomycin  IVPB 1000 milliGRAM(s) IV Intermittent every 24 hours      ALLERGIES:  No Known Allergies       GLORIA WATERS  76y, Female  Allergy: No Known Allergies      CHIEF COMPLAINT:       LOS      HPI  HPI:  77 y/o F w/ pmhx of Anxiety, CAD, Hypothyroidism, IDDM, Schizophrenia, Dementia sent to ED from [facility] for concern over infected sacral ulcer.     Unable to obtain HPI from patient (AOx0):  As per ED note, patient is AOx2 at baseline    Patient has a G-tube; Done in [facility] (as per NH paperwork); However no indication why; Poor PO intake??    Unable to complete ROS due to mental status    ED:   - VS: 99F, HR 71, /53, 99% on RA;  - Labs: WBC 18, Cr. 1.6 (improved to 1.4 w/ IVF), Lac 2.7;  - CXR: Left basilar linear subsegmental atelectasis. Otherwise, no evidence of acute pulmonary process..  - CTH: chest communicating hydrocephalus. Chronic microvascular ischemic changes.    Admit to Medicine;  (20 May 2022 03:31)      INFECTIOUS DISEASE HISTORY:  ID consulted for decub  Afebrile  WBC 18  hx ESBL, changed to meropenem  WBC 18    PMH  PAST MEDICAL & SURGICAL HISTORY:  Coronary artery disease      Anxiety      Dementia      Insulin dependent diabetes mellitus      Paranoid schizophrenia      Hyperlipidemia      Hypothyroidism      Abscess of right buttock      H/O abdominal surgery          FAMILY HISTORY  No pertinent family history in first degree relatives        SOCIAL HISTORY  Social History:  Denies smoking, alcohol or drug use (01 Sep 2019 20:50)        ROS  unable to obtain history secondary to patient's mental status and/or sedation     VITALS:  T(F): 97, Max: 99.1 (05-19-22 @ 20:05)  HR: 70  BP: 112/57  RR: 16Vital Signs Last 24 Hrs  T(C): 36.1 (20 May 2022 08:02), Max: 37.3 (19 May 2022 20:05)  T(F): 97 (20 May 2022 08:02), Max: 99.1 (19 May 2022 20:05)  HR: 70 (20 May 2022 08:02) (68 - 78)  BP: 112/57 (20 May 2022 08:02) (88/39 - 115/56)  BP(mean): 77 (20 May 2022 03:04) (77 - 79)  RR: 16 (20 May 2022 08:02) (16 - 20)  SpO2: 100% (20 May 2022 08:02) (97% - 100%)    PHYSICAL EXAM:  Gen: NAD, resting in bed  HEENT: Normocephalic, atraumatic  Neck: supple, no lymphadenopathy  CV: Regular rate & regular rhythm  Lungs: decreased BS at bases, no fremitus  Abdomen: Soft, BS present  Ext: Warm, well perfused  Neuro: non focal, awake  Skin: no rash, no erythema  Lines: no phlebitis   sacrum necrotic ulcer    TESTS & MEASUREMENTS:                        8.4    18.20 )-----------( 160      ( 19 May 2022 21:22 )             28.1     05-20    142  |  106  |  92<HH>  ----------------------------<  294<H>  3.7   |  21  |  1.4    Ca    7.9<L>      20 May 2022 01:18    TPro  5.2<L>  /  Alb  2.2<L>  /  TBili  0.3  /  DBili  x   /  AST  30  /  ALT  15  /  AlkPhos  115  05-20      LIVER FUNCTIONS - ( 20 May 2022 01:18 )  Alb: 2.2 g/dL / Pro: 5.2 g/dL / ALK PHOS: 115 U/L / ALT: 15 U/L / AST: 30 U/L / GGT: x                 Blood Gas Venous - Lactate: 2.70 mmol/L (05-20-22 @ 01:02)  Blood Gas Venous - Lactate: 3.10 mmol/L (05-19-22 @ 20:20)      INFECTIOUS DISEASES TESTING  Rapid RVP Result: NotDetec (05-19-22 @ 21:22)      INFLAMMATORY MARKERS      RADIOLOGY & ADDITIONAL TESTS:  I have personally reviewed the last Chest xray  CXR      CT      CARDIOLOGY TESTING  12 Lead ECG:   Ventricular Rate 79 BPM    Atrial Rate 79 BPM    P-R Interval 192 ms    QRS Duration 80 ms    Q-T Interval 394 ms    QTC Calculation(Bazett) 451 ms    P Axis 75 degrees    R Axis -7 degrees    T Axis 51 degrees    Diagnosis Line Normal sinus rhythm  Low voltage QRS  Cannot rule out Anterior infarct , age undetermined  Abnormal ECG    Confirmed by Ramon Beck ([id number]) on 5/20/2022 7:50:19 AM (05-19-22 @ 21:37)      MEDICATIONS  aspirin  chewable 81 Oral daily  atorvastatin 20 Oral at bedtime  calcium carbonate 1250 mG  + Vitamin D (OsCal 500 + D) 1 Oral daily  dextrose 5%. 1000 IV Continuous <Continuous>  dextrose 5%. 1000 IV Continuous <Continuous>  dextrose 50% Injectable 25 IV Push once  dextrose 50% Injectable 12.5 IV Push once  dextrose 50% Injectable 25 IV Push once  famotidine    Tablet 20 Oral daily  glucagon  Injectable 1 IntraMuscular once  heparin   Injectable 5000 SubCutaneous every 12 hours  insulin glargine Injectable (LANTUS) 45 SubCutaneous at bedtime  insulin lispro (ADMELOG) corrective regimen sliding scale  SubCutaneous three times a day before meals  insulin lispro Injectable (ADMELOG) 10 SubCutaneous every 6 hours  lactated ringers. 1000 IV Continuous <Continuous>  levothyroxine 50 Oral daily  melatonin 5 Oral at bedtime  meropenem  IVPB 1000 IV Intermittent every 12 hours  senna 2 Oral at bedtime  vancomycin  IVPB 1000 IV Intermittent every 24 hours        ANTIBIOTICS:  meropenem  IVPB 1000 milliGRAM(s) IV Intermittent every 12 hours  vancomycin  IVPB 1000 milliGRAM(s) IV Intermittent every 24 hours      ALLERGIES:  No Known Allergies

## 2022-05-20 NOTE — H&P ADULT - TIME BILLING
HPI as above.  Interval history: Pt seen and examined at bedside. Unable to get ros.   Vital Signs (24 Hrs):  T(C): 36.8 (05-20-22 @ 15:46), Max: 37.3 (05-19-22 @ 20:05)  HR: 66 (05-20-22 @ 15:46) (66 - 78)  BP: 110/93 (05-20-22 @ 15:46) (88/39 - 115/56)  RR: 17 (05-20-22 @ 15:46) (16 - 20)  SpO2: 99% (05-20-22 @ 15:46) (97% - 100%)  Wt(kg): --  Daily Height in cm: 160.02 (19 May 2022 20:05)    Daily     I&O's Summary    PHYSICAL EXAM:  GENERAL: NAD,, frail  HEAD:  Atraumatic, Normocephalic  EYES: EOMI, PERRLA, conjunctiva and sclera clear  NECK: Supple, No JVD  CHEST/LUNG: Clear to auscultation bilaterally; No wheeze  HEART: Regular rate and rhythm; No murmurs, rubs, or gallops  ABDOMEN: Soft, Nontender, Nondistended; Bowel sounds present, +peg tube  EXTREMITIES:  2+ Peripheral Pulses, No clubbing, cyanosis, or edema  Sacral ulcer, malodorous, green, appears infected  PSYCH: AAOx1-2  NEUROLOGY: unable to assess, does not follow up commands   SKIN: No rashes or lesions    Labs reviewed  Imaging reviewed independently and reviewed read  < from: Xray Chest 1 View-PORTABLE IMMEDIATE (05.19.22 @ 22:20) >    Impression:    Left basilar linear subsegmental atelectasis. Otherwise, no evidence of   acute pulmonary process.      EKG reviewed independently and reviewed read  < from: 12 Lead ECG (05.19.22 @ 21:37) >  < from: CT Head No Cont (05.19.22 @ 21:17) >    Findings the communicating hydrocephalus. Chronic microvascular   ischemic changes.    < end of copied text >      Diagnosis Line Normal sinus rhythm  Low voltage QRS  Cannot rule out Anterior infarct , age undetermined  Abnormal ECG  Plan  #toxic metabolic encphalopathy 2/2 sepsis 2/2 sacral ulcer- burn debridement, cultures esbl in past, resitant to cefepime, spoke to ID approved melodie, DC vanco, monitor cbc, fevers, follow up cultures  #comminicating hydrocephalus- curbsided neuro- outpt follow up   #functional quad- pt bed bound,  has peg tube, unable to do full ADLs  #DM- add premeal q6hr, continue glargine and SS  #rest as above      #Progress Note Handoff  Pending (specify):  follow up burn, ID, dc cardiac telemonitoring labs, cultures  Family discussion: unable to reach family   Disposition: medicine, off cardiac telemonitoring

## 2022-05-20 NOTE — H&P ADULT - NSHPLABSRESULTS_GEN_ALL_CORE
8.4    18.20 )-----------( 160      ( 19 May 2022 21:22 )             28.1       05-20    142  |  106  |  92<HH>  ----------------------------<  294<H>  3.7   |  21  |  1.4    Ca    7.9<L>      20 May 2022 01:18    TPro  5.2<L>  /  Alb  2.2<L>  /  TBili  0.3  /  DBili  x   /  AST  30  /  ALT  15  /  AlkPhos  115  05-20              PT/INR - ( 19 May 2022 21:22 )   PT: 10.40 sec;   INR: 0.90 ratio         PTT - ( 19 May 2022 21:22 )  PTT:23.1 sec

## 2022-05-20 NOTE — H&P ADULT - ASSESSMENT
77 y/o F w/ pmhx of Anxiety, CAD, Hypothyroidism, IDDM, Schizophrenia, Dementia sent to ED from [facility] for concern over infected sacral ulcer.     Unable to obtain HPI from patient (AOx0):  As per ED note, patient is AOx2 at baseline    Unable to complete ROS due to mental status    ED:   - VS: 99F, HR 71, /53, 99% on RA;  - Labs: WBC 18, Cr. 1.6 (improved to 1.4 w/ IVF), Lac 2.7, K+ 3.3,   - CXR: Left basilar linear subsegmental atelectasis. Otherwise, no evidence of acute pulmonary process..  - CTH: chest communicating hydrocephalus. Chronic microvascular ischemic changes.    Admit to Medicine;     # Infected Stage 4 Sacral Ulcer - Sepsis POA;   -     # Schizophrenia / Advanced Dementia - On 37.5 IM Haldol Docusate qmonthly; Ordered  # Hypothyroidism - c/w home Levothyroxine   # DLD - c/w home Lipitor  # IDDM - SS + 45U Lantus (home dose); Adjust PRN; fu A1c    Diet: Tube feeding;   Activity: Bedrest  DVT ppx:   GI ppx  CODE:    Dispo:    75 y/o F w/ pmhx of Anxiety, CAD, Hypothyroidism, IDDM, Schizophrenia, Dementia sent to ED from [facility] for concern over infected sacral ulcer.       # Infected Stage 4 Sacral Ulcer - Sepsis POA; Lac 2.7, Cr 18, WBC 18;  - Sacral DTI examined>> Muscle & bone visible; Malodorous greenish discharge w/ necrotic tissue & surrounding erythema;   - May benefit from debridement>> Burn consulted  - Started on Vanc/Cefepime until further ID recs;     # TELMA - Likely prerenal 2ry to sepsis; Improved in ED (1.6>>1.4) after IVF; c/w IVF;     # Schizophrenia / Advanced Dementia - On 37.5 IM Haldol Docusate qmonthly; Ordered  # Hypothyroidism - c/w home Levothyroxine   # DLD - c/w home Lipitor  # IDDM - SS + 45U Lantus (home dose); Adjust PRN; fu A1c    Diet: Tube feeds restarted  Activity: Bedrest  DVT ppx: Heparin SQ   GI ppx: n/a  FULL CODE    Dispo: Acute    75 y/o F w/ pmhx of Anxiety, CAD, Hypothyroidism, IDDM, Schizophrenia, Dementia sent to ED from [facility] for concern over infected sacral ulcer.     # Infected Stage 4 Sacral Ulcer - Sepsis POA; Lac 2.7, Cr 18, WBC 18;  # AMS - As per ED, AOx2 at baseline; AOx0 on admission; Please confirm w/ family in AM;   - AMS Likely 2ry to sacral ulcer infection; however cannot r/o pneumonitis as the patient is actively aspirating (suctioned on admission);    - Sacral DTI examined>> Muscle & bone visible; Malodorous greenish discharge w/ necrotic tissue & surrounding erythema;   - May benefit from debridement>> Burn consulted  - Started on Vanc/Cefepime until further ID recs;   > fu UA & Bcx    # TELMA - Likely prerenal 2ry to sepsis; Improved in ED (1.6>>1.4) after IVF; c/w IVF;     # Schizophrenia / Advanced Dementia - On 37.5 IM Haldol Docusate qmonthly; Ordered  # Hypothyroidism - c/w home Levothyroxine   # DLD - c/w home Lipitor  # IDDM - SS + 45U Lantus (home dose); Adjust PRN; fu A1c    Diet: Tube feeds restarted  Activity: Bedrest  DVT ppx: Heparin SQ   GI ppx: n/a  FULL CODE    Dispo: Acute    75 y/o F w/ pmhx of Anxiety, CAD, Hypothyroidism, IDDM, Schizophrenia, Dementia sent to ED from [facility] for concern over infected sacral ulcer.     # Toxic metabolic encephalopathy 2/2 sepsis 2/2 Infected Stage 4 Sacral Ulcer- As per ED, AOx2 at baseline; AOx0 on admission; Please confirm w/ family in AM;   - Sepsis POA; Lac 2.7, Cr 1.8, WBC 18;  - AMS Likely 2ry to sacral ulcer infection; however cannot r/o pneumonitis as the patient is actively aspirating (suctioned on admission);    - Sacral DTI examined>> Muscle & bone visible; Malodorous greenish discharge w/ necrotic tissue & surrounding erythema;   - May benefit from debridement>> Burn consulted  - Started on Vanc/Cefepime until further ID recs;   > fu UA & Bcx    # TELMA - Likely prerenal 2ry to sepsis; Improved in ED (1.6>>1.4) after IVF; c/w IVF;     # Schizophrenia / Advanced Dementia - On 37.5 IM Haldol Docusate qmonthly; Ordered  # Hypothyroidism - c/w home Levothyroxine   # DLD - c/w home Lipitor  # IDDM - SS + 45U Lantus (home dose); Adjust PRN; fu A1c    Diet: Tube feeds restarted  Activity: Bedrest  DVT ppx: Heparin SQ   GI ppx: n/a  FULL CODE    Dispo: Acute

## 2022-05-20 NOTE — CONSULT NOTE ADULT - SUBJECTIVE AND OBJECTIVE BOX
NEPHROLOGY CONSULTATION NOTE    Patient is a 77 yo woman from NH with schizophrenia, anxiety, dementia, DM, CAD, hypothyroidism, ovarian cyst.  A few months ago with admission to [facility] with failure to thrive and no specific cause found.  Had PEG placed and developed sacral decubitus ulcer.  Now admitted with TELMA and sepsis and necrotic sacral ulcer.  Was on vancomycin 2 days in NH.    PAST MEDICAL & SURGICAL HISTORY:  Coronary artery disease      Anxiety      Dementia      Insulin dependent diabetes mellitus      Paranoid schizophrenia      Hyperlipidemia      Hypothyroidism      Abscess of right buttock      H/O abdominal surgery        Allergies:  No Known Allergies    Home Medications Reviewed  Hospital Medications:   MEDICATIONS  (STANDING):  aspirin  chewable 81 milliGRAM(s) Oral daily  atorvastatin 20 milliGRAM(s) Oral at bedtime  calcium carbonate 1250 mG  + Vitamin D (OsCal 500 + D) 1 Tablet(s) Oral daily  dextrose 5%. 1000 milliLiter(s) (100 mL/Hr) IV Continuous <Continuous>  dextrose 5%. 1000 milliLiter(s) (50 mL/Hr) IV Continuous <Continuous>  dextrose 50% Injectable 25 Gram(s) IV Push once  dextrose 50% Injectable 12.5 Gram(s) IV Push once  dextrose 50% Injectable 25 Gram(s) IV Push once  famotidine    Tablet 20 milliGRAM(s) Oral daily  glucagon  Injectable 1 milliGRAM(s) IntraMuscular once  heparin   Injectable 5000 Unit(s) SubCutaneous every 12 hours  insulin glargine Injectable (LANTUS) 45 Unit(s) SubCutaneous at bedtime  insulin lispro (ADMELOG) corrective regimen sliding scale   SubCutaneous three times a day before meals  insulin lispro Injectable (ADMELOG) 10 Unit(s) SubCutaneous every 6 hours  lactated ringers. 1000 milliLiter(s) (100 mL/Hr) IV Continuous <Continuous>  levothyroxine 50 MICROGram(s) Oral daily  melatonin 5 milliGRAM(s) Oral at bedtime  meropenem  IVPB 1000 milliGRAM(s) IV Intermittent every 12 hours  senna 2 Tablet(s) Oral at bedtime      SOCIAL HISTORY:  Denies ETOH,Smoking,   FAMILY HISTORY:        REVIEW OF SYSTEMS:  unable to obtain  All other review of systems is negative unless indicated above.    VITALS:  T(F): 97 (05-20-22 @ 08:02), Max: 99.1 (05-19-22 @ 20:05)  HR: 70 (05-20-22 @ 08:02)  BP: 112/57 (05-20-22 @ 08:02)  RR: 16 (05-20-22 @ 08:02)  SpO2: 100% (05-20-22 @ 08:02)    Height (cm): 160 (05-19 @ 20:05)  Weight (kg): 73 (05-20 @ 04:24)  BMI (kg/m2): 28.5 (05-20 @ 04:24)  BSA (m2): 1.76 (05-20 @ 04:24)    I&O's Detail        PHYSICAL EXAM:  Constitutional: NAD  HEENT: anicteric sclera, oropharynx clear, MMM  Neck: No JVD  Respiratory: CTAB, no wheezes, rales or rhonchi  Cardiovascular: S1, S2, RRR  Gastrointestinal: BS+, soft, NT/ND  Extremities: tarce edema  Neurological: A/O x 3, no focal deficits  Psychiatric: Normal mood, normal affect  : No CVA tenderness. No main.   Skin: No rashes  Vascular Access:    LABS:  05-20    142  |  106  |  92<HH>  ----------------------------<  294<H>  3.7   |  21  |  1.4    Ca    7.9<L>      20 May 2022 01:18    TPro  5.2<L>  /  Alb  2.2<L>  /  TBili  0.3  /  DBili      /  AST  30  /  ALT  15  /  AlkPhos  115  05-20    Creatinine Trend: 1.4 <--, 1.6 <--                        8.4    18.20 )-----------( 160      ( 19 May 2022 21:22 )             28.1     Urine Studies:        ·	pt known to me  ·	TELMA on setting of  ·	sepsis with low BP  ·	likely source infected sacral ulcer  ·	hyperkalemia on LR IVF  ·	anemia  ·	PEG  ·	lactic acidosis    1) seen by ID - ok with Merrem  - if renal function does not improve reduce dose 500 mg q12  2) as hyperkalemia resolved ok with LR at 100 cc/hr  3) am labs  will d/w daughter

## 2022-05-20 NOTE — H&P ADULT - HISTORY OF PRESENT ILLNESS
75 y/o F w/ pmhx of Anxiety, CAD, Hypothyroidism, IDDM, Schizophrenia, Dementia sent to ED from [facility] for concern over infected sacral ulcer.     AOx2 at baseline??    ED:   - VS: 99F, HR 71, /53, 99% on RA;  - Labs: WBC 18, Cr. 1.6 (improved to 1.4 w/ IVF), Lac 2.7, K+ 3.3,   - CXR: Left basilar linear subsegmental atelectasis. Otherwise, no evidence of acute pulmonary process..  - CTH: chest communicating hydrocephalus. Chronic microvascular ischemic changes.    Admit to Medicine;  75 y/o F w/ pmhx of Anxiety, CAD, Hypothyroidism, IDDM, Schizophrenia, Dementia sent to ED from [facility] for concern over infected sacral ulcer.     Unable to obtain HPI from patient (AOx0):  As per ED note, patient is AOx2 at baseline    Unable to complete ROS due to mental status    ED:   - VS: 99F, HR 71, /53, 99% on RA;  - Labs: WBC 18, Cr. 1.6 (improved to 1.4 w/ IVF), Lac 2.7, K+ 3.3,   - CXR: Left basilar linear subsegmental atelectasis. Otherwise, no evidence of acute pulmonary process..  - CTH: chest communicating hydrocephalus. Chronic microvascular ischemic changes.    Admit to Medicine;  75 y/o F w/ pmhx of Anxiety, CAD, Hypothyroidism, IDDM, Schizophrenia, Dementia sent to ED from [facility] for concern over infected sacral ulcer.     Unable to obtain HPI from patient (AOx0):  As per ED note, patient is AOx2 at baseline    Patient has a G-tube; Done in [facility] (as per NH paperwork); However no indication why; Poor PO intake??    Unable to complete ROS due to mental status    ED:   - VS: 99F, HR 71, /53, 99% on RA;  - Labs: WBC 18, Cr. 1.6 (improved to 1.4 w/ IVF), Lac 2.7;  - CXR: Left basilar linear subsegmental atelectasis. Otherwise, no evidence of acute pulmonary process..  - CTH: chest communicating hydrocephalus. Chronic microvascular ischemic changes.    Admit to Medicine;

## 2022-05-20 NOTE — H&P ADULT - NSHPPHYSICALEXAM_GEN_ALL_CORE
GENERAL: Chronically ill appearing; Crackling aspiration breathing sound audible;   HEAD:  Atraumatic, Normocephalic  EYES: Sclera clear  ENT: Moist mucous membranes  NECK: Supple, No JVD  CHEST/LUNG: Bilateral upper lung crackles;   HEART: Regular rate and rhythm;   ABDOMEN: Soft, Nontender, Nondistended;  PELVIS: Deep sacral ulcer; Muscle & bone visible; Greenish discharge w/ necrotic tissue;   EXTREMITIES: No LE edema;  NERVOUS SYSTEM:  Alert & Oriented x 0

## 2022-05-21 NOTE — PROGRESS NOTE ADULT - ASSESSMENT
77 y/o F w/ pmhx of Anxiety, CAD, Hypothyroidism, IDDM, Schizophrenia, Dementia sent to ED from [facility] for concern over infected sacral ulcer.     # Toxic metabolic encephalopathy 2/2 sepsis 2/2 Infected Stage 4 Sacral Ulcer  - As per ED, AOx2 at baseline; AOx0 on admission  - Sepsis POA; Lac 2.7, Cr 1.8, WBC 18;  - AMS Likely 2ry to sacral ulcer infection  - Sacral DTI examined most likely source of sepsis  - Burn consulted F/U recs   - C/W meropenem as per ID 1g q12h IV    # TELMA   - Likely prerenal 2/2 to sepsis  - improving with IVF>>continue  - trend Cr   - F/U Lytes correct PRN  - Nephro following    # Schizophrenia / Advanced Dementia   - On 37.5 IM Haldol Docusate q monthly; Ordered    # Hypothyroidism   - c/w home Levothyroxine     # DLD   - c/w home Lipitor    # IDDM   - SS + 45U Lantus (home dose)  - F/U FS Adjust regimen PRN  - fu A1c    Diet: Tube feeds   Activity: Bedrest  DVT ppx: Heparin SQ   FULL CODE  Dispo: Acute     F/U burn   75 y/o F w/ pmhx of Anxiety, CAD, Hypothyroidism, IDDM, Schizophrenia, Dementia sent to ED from [facility] for concern over infected sacral ulcer.     # Toxic metabolic encephalopathy 2/2 sepsis 2/2 Infected Stage 4 Sacral Ulcer  - As per ED, AOx2 at baseline; AOx0 on admission  - Sepsis POA; Lac 2.7, Cr 1.8, WBC 18;  - AMS Likely 2ry to sacral ulcer infection  - Sacral DTI examined most likely source of sepsis  - Burn consulted F/U recs   - C/W meropenem as per ID 1g q12h IV    #Hypernatremia  - DC LR  - increased free water through peg 400 ml Q 6 hours  - Repeat BMP 8 pm to monitor    # TELMA   - Likely prerenal 2/2 to sepsis  - improved with IVF  - trend Cr   - F/U Lytes correct PRN  - Nephro following    # Schizophrenia / Advanced Dementia   - On 37.5 IM Haldol Docusate q monthly; Ordered    # Hypothyroidism   - c/w home Levothyroxine     # DLD   - c/w home Lipitor    # IDDM   - SS + 45U Lantus (home dose)  - F/U FS Adjust regimen PRN  - fu A1c    Diet: Tube feeds   Activity: Bedrest  DVT ppx: Heparin SQ   FULL CODE  Dispo: Acute     F/U burn

## 2022-05-21 NOTE — PROGRESS NOTE ADULT - SUBJECTIVE AND OBJECTIVE BOX
patient seen and examined and discussed with primary attending.  nonverbal.  patient responds with grimacing to touch anywhere on her body  s/p debridement ulcer  Vital Signs Last 24 Hrs  T(C): 36.6 (21 May 2022 13:32), Max: 36.9 (21 May 2022 05:11)  T(F): 97.9 (21 May 2022 13:32), Max: 98.4 (21 May 2022 05:11)  HR: 76 (21 May 2022 13:32) (65 - 76)  BP: 103/49 (21 May 2022 13:32) (93/44 - 119/55)  RR: 20 (21 May 2022 13:32) (17 - 20)  SpO2: 98% (20 May 2022 19:08) (98% - 99%)     conj pale, no jaundice  neck supple. no JVD  lungs clear to auscultation.  good air entry bilaterally  heart regular rate and rhythm.  abd. pos. Peg tube. clean exit site. not tender.  extremities decreased skin turgor. no edema  Labs:                   8.1    16.69 )-----------( 122      ( 21 May 2022 07:55 )             27.9   05-21    152<H>  |  115<H>  |  60<H>  ----------------------------<  125<H>  4.2   |  21  |  1.1    Ca    8.8      21 May 2022 07:55  Phos  3.6     05-21  Mg     2.4     05-21    TPro  5.4<L>  /  Alb  2.3<L>  /  TBili  0.2  /  DBili  x   /  AST  27  /  ALT  16  /  AlkPhos  88  05-21  MEDICATIONS  (STANDING):  aspirin  chewable 81 milliGRAM(s) Oral daily  atorvastatin 20 milliGRAM(s) Oral at bedtime  calcium carbonate 1250 mG  + Vitamin D (OsCal 500 + D) 1 Tablet(s) Oral daily  collagenase Ointment 1 Application(s) Topical two times a day  Dakins Solution - 1/2 Strength 1 Application(s) Topical two times a day  dextrose 5%. 1000 milliLiter(s) (100 mL/Hr) IV Continuous <Continuous>  dextrose 5%. 1000 milliLiter(s) (50 mL/Hr) IV Continuous <Continuous>  dextrose 50% Injectable 25 Gram(s) IV Push once  dextrose 50% Injectable 12.5 Gram(s) IV Push once  dextrose 50% Injectable 25 Gram(s) IV Push once  famotidine    Tablet 20 milliGRAM(s) Oral daily  glucagon  Injectable 1 milliGRAM(s) IntraMuscular once  heparin   Injectable 5000 Unit(s) SubCutaneous every 12 hours  insulin glargine Injectable (LANTUS) 45 Unit(s) SubCutaneous at bedtime  insulin lispro (ADMELOG) corrective regimen sliding scale   SubCutaneous three times a day before meals  insulin lispro Injectable (ADMELOG) 10 Unit(s) SubCutaneous every 6 hours  levothyroxine 50 MICROGram(s) Oral daily  melatonin 5 milliGRAM(s) Oral at bedtime  meropenem  IVPB 1000 milliGRAM(s) IV Intermittent every 12 hours  senna 2 Tablet(s) Oral at bedtime    CAPILLARY BLOOD GLUCOSE      POCT Blood Glucose.: 103 mg/dL (21 May 2022 11:43)  POCT Blood Glucose.: 133 mg/dL (21 May 2022 07:11)  POCT Blood Glucose.: 141 mg/dL (20 May 2022 23:53)  POCT Blood Glucose.: 116 mg/dL (20 May 2022 16:40)

## 2022-05-21 NOTE — PROGRESS NOTE ADULT - SUBJECTIVE AND OBJECTIVE BOX
SUBJECTIVE:    Patient is a 76y old Female who presents with a chief complaint of   Currently admitted to medicine with the primary diagnosis of Severe sepsis with lactic acidosis       Today is hospital day 1d. This morning she was evaluated at beside, no significant ON events reported.       PAST MEDICAL & SURGICAL HISTORY  Coronary artery disease    Anxiety    Dementia    Insulin dependent diabetes mellitus    Paranoid schizophrenia    Hyperlipidemia    Hypothyroidism    Abscess of right buttock    H/O abdominal surgery        ALLERGIES:  No Known Allergies    MEDICATIONS:  STANDING MEDICATIONS  aspirin  chewable 81 milliGRAM(s) Oral daily  atorvastatin 20 milliGRAM(s) Oral at bedtime  calcium carbonate 1250 mG  + Vitamin D (OsCal 500 + D) 1 Tablet(s) Oral daily  collagenase Ointment 1 Application(s) Topical two times a day  Dakins Solution - 1/2 Strength 1 Application(s) Topical two times a day  dextrose 5%. 1000 milliLiter(s) IV Continuous <Continuous>  dextrose 5%. 1000 milliLiter(s) IV Continuous <Continuous>  dextrose 50% Injectable 25 Gram(s) IV Push once  dextrose 50% Injectable 12.5 Gram(s) IV Push once  dextrose 50% Injectable 25 Gram(s) IV Push once  famotidine    Tablet 20 milliGRAM(s) Oral daily  glucagon  Injectable 1 milliGRAM(s) IntraMuscular once  heparin   Injectable 5000 Unit(s) SubCutaneous every 12 hours  insulin glargine Injectable (LANTUS) 45 Unit(s) SubCutaneous at bedtime  insulin lispro (ADMELOG) corrective regimen sliding scale   SubCutaneous three times a day before meals  insulin lispro Injectable (ADMELOG) 10 Unit(s) SubCutaneous every 6 hours  lactated ringers. 1000 milliLiter(s) IV Continuous <Continuous>  levothyroxine 50 MICROGram(s) Oral daily  melatonin 5 milliGRAM(s) Oral at bedtime  meropenem  IVPB 1000 milliGRAM(s) IV Intermittent every 12 hours  senna 2 Tablet(s) Oral at bedtime    PRN MEDICATIONS  acetaminophen     Tablet .. 650 milliGRAM(s) Oral every 6 hours PRN  dextrose Oral Gel 15 Gram(s) Oral once PRN  polyethylene glycol 3350 17 Gram(s) Oral daily PRN    VITALS:   T(F): 98.4  HR: 73  BP: 119/55  RR: 18  SpO2: 98%    LABS:                        8.1    16.69 )-----------( 122      ( 21 May 2022 07:55 )             27.9     05-20    142  |  106  |  92<HH>  ----------------------------<  294<H>  3.7   |  21  |  1.4    Ca    7.9<L>      20 May 2022 01:18    TPro  5.2<L>  /  Alb  2.2<L>  /  TBili  0.3  /  DBili  x   /  AST  30  /  ALT  15  /  AlkPhos  115  05-20    PT/INR - ( 19 May 2022 21:22 )   PT: 10.40 sec;   INR: 0.90 ratio         PTT - ( 19 May 2022 21:22 )  PTT:23.1 sec          Culture - Blood (collected 19 May 2022 21:38)  Source: .Blood Blood-Peripheral  Preliminary Report (21 May 2022 02:01):    No growth to date.    Culture - Blood (collected 19 May 2022 21:22)  Source: .Blood Blood-Peripheral  Preliminary Report (21 May 2022 02:01):    No growth to date.      RADIOLOGY:    ACC: [id number] EXAM:  XR CHEST PORTABLE IMMED 1V                          PROCEDURE DATE:  05/19/2022          INTERPRETATION:  Clinical History / Reason for exam: Sepsis    Comparison : Chest radiograph 9/1/2019    Technique/Positioning: Frontal chest radiograph.    Findings:    Support devices: None.    Cardiac/mediastinum/hilum: Unremarkable.    Lung parenchyma/Pleura: Left basilar linear subsegmental atelectasis.   Otherwise, no evidence of acute pulmonary process..    Skeleton/soft tissues:There are degenerative changes of the thoracic   spine and both shoulder.    Impression:    Left basilar linear subsegmental atelectasis. Otherwise, no evidence of   acute pulmonary process..    --- End of Report ---            MARY PARRISH MD; Attending Radiologist  This document has been electronically signed. May 20 2022  2:03AM    PHYSICAL EXAM:  GEN: No acute distress  PULM/CHEST: Clear to auscultation bilaterally, no rales, rhonchi or wheezes   CVS: Regular rate and rhythm, S1-S2, no murmurs  ABD: Soft, non-tender, non-distended, +BS, Peg tube in place  EXT: Contracted LE  NEURO: AAOx0, does not follow commands

## 2022-05-21 NOTE — CONSULT NOTE ADULT - ASSESSMENT
ASSESSMENT:  Stage IV pressure ulcer to sacrum    RECOMMENDATION:  Wound care - Santyl/Wet Kerlex Packin with 1/4 Dakins/DPD BID  Surgical debridement done at bedside - Pt tolerated proceedure well  IV abx as indicated/ per ID  f/u Cultures  Offloading/ positional changes  Will follow.

## 2022-05-21 NOTE — PROGRESS NOTE ADULT - ASSESSMENT
TELMA - secondary to sepsis +/- volume depletion  hypernatremia - patient not able to take in free water on her own  infected sacral decubitus ulcer  anemia - ?related to ckd or infection  Diabetes Mellitus - BS close to target  Dementia/schizophrenia    Plan:  Free water via n/g tube  monitor serum Na  IV antibiotics  monitor BS  check iron/TBC/ferritin

## 2022-05-21 NOTE — CONSULT NOTE ADULT - SUBJECTIVE AND OBJECTIVE BOX
76y  Female  HPI:  77 y/o F w/ pmhx of Anxiety, CAD, Hypothyroidism, IDDM, Schizophrenia, Dementia sent to ED from [facility] for concern over infected sacral ulcer.     Unable to obtain HPI from patient (AOx0):  As per ED note, patient is AOx2 at baseline    Patient has a G-tube; Done in [facility] (as per NH paperwork); However no indication why; Poor PO intake??    Unable to complete ROS due to mental status    ED:   - VS: 99F, HR 71, /53, 99% on RA;  - Labs: WBC 18, Cr. 1.6 (improved to 1.4 w/ IVF), Lac 2.7;  - CXR: Left basilar linear subsegmental atelectasis. Otherwise, no evidence of acute pulmonary process..  - CTH: chest communicating hydrocephalus. Chronic microvascular ischemic changes.    Admit to Medicine;  (20 May 2022 03:31)    Hospital course***  Allergies    No Known Allergies    Intolerances      PAST MEDICAL & SURGICAL HISTORY:  Coronary artery disease      Anxiety      Dementia      Insulin dependent diabetes mellitus      Paranoid schizophrenia      Hyperlipidemia      Hypothyroidism      Abscess of right buttock      H/O abdominal surgery          Labs:                        8.1    16.69 )-----------( 122      ( 21 May 2022 07:55 )             27.9         PHYSICAL EXAM: A & O x 2    Full thickness wound to sacrum with necrotic tissue, bone exposed, necrotic tendons, odor, serosang dc

## 2022-05-21 NOTE — CONSULT NOTE ADULT - TIME BILLING
debridement  wound eval treat
I have personally seen and examined this patient.  I have reviewed all pertinent clinical information and reviewed all relevant imaging and diagnostic studies personally.   If possible, I counseled the patient about diagnostic testing and treatment plan.   I discussed my recommendations with the primary team.

## 2022-05-22 NOTE — PROGRESS NOTE ADULT - SUBJECTIVE AND OBJECTIVE BOX
patient seen and examined and discussed with primary attending.  nonverbal.  patient responds with grimacing to touch anywhere on her body  Vital Signs Last 24 Hrs  T(C): 36.2 (22 May 2022 05:00), Max: 36.6 (21 May 2022 13:32)  T(F): 97.1 (22 May 2022 05:00), Max: 97.9 (21 May 2022 13:32)  HR: 74 (22 May 2022 05:00) (74 - 79)  BP: 110/48 (22 May 2022 05:00) (103/49 - 129/63)  RR: 18 (22 May 2022 05:00) (18 - 20)  SpO2: 95% (22 May 2022 05:00) (95% - 95%)   conj pale, no jaundice  neck supple. no JVD  lungs clear to auscultation.  good air entry bilaterally  heart regular rate and rhythm.  abd. pos. Peg tube. clean exit site. not tender.  extremities decreased skin turgor. no edema  Labs:                                 7.2    13.19 )-----------( 186      ( 22 May 2022 08:14 )             24.5   05-22    152<H>  |  115<H>  |  41<H>  ----------------------------<  133<H>  4.2   |  24  |  1.0    Ca    8.7      22 May 2022 08:14  Phos  3.6     05-21  Mg     2.2     05-22    TPro  5.1<L>  /  Alb  2.3<L>  /  TBili  0.2  /  DBili  x   /  AST  27  /  ALT  20  /  AlkPhos  78  05-22    CAPILLARY BLOOD GLUCOSE    POCT Blood Glucose.: 133 mg/dL (22 May 2022 11:31)  POCT Blood Glucose.: 147 mg/dL (22 May 2022 06:10)  POCT Blood Glucose.: 168 mg/dL (21 May 2022 23:55)  POCT Blood Glucose.: 132 mg/dL (21 May 2022 21:29)  POCT Blood Glucose.: 76 mg/dL (21 May 2022 17:19)    MEDICATIONS  (STANDING):  aspirin  chewable 81 milliGRAM(s) Oral daily  atorvastatin 20 milliGRAM(s) Oral at bedtime  calcium carbonate 1250 mG  + Vitamin D (OsCal 500 + D) 1 Tablet(s) Oral daily  collagenase Ointment 1 Application(s) Topical two times a day  Dakins Solution - 1/2 Strength 1 Application(s) Topical two times a day  dextrose 5%. 1000 milliLiter(s) (70 mL/Hr) IV Continuous <Continuous>  dextrose 5%. 1000 milliLiter(s) (100 mL/Hr) IV Continuous <Continuous>  dextrose 5%. 1000 milliLiter(s) (50 mL/Hr) IV Continuous <Continuous>  dextrose 50% Injectable 25 Gram(s) IV Push once  dextrose 50% Injectable 12.5 Gram(s) IV Push once  dextrose 50% Injectable 25 Gram(s) IV Push once  famotidine    Tablet 20 milliGRAM(s) Oral daily  glucagon  Injectable 1 milliGRAM(s) IntraMuscular once  heparin   Injectable 5000 Unit(s) SubCutaneous every 12 hours  insulin glargine Injectable (LANTUS) 45 Unit(s) SubCutaneous at bedtime  insulin lispro (ADMELOG) corrective regimen sliding scale   SubCutaneous three times a day before meals  insulin lispro Injectable (ADMELOG) 10 Unit(s) SubCutaneous every 6 hours  levothyroxine 50 MICROGram(s) Oral daily  melatonin 5 milliGRAM(s) Oral at bedtime  meropenem  IVPB 1000 milliGRAM(s) IV Intermittent every 12 hours  senna 2 Tablet(s) Oral at bedtime

## 2022-05-22 NOTE — PROGRESS NOTE ADULT - SUBJECTIVE AND OBJECTIVE BOX
Pt seen and examined at bedside.    VITAL SIGNS (Last 24 hrs):  T(C): 36.2 (05-22-22 @ 05:00), Max: 36.6 (05-21-22 @ 13:32)  HR: 74 (05-22-22 @ 05:00) (74 - 79)  BP: 110/48 (05-22-22 @ 05:00) (103/49 - 129/63)  RR: 18 (05-22-22 @ 05:00) (18 - 20)  SpO2: 95% (05-22-22 @ 05:00) (95% - 95%)  Wt(kg): --  Daily     Daily     I&O's Summary    21 May 2022 07:01  -  22 May 2022 07:00  --------------------------------------------------------  IN: 845 mL / OUT: 600 mL / NET: 245 mL        PHYSICAL EXAM:  GENERAL: NAD   HEAD:  Atraumatic, Normocephalic  EYES:  conjunctiva and sclera clear  NECK: Supple, No JVD  CHEST/LUNG: Clear to auscultation bilaterally; No wheeze  HEART: Regular rate and rhythm; No murmurs, rubs, or gallops  ABDOMEN: Soft, Nontender, Nondistended; Bowel sounds present +PEG  EXTREMITIES:  2+ Peripheral Pulses, No clubbing, cyanosis, or edema  Skin: sacral DU     Labs Reviewed       CBC Full  -  ( 22 May 2022 08:14 )  WBC Count : 13.19 K/uL  Hemoglobin : 7.2 g/dL  Hematocrit : 24.5 %  Platelet Count - Automated : 186 K/uL  Mean Cell Volume : 86.3 fL  Mean Cell Hemoglobin : 25.4 pg  Mean Cell Hemoglobin Concentration : 29.4 g/dL  Auto Neutrophil # : x  Auto Lymphocyte # : x  Auto Monocyte # : x  Auto Eosinophil # : x  Auto Basophil # : x  Auto Neutrophil % : x  Auto Lymphocyte % : x  Auto Monocyte % : x  Auto Eosinophil % : x  Auto Basophil % : x    BMP:    05-22 @ 08:14    Blood Urea Nitrogen - 41  Calcium - 8.7  Carbon Dioxide - 24  Chloride - 115  Creatinine - 1.0  Glucose - 133  Potassium - 4.2  Sodium - 152      Hemoglobin A1c -   PT/INR - ( 19 May 2022 21:22 )   PT: 10.40 sec;   INR: 0.90 ratio         PTT - ( 19 May 2022 21:22 )  PTT:23.1 sec  Urine Culture:  05-19 @ 21:38 Urine culture: --    Culture Results:   No growth to date.  Method Type: --  Organism: --  Organism Identification: --  Specimen Source: .Blood Blood-Peripheral  05-19 @ 21:22 Urine culture: --    Culture Results:   No growth to date.  Method Type: --  Organism: --  Organism Identification: --  Specimen Source: .Blood Blood-Peripheral           MEDICATIONS  (STANDING):  aspirin  chewable 81 milliGRAM(s) Oral daily  atorvastatin 20 milliGRAM(s) Oral at bedtime  calcium carbonate 1250 mG  + Vitamin D (OsCal 500 + D) 1 Tablet(s) Oral daily  collagenase Ointment 1 Application(s) Topical two times a day  Dakins Solution - 1/2 Strength 1 Application(s) Topical two times a day  dextrose 5%. 1000 milliLiter(s) (70 mL/Hr) IV Continuous <Continuous>  dextrose 5%. 1000 milliLiter(s) (100 mL/Hr) IV Continuous <Continuous>  dextrose 5%. 1000 milliLiter(s) (50 mL/Hr) IV Continuous <Continuous>  dextrose 50% Injectable 25 Gram(s) IV Push once  dextrose 50% Injectable 12.5 Gram(s) IV Push once  dextrose 50% Injectable 25 Gram(s) IV Push once  famotidine    Tablet 20 milliGRAM(s) Oral daily  glucagon  Injectable 1 milliGRAM(s) IntraMuscular once  heparin   Injectable 5000 Unit(s) SubCutaneous every 12 hours  insulin glargine Injectable (LANTUS) 45 Unit(s) SubCutaneous at bedtime  insulin lispro (ADMELOG) corrective regimen sliding scale   SubCutaneous three times a day before meals  insulin lispro Injectable (ADMELOG) 10 Unit(s) SubCutaneous every 6 hours  levothyroxine 50 MICROGram(s) Oral daily  melatonin 5 milliGRAM(s) Oral at bedtime  meropenem  IVPB 1000 milliGRAM(s) IV Intermittent every 12 hours  senna 2 Tablet(s) Oral at bedtime    MEDICATIONS  (PRN):  acetaminophen     Tablet .. 650 milliGRAM(s) Oral every 6 hours PRN Temp greater or equal to 38C (100.4F), Mild Pain (1 - 3)  dextrose Oral Gel 15 Gram(s) Oral once PRN Blood Glucose LESS THAN 70 milliGRAM(s)/deciliter  morphine  - Injectable 2 milliGRAM(s) IV Push every 6 hours PRN Severe Pain (7 - 10)  polyethylene glycol 3350 17 Gram(s) Oral daily PRN Constipation

## 2022-05-22 NOTE — PROGRESS NOTE ADULT - ASSESSMENT
77 y/o F w/ pmhx of Anxiety, CAD, Hypothyroidism, IDDM, Schizophrenia, Dementia sent to ED from [facility] for concern over infected sacral ulcer.     # Toxic metabolic encephalopathy 2/2 sepsis POA 2/2 Infected Stage 4 Sacral Ulcer  # Advanced Dementia  # Functional Quadriplegia   - local wound care   - Burn consult   - f/u wound culture   - C/w meropenem as per ID 1g q12h IV    #Hypernatremia  - c/w free water via PEG   - start D5W     # TELMA   - Likely prerenal, resolved     # Hypothyroidism   - c/w home Levothyroxine     # DLD   - c/w home Lipitor    # IDDM  - insulin as needed     Diet: Tube feeds      Pending: IV abx, f/u wound culture, hypernatremia .

## 2022-05-22 NOTE — PROGRESS NOTE ADULT - ASSESSMENT
TELMA - secondary to sepsis +/- volume depletion  hypernatremia - patient not able to take in free water on her own - did not improve with free water via PEG - calculated water deficit 2.8 L  infected sacral decubitus ulcer  anemia - ?related to ckd or infection  Diabetes Mellitus - BS close to target  Dementia/schizophrenia      Plan:  start IV D5W 75 cc/hour  monitor serum Na  monitor glucose on IV D5@  IV antibiotics  check iron/TBC/ferritin

## 2022-05-22 NOTE — PROGRESS NOTE ADULT - SUBJECTIVE AND OBJECTIVE BOX
Patient is a 76y old  Female who presents with a chief complaint of sepsis, altered MS (22 May 2022 12:22)    Pt seen for f/u s/p sal sacral wound POD 1    Vital Signs Last 24 Hrs  T(C): 37.3 (22 May 2022 12:25), Max: 37.3 (22 May 2022 12:25)  T(F): 99.1 (22 May 2022 12:25), Max: 99.1 (22 May 2022 12:25)  HR: 76 (22 May 2022 12:25) (74 - 79)  BP: 134/59 (22 May 2022 12:25) (110/48 - 134/59)  BP(mean): --  RR: 18 (22 May 2022 12:25) (18 - 18)  SpO2: 95% (22 May 2022 05:00) (95% - 95%)    Meds:  MEDICATIONS  (STANDING):  aspirin  chewable 81 milliGRAM(s) Oral daily  atorvastatin 20 milliGRAM(s) Oral at bedtime  calcium carbonate 1250 mG  + Vitamin D (OsCal 500 + D) 1 Tablet(s) Oral daily  collagenase Ointment 1 Application(s) Topical two times a day  Dakins Solution - 1/2 Strength 1 Application(s) Topical two times a day  dextrose 5%. 1000 milliLiter(s) (70 mL/Hr) IV Continuous <Continuous>  dextrose 5%. 1000 milliLiter(s) (50 mL/Hr) IV Continuous <Continuous>  dextrose 5%. 1000 milliLiter(s) (100 mL/Hr) IV Continuous <Continuous>  dextrose 50% Injectable 25 Gram(s) IV Push once  dextrose 50% Injectable 25 Gram(s) IV Push once  dextrose 50% Injectable 12.5 Gram(s) IV Push once  famotidine    Tablet 20 milliGRAM(s) Oral daily  glucagon  Injectable 1 milliGRAM(s) IntraMuscular once  heparin   Injectable 5000 Unit(s) SubCutaneous every 12 hours  insulin glargine Injectable (LANTUS) 45 Unit(s) SubCutaneous at bedtime  insulin lispro (ADMELOG) corrective regimen sliding scale   SubCutaneous three times a day before meals  insulin lispro Injectable (ADMELOG) 10 Unit(s) SubCutaneous every 6 hours  levothyroxine 50 MICROGram(s) Oral daily  melatonin 5 milliGRAM(s) Oral at bedtime  meropenem  IVPB 1000 milliGRAM(s) IV Intermittent every 12 hours  senna 2 Tablet(s) Oral at bedtime    MEDICATIONS  (PRN):  acetaminophen     Tablet .. 650 milliGRAM(s) Oral every 6 hours PRN Temp greater or equal to 38C (100.4F), Mild Pain (1 - 3)  dextrose Oral Gel 15 Gram(s) Oral once PRN Blood Glucose LESS THAN 70 milliGRAM(s)/deciliter  morphine  - Injectable 2 milliGRAM(s) IV Push every 6 hours PRN Severe Pain (7 - 10)  polyethylene glycol 3350 17 Gram(s) Oral daily PRN Constipation          Labs:                        7.2    13.19 )-----------( 186      ( 22 May 2022 08:14 )             24.5         PE: Awake and alert    full thickness wound to sacrum with areas of necrotic tissue, decreased erythema and swelling  serosang dc

## 2022-05-22 NOTE — PROGRESS NOTE ADULT - ASSESSMENT
ASSESSMENT:  Stage IV pressure ulcer to sacrum    RECOMMENDATION:  Wound care - Santyl/Wet Kerlex Packin with 1/4 Dakins/DPD BID  IV abx as indicated/ per ID  f/u Cultures  Offloading/ positional changes  Will follow.

## 2022-05-23 NOTE — DIETITIAN INITIAL EVALUATION ADULT - PERTINENT LABORATORY DATA
05-23    147<H>  |  109  |  26<H>  ----------------------------<  201<H>  5.0   |  23  |  0.9    Ca    8.6      23 May 2022 08:05  Mg     2.0     05-23    TPro  5.4<L>  /  Alb  2.3<L>  /  TBili  0.4  /  DBili  x   /  AST  20  /  ALT  18  /  AlkPhos  65  05-23  POCT Blood Glucose.: 181 mg/dL (05-23-22 @ 11:53)  A1C with Estimated Average Glucose Result: 9.8 % (05-21-22 @ 07:55)

## 2022-05-23 NOTE — DIETITIAN INITIAL EVALUATION ADULT - PERTINENT MEDS FT
MEDICATIONS  (STANDING):  atorvastatin 20 milliGRAM(s) Oral at bedtime  calcium carbonate 1250 mG  + Vitamin D (OsCal 500 + D) 1 Tablet(s) Oral daily  collagenase Ointment 1 Application(s) Topical two times a day  Dakins Solution - 1/2 Strength 1 Application(s) Topical two times a day  famotidine    Tablet 20 milliGRAM(s) Oral daily  heparin   Injectable 5000 Unit(s) SubCutaneous every 12 hours  insulin glargine Injectable (LANTUS) 50 Unit(s) SubCutaneous at bedtime  insulin lispro (ADMELOG) corrective regimen sliding scale   SubCutaneous three times a day before meals  insulin lispro Injectable (ADMELOG) 10 Unit(s) SubCutaneous every 6 hours  levothyroxine 50 MICROGram(s) Oral daily  melatonin 5 milliGRAM(s) Oral at bedtime  meropenem  IVPB 1000 milliGRAM(s) IV Intermittent every 12 hours  senna 2 Tablet(s) Oral at bedtime    MEDICATIONS  (PRN):  acetaminophen     Tablet .. 650 milliGRAM(s) Oral every 6 hours PRN Temp greater or equal to 38C (100.4F), Mild Pain (1 - 3)  dextrose Oral Gel 15 Gram(s) Oral once PRN Blood Glucose LESS THAN 70 milliGRAM(s)/deciliter  morphine  - Injectable 2 milliGRAM(s) IV Push every 6 hours PRN Severe Pain (7 - 10)  polyethylene glycol 3350 17 Gram(s) Oral daily PRN Constipation

## 2022-05-23 NOTE — DIETITIAN INITIAL EVALUATION ADULT - COLLABORATION WITH OTHER PROVIDERS
Interventions: EN, medical food supplements, medications, coordination of care. Monitoring/evaluation: nutrient intake, supplement acceptance, weight, labs (renal/electrolyte profile, glucose/endocrine profile), skin status, NFPF.

## 2022-05-23 NOTE — PROGRESS NOTE ADULT - ASSESSMENT
77 y/o F w/ pmhx of Anxiety, CAD, Hypothyroidism, IDDM, Schizophrenia, Dementia sent to ED from [facility] for concern over infected sacral ulcer.       # Toxic metabolic encephalopathy 2/2 sepsis POA 2/2 Infected Stage 4 Sacral Ulcer  # Advanced Dementia  # Functional Quadriplegia   - local wound care   - Burn consult noted - s/p bedside debridement   - f/u wound culture (in lab)   - C/w meropenem      #Hypernatremia  - c/w free water via PEG     # TELMA   - Likely prerenal, resolved     # Hypothyroidism   - c/w home Levothyroxine     # DLD   - c/w home Lipitor    # IDDM  - insulin as needed     Diet: Tube feeds      Pending: IV abx, f/u wound culture   Dispo: SNF    77 y/o F w/ pmhx of Anxiety, CAD, Hypothyroidism, IDDM, Schizophrenia, Dementia sent to ED from [facility] for concern over infected sacral ulcer.       # Toxic metabolic encephalopathy 2/2 sepsis POA 2/2 Infected Stage 4 Sacral Ulcer  # Advanced Dementia  # Functional Quadriplegia   - local wound care   - Burn consult noted - s/p bedside debridement   - f/u wound culture (in lab)   - C/w meropenem      #Hypernatremia  - c/w free water via PEG     # TELMA   - Likely prerenal, resolved     # Hypothyroidism   - c/w home Levothyroxine     # DLD   - c/w home Lipitor    # IDDM  - insulin as needed     Diet: Tube feeds      Pending: IV abx, f/u wound culture   Called [facility], no answer   Dispo: SNF

## 2022-05-23 NOTE — DIETITIAN INITIAL EVALUATION ADULT - ADD RECOMMEND
-Zinc should be discharged from [facility] med list as further supplementation is not indicated. Can continue MVI/min daily.   -Pt is at high nutrition risk, f/u within 4 days.

## 2022-05-23 NOTE — PROGRESS NOTE ADULT - SUBJECTIVE AND OBJECTIVE BOX
Pt seen and examined at bedside.    VITAL SIGNS (Last 24 hrs):  T(C): 36.8 (05-23-22 @ 05:55), Max: 37.5 (05-22-22 @ 21:32)  HR: 76 (05-23-22 @ 05:55) (76 - 78)  BP: 141/66 (05-23-22 @ 05:55) (140/66 - 141/66)  RR: 17 (05-23-22 @ 05:55) (17 - 18)        I&O's Summary      PHYSICAL EXAM:  GENERAL: NAD   HEAD:  Atraumatic, Normocephalic  EYES: conjunctiva and sclera clear  NECK: Supple, No JVD  CHEST/LUNG: Clear to auscultation bilaterally; No wheeze  HEART: Regular rate and rhythm; No murmurs, rubs, or gallops  ABDOMEN: Soft, Nontender, Nondistended; Bowel sounds present +PEG   EXTREMITIES:  No clubbing, cyanosis, or edema  SKIN: sacral DU     Labs Reviewed       CBC Full  -  ( 23 May 2022 08:05 )  WBC Count : 13.37 K/uL  Hemoglobin : 7.4 g/dL  Hematocrit : 24.3 %  Platelet Count - Automated : 236 K/uL  Mean Cell Volume : 83.5 fL  Mean Cell Hemoglobin : 25.4 pg  Mean Cell Hemoglobin Concentration : 30.5 g/dL  Auto Neutrophil # : x  Auto Lymphocyte # : x  Auto Monocyte # : x  Auto Eosinophil # : x  Auto Basophil # : x  Auto Neutrophil % : x  Auto Lymphocyte % : x  Auto Monocyte % : x  Auto Eosinophil % : x  Auto Basophil % : x    BMP:    05-23 @ 08:05    Blood Urea Nitrogen - 26  Calcium - 8.6  Carbon Dioxide - 23  Chloride - 109  Creatinine - 0.9  Glucose - 201  Potassium - 5.0  Sodium - 147         PT/INR - ( 19 May 2022 21:22 )   PT: 10.40 sec;   INR: 0.90 ratio         PTT - ( 19 May 2022 21:22 )  PTT:23.1 sec  Urine Culture:  05-19 @ 21:38 Urine culture: --    Culture Results:   No growth to date.  Method Type: --  Organism: --  Organism Identification: --  Specimen Source: .Blood Blood-Peripheral  05-19 @ 21:22 Urine culture: --    Culture Results:   No growth to date.  Method Type: --  Organism: --  Organism Identification: --  Specimen Source: .Blood Blood-Peripheral           MEDICATIONS  (STANDING):  aspirin  chewable 81 milliGRAM(s) Oral daily  atorvastatin 20 milliGRAM(s) Oral at bedtime  calcium carbonate 1250 mG  + Vitamin D (OsCal 500 + D) 1 Tablet(s) Oral daily  collagenase Ointment 1 Application(s) Topical two times a day  Dakins Solution - 1/2 Strength 1 Application(s) Topical two times a day  dextrose 5%. 1000 milliLiter(s) (100 mL/Hr) IV Continuous <Continuous>  dextrose 5%. 1000 milliLiter(s) (50 mL/Hr) IV Continuous <Continuous>  dextrose 50% Injectable 25 Gram(s) IV Push once  dextrose 50% Injectable 12.5 Gram(s) IV Push once  dextrose 50% Injectable 25 Gram(s) IV Push once  famotidine    Tablet 20 milliGRAM(s) Oral daily  glucagon  Injectable 1 milliGRAM(s) IntraMuscular once  heparin   Injectable 5000 Unit(s) SubCutaneous every 12 hours  insulin glargine Injectable (LANTUS) 40 Unit(s) SubCutaneous at bedtime  insulin lispro (ADMELOG) corrective regimen sliding scale   SubCutaneous three times a day before meals  insulin lispro Injectable (ADMELOG) 10 Unit(s) SubCutaneous every 6 hours  levothyroxine 50 MICROGram(s) Oral daily  melatonin 5 milliGRAM(s) Oral at bedtime  meropenem  IVPB 1000 milliGRAM(s) IV Intermittent every 12 hours  senna 2 Tablet(s) Oral at bedtime    MEDICATIONS  (PRN):  acetaminophen     Tablet .. 650 milliGRAM(s) Oral every 6 hours PRN Temp greater or equal to 38C (100.4F), Mild Pain (1 - 3)  dextrose Oral Gel 15 Gram(s) Oral once PRN Blood Glucose LESS THAN 70 milliGRAM(s)/deciliter  morphine  - Injectable 2 milliGRAM(s) IV Push every 6 hours PRN Severe Pain (7 - 10)  polyethylene glycol 3350 17 Gram(s) Oral daily PRN Constipation

## 2022-05-23 NOTE — DIETITIAN INITIAL EVALUATION ADULT - ENERGY INTAKE
Receiving tube feeding and tolerating @ goal rate In-house TF regimen Glucerna 1.2 @ 65 mL/hr x 24 hrs (1560 mL/24 hrs) provides 1872 kcal, 93.6 g pro, and 1256 mL free water, + additional 2400 mL from  mL q4h

## 2022-05-23 NOTE — DIETITIAN INITIAL EVALUATION ADULT - ENTERAL
Glucerna 1.2, bolus 1 carton (237 mL) 6 x per day, provides 1422 mL/24 hrs, 1710 kcal, 85.2 g pro, 1152 mL free water. Provide additional 120 mL FWF before and after each bolus (1440 mL/day). Provide ProSource TF 2 x per day (additional 40 kcal, 11 g pro each).

## 2022-05-23 NOTE — DIETITIAN INITIAL EVALUATION ADULT - ORAL INTAKE PTA/DIET HISTORY
Pt is from [facility], TF regimen Glucerna 1.5 @ 65 mL/hr x 20 hrs (1300 mL/24 hrs) provides 1950 kcal, 107 g pro, 988 mL free water (could not find documentation of FWF), supplements include zinc 220 mg since at least 3/14/22, as well as calcium with vitamin D, D2. Pt received puree diet for pleasure. Usual insulin regimen is s/s and lantus @ HS.

## 2022-05-23 NOTE — PROGRESS NOTE ADULT - SUBJECTIVE AND OBJECTIVE BOX
Nephrology progress note     alert but not communicative    ON events/Subjective:     Allergies:  No Known Allergies    Hospital Medications:   MEDICATIONS  (STANDING):  aspirin  chewable 81 milliGRAM(s) Oral daily  atorvastatin 20 milliGRAM(s) Oral at bedtime  calcium carbonate 1250 mG  + Vitamin D (OsCal 500 + D) 1 Tablet(s) Oral daily  collagenase Ointment 1 Application(s) Topical two times a day  Dakins Solution - 1/2 Strength 1 Application(s) Topical two times a day  dextrose 5%. 1000 milliLiter(s) (100 mL/Hr) IV Continuous <Continuous>  dextrose 5%. 1000 milliLiter(s) (50 mL/Hr) IV Continuous <Continuous>  dextrose 50% Injectable 25 Gram(s) IV Push once  dextrose 50% Injectable 12.5 Gram(s) IV Push once  dextrose 50% Injectable 25 Gram(s) IV Push once  famotidine    Tablet 20 milliGRAM(s) Oral daily  glucagon  Injectable 1 milliGRAM(s) IntraMuscular once  heparin   Injectable 5000 Unit(s) SubCutaneous every 12 hours  insulin glargine Injectable (LANTUS) 50 Unit(s) SubCutaneous at bedtime  insulin lispro (ADMELOG) corrective regimen sliding scale   SubCutaneous three times a day before meals  insulin lispro Injectable (ADMELOG) 10 Unit(s) SubCutaneous every 6 hours  levothyroxine 50 MICROGram(s) Oral daily  melatonin 5 milliGRAM(s) Oral at bedtime  meropenem  IVPB 1000 milliGRAM(s) IV Intermittent every 12 hours  senna 2 Tablet(s) Oral at bedtime    REVIEW OF SYSTEMS:  CONSTITUTIONAL: No weakness, fevers or chills  EYES/ENT: No visual changes;  No vertigo or throat pain   NECK: No pain or stiffness  RESPIRATORY: No cough, wheezing, hemoptysis; No shortness of breath  CARDIOVASCULAR: No chest pain or palpitations.  GASTROINTESTINAL: No abdominal or epigastric pain. No nausea, vomiting, or hematemesis; No diarrhea or constipation. No melena or hematochezia.  GENITOURINARY: No dysuria, frequency, foamy urine, urinary urgency, incontinence or hematuria  NEUROLOGICAL: No numbness or weakness  SKIN: No itching, burning, rashes, or lesions   VASCULAR: No bilateral lower extremity edema.   All other review of systems is negative unless indicated above.    VITALS:  T(F): 98.2 (05-23-22 @ 05:55), Max: 99.5 (05-22-22 @ 21:32)  HR: 76 (05-23-22 @ 05:55)  BP: 141/66 (05-23-22 @ 05:55)  RR: 17 (05-23-22 @ 05:55)  SpO2: --  Wt(kg): --    05-21 @ 07:01  -  05-22 @ 07:00  --------------------------------------------------------  IN: 845 mL / OUT: 600 mL / NET: 245 mL        PHYSICAL EXAM:  Constitutional: NAD  HEENT: anicteric sclera, oropharynx clear, MMM  Neck: No JVD  Respiratory: CTAB, no wheezes, rales or rhonchi  Cardiovascular: S1, S2, RRR  Gastrointestinal: BS+, soft, NT/ND  Extremities: No cyanosis or clubbing. No peripheral edema  Neurological: A/O x 3, no focal deficits  Psychiatric: Normal mood, normal affect  : No CVA tenderness. No amin.   Skin: No rashes  Vascular Access:    LABS:  05-23    147<H>  |  109  |  26<H>  ----------------------------<  201<H>  5.0   |  23  |  0.9    Ca    8.6      23 May 2022 08:05  Mg     2.0     05-23    TPro  5.4<L>  /  Alb  2.3<L>  /  TBili  0.4  /  DBili      /  AST  20  /  ALT  18  /  AlkPhos  65  05-23                          7.4    13.37 )-----------( 236      ( 23 May 2022 08:05 )             24.3       Urine Studies:  Creatinine Trend: 0.9<--, 1.0<--, 1.0<--, 1.1<--, 1.4<--, 1.6<--        ·	pt known to me  ·	TELMA improved with ivf  ·	hypernatremia  ·	sepsis and likely source infected sacral ulcer s/p dedridement on merrem  ·	anemia  ·	PEG  ·	diabetes with elevated glucose    1) seen by ID - on Merrem    2) peg feeds and free water  3) am labs  will d/w daughter     Nephrology progress note     alert but not communicative    ON events/Subjective:     Allergies:  No Known Allergies    Hospital Medications:   MEDICATIONS  (STANDING):  aspirin  chewable 81 milliGRAM(s) Oral daily  atorvastatin 20 milliGRAM(s) Oral at bedtime  calcium carbonate 1250 mG  + Vitamin D (OsCal 500 + D) 1 Tablet(s) Oral daily  collagenase Ointment 1 Application(s) Topical two times a day  Dakins Solution - 1/2 Strength 1 Application(s) Topical two times a day  dextrose 5%. 1000 milliLiter(s) (100 mL/Hr) IV Continuous <Continuous>  dextrose 5%. 1000 milliLiter(s) (50 mL/Hr) IV Continuous <Continuous>  dextrose 50% Injectable 25 Gram(s) IV Push once  dextrose 50% Injectable 12.5 Gram(s) IV Push once  dextrose 50% Injectable 25 Gram(s) IV Push once  famotidine    Tablet 20 milliGRAM(s) Oral daily  glucagon  Injectable 1 milliGRAM(s) IntraMuscular once  heparin   Injectable 5000 Unit(s) SubCutaneous every 12 hours  insulin glargine Injectable (LANTUS) 50 Unit(s) SubCutaneous at bedtime  insulin lispro (ADMELOG) corrective regimen sliding scale   SubCutaneous three times a day before meals  insulin lispro Injectable (ADMELOG) 10 Unit(s) SubCutaneous every 6 hours  levothyroxine 50 MICROGram(s) Oral daily  melatonin 5 milliGRAM(s) Oral at bedtime  meropenem  IVPB 1000 milliGRAM(s) IV Intermittent every 12 hours  senna 2 Tablet(s) Oral at bedtime    REVIEW OF SYSTEMS:  CONSTITUTIONAL: No weakness, fevers or chills  EYES/ENT: No visual changes;  No vertigo or throat pain   NECK: No pain or stiffness  RESPIRATORY: No cough, wheezing, hemoptysis; No shortness of breath  CARDIOVASCULAR: No chest pain or palpitations.  GASTROINTESTINAL: No abdominal or epigastric pain. No nausea, vomiting, or hematemesis; No diarrhea or constipation. No melena or hematochezia.  GENITOURINARY: No dysuria, frequency, foamy urine, urinary urgency, incontinence or hematuria  NEUROLOGICAL: No numbness or weakness  SKIN: No itching, burning, rashes, or lesions   VASCULAR: No bilateral lower extremity edema.   All other review of systems is negative unless indicated above.    VITALS:  T(F): 98.2 (05-23-22 @ 05:55), Max: 99.5 (05-22-22 @ 21:32)  HR: 76 (05-23-22 @ 05:55)  BP: 141/66 (05-23-22 @ 05:55)  RR: 17 (05-23-22 @ 05:55)  SpO2: --  Wt(kg): --    05-21 @ 07:01  -  05-22 @ 07:00  --------------------------------------------------------  IN: 845 mL / OUT: 600 mL / NET: 245 mL        PHYSICAL EXAM:  Constitutional: NAD  HEENT: anicteric sclera, oropharynx clear, MMM  Neck: No JVD  Respiratory: CTAB, no wheezes, rales or rhonchi  Cardiovascular: S1, S2, RRR  Gastrointestinal: BS+, soft, NT/ND  Extremities: No cyanosis or clubbing. No peripheral edema  Neurological: A/O x 3, no focal deficits  Psychiatric: Normal mood, normal affect  : No CVA tenderness. No amin.   Skin: No rashes  Vascular Access:    LABS:  05-23    147<H>  |  109  |  26<H>  ----------------------------<  201<H>  5.0   |  23  |  0.9    Ca    8.6      23 May 2022 08:05  Mg     2.0     05-23    TPro  5.4<L>  /  Alb  2.3<L>  /  TBili  0.4  /  DBili      /  AST  20  /  ALT  18  /  AlkPhos  65  05-23                          7.4    13.37 )-----------( 236      ( 23 May 2022 08:05 )             24.3       Urine Studies:  Creatinine Trend: 0.9<--, 1.0<--, 1.0<--, 1.1<--, 1.4<--, 1.6<--        ·	pt known to me  ·	TELMA improved with ivf  ·	hypernatremia  ·	sepsis and likely source infected sacral ulcer s/p dedridement on merrem  ·	anemia  ·	PEG  ·	diabetes with elevated glucose    1) seen by ID - on Merrem    2) peg feeds and free water  3) am labs with iron studies  will d/w daughter

## 2022-05-23 NOTE — DIETITIAN INITIAL EVALUATION ADULT - OTHER CALCULATIONS
MSJ 1204 x SF 1.3-1.4= [phone] kcal/day (SF r/t stage 4 PU); pro needs based on 1.25-1.5 g/kg (same reason as above)=  g/day (may be higher given recent debridement and current infection); fluid needs 2555 ml/day or per MD (35 mL/kg r/t infection and wound)

## 2022-05-23 NOTE — DIETITIAN INITIAL EVALUATION ADULT - NS FNS DIET ORDER
Diet, NPO with Tube Feed:   Tube Feeding Modality: Gastrostomy  Glucerna 1.2 Bertin  Total Volume for 24 Hours (mL): 1560  Continuous  Starting Tube Feed Rate {mL per Hour}: 65  Increase Tube Feed Rate by (mL): 0  Until Goal Tube Feed Rate (mL per Hour): 65  Tube Feed Duration (in Hours): 24  Tube Feed Start Time: 10:00  Free Water Flush   Total Volume per Flush (mL): 400   Frequency: Every 4 Hours    Start Time: 10:00 (05-22-22 @ 00:29) [Active]
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
## 2022-05-24 NOTE — PROGRESS NOTE ADULT - SUBJECTIVE AND OBJECTIVE BOX
Patient is a 76y old  Female who presents with a chief complaint of sepsis, altered MS (22 May 2022 12:22)    Pt seen for f/u s/p sal sacral wound POD 3     Vital Signs Last 24 Hrs  T(C): 37.2 (24 May 2022 05:00), Max: 37.2 (24 May 2022 05:00)  T(F): 98.9 (24 May 2022 05:00), Max: 98.9 (24 May 2022 05:00)  HR: 81 (24 May 2022 05:00) (81 - 92)  BP: 149/63 (24 May 2022 05:00) (132/64 - 149/63)  LABS:                        7.4    13.37 )-----------( 236      ( 23 May 2022 08:05 )             24.3           PE: Awake and alert    full thickness wound to sacrum with areas of necrotic tissue, decreased erythema and swelling. No discharge

## 2022-05-24 NOTE — PROGRESS NOTE ADULT - SUBJECTIVE AND OBJECTIVE BOX
Pt seen and examined at bedside.        VITAL SIGNS (Last 24 hrs):  T(C): 37.2 (05-24-22 @ 05:00), Max: 37.2 (05-24-22 @ 05:00)  HR: 81 (05-24-22 @ 05:00) (81 - 92)  BP: 149/63 (05-24-22 @ 05:00) (132/64 - 149/63)  RR: 19 (05-24-22 @ 05:00) (18 - 19)  SpO2: 94% (05-24-22 @ 12:15) (94% - 94%)  Wt(kg): --  Daily     Daily     I&O's Summary    23 May 2022 07:01  -  24 May 2022 07:00  --------------------------------------------------------  IN: 637 mL / OUT: 0 mL / NET: 637 mL        PHYSICAL EXAM:  GENERAL: NAD  HEAD:  Atraumatic, Normocephalic  EYES:  conjunctiva and sclera clear  NECK: Supple, No JVD  CHEST/LUNG: Clear to auscultation bilaterally; No wheeze  HEART: Regular rate and rhythm; No murmurs, rubs, or gallops  ABDOMEN: Soft, Nontender, Nondistended; Bowel sounds present  EXTREMITIES:  2+ Peripheral Pulses, No clubbing, cyanosis, or edema  SKIN: sacral DU     Labs Reviewed       CBC Full  -  ( 24 May 2022 09:06 )  WBC Count : 14.65 K/uL  Hemoglobin : 8.2 g/dL  Hematocrit : 27.6 %  Platelet Count - Automated : 322 K/uL  Mean Cell Volume : 83.9 fL  Mean Cell Hemoglobin : 24.9 pg  Mean Cell Hemoglobin Concentration : 29.7 g/dL  Auto Neutrophil # : 10.28 K/uL  Auto Lymphocyte # : 2.75 K/uL  Auto Monocyte # : 0.75 K/uL  Auto Eosinophil # : 0.26 K/uL  Auto Basophil # : 0.09 K/uL  Auto Neutrophil % : 70.2 %  Auto Lymphocyte % : 18.8 %  Auto Monocyte % : 5.1 %  Auto Eosinophil % : 1.8 %  Auto Basophil % : 0.6 %    BMP:    05-24 @ 09:06    Blood Urea Nitrogen - 26  Calcium - 8.6  Carbon Dioxide - 20  Chloride - 104  Creatinine - 0.8  Glucose - 91  Potassium - 4.7  Sodium - 138         Urine Culture:  05-21 @ 14:32 Urine culture: --    Culture Results:   Few Pseudomonas aeruginosa Susceptibility to follow.  Normal enteric patricia isolated  Method Type: BARON  Organism: Enterococcus faecium (vancomycin resistant)  Organism Identification: Enterococcus faecium (vancomycin resistant)  Specimen Source: .Other sacrum  05-19 @ 21:38 Urine culture: --    Culture Results:   No growth to date.  Method Type: --  Organism: --  Organism Identification: --  Specimen Source: .Blood Blood-Peripheral  05-19 @ 21:22 Urine culture: --    Culture Results:   No growth to date.  Method Type: --  Organism: --  Organism Identification: --  Specimen Source: .Blood Blood-Peripheral           MEDICATIONS  (STANDING):  aspirin  chewable 81 milliGRAM(s) Oral daily  atorvastatin 20 milliGRAM(s) Oral at bedtime  calcium carbonate 1250 mG  + Vitamin D (OsCal 500 + D) 1 Tablet(s) Oral daily  collagenase Ointment 1 Application(s) Topical two times a day  Dakins Solution - 1/2 Strength 1 Application(s) Topical two times a day  dextrose 5%. 1000 milliLiter(s) (100 mL/Hr) IV Continuous <Continuous>  dextrose 5%. 1000 milliLiter(s) (50 mL/Hr) IV Continuous <Continuous>  dextrose 50% Injectable 25 Gram(s) IV Push once  dextrose 50% Injectable 12.5 Gram(s) IV Push once  dextrose 50% Injectable 25 Gram(s) IV Push once  famotidine    Tablet 20 milliGRAM(s) Oral daily  glucagon  Injectable 1 milliGRAM(s) IntraMuscular once  heparin   Injectable 5000 Unit(s) SubCutaneous every 12 hours  insulin glargine Injectable (LANTUS) 40 Unit(s) SubCutaneous at bedtime  insulin lispro (ADMELOG) corrective regimen sliding scale   SubCutaneous three times a day before meals  insulin lispro Injectable (ADMELOG) 10 Unit(s) SubCutaneous every 6 hours  levothyroxine 50 MICROGram(s) Oral daily  melatonin 5 milliGRAM(s) Oral at bedtime  meropenem  IVPB 1000 milliGRAM(s) IV Intermittent every 12 hours    MEDICATIONS  (PRN):  acetaminophen     Tablet .. 650 milliGRAM(s) Oral every 6 hours PRN Temp greater or equal to 38C (100.4F), Mild Pain (1 - 3)  dextrose Oral Gel 15 Gram(s) Oral once PRN Blood Glucose LESS THAN 70 milliGRAM(s)/deciliter

## 2022-05-24 NOTE — PROGRESS NOTE ADULT - SUBJECTIVE AND OBJECTIVE BOX
GLORIA WATERS  76y, Female  Allergy: No Known Allergies      LOS  4d    CHIEF COMPLAINT: SEPSIS W/LACTIC ACIDOSIS; PRESSURE INJURY DEEP SACRAL TISSUE; UREMIA; TELMA     (23 May 2022 13:30)      INTERVAL EVENTS/HPI  - No acute events overnight  - T(F): , Max: 98.9 (05-24-22 @ 05:00)  - Tolerating medication  - WBC Count: 13.37 (05-23-22 @ 08:05)  WBC Count: 13.19 (05-22-22 @ 08:14)     - Creatinine, Serum: 0.9 (05-23-22 @ 08:05)       ROS  unable to obtain history secondary to patient's mental status and/or sedation     VITALS:  T(F): 98.9, Max: 98.9 (05-24-22 @ 05:00)  HR: 81  BP: 149/63  RR: 19Vital Signs Last 24 Hrs  T(C): 37.2 (24 May 2022 05:00), Max: 37.2 (24 May 2022 05:00)  T(F): 98.9 (24 May 2022 05:00), Max: 98.9 (24 May 2022 05:00)  HR: 81 (24 May 2022 05:00) (81 - 92)  BP: 149/63 (24 May 2022 05:00) (132/64 - 149/63)  BP(mean): --  RR: 19 (24 May 2022 05:00) (18 - 19)  SpO2: --    PHYSICAL EXAM:  Gen: NAD, resting in bed  HEENT: Normocephalic, atraumatic  Neck: supple, no lymphadenopathy  CV: Regular rate & regular rhythm  Lungs: decreased BS at bases, no fremitus  Abdomen: Soft, BS present  Ext: Warm, well perfused  Neuro: non focal, awake  Skin: no rash, no erythema  Lines: no phlebitis   Sacrum deferred     FH: Non-contributory  Social Hx: Non-contributory    TESTS & MEASUREMENTS:                        7.4    13.37 )-----------( 236      ( 23 May 2022 08:05 )             24.3     05-23    147<H>  |  109  |  26<H>  ----------------------------<  201<H>  5.0   |  23  |  0.9    Ca    8.6      23 May 2022 08:05  Mg     2.0     05-23    TPro  5.4<L>  /  Alb  2.3<L>  /  TBili  0.4  /  DBili  x   /  AST  20  /  ALT  18  /  AlkPhos  65  05-23      LIVER FUNCTIONS - ( 23 May 2022 08:05 )  Alb: 2.3 g/dL / Pro: 5.4 g/dL / ALK PHOS: 65 U/L / ALT: 18 U/L / AST: 20 U/L / GGT: x               Culture - Other (collected 05-21-22 @ 14:32)  Source: .Other sacrum  Preliminary Report (05-23-22 @ 14:13):    Few Pseudomonas aeruginosa    Normal enteric patricia isolated    Culture - Blood (collected 05-19-22 @ 21:38)  Source: .Blood Blood-Peripheral  Preliminary Report (05-21-22 @ 02:01):    No growth to date.    Culture - Blood (collected 05-19-22 @ 21:22)  Source: .Blood Blood-Peripheral  Preliminary Report (05-21-22 @ 02:01):    No growth to date.        Blood Gas Venous - Lactate: 2.70 mmol/L (05-20-22 @ 01:02)  Blood Gas Venous - Lactate: 3.10 mmol/L (05-19-22 @ 20:20)      INFECTIOUS DISEASES TESTING  Rapid RVP Result: NotDetec (05-19-22 @ 21:22)  strept    INFLAMMATORY MARKERS      RADIOLOGY & ADDITIONAL TESTS:  I have personally reviewed the last available Chest xray  CXR      CT      CARDIOLOGY TESTING  12 Lead ECG:   Ventricular Rate 79 BPM    Atrial Rate 79 BPM    P-R Interval 192 ms    QRS Duration 80 ms    Q-T Interval 394 ms    QTC Calculation(Bazett) 451 ms    P Axis 75 degrees    R Axis -7 degrees    T Axis 51 degrees    Diagnosis Line Normal sinus rhythm  Low voltage QRS  Cannot rule out Anterior infarct , age undetermined  Abnormal ECG    Confirmed by Ramon Beck ([id number]) on 5/20/2022 7:50:19 AM (05-19-22 @ 21:37)      MEDICATIONS  aspirin  chewable 81 Oral daily  atorvastatin 20 Oral at bedtime  calcium carbonate 1250 mG  + Vitamin D (OsCal 500 + D) 1 Oral daily  collagenase Ointment 1 Topical two times a day  Dakins Solution - 1/2 Strength 1 Topical two times a day  dextrose 5%. 1000 IV Continuous <Continuous>  dextrose 5%. 1000 IV Continuous <Continuous>  dextrose 50% Injectable 25 IV Push once  dextrose 50% Injectable 12.5 IV Push once  dextrose 50% Injectable 25 IV Push once  famotidine    Tablet 20 Oral daily  glucagon  Injectable 1 IntraMuscular once  heparin   Injectable 5000 SubCutaneous every 12 hours  insulin glargine Injectable (LANTUS) 40 SubCutaneous at bedtime  insulin lispro (ADMELOG) corrective regimen sliding scale  SubCutaneous three times a day before meals  insulin lispro Injectable (ADMELOG) 10 SubCutaneous every 6 hours  levothyroxine 50 Oral daily  melatonin 5 Oral at bedtime  meropenem  IVPB 1000 IV Intermittent every 12 hours      WEIGHT  Weight (kg): 73 (05-20-22 @ 04:24)      ANTIBIOTICS:  meropenem  IVPB 1000 milliGRAM(s) IV Intermittent every 12 hours      All available historical records have been reviewed

## 2022-05-24 NOTE — PROGRESS NOTE ADULT - ASSESSMENT
ASSESSMENT:  Stage IV pressure ulcer to sacrum    RECOMMENDATION:  Continue wound care - Santyl/Wet Kerlex Packin with 1/4 Dakins/DPD BID  prelim culture shows pseudomonas and normal patricia  IV abx as indicated/ per ID  Offloading/ positional changes  Will follow.
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
## 2022-05-24 NOTE — PROGRESS NOTE ADULT - SUBJECTIVE AND OBJECTIVE BOX
Nephrology progress note     pt more alert    ON events/Subjective:     Allergies:  No Known Allergies    Hospital Medications:   MEDICATIONS  (STANDING):  aspirin  chewable 81 milliGRAM(s) Oral daily  atorvastatin 20 milliGRAM(s) Oral at bedtime  calcium carbonate 1250 mG  + Vitamin D (OsCal 500 + D) 1 Tablet(s) Oral daily  collagenase Ointment 1 Application(s) Topical two times a day  Dakins Solution - 1/2 Strength 1 Application(s) Topical two times a day  dextrose 5%. 1000 milliLiter(s) (100 mL/Hr) IV Continuous <Continuous>  dextrose 5%. 1000 milliLiter(s) (50 mL/Hr) IV Continuous <Continuous>  dextrose 50% Injectable 25 Gram(s) IV Push once  dextrose 50% Injectable 12.5 Gram(s) IV Push once  dextrose 50% Injectable 25 Gram(s) IV Push once  famotidine    Tablet 20 milliGRAM(s) Oral daily  glucagon  Injectable 1 milliGRAM(s) IntraMuscular once  heparin   Injectable 5000 Unit(s) SubCutaneous every 12 hours  insulin glargine Injectable (LANTUS) 40 Unit(s) SubCutaneous at bedtime  insulin lispro (ADMELOG) corrective regimen sliding scale   SubCutaneous three times a day before meals  insulin lispro Injectable (ADMELOG) 10 Unit(s) SubCutaneous every 6 hours  levothyroxine 50 MICROGram(s) Oral daily  melatonin 5 milliGRAM(s) Oral at bedtime  meropenem  IVPB 1000 milliGRAM(s) IV Intermittent every 12 hours    REVIEW OF SYSTEMS:    unable to obtain    All other review of systems is negative unless indicated above.    VITALS:  T(F): 98.9 (05-24-22 @ 05:00), Max: 98.9 (05-24-22 @ 05:00)  HR: 81 (05-24-22 @ 05:00)  BP: 149/63 (05-24-22 @ 05:00)  RR: 19 (05-24-22 @ 05:00)  SpO2: --  Wt(kg): --    05-23 @ 07:01  -  05-24 @ 07:00  --------------------------------------------------------  IN: 637 mL / OUT: 0 mL / NET: 637 mL      Height (cm): 160 (05-23 @ 13:30)  PHYSICAL EXAM:  Constitutional: NAD  HEENT: anicteric sclera, oropharynx clear, MMM  Neck: No JVD  Respiratory: CTAB, no wheezes, rales or rhonchi  Cardiovascular: S1, S2, RRR  Gastrointestinal: BS+, soft, NT/ND  Extremities:edema  Neurological: A/O x 3, no focal deficits  Psychiatric: Normal mood, normal affect  : No CVA tenderness. No amin.   Skin: No rashes  Vascular Access:    LABS:  05-23    147<H>  |  109  |  26<H>  ----------------------------<  201<H>  5.0   |  23  |  0.9    Ca    8.6      23 May 2022 08:05  Mg     2.0     05-23    TPro  5.4<L>  /  Alb  2.3<L>  /  TBili  0.4  /  DBili      /  AST  20  /  ALT  18  /  AlkPhos  65  05-23                          7.4    13.37 )-----------( 236      ( 23 May 2022 08:05 )             24.3       Urine Studies:  Creatinine Trend: 0.9<--, 1.0<--, 1.0<--, 1.1<--, 1.4<--, 1.6<--      RADIOLOGY & ADDITIONAL STUDIES:      ·	pt known to me  ·	TELMA improved with ivf  ·	hypernatremia  ·	sepsis and likely source infected sacral ulcer s/p dedridement on merrem  ·	anemia  ·	PEG  ·	more alert    1) seen by ID - on Merrem    2) peg feeds and free water  3) am labs with iron studies please  will d/w daughter

## 2022-05-24 NOTE — MEDICAL STUDENT PROGRESS NOTE(EDUCATION) - SUBJECTIVE AND OBJECTIVE BOX
GLORIA WATERS 76y Female  MRN#: [id number]   Hospital Day: 3d    SUBJECTIVE  Patient is a 76y old Female who presents with a chief complaint of sepsis, altered MS (22 May 2022 12:22)  Currently admitted to medicine with the primary diagnosis of Severe sepsis with lactic acidosis    75 y/o F w/ pmhx of Anxiety, CAD, Hypothyroidism, IDDM, Schizophrenia, Dementia sent to ED from [facility] for concern over infected sacral ulcer.     Unable to obtain HPI from patient (AOx0):  As per ED note, patient is AOx2 at baseline    Patient has a G-tube; Done in [email or web address] (as per [facility] paperwork); However no indication why; Poor PO intake??    Unable to complete ROS due to mental status    ED:   - VS: 99F, HR 71, /53, 99% on RA;  - Labs: WBC 18, Cr. 1.6 (improved to 1.4 w/ IVF), Lac 2.7;  - CXR: Left basilar linear subsegmental atelectasis. Otherwise, no evidence of acute pulmonary process..  - CTH: chest communicating hydrocephalus. Chronic microvascular ischemic changes.      INTERVAL HPI AND OVERNIGHT EVENTS:  Patient was examined and seen at bedside. This morning she is resting comfortably in bed and reports no issues or overnight events.    REVIEW OF SYMPTOMS:  CONSTITUTIONAL: No weakness, fevers or chills; No headaches  RESPIRATORY: No cough, wheezing, or hemoptysis; No shortness of breath  CARDIOVASCULAR: No chest pain or palpitations  GASTROINTESTINAL: No abdominal or epigastric pain; No nausea, vomiting, or hematemesis; No diarrhea or constipation; No melena or hematochezia  GENITOURINARY: No dysuria, frequency or hematuria  MUSCULOSKELETAL: No joint pain, no muscle pain, no weakness    OBJECTIVE  PAST MEDICAL & SURGICAL HISTORY  Coronary artery disease    Anxiety    Dementia    Insulin dependent diabetes mellitus    Paranoid schizophrenia    Hyperlipidemia    Hypothyroidism    Abscess of right buttock    H/O abdominal surgery      ALLERGIES:  No Known Allergies    MEDICATIONS:  STANDING MEDICATIONS  aspirin  chewable 81 milliGRAM(s) Oral daily  atorvastatin 20 milliGRAM(s) Oral at bedtime  calcium carbonate 1250 mG  + Vitamin D (OsCal 500 + D) 1 Tablet(s) Oral daily  collagenase Ointment 1 Application(s) Topical two times a day  Dakins Solution - 1/2 Strength 1 Application(s) Topical two times a day  dextrose 5%. 1000 milliLiter(s) IV Continuous <Continuous>  dextrose 5%. 1000 milliLiter(s) IV Continuous <Continuous>  dextrose 5%. 1000 milliLiter(s) IV Continuous <Continuous>  dextrose 50% Injectable 25 Gram(s) IV Push once  dextrose 50% Injectable 12.5 Gram(s) IV Push once  dextrose 50% Injectable 25 Gram(s) IV Push once  famotidine    Tablet 20 milliGRAM(s) Oral daily  glucagon  Injectable 1 milliGRAM(s) IntraMuscular once  heparin   Injectable 5000 Unit(s) SubCutaneous every 12 hours  insulin glargine Injectable (LANTUS) 45 Unit(s) SubCutaneous at bedtime  insulin lispro (ADMELOG) corrective regimen sliding scale   SubCutaneous three times a day before meals  insulin lispro Injectable (ADMELOG) 10 Unit(s) SubCutaneous every 6 hours  levothyroxine 50 MICROGram(s) Oral daily  melatonin 5 milliGRAM(s) Oral at bedtime  meropenem  IVPB 1000 milliGRAM(s) IV Intermittent every 12 hours  senna 2 Tablet(s) Oral at bedtime    PRN MEDICATIONS  acetaminophen     Tablet .. 650 milliGRAM(s) Oral every 6 hours PRN  dextrose Oral Gel 15 Gram(s) Oral once PRN  morphine  - Injectable 2 milliGRAM(s) IV Push every 6 hours PRN  polyethylene glycol 3350 17 Gram(s) Oral daily PRN      VITAL SIGNS: Last 24 Hours  T(C): 36.8 (23 May 2022 05:55), Max: 37.5 (22 May 2022 21:32)  T(F): 98.2 (23 May 2022 05:55), Max: 99.5 (22 May 2022 21:32)  HR: 76 (23 May 2022 05:55) (76 - 78)  BP: 141/66 (23 May 2022 05:55) (134/59 - 141/66)  BP(mean): --  RR: 17 (23 May 2022 05:55) (17 - 18)  SpO2: --    LABS:                        7.2    13.19 )-----------( 186      ( 22 May 2022 08:14 )             24.5     05-22    152<H>  |  115<H>  |  41<H>  ----------------------------<  133<H>  4.2   |  24  |  1.0    Ca    8.7      22 May 2022 08:14  Mg     2.2     05-22    TPro  5.1<L>  /  Alb  2.3<L>  /  TBili  0.2  /  DBili  x   /  AST  27  /  ALT  20  /  AlkPhos  78  05-22          RADIOLOGY:  < from: Xray Chest 1 View-PORTABLE IMMEDIATE (05.19.22 @ 22:20) >  Impression:    Left basilar linear subsegmental atelectasis. Otherwise, no evidence of   acute pulmonary process..    --- End of Report ---      < end of copied text >  < from: CT Head No Cont (05.19.22 @ 21:17) >  IMPRESSION:    Findings the chest communicating hydrocephalus. Chronic microvascular   ischemic changes.    --- End of Report ---    < end of copied text >      PHYSICAL EXAM:  CONSTITUTIONAL: No acute distress, well-developed, well-groomed, AAOx3  HEAD: Atraumatic, normocephalic  PULMONARY: Clear to auscultation bilaterally; no wheezes, rales, or rhonchi  CARDIOVASCULAR: Regular rate and rhythm; no murmurs, rubs, or gallops  GASTROINTESTINAL: Soft, non-tender, non-distended; bowel sounds present  MUSCULOSKELETAL: 2+ peripheral pulses; no clubbing, no cyanosis, no edema  
GLORIA WATERS 76y Female  MRN#: [id number]   Hospital Day: 4d    SUBJECTIVE  Patient is a 76y old Female who presents with a chief complaint of SEPSIS W/LACTIC ACIDOSIS; PRESSURE INJURY DEEP SACRAL TISSUE; UREMIA; TELMA     (23 May 2022 13:30)  Currently admitted to medicine with the primary diagnosis of Severe sepsis with lactic acidosis    75 y/o F w/ pmhx of Anxiety, CAD, Hypothyroidism, IDDM, Schizophrenia, Dementia sent to ED from [facility] for concern over infected sacral ulcer.     Unable to obtain HPI from patient (AOx0):  As per ED note, patient is AOx2 at baseline    Patient has a G-tube; Done in [facility] (as per NH paperwork); However no indication why; Poor PO intake??    Unable to complete ROS due to mental status    ED:   - VS: 99F, HR 71, /53, 99% on RA;  - Labs: WBC 18, Cr. 1.6 (improved to 1.4 w/ IVF), Lac 2.7;  - CXR: Left basilar linear subsegmental atelectasis. Otherwise, no evidence of acute pulmonary process..  - CTH: chest communicating hydrocephalus. Chronic microvascular ischemic changes.      INTERVAL HPI AND OVERNIGHT EVENTS:  Patient was examined and seen at bedside. This morning she is resting comfortably in bed and reports no issues or overnight events. Patient is more alert this morning    REVIEW OF SYMPTOMS:  CONSTITUTIONAL: No weakness, fevers or chills; No headaches  RESPIRATORY: No cough, wheezing, or hemoptysis; No shortness of breath  CARDIOVASCULAR: No chest pain or palpitations  GASTROINTESTINAL: No abdominal or epigastric pain; No nausea, vomiting, or hematemesis; No diarrhea or constipation; No melena or hematochezia  GENITOURINARY: No dysuria, frequency or hematuria  SKIN: edematous      OBJECTIVE  PAST MEDICAL & SURGICAL HISTORY  Coronary artery disease    Anxiety    Dementia    Insulin dependent diabetes mellitus    Paranoid schizophrenia    Hyperlipidemia    Hypothyroidism    Abscess of right buttock    H/O abdominal surgery      ALLERGIES:  No Known Allergies    MEDICATIONS:  STANDING MEDICATIONS  aspirin  chewable 81 milliGRAM(s) Oral daily  atorvastatin 20 milliGRAM(s) Oral at bedtime  calcium carbonate 1250 mG  + Vitamin D (OsCal 500 + D) 1 Tablet(s) Oral daily  collagenase Ointment 1 Application(s) Topical two times a day  Dakins Solution - 1/2 Strength 1 Application(s) Topical two times a day  dextrose 5%. 1000 milliLiter(s) IV Continuous <Continuous>  dextrose 5%. 1000 milliLiter(s) IV Continuous <Continuous>  dextrose 50% Injectable 25 Gram(s) IV Push once  dextrose 50% Injectable 12.5 Gram(s) IV Push once  dextrose 50% Injectable 25 Gram(s) IV Push once  famotidine    Tablet 20 milliGRAM(s) Oral daily  glucagon  Injectable 1 milliGRAM(s) IntraMuscular once  heparin   Injectable 5000 Unit(s) SubCutaneous every 12 hours  insulin glargine Injectable (LANTUS) 40 Unit(s) SubCutaneous at bedtime  insulin lispro (ADMELOG) corrective regimen sliding scale   SubCutaneous three times a day before meals  insulin lispro Injectable (ADMELOG) 10 Unit(s) SubCutaneous every 6 hours  levothyroxine 50 MICROGram(s) Oral daily  melatonin 5 milliGRAM(s) Oral at bedtime  meropenem  IVPB 1000 milliGRAM(s) IV Intermittent every 12 hours  senna 2 Tablet(s) Oral at bedtime    PRN MEDICATIONS  acetaminophen     Tablet .. 650 milliGRAM(s) Oral every 6 hours PRN  dextrose Oral Gel 15 Gram(s) Oral once PRN  morphine  - Injectable 2 milliGRAM(s) IV Push every 6 hours PRN  polyethylene glycol 3350 17 Gram(s) Oral daily PRN      VITAL SIGNS: Last 24 Hours  T(C): 37.2 (24 May 2022 05:00), Max: 37.2 (24 May 2022 05:00)  T(F): 98.9 (24 May 2022 05:00), Max: 98.9 (24 May 2022 05:00)  HR: 81 (24 May 2022 05:00) (81 - 92)  BP: 149/63 (24 May 2022 05:00) (132/64 - 149/63)  BP(mean): --  RR: 19 (24 May 2022 05:00) (18 - 19)  SpO2: 97% (23 May 2022 09:00) (97% - 97%)    LABS:                        7.4    13.37 )-----------( 236      ( 23 May 2022 08:05 )             24.3     05-23    147<H>  |  109  |  26<H>  ----------------------------<  201<H>  5.0   |  23  |  0.9    Ca    8.6      23 May 2022 08:05  Mg     2.0     05-23    TPro  5.4<L>  /  Alb  2.3<L>  /  TBili  0.4  /  DBili  x   /  AST  20  /  ALT  18  /  AlkPhos  65  05-23      Culture - Other (collected 21 May 2022 14:32)  Source: .Other sacrum  Preliminary Report (23 May 2022 14:13):    Few Pseudomonas aeruginosa    Normal enteric patricia isolated          RADIOLOGY:      PHYSICAL EXAM:  CONSTITUTIONAL: No acute distress, well-developed, well-groomed, AAOx3  HEAD: Atraumatic, normocephalic  PULMONARY: Clear to auscultation bilaterally; no wheezes, rales, or rhonchi  CARDIOVASCULAR: Regular rate and rhythm; no murmurs, rubs, or gallops  GASTROINTESTINAL: Soft, non-tender, non-distended; bowel sounds present  MUSCULOSKELETAL: 2+ peripheral pulses; no clubbing, no cyanosis, no edema  NEUROLOGY: all CN grossly intact

## 2022-05-24 NOTE — MEDICAL STUDENT PROGRESS NOTE(EDUCATION) - NS MD HP STUD ASPLAN ASSES FT
77 y/o F w/ pmhx of Anxiety, CAD, Hypothyroidism, IDDM, Schizophrenia, Dementia sent to ED from [facility] found to be septic with lactic acidosis 2/2 infected stage 4 sacral ulcer.
75 y/o F w/ pmhx of Anxiety, CAD, Hypothyroidism, IDDM, Schizophrenia, Dementia sent to ED from [facility] found to be septic with lactic acidosis 2/2 infected stage 4 sacral ulcer.

## 2022-05-25 NOTE — PROGRESS NOTE ADULT - SUBJECTIVE AND OBJECTIVE BOX
Nephrology progress note     more alert    ON events/Subjective:     Allergies:  No Known Allergies    Hospital Medications:   MEDICATIONS  (STANDING):  aspirin  chewable 81 milliGRAM(s) Oral daily  atorvastatin 20 milliGRAM(s) Oral at bedtime  calcium carbonate 1250 mG  + Vitamin D (OsCal 500 + D) 1 Tablet(s) Oral daily  collagenase Ointment 1 Application(s) Topical two times a day  Dakins Solution - 1/2 Strength 1 Application(s) Topical two times a day  dextrose 5%. 1000 milliLiter(s) (100 mL/Hr) IV Continuous <Continuous>  dextrose 5%. 1000 milliLiter(s) (50 mL/Hr) IV Continuous <Continuous>  dextrose 50% Injectable 25 Gram(s) IV Push once  dextrose 50% Injectable 12.5 Gram(s) IV Push once  dextrose 50% Injectable 25 Gram(s) IV Push once  famotidine    Tablet 20 milliGRAM(s) Oral daily  glucagon  Injectable 1 milliGRAM(s) IntraMuscular once  heparin   Injectable 5000 Unit(s) SubCutaneous every 12 hours  insulin glargine Injectable (LANTUS) 40 Unit(s) SubCutaneous at bedtime  insulin lispro (ADMELOG) corrective regimen sliding scale   SubCutaneous three times a day before meals  insulin lispro Injectable (ADMELOG) 10 Unit(s) SubCutaneous every 6 hours  levothyroxine 50 MICROGram(s) Oral daily  linezolid    Tablet 600 milliGRAM(s) Oral every 12 hours  melatonin 5 milliGRAM(s) Oral at bedtime  meropenem  IVPB 1000 milliGRAM(s) IV Intermittent every 12 hours    REVIEW OF SYSTEMS:  CONSTITUTIONAL: No weakness, fevers or chills  EYES/ENT: ? sore throat  NECK: No pain or stiffness  RESPIRATORY: No cough, wheezing, hemoptysis; No shortness of breath  CARDIOVASCULAR: No chest pain or palpitations.  GASTROINTESTINAL: No abdominal or epigastric pain. No nausea, vomiting, or hematemesis; No diarrhea or constipation. No melena or hematochezia.  GENITOURINARY: No dysuria, frequency, foamy urine, urinary urgency, incontinence or hematuria  NEUROLOGICAL: No numbness or weakness  SKIN: No itching, burning, rashes, or lesions   VASCULAR: No bilateral lower extremity edema.   All other review of systems is negative unless indicated above.    VITALS:  T(F): 97.7 (05-25-22 @ 04:50), Max: 98.2 (05-24-22 @ 14:23)  HR: 78 (05-25-22 @ 07:55)  BP: 124/71 (05-25-22 @ 04:50)  RR: 18 (05-25-22 @ 07:55)  SpO2: 96% (05-25-22 @ 07:55)  Wt(kg): --    05-23 @ 07:01  -  05-24 @ 07:00  --------------------------------------------------------  IN: 637 mL / OUT: 0 mL / NET: 637 mL    05-24 @ 07:01  -  05-25 @ 07:00  --------------------------------------------------------  IN: 1948 mL / OUT: 800 mL / NET: 1148 mL        PHYSICAL EXAM:  Constitutional: NAD  HEENT: anicteric sclera, oropharynx clear, MMM  Neck: No JVD  Respiratory: CTAB, no wheezes, rales or rhonchi  Cardiovascular: S1, S2, RRR  Gastrointestinal: BS+, soft, NT/ND  Extremities: edema  Neurological: A/O x 3, no focal deficits  Psychiatric: Normal mood, normal affect  : No CVA tenderness. No amin.   Skin: No rashes  Vascular Access:    LABS:  05-25    140  |  106  |  22<H>  ----------------------------<  101<H>  5.1<H>   |  22  |  0.8    Ca    8.8      25 May 2022 04:30  Mg     2.0     05-25    TPro  5.5<L>  /  Alb  2.5<L>  /  TBili  0.4  /  DBili      /  AST  18  /  ALT  14  /  AlkPhos  74  05-25                          8.4    13.72 )-----------( 409      ( 25 May 2022 04:30 )             27.9       Urine Studies:  Creatinine Trend: 0.8<--, 0.8<--, 0.9<--, 1.0<--, 1.0<--, 1.1<--    ·	pt known to me  ·	TELMA improved with ivf (now off)  ·	hypernatremia resolved with ivf  ·	sepsis and likely source infected sacral ulcer s/p dedridement on merrem  ·	anemia  ·	PEG  ·	more alert    1) seen by ID - on Merrem/linezolid    2) peg feeds and free water  3) am labs with iron studies please  4) for another debridement per surgery later in week  will d/w daughter

## 2022-05-25 NOTE — PROGRESS NOTE ADULT - SUBJECTIVE AND OBJECTIVE BOX
GLORIA WATERS 76y Female  MRN#: [id number]   CODE STATUS:________        SUBJECTIVE    No acute events overnight. Patient lucid this morning and able to have a short conversation.                                           ----------------------------------------------------------  OBJECTIVE  PAST MEDICAL & SURGICAL HISTORY  Coronary artery disease    Anxiety    Dementia    Insulin dependent diabetes mellitus    Paranoid schizophrenia    Hyperlipidemia    Hypothyroidism    Abscess of right buttock    H/O abdominal surgery                                              -----------------------------------------------------------  ALLERGIES:  No Known Allergies                                            ------------------------------------------------------------    HOME MEDICATIONS  Home Medications:  acetaminophen 325 mg oral tablet, disintegratin tab(s) orally every 6 hours, As Needed (20 May 2022 04:21)  aspirin 81 mg oral tablet: 1 tab(s) orally once a day (20 May 2022 04:21)  atorvastatin 20 mg oral tablet: 1 tab(s) orally once a day (at bedtime) (20 May 2022 04:21)  Calcium 600+D 600 mg-200 intl units oral tablet: 2 tab(s) orally once a day (20 May 2022 04:21)  collagenase 250 units/g topical ointment: 1 application topically 2 times a day (20 May 2022 04:21)  famotidine 20 mg oral tablet: 1 tab(s) orally once a day (20 May 2022 04:21)  Haldol Decanoate 50 mg/mL intramuscular solution: 37.5 milligram(s) intramuscular once a month (first Tuesday (20 May 2022 04:21)  insulin glargine 100 units/mL subcutaneous solution: 45 unit(s) subcutaneous once a day (at bedtime) (20 May 2022 04:21)  Januvia 100 mg oral tablet: 1 tab(s) orally once a day (20 May 2022 04:21)  levothyroxine 50 mcg (0.05 mg) oral tablet: 1 tab(s) orally once a day (20 May 2022 04:21)  Melatonin 10 mg oral capsule: 1 cap(s) orally once a day (at bedtime) (20 May 2022 04:21)  Senna 8.6 mg oral tablet: 2 tab(s) orally once a day (at bedtime) (20 May 2022 04:21)                           MEDICATIONS:  STANDING MEDICATIONS  aspirin  chewable 81 milliGRAM(s) Oral daily  atorvastatin 20 milliGRAM(s) Oral at bedtime  calcium carbonate 1250 mG  + Vitamin D (OsCal 500 + D) 1 Tablet(s) Oral daily  collagenase Ointment 1 Application(s) Topical two times a day  Dakins Solution - 1/2 Strength 1 Application(s) Topical two times a day  dextrose 5%. 1000 milliLiter(s) IV Continuous <Continuous>  dextrose 5%. 1000 milliLiter(s) IV Continuous <Continuous>  dextrose 50% Injectable 25 Gram(s) IV Push once  dextrose 50% Injectable 12.5 Gram(s) IV Push once  dextrose 50% Injectable 25 Gram(s) IV Push once  famotidine    Tablet 20 milliGRAM(s) Oral daily  glucagon  Injectable 1 milliGRAM(s) IntraMuscular once  heparin   Injectable 5000 Unit(s) SubCutaneous every 12 hours  insulin glargine Injectable (LANTUS) 40 Unit(s) SubCutaneous at bedtime  insulin lispro (ADMELOG) corrective regimen sliding scale   SubCutaneous three times a day before meals  insulin lispro Injectable (ADMELOG) 10 Unit(s) SubCutaneous every 6 hours  levothyroxine 50 MICROGram(s) Oral daily  melatonin 5 milliGRAM(s) Oral at bedtime  meropenem  IVPB 1000 milliGRAM(s) IV Intermittent every 12 hours    PRN MEDICATIONS  acetaminophen     Tablet .. 650 milliGRAM(s) Oral every 6 hours PRN  dextrose Oral Gel 15 Gram(s) Oral once PRN                                            ------------------------------------------------------------  VITAL SIGNS: Last 24 Hours  T(C): 36.5 (25 May 2022 04:50), Max: 36.8 (24 May 2022 14:23)  T(F): 97.7 (25 May 2022 04:50), Max: 98.2 (24 May 2022 14:23)  HR: 78 (25 May 2022 07:55) (78 - 85)  BP: 124/71 (25 May 2022 04:50) (124/71 - 145/69)  BP(mean): --  RR: 18 (25 May 2022 07:55) (18 - 22)  SpO2: 96% (25 May 2022 07:55) (93% - 96%)      22 @ 07:01  -  22 @ 07:00  --------------------------------------------------------  IN: 1948 mL / OUT: 800 mL / NET: 1148 mL                                             --------------------------------------------------------------  LABS:                        8.4    13.72 )-----------( 409      ( 25 May 2022 04:30 )             27.9     05-25    140  |  106  |  22<H>  ----------------------------<  101<H>  5.1<H>   |  22  |  0.8    Ca    8.8      25 May 2022 04:30  Mg     2.0         TPro  5.5<L>  /  Alb  2.5<L>  /  TBili  0.4  /  DBili  x   /  AST  18  /  ALT  14  /  AlkPhos  74  05            PHYSICAL EXAM:    CONSTITUTIONAL: No acute distress  PULMONARY: Clear to auscultation bilaterally  CARDIOVASCULAR: Regular rate and rhythm; no murmurs, rubs, or gallops  GASTROINTESTINAL: Soft, non-tender  MUSCULOSKELETAL: no LE edema  Back: Stage 4 sacral ulcer    ASSESSMENT AND PLAN  75 y/o F w/ pmhx of Anxiety, CAD, Hypothyroidism, IDDM, Schizophrenia, Dementia sent to ED from [facility] found to be septic with lactic acidosis 2/2 infected stage 4 sacral ulcer    	  # Toxic metabolic encephalopathy 2/2 sepsis POA 2/2 Infected Stage 4 Sacral Ulcer  # Advanced Dementia  # Functional Quadriplegia  - s/p Vanc/ cefepime  - vanc dc due to no h/o MRSA  - Meropenem started due to h/o of ESBL  - local wound care Santyl/Wet Kerlex Packin with / Dakins/DPD BID  - Burn consult s/p debridement   - wound culture: few pseudomonas and VRE  - Burn re-consulted, wish to take patient to OR for another debridement on  or   - C/w meropenem 1g q12h IV  - ID re-consulted in light of wound culture results, will need to adjust abx  - check iron/ TIBC/ ferritin  - Check ESR/CRP  - Keep FS under tight control    #Hypernatremia  - c/w free water via PEG 200ml/ feed q4h  - s/p D5W  - hypernatremia resolved  - Na 140 on     # TELMA   - Likely prerenal, resolved  - Cr downtrending 1.6-> 1.4 -> 1.0    # Hypothyroidism   - c/w home Levothyroxine     # DLD  - A1c 9.8   - c/w home Lipitor    # IDDM  - c/w SS+ 40U Lantus (home dose)  - insulin as needed    Diet: Tube feeds      Pending: IV abx         GLORIA WATERS 76y Female  MRN#: [id number]   CODE STATUS:________        SUBJECTIVE    No acute events overnight. Patient lucid this morning and able to have a short conversation.                                           ----------------------------------------------------------  OBJECTIVE  PAST MEDICAL & SURGICAL HISTORY  Coronary artery disease    Anxiety    Dementia    Insulin dependent diabetes mellitus    Paranoid schizophrenia    Hyperlipidemia    Hypothyroidism    Abscess of right buttock    H/O abdominal surgery                                              -----------------------------------------------------------  ALLERGIES:  No Known Allergies                                            ------------------------------------------------------------    HOME MEDICATIONS  Home Medications:  acetaminophen 325 mg oral tablet, disintegratin tab(s) orally every 6 hours, As Needed (20 May 2022 04:21)  aspirin 81 mg oral tablet: 1 tab(s) orally once a day (20 May 2022 04:21)  atorvastatin 20 mg oral tablet: 1 tab(s) orally once a day (at bedtime) (20 May 2022 04:21)  Calcium 600+D 600 mg-200 intl units oral tablet: 2 tab(s) orally once a day (20 May 2022 04:21)  collagenase 250 units/g topical ointment: 1 application topically 2 times a day (20 May 2022 04:21)  famotidine 20 mg oral tablet: 1 tab(s) orally once a day (20 May 2022 04:21)  Haldol Decanoate 50 mg/mL intramuscular solution: 37.5 milligram(s) intramuscular once a month (first Tuesday (20 May 2022 04:21)  insulin glargine 100 units/mL subcutaneous solution: 45 unit(s) subcutaneous once a day (at bedtime) (20 May 2022 04:21)  Januvia 100 mg oral tablet: 1 tab(s) orally once a day (20 May 2022 04:21)  levothyroxine 50 mcg (0.05 mg) oral tablet: 1 tab(s) orally once a day (20 May 2022 04:21)  Melatonin 10 mg oral capsule: 1 cap(s) orally once a day (at bedtime) (20 May 2022 04:21)  Senna 8.6 mg oral tablet: 2 tab(s) orally once a day (at bedtime) (20 May 2022 04:21)                           MEDICATIONS:  STANDING MEDICATIONS  aspirin  chewable 81 milliGRAM(s) Oral daily  atorvastatin 20 milliGRAM(s) Oral at bedtime  calcium carbonate 1250 mG  + Vitamin D (OsCal 500 + D) 1 Tablet(s) Oral daily  collagenase Ointment 1 Application(s) Topical two times a day  Dakins Solution - 1/2 Strength 1 Application(s) Topical two times a day  dextrose 5%. 1000 milliLiter(s) IV Continuous <Continuous>  dextrose 5%. 1000 milliLiter(s) IV Continuous <Continuous>  dextrose 50% Injectable 25 Gram(s) IV Push once  dextrose 50% Injectable 12.5 Gram(s) IV Push once  dextrose 50% Injectable 25 Gram(s) IV Push once  famotidine    Tablet 20 milliGRAM(s) Oral daily  glucagon  Injectable 1 milliGRAM(s) IntraMuscular once  heparin   Injectable 5000 Unit(s) SubCutaneous every 12 hours  insulin glargine Injectable (LANTUS) 40 Unit(s) SubCutaneous at bedtime  insulin lispro (ADMELOG) corrective regimen sliding scale   SubCutaneous three times a day before meals  insulin lispro Injectable (ADMELOG) 10 Unit(s) SubCutaneous every 6 hours  levothyroxine 50 MICROGram(s) Oral daily  melatonin 5 milliGRAM(s) Oral at bedtime  meropenem  IVPB 1000 milliGRAM(s) IV Intermittent every 12 hours    PRN MEDICATIONS  acetaminophen     Tablet .. 650 milliGRAM(s) Oral every 6 hours PRN  dextrose Oral Gel 15 Gram(s) Oral once PRN                                            ------------------------------------------------------------  VITAL SIGNS: Last 24 Hours  T(C): 36.5 (25 May 2022 04:50), Max: 36.8 (24 May 2022 14:23)  T(F): 97.7 (25 May 2022 04:50), Max: 98.2 (24 May 2022 14:23)  HR: 78 (25 May 2022 07:55) (78 - 85)  BP: 124/71 (25 May 2022 04:50) (124/71 - 145/69)  BP(mean): --  RR: 18 (25 May 2022 07:55) (18 - 22)  SpO2: 96% (25 May 2022 07:55) (93% - 96%)      22 @ 07:01  -  22 @ 07:00  --------------------------------------------------------  IN: 1948 mL / OUT: 800 mL / NET: 1148 mL                                             --------------------------------------------------------------  LABS:                        8.4    13.72 )-----------( 409      ( 25 May 2022 04:30 )             27.9     05-25    140  |  106  |  22<H>  ----------------------------<  101<H>  5.1<H>   |  22  |  0.8    Ca    8.8      25 May 2022 04:30  Mg     2.0         TPro  5.5<L>  /  Alb  2.5<L>  /  TBili  0.4  /  DBili  x   /  AST  18  /  ALT  14  /  AlkPhos  74  05            PHYSICAL EXAM:    CONSTITUTIONAL: No acute distress  PULMONARY: Clear to auscultation bilaterally  CARDIOVASCULAR: Regular rate and rhythm; no murmurs, rubs, or gallops  GASTROINTESTINAL: Soft, non-tender  MUSCULOSKELETAL: no LE edema  Back: Stage 4 sacral ulcer    ASSESSMENT AND PLAN  75 y/o F w/ pmhx of Anxiety, CAD, Hypothyroidism, IDDM, Schizophrenia, Dementia sent to ED from [facility] found to be septic with lactic acidosis 2/2 infected stage 4 sacral ulcer    	  # Toxic metabolic encephalopathy 2/2 sepsis POA 2/2 Infected Stage 4 Sacral Ulcer  # Advanced Dementia  # Functional Quadriplegia  - s/p Vanc/ cefepime  - vanc dc due to no h/o MRSA  - Meropenem started due to h/o of ESBL  - local wound care Santyl/Wet Kerlex Packin with  Dakins/DPD BID  - Burn consult s/p debridement   - wound culture: few pseudomonas and VRE  - Burn re-consulted, wish to take patient to OR for another debridement on  or   - C/w meropenem 1g q12h IV  - ID re-consulted in light of wound culture results, will need to adjust abx  - Per ID start Linezolid 600 BID PO (approved by Dr. Munguia verbally)  - check iron/ TIBC/ ferritin  - Check ESR/CRP  - Keep FS under tight control    #Hypernatremia  - c/w free water via PEG 200ml/ feed q4h  - s/p D5W  - hypernatremia resolved  - Na 140 on     # TELMA   - Likely prerenal, resolved  - Cr downtrending 1.6-> 1.4 -> 1.0    # Hypothyroidism   - c/w home Levothyroxine     # DLD  - A1c 9.8   - c/w home Lipitor    # IDDM  - c/w SS+ 40U Lantus (home dose)  - insulin as needed    Diet: Tube feeds      Pending: IV abx         GLORIA WATERS 76y Female  MRN#: [id number]   CODE STATUS:________        SUBJECTIVE    No acute events overnight. Patient lucid this morning and able to have a short conversation.                                           ----------------------------------------------------------  OBJECTIVE  PAST MEDICAL & SURGICAL HISTORY  Coronary artery disease    Anxiety    Dementia    Insulin dependent diabetes mellitus    Paranoid schizophrenia    Hyperlipidemia    Hypothyroidism    Abscess of right buttock    H/O abdominal surgery                                              -----------------------------------------------------------  ALLERGIES:  No Known Allergies                                            ------------------------------------------------------------    HOME MEDICATIONS  Home Medications:  acetaminophen 325 mg oral tablet, disintegratin tab(s) orally every 6 hours, As Needed (20 May 2022 04:21)  aspirin 81 mg oral tablet: 1 tab(s) orally once a day (20 May 2022 04:21)  atorvastatin 20 mg oral tablet: 1 tab(s) orally once a day (at bedtime) (20 May 2022 04:21)  Calcium 600+D 600 mg-200 intl units oral tablet: 2 tab(s) orally once a day (20 May 2022 04:21)  collagenase 250 units/g topical ointment: 1 application topically 2 times a day (20 May 2022 04:21)  famotidine 20 mg oral tablet: 1 tab(s) orally once a day (20 May 2022 04:21)  Haldol Decanoate 50 mg/mL intramuscular solution: 37.5 milligram(s) intramuscular once a month (first Tuesday (20 May 2022 04:21)  insulin glargine 100 units/mL subcutaneous solution: 45 unit(s) subcutaneous once a day (at bedtime) (20 May 2022 04:21)  Januvia 100 mg oral tablet: 1 tab(s) orally once a day (20 May 2022 04:21)  levothyroxine 50 mcg (0.05 mg) oral tablet: 1 tab(s) orally once a day (20 May 2022 04:21)  Melatonin 10 mg oral capsule: 1 cap(s) orally once a day (at bedtime) (20 May 2022 04:21)  Senna 8.6 mg oral tablet: 2 tab(s) orally once a day (at bedtime) (20 May 2022 04:21)                           MEDICATIONS:  STANDING MEDICATIONS  aspirin  chewable 81 milliGRAM(s) Oral daily  atorvastatin 20 milliGRAM(s) Oral at bedtime  calcium carbonate 1250 mG  + Vitamin D (OsCal 500 + D) 1 Tablet(s) Oral daily  collagenase Ointment 1 Application(s) Topical two times a day  Dakins Solution - 1/2 Strength 1 Application(s) Topical two times a day  dextrose 5%. 1000 milliLiter(s) IV Continuous <Continuous>  dextrose 5%. 1000 milliLiter(s) IV Continuous <Continuous>  dextrose 50% Injectable 25 Gram(s) IV Push once  dextrose 50% Injectable 12.5 Gram(s) IV Push once  dextrose 50% Injectable 25 Gram(s) IV Push once  famotidine    Tablet 20 milliGRAM(s) Oral daily  glucagon  Injectable 1 milliGRAM(s) IntraMuscular once  heparin   Injectable 5000 Unit(s) SubCutaneous every 12 hours  insulin glargine Injectable (LANTUS) 40 Unit(s) SubCutaneous at bedtime  insulin lispro (ADMELOG) corrective regimen sliding scale   SubCutaneous three times a day before meals  insulin lispro Injectable (ADMELOG) 10 Unit(s) SubCutaneous every 6 hours  levothyroxine 50 MICROGram(s) Oral daily  melatonin 5 milliGRAM(s) Oral at bedtime  meropenem  IVPB 1000 milliGRAM(s) IV Intermittent every 12 hours    PRN MEDICATIONS  acetaminophen     Tablet .. 650 milliGRAM(s) Oral every 6 hours PRN  dextrose Oral Gel 15 Gram(s) Oral once PRN                                            ------------------------------------------------------------  VITAL SIGNS: Last 24 Hours  T(C): 36.5 (25 May 2022 04:50), Max: 36.8 (24 May 2022 14:23)  T(F): 97.7 (25 May 2022 04:50), Max: 98.2 (24 May 2022 14:23)  HR: 78 (25 May 2022 07:55) (78 - 85)  BP: 124/71 (25 May 2022 04:50) (124/71 - 145/69)  BP(mean): --  RR: 18 (25 May 2022 07:55) (18 - 22)  SpO2: 96% (25 May 2022 07:55) (93% - 96%)      22 @ 07:01  -  22 @ 07:00  --------------------------------------------------------  IN: 1948 mL / OUT: 800 mL / NET: 1148 mL                                             --------------------------------------------------------------  LABS:                        8.4    13.72 )-----------( 409      ( 25 May 2022 04:30 )             27.9     05-25    140  |  106  |  22<H>  ----------------------------<  101<H>  5.1<H>   |  22  |  0.8    Ca    8.8      25 May 2022 04:30  Mg     2.0         TPro  5.5<L>  /  Alb  2.5<L>  /  TBili  0.4  /  DBili  x   /  AST  18  /  ALT  14  /  AlkPhos  74  05            PHYSICAL EXAM:    CONSTITUTIONAL: No acute distress  PULMONARY: Clear to auscultation bilaterally  CARDIOVASCULAR: Regular rate and rhythm; no murmurs, rubs, or gallops  GASTROINTESTINAL: Soft, non-tender  MUSCULOSKELETAL: no LE edema  Back: Stage 4 sacral ulcer    ASSESSMENT AND PLAN  77 y/o F w/ pmhx of Anxiety, CAD, Hypothyroidism, IDDM, Schizophrenia, Dementia sent to ED from [facility] found to be septic with lactic acidosis 2/2 infected stage 4 sacral ulcer    	  # Toxic metabolic encephalopathy 2/2 sepsis POA 2/2 Infected Stage 4 Sacral Ulcer  # Advanced Dementia  # Functional Quadriplegia  - s/p Vanc/ cefepime  - vanc dc due to no h/o MRSA  - Meropenem started due to h/o of ESBL  - local wound care Santyl/Wet Kerlex Packin with  Dakins/DPD BID  - Burn consult s/p debridement   - wound culture: few pseudomonas and VRE  - Burn re-consulted, wish to take patient to OR for another debridement on  or   - ID re-consulted in light of wound culture results, will need to adjust abx  - Per ID start Linezolid 600 BID PO, Levaquin 500 QD, and Flagyl 500 TID   - check iron/ TIBC/ ferritin  - Check ESR/CRP  - Keep FS under tight control    #Hypernatremia  - c/w free water via PEG 200ml/ feed q4h  - s/p D5W  - hypernatremia resolved  - Na 140 on     # TELMA   - Likely prerenal, resolved  - Cr downtrending 1.6-> 1.4 -> 1.0    # Hypothyroidism   - c/w home Levothyroxine     # DLD  - A1c 9.8   - c/w home Lipitor    # IDDM  - c/w SS+ 40U Lantus (home dose)  - insulin as needed    Diet: Tube feeds      Pending: IV abx

## 2022-05-25 NOTE — PROGRESS NOTE ADULT - SUBJECTIVE AND OBJECTIVE BOX
GLORIA WATERS  76y, Female  Allergy: No Known Allergies      LOS  5d    CHIEF COMPLAINT: Lacie (25 May 2022 12:49)      INTERVAL EVENTS/HPI  - No acute events overnight  - T(F): , Max: 98.2 (05-24-22 @ 14:23)  - Tolerating medication  - WBC Count: 13.72 (05-25-22 @ 04:30)  WBC Count: 14.65 (05-24-22 @ 09:06)     - Creatinine, Serum: 0.8 (05-25-22 @ 04:30)  Creatinine, Serum: 0.8 (05-24-22 @ 09:06)       ROS  unable to obtain history secondary to patient's mental status and/or sedation     VITALS:  T(F): 97.7, Max: 98.2 (05-24-22 @ 14:23)  HR: 78  BP: 124/71  RR: 18Vital Signs Last 24 Hrs  T(C): 36.5 (25 May 2022 04:50), Max: 36.8 (24 May 2022 14:23)  T(F): 97.7 (25 May 2022 04:50), Max: 98.2 (24 May 2022 14:23)  HR: 78 (25 May 2022 07:55) (78 - 85)  BP: 124/71 (25 May 2022 04:50) (124/71 - 145/69)  BP(mean): --  RR: 18 (25 May 2022 07:55) (18 - 22)  SpO2: 96% (25 May 2022 07:55) (93% - 96%)    PHYSICAL EXAM:  Gen: NAD, resting in bed  HEENT: Normocephalic, atraumatic  Neck: supple, no lymphadenopathy  CV: Regular rate & regular rhythm  Lungs: decreased BS at bases, no fremitus  Abdomen: Soft, BS present  Ext: Warm, well perfused  Neuro: non focal, awake  Skin: no rash, no erythema  Lines: no phlebitis   Sacrum deferred      FH: Non-contributory  Social Hx: Non-contributory    TESTS & MEASUREMENTS:                        8.4    13.72 )-----------( 409      ( 25 May 2022 04:30 )             27.9     05-25    140  |  106  |  22<H>  ----------------------------<  101<H>  5.1<H>   |  22  |  0.8    Ca    8.8      25 May 2022 04:30  Mg     2.0     05-25    TPro  5.5<L>  /  Alb  2.5<L>  /  TBili  0.4  /  DBili  x   /  AST  18  /  ALT  14  /  AlkPhos  74  05-25      LIVER FUNCTIONS - ( 25 May 2022 04:30 )  Alb: 2.5 g/dL / Pro: 5.5 g/dL / ALK PHOS: 74 U/L / ALT: 14 U/L / AST: 18 U/L / GGT: x               Culture - Other (collected 05-21-22 @ 14:32)  Source: .Other sacrum  Final Report (05-25-22 @ 12:40):    Few Pseudomonas aeruginosa    Numerous Enterococcus faecium (vancomycin resistant)    Normal enteric patricia isolated  Organism: Pseudomonas aeruginosa  Enterococcus faecium (vancomycin resistant) (05-25-22 @ 12:40)  Organism: Enterococcus faecium (vancomycin resistant) (05-25-22 @ 12:40)      -  Ampicillin: R >8 Predicts results to ampicillin/sulbactam, amoxacillin-clavulanate and  piperacillin-tazobactam.      -  Daptomycin: SDD 4 The breakpoint for SDD (sensitive dose dependent)is based on a dosage regimen of 8-12 mg/kg administered every 24 h in adults and is intended for serious infections due to E. faecium. Consultation with an infectious diseases specialist is recommended.      -  Levofloxacin: R >4      -  Linezolid: S 1      -  Tetra/Doxy: R >8      -  Vancomycin: R >16      Method Type: BARON  Organism: Pseudomonas aeruginosa (05-25-22 @ 12:40)      -  Amikacin: S <=16      -  Aztreonam: S <=4      -  Cefepime: S 4      -  Ceftazidime: S 4      -  Ciprofloxacin: S <=0.25      -  Gentamicin: I 8      -  Imipenem: I 4      -  Levofloxacin: S 1      -  Meropenem: S <=1      -  Piperacillin/Tazobactam: S <=8      -  Tobramycin: S <=2      Method Type: BARON    Culture - Blood (collected 05-19-22 @ 21:38)  Source: .Blood Blood-Peripheral  Final Report (05-25-22 @ 02:00):    No Growth Final    Culture - Blood (collected 05-19-22 @ 21:22)  Source: .Blood Blood-Peripheral  Final Report (05-25-22 @ 02:00):    No Growth Final            INFECTIOUS DISEASES TESTING  COVID-19 PCR: NotDetec (05-24-22 @ 10:00)  Rapid RVP Result: NotDetec (05-19-22 @ 21:22)  strept    INFLAMMATORY MARKERS  Sedimentation Rate, Erythrocyte: 121 mm/Hr (05-25-22 @ 10:40)      RADIOLOGY & ADDITIONAL TESTS:  I have personally reviewed the last available Chest xray  CXR      CT      CARDIOLOGY TESTING  12 Lead ECG:   Ventricular Rate 79 BPM    Atrial Rate 79 BPM    P-R Interval 192 ms    QRS Duration 80 ms    Q-T Interval 394 ms    QTC Calculation(Bazett) 451 ms    P Axis 75 degrees    R Axis -7 degrees    T Axis 51 degrees    Diagnosis Line Normal sinus rhythm  Low voltage QRS  Cannot rule out Anterior infarct , age undetermined  Abnormal ECG    Confirmed by Ramon Beck ([id number]) on 5/20/2022 7:50:19 AM (05-19-22 @ 21:37)      MEDICATIONS  aspirin  chewable 81 Oral daily  atorvastatin 20 Oral at bedtime  calcium carbonate 1250 mG  + Vitamin D (OsCal 500 + D) 1 Oral daily  collagenase Ointment 1 Topical two times a day  Dakins Solution - 1/2 Strength 1 Topical two times a day  dextrose 5%. 1000 IV Continuous <Continuous>  dextrose 5%. 1000 IV Continuous <Continuous>  dextrose 50% Injectable 25 IV Push once  dextrose 50% Injectable 12.5 IV Push once  dextrose 50% Injectable 25 IV Push once  famotidine    Tablet 20 Oral daily  glucagon  Injectable 1 IntraMuscular once  heparin   Injectable 5000 SubCutaneous every 12 hours  insulin glargine Injectable (LANTUS) 40 SubCutaneous at bedtime  insulin lispro (ADMELOG) corrective regimen sliding scale  SubCutaneous three times a day before meals  insulin lispro Injectable (ADMELOG) 10 SubCutaneous every 6 hours  levothyroxine 50 Oral daily  linezolid    Tablet 600 Oral every 12 hours  melatonin 5 Oral at bedtime  meropenem  IVPB 1000 IV Intermittent every 12 hours      WEIGHT  Weight (kg): 73 (05-20-22 @ 04:24)  Creatinine, Serum: 0.8 mg/dL (05-25-22 @ 04:30)      ANTIBIOTICS:  linezolid    Tablet 600 milliGRAM(s) Oral every 12 hours  meropenem  IVPB 1000 milliGRAM(s) IV Intermittent every 12 hours      All available historical records have been reviewed

## 2022-05-25 NOTE — PROGRESS NOTE ADULT - SUBJECTIVE AND OBJECTIVE BOX
77 y/o F w/ pmhx of Anxiety, CAD, Hypothyroidism, IDDM, Schizophrenia, Dementia sent to ED from [facility] found to be septic with lactic acidosis 2/2 infected stage 4 sacral ulcer. She was consulted by burn, underwent bedside debridement, consulted by ID. On admission pt was found to have TELMA, resolved after IV hydration.  Today pt is awake, minimally verbal, denies pain, following commands, left arm is swollen.      OBJECTIVE  PAST MEDICAL & SURGICAL HISTORY  Coronary artery disease    Anxiety    Dementia    Insulin dependent diabetes mellitus    Paranoid schizophrenia    Hyperlipidemia    Hypothyroidism    Abscess of right buttock    H/O abdominal surgery      ALLERGIES:  No Known Allergies        HOME MEDICATIONS  Home Medications:  acetaminophen 325 mg oral tablet, disintegratin tab(s) orally every 6 hours, As Needed (20 May 2022 04:21)  aspirin 81 mg oral tablet: 1 tab(s) orally once a day (20 May 2022 04:)  atorvastatin 20 mg oral tablet: 1 tab(s) orally once a day (at bedtime) (20 May 2022 04:)  Calcium 600+D 600 mg-200 intl units oral tablet: 2 tab(s) orally once a day (20 May 2022 04:21)  collagenase 250 units/g topical ointment: 1 application topically 2 times a day (20 May 2022 04:)  famotidine 20 mg oral tablet: 1 tab(s) orally once a day (20 May 2022 04:)  Haldol Decanoate 50 mg/mL intramuscular solution: 37.5 milligram(s) intramuscular once a month (first Tuesday (20 May 2022 04:21)  insulin glargine 100 units/mL subcutaneous solution: 45 unit(s) subcutaneous once a day (at bedtime) (20 May 2022 04:21)  Januvia 100 mg oral tablet: 1 tab(s) orally once a day (20 May 2022 04:21)  levothyroxine 50 mcg (0.05 mg) oral tablet: 1 tab(s) orally once a day (20 May 2022 04:)  Melatonin 10 mg oral capsule: 1 cap(s) orally once a day (at bedtime) (20 May 2022 04:)  Senna 8.6 mg oral tablet: 2 tab(s) orally once a day (at bedtime) (20 May 2022 04:21)    VITAL SIGNS: Last 24 Hours  T(C): 36.5 (25 May 2022 04:50), Max: 36.8 (24 May 2022 14:23)  T(F): 97.7 (25 May 2022 04:50), Max: 98.2 (24 May 2022 14:23)  HR: 78 (25 May 2022 07:55) (78 - 85)  BP: 124/71 (25 May 2022 04:50) (124/71 - 145/69)  BP(mean): --  RR: 18 (25 May 2022 07:55) (18 - 22)  SpO2: 96% (25 May 2022 07:55) (93% - 96%)      PHYSICAL EXAM:  CONSTITUTIONAL: No acute distress, demented, minimally verbal   NECK: supple, no JVD   PULMONARY: decreased BS at bases   CARDIOVASCULAR: Regular rate and rhythm; no murmurs, rubs, or gallops  GASTROINTESTINAL: Soft, non-tender, PEG tube noted , BS present   MUSCULOSKELETAL: no LE edema on LE, LUE edema noted with contracted wrist   Back: Stage 4 sacral ulcer dressed       LABS:                                   8.4    13.72 )-----------( 409      ( 25 May 2022 04:30 )             27.9   05    140  |  106  |  22<H>  ----------------------------<  101<H>  5.1<H>   |  22  |  0.8    Ca    8.8      25 May 2022 04:30  Mg     2.0         TPro  5.5<L>  /  Alb  2.5<L>  /  TBili  0.4  /  DBili  x   /  AST  18  /  ALT  14  /  AlkPhos  74        Culture - Other (22 @ 14:32)   - Vancomycin: R >16   - Levofloxacin: R >4   - Linezolid: S 1   - Daptomycin: SDD 4 The breakpoint for SDD (sensitive dose dependent)is based on a dosage regimen of 8-12 mg/kg administered every 24 h in adults and is intended for serious infections due to E. faecium. Consultation with an infectious diseases specialist is recommended.   - Tetra/Doxy: R >8   - Ampicillin: R >8 Predicts results to ampicillin/sulbactam, amoxacillin-clavulanate and piperacillin-tazobactam.   Specimen Source: .Other sacrum   Culture Results:   Few Pseudomonas aeruginosa Susceptibility to follow.   Numerous Enterococcus faecium (vancomycin resistant)   Normal enteric patricia isolated   Organism Identification: Enterococcus faecium (vancomycin resistant)   Organism: Enterococcus faecium (vancomycin resistant)   Method Type: BARON Culture - Blood (22 @ 21:38)   Specimen Source: .Blood Blood-Peripheral   Culture Results:   No Growth Final     RADIOLOGY:   < from: Xray Chest 1 View-PORTABLE IMMEDIATE (22 @ 22:20) >  Impression:    Left basilar linear subsegmental atelectasis. Otherwise, no evidence of   acute pulmonary process..    < end of copied text >  < from: CT Head No Cont (22 @ 21:17) >  IMPRESSION:    Findings the chest communicating hydrocephalus. Chronic microvascular   ischemic changes.      MEDICATIONS  (STANDING):  aspirin  chewable 81 milliGRAM(s) Oral daily  atorvastatin 20 milliGRAM(s) Oral at bedtime  calcium carbonate 1250 mG  + Vitamin D (OsCal 500 + D) 1 Tablet(s) Oral daily  collagenase Ointment 1 Application(s) Topical two times a day  Dakins Solution - 1/2 Strength 1 Application(s) Topical two times a day  dextrose 5%. 1000 milliLiter(s) (100 mL/Hr) IV Continuous <Continuous>  dextrose 5%. 1000 milliLiter(s) (50 mL/Hr) IV Continuous <Continuous>  dextrose 50% Injectable 25 Gram(s) IV Push once  dextrose 50% Injectable 12.5 Gram(s) IV Push once  dextrose 50% Injectable 25 Gram(s) IV Push once  famotidine    Tablet 20 milliGRAM(s) Oral daily  glucagon  Injectable 1 milliGRAM(s) IntraMuscular once  heparin   Injectable 5000 Unit(s) SubCutaneous every 12 hours  insulin glargine Injectable (LANTUS) 40 Unit(s) SubCutaneous at bedtime  insulin lispro (ADMELOG) corrective regimen sliding scale   SubCutaneous three times a day before meals  insulin lispro Injectable (ADMELOG) 10 Unit(s) SubCutaneous every 6 hours  levothyroxine 50 MICROGram(s) Oral daily  linezolid    Tablet 600 milliGRAM(s) Oral every 12 hours  melatonin 5 milliGRAM(s) Oral at bedtime  meropenem  IVPB 1000 milliGRAM(s) IV Intermittent every 12 hours    MEDICATIONS  (PRN):  acetaminophen     Tablet .. 650 milliGRAM(s) Oral every 6 hours PRN Temp greater or equal to 38C (100.4F), Mild Pain (1 - 3)  dextrose Oral Gel 15 Gram(s) Oral once PRN Blood Glucose LESS THAN 70 milliGRAM(s)/deciliter

## 2022-05-25 NOTE — PROGRESS NOTE ADULT - ASSESSMENT
ASSESSMENT  75 y/o F w/ pmhx of Anxiety, CAD, Hypothyroidism, IDDM, Schizophrenia, Dementia sent to ED from [facility] for concern over infected sacral ulcer.       IMPRESSION  #Sacral decub    5/21 WCX     Few Pseudomonas aeruginosa    Numerous Enterococcus faecium (vancomycin resistant)    s/p debridement 5/21 to bone    5/19 BCX NGTD     WBC 18    Afebrile  , systolic hypotension     hx ESBL 2019    CXR no PNA  #Lactic acidosis  #Atelectasis  #TELMA  Creatinine, Serum: 1.4 (05-20-22 @ 01:18)  Creatinine, Serum: 0.8 mg/dL (05.25.22 @ 04:30)  QTC Calculation(Malik) 451 ms  crcl71    Weight (kg): 73 (05-20-22 @ 04:24)    RECOMMENDATIONS  - PO linezolid 600mg BID + Flagyl 500mg TID + Levaquin 500mg daily x 14 days  - Burn consult appreciated  - No benefit of long term intravenous antibiotics for sacral osteomyelitis. Continue with nutritional support and offloading   - Recall ID PRN     If any questions, please call or send a message on [email or web address] Teams  Please continue to update ID with any pertinent new laboratory or radiographic findings  Spectra [id number]

## 2022-05-25 NOTE — PROGRESS NOTE ADULT - ASSESSMENT
ASSESSMENT AND PLAN  75 y/o F w/ pmhx of Anxiety, CAD, Hypothyroidism, IDDM, Schizophrenia, Dementia sent to ED from [facility] found to be septic with lactic acidosis 2/2 infected stage 4 sacral ulcer      # Suspected  metabolic encephalopathy / Infected Stage 4 Sacral Ulcer/ Advanced Dementia/ Functional Quadriplegia  -mental status at baseline today, c/w supportive care, prevent falls and aspiration   - maintain sufficient hydration   - dietitian consult   - off load pressure points, change position Q 2 hours   - local wound care Santyl/Wet Kerlex Packin with 1/4 Dakins/DPD BID  - Burn is following,  s/p debridement , pt needs a follow up today to finalize the second debridement   - wound culture:  pseudomonas and VRE  - on Meropenem and Zyvox   - f/u  ESR/CRP      #Hypernatremia/ TELMA   - resolved, c/w free water via PEG  - monitor BUN/cr and urine output   - likely prerenal TELMA     # Hypothyroidism   - c/w home Levothyroxine     # DLD   - c/w home Lipitor    # IDDM  - carb consistent PEG formula   - monitor finger sticks s  - on Insulin       # GI/DVT prophylaxis     #Progress Note Handoff  Pending (specify): burn follow up to confirm debridement, check ESR/SRP, monitor BUN/cr and urine output, dietitian  consult , c/w local wound care and supportive care    Family discussion: n/a   Disposition: Home___/SNF___/Other________/Unknown at this time_____x ___

## 2022-05-26 NOTE — PROGRESS NOTE ADULT - SUBJECTIVE AND OBJECTIVE BOX
GLORIA WATERS 76y Female  MRN#: [id number]   CODE STATUS:________        SUBJECTIVE    No acute events overnight. Patient had bedside debridement yesterday. Per Dr. Robert will likely need to go to OR for full debridement.                                           ----------------------------------------------------------  OBJECTIVE  PAST MEDICAL & SURGICAL HISTORY  Coronary artery disease    Anxiety    Dementia    Insulin dependent diabetes mellitus    Paranoid schizophrenia    Hyperlipidemia    Hypothyroidism    Abscess of right buttock    H/O abdominal surgery                                              -----------------------------------------------------------  ALLERGIES:  No Known Allergies                                            ------------------------------------------------------------    HOME MEDICATIONS  Home Medications:  acetaminophen 325 mg oral tablet, disintegratin tab(s) orally every 6 hours, As Needed (20 May 2022 04:21)  aspirin 81 mg oral tablet: 1 tab(s) orally once a day (20 May 2022 04:21)  atorvastatin 20 mg oral tablet: 1 tab(s) orally once a day (at bedtime) (20 May 2022 04:21)  Calcium 600+D 600 mg-200 intl units oral tablet: 2 tab(s) orally once a day (20 May 2022 04:21)  collagenase 250 units/g topical ointment: 1 application topically 2 times a day (20 May 2022 04:21)  famotidine 20 mg oral tablet: 1 tab(s) orally once a day (20 May 2022 04:21)  Haldol Decanoate 50 mg/mL intramuscular solution: 37.5 milligram(s) intramuscular once a month (first Tuesday (20 May 2022 04:21)  insulin glargine 100 units/mL subcutaneous solution: 45 unit(s) subcutaneous once a day (at bedtime) (20 May 2022 04:21)  Januvia 100 mg oral tablet: 1 tab(s) orally once a day (20 May 2022 04:21)  levothyroxine 50 mcg (0.05 mg) oral tablet: 1 tab(s) orally once a day (20 May 2022 04:21)  Melatonin 10 mg oral capsule: 1 cap(s) orally once a day (at bedtime) (20 May 2022 04:21)  Senna 8.6 mg oral tablet: 2 tab(s) orally once a day (at bedtime) (20 May 2022 04:21)                           MEDICATIONS:  STANDING MEDICATIONS  aspirin  chewable 81 milliGRAM(s) Oral daily  atorvastatin 20 milliGRAM(s) Oral at bedtime  calcium carbonate 1250 mG  + Vitamin D (OsCal 500 + D) 1 Tablet(s) Oral daily  collagenase Ointment 1 Application(s) Topical two times a day  Dakins Solution - 1/2 Strength 1 Application(s) Topical two times a day  dextrose 5%. 1000 milliLiter(s) IV Continuous <Continuous>  dextrose 5%. 1000 milliLiter(s) IV Continuous <Continuous>  dextrose 50% Injectable 25 Gram(s) IV Push once  dextrose 50% Injectable 12.5 Gram(s) IV Push once  dextrose 50% Injectable 25 Gram(s) IV Push once  famotidine    Tablet 20 milliGRAM(s) Oral daily  glucagon  Injectable 1 milliGRAM(s) IntraMuscular once  heparin   Injectable 5000 Unit(s) SubCutaneous every 12 hours  insulin glargine Injectable (LANTUS) 40 Unit(s) SubCutaneous at bedtime  insulin lispro (ADMELOG) corrective regimen sliding scale   SubCutaneous three times a day before meals  insulin lispro Injectable (ADMELOG) 10 Unit(s) SubCutaneous every 6 hours  levoFLOXacin  Tablet 500 milliGRAM(s) Oral every 24 hours  levothyroxine 50 MICROGram(s) Oral daily  linezolid    Tablet 600 milliGRAM(s) Oral every 12 hours  melatonin 5 milliGRAM(s) Oral at bedtime  metroNIDAZOLE    Tablet 500 milliGRAM(s) Oral every 8 hours    PRN MEDICATIONS  acetaminophen     Tablet .. 650 milliGRAM(s) Oral every 6 hours PRN  dextrose Oral Gel 15 Gram(s) Oral once PRN  morphine  - Injectable 2 milliGRAM(s) IV Push every 6 hours PRN                                            ------------------------------------------------------------  VITAL SIGNS: Last 24 Hours  T(C): 36.8 (25 May 2022 19:22), Max: 36.9 (25 May 2022 14:51)  T(F): 98.2 (25 May 2022 19:22), Max: 98.4 (25 May 2022 14:51)  HR: 85 (26 May 2022 08:00) (79 - 85)  BP: 132/68 (25 May 2022 19:22) (132/68 - 134/63)  BP(mean): --  RR: 18 (25 May 2022 19:22) (18 - 18)  SpO2: 96% (26 May 2022 08:00) (94% - 96%)      22 @ 07:01  -  22 @ 07:00  --------------------------------------------------------  IN: 874 mL / OUT: 0 mL / NET: 874 mL                                             --------------------------------------------------------------  LABS:                        7.8    13.54 )-----------( 401      ( 26 May 2022 07:37 )             24.9     -    138  |  103  |  21<H>  ----------------------------<  75  4.7   |  21  |  0.8    Ca    8.9      26 May 2022 07:37  Mg     2.0         TPro  5.9<L>  /  Alb  2.7<L>  /  TBili  0.5  /  DBili  x   /  AST  21  /  ALT  16  /  AlkPhos  77            Sedimentation Rate, Erythrocyte: 121 mm/Hr *H* (22 @ 10:40)          PHYSICAL EXAM:    CONSTITUTIONAL: No acute distress  PULMONARY: Clear to auscultation bilaterally  CARDIOVASCULAR: Regular rate and rhythm; no murmurs, rubs, or gallops  GASTROINTESTINAL: Soft, non-tender  MUSCULOSKELETAL: no LE edema  Back: Stage 4 sacral ulcer    ASSESSMENT AND PLAN  77 y/o F w/ pmhx of Anxiety, CAD, Hypothyroidism, IDDM, Schizophrenia, Dementia sent to ED from [facility] found to be septic with lactic acidosis 2/2 infected stage 4 sacral ulcer    	  # Toxic metabolic encephalopathy 2/2 sepsis POA 2/2 Infected Stage 4 Sacral Ulcer  # Advanced Dementia  # Functional Quadriplegia  - s/p Vanc/ cefepime  - vanc dc due to no h/o MRSA  - Meropenem started due to h/o of ESBL  - local wound care Santyl/Wet Kerlex Packin with / Dakins/DPD BID  - Burn consult s/p debridement   - wound culture: few pseudomonas and VRE  - Burn re-consulted, performed bedside debridement on    - Per Dr. Robert will likely need further debridement in OR on  or   - Kept NPO for possible add-on case  - ID re-consulted in light of wound culture results, will need to adjust abx  - Per ID start Linezolid 600 BID PO, Levaquin 500 QD, and Flagyl 500 TID   - check iron/ TIBC/ ferritin  -   - CRP pending  - Keep FS under tight control    #Hypernatremia  - c/w free water via PEG 200ml/ feed q4h  - s/p D5W  - hypernatremia resolved  - Na 140 on     # TELMA   - Likely prerenal, resolved  - Cr downtrending 1.6-> 1.4 -> 1.0    # Hypothyroidism   - c/w home Levothyroxine     # DLD  - A1c 9.8   - c/w home Lipitor    # IDDM  - c/w SS+ 40U Lantus (home dose)  - insulin as needed    Diet: Tube feeds      Pending: IV abx     GLORIA WATERS 76y Female  MRN#: [id number]   CODE STATUS:________        SUBJECTIVE    No acute events overnight. Patient had bedside debridement yesterday. Per Dr. Robert will likely need to go to OR for full debridement.                                           ----------------------------------------------------------  OBJECTIVE  PAST MEDICAL & SURGICAL HISTORY  Coronary artery disease    Anxiety    Dementia    Insulin dependent diabetes mellitus    Paranoid schizophrenia    Hyperlipidemia    Hypothyroidism    Abscess of right buttock    H/O abdominal surgery                                              -----------------------------------------------------------  ALLERGIES:  No Known Allergies                                            ------------------------------------------------------------    HOME MEDICATIONS  Home Medications:  acetaminophen 325 mg oral tablet, disintegratin tab(s) orally every 6 hours, As Needed (20 May 2022 04:21)  aspirin 81 mg oral tablet: 1 tab(s) orally once a day (20 May 2022 04:21)  atorvastatin 20 mg oral tablet: 1 tab(s) orally once a day (at bedtime) (20 May 2022 04:21)  Calcium 600+D 600 mg-200 intl units oral tablet: 2 tab(s) orally once a day (20 May 2022 04:21)  collagenase 250 units/g topical ointment: 1 application topically 2 times a day (20 May 2022 04:21)  famotidine 20 mg oral tablet: 1 tab(s) orally once a day (20 May 2022 04:21)  Haldol Decanoate 50 mg/mL intramuscular solution: 37.5 milligram(s) intramuscular once a month (first Tuesday (20 May 2022 04:21)  insulin glargine 100 units/mL subcutaneous solution: 45 unit(s) subcutaneous once a day (at bedtime) (20 May 2022 04:21)  Januvia 100 mg oral tablet: 1 tab(s) orally once a day (20 May 2022 04:21)  levothyroxine 50 mcg (0.05 mg) oral tablet: 1 tab(s) orally once a day (20 May 2022 04:21)  Melatonin 10 mg oral capsule: 1 cap(s) orally once a day (at bedtime) (20 May 2022 04:21)  Senna 8.6 mg oral tablet: 2 tab(s) orally once a day (at bedtime) (20 May 2022 04:21)                           MEDICATIONS:  STANDING MEDICATIONS  aspirin  chewable 81 milliGRAM(s) Oral daily  atorvastatin 20 milliGRAM(s) Oral at bedtime  calcium carbonate 1250 mG  + Vitamin D (OsCal 500 + D) 1 Tablet(s) Oral daily  collagenase Ointment 1 Application(s) Topical two times a day  Dakins Solution - 1/2 Strength 1 Application(s) Topical two times a day  dextrose 5%. 1000 milliLiter(s) IV Continuous <Continuous>  dextrose 5%. 1000 milliLiter(s) IV Continuous <Continuous>  dextrose 50% Injectable 25 Gram(s) IV Push once  dextrose 50% Injectable 12.5 Gram(s) IV Push once  dextrose 50% Injectable 25 Gram(s) IV Push once  famotidine    Tablet 20 milliGRAM(s) Oral daily  glucagon  Injectable 1 milliGRAM(s) IntraMuscular once  heparin   Injectable 5000 Unit(s) SubCutaneous every 12 hours  insulin glargine Injectable (LANTUS) 40 Unit(s) SubCutaneous at bedtime  insulin lispro (ADMELOG) corrective regimen sliding scale   SubCutaneous three times a day before meals  insulin lispro Injectable (ADMELOG) 10 Unit(s) SubCutaneous every 6 hours  levoFLOXacin  Tablet 500 milliGRAM(s) Oral every 24 hours  levothyroxine 50 MICROGram(s) Oral daily  linezolid    Tablet 600 milliGRAM(s) Oral every 12 hours  melatonin 5 milliGRAM(s) Oral at bedtime  metroNIDAZOLE    Tablet 500 milliGRAM(s) Oral every 8 hours    PRN MEDICATIONS  acetaminophen     Tablet .. 650 milliGRAM(s) Oral every 6 hours PRN  dextrose Oral Gel 15 Gram(s) Oral once PRN  morphine  - Injectable 2 milliGRAM(s) IV Push every 6 hours PRN                                            ------------------------------------------------------------  VITAL SIGNS: Last 24 Hours  T(C): 36.8 (25 May 2022 19:22), Max: 36.9 (25 May 2022 14:51)  T(F): 98.2 (25 May 2022 19:22), Max: 98.4 (25 May 2022 14:51)  HR: 85 (26 May 2022 08:00) (79 - 85)  BP: 132/68 (25 May 2022 19:22) (132/68 - 134/63)  BP(mean): --  RR: 18 (25 May 2022 19:22) (18 - 18)  SpO2: 96% (26 May 2022 08:00) (94% - 96%)      22 @ 07:01  -  22 @ 07:00  --------------------------------------------------------  IN: 874 mL / OUT: 0 mL / NET: 874 mL                                             --------------------------------------------------------------  LABS:                        7.8    13.54 )-----------( 401      ( 26 May 2022 07:37 )             24.9     -    138  |  103  |  21<H>  ----------------------------<  75  4.7   |  21  |  0.8    Ca    8.9      26 May 2022 07:37  Mg     2.0         TPro  5.9<L>  /  Alb  2.7<L>  /  TBili  0.5  /  DBili  x   /  AST  21  /  ALT  16  /  AlkPhos  77            Sedimentation Rate, Erythrocyte: 121 mm/Hr *H* (22 @ 10:40)          PHYSICAL EXAM:    CONSTITUTIONAL: No acute distress  PULMONARY: Clear to auscultation bilaterally  CARDIOVASCULAR: Regular rate and rhythm; no murmurs, rubs, or gallops  GASTROINTESTINAL: Soft, non-tender  MUSCULOSKELETAL: no LE edema  Back: Stage 4 sacral ulcer    ASSESSMENT AND PLAN  75 y/o F w/ pmhx of Anxiety, CAD, Hypothyroidism, IDDM, Schizophrenia, Dementia sent to ED from [facility] found to be septic with lactic acidosis 2/2 infected stage 4 sacral ulcer    	  # Toxic metabolic encephalopathy 2/2 sepsis POA 2/2 Infected Stage 4 Sacral Ulcer  # Advanced Dementia  # Functional Quadriplegia  - s/p Vanc/ cefepime  - vanc dc due to no h/o MRSA  - Meropenem started due to h/o of ESBL  - local wound care Santyl/Wet Kerlex Packin with / Dakins/DPD BID  - Burn consult s/p debridement   - wound culture: few pseudomonas and VRE  - Burn re-consulted, performed bedside debridement on    - Per Dr. Robert will likely need further debridement in OR on  or   - Kept NPO for possible add-on case  - ID re-consulted in light of wound culture results, will need to adjust abx  - Per ID start Linezolid 600 BID PO, Levaquin 500 QD, and Flagyl 500 TID   - check iron/ TIBC/ ferritin  -   - CRP 39  - Keep FS under tight control    #Hypernatremia  - c/w free water via PEG 200ml/ feed q4h  - s/p D5W  - hypernatremia resolved  - Na 140 on     # TELMA   - Likely prerenal, resolved  - Cr downtrending 1.6-> 1.4 -> 1.0    # Hypothyroidism   - c/w home Levothyroxine     # DLD  - A1c 9.8   - c/w home Lipitor    # IDDM  - c/w SS+ 40U Lantus (home dose)  - insulin as needed    Diet: Tube feeds      Pending: IV abx

## 2022-05-26 NOTE — CHART NOTE - NSCHARTNOTEFT_GEN_A_CORE
PACU ANESTHESIA ADMISSION NOTE      Procedure: Debridement, sacrum      Post op diagnosis:  Pressure sore on sacrum        ____  Intubated  TV:______       Rate: ______      FiO2: ______    __x__  Patent Airway    ____  Full return of protective reflexes    ____  Full recovery from anesthesia / back to baseline     Vitals:   T:     98      R:   12               BP:    106/55               Sat:  97%                 P:  70      Mental Status:  __x__ Awake   _____ Alert   _____ Drowsy   _____ Sedated    Nausea/Vomiting:  __x__ NO  ______Yes,   See Post - Op Orders          Pain Scale (0-10):  _____    Treatment: ____ None    ___x_ See Post - Op/PCA Orders    Post - Operative Fluids:   ____ Oral   ___x_ See Post - Op Orders    Plan: Discharge:   ____Home       ___x__Floor     _____Critical Care    _____  Other:_________________    Comments: Uneventful intraoperative course. No anesthesia issues or complications noted.  Patient stable upon arrival to PACU. Report given to RN. Discharge when criteria met.

## 2022-05-26 NOTE — PRE-OP CHECKLIST - MUPIRONCIN COMMENTS
Pt A/O  times 1 this is her baseline report is given to OR RN ready to be transport with transporter.

## 2022-05-26 NOTE — BRIEF OPERATIVE NOTE - NSICDXBRIEFPREOP_GEN_ALL_CORE_FT
PRE-OP DIAGNOSIS:  Pressure sore on sacrum 25-May-2022 17:12:25  Regan Robert  
PRE-OP DIAGNOSIS:  Pressure sore on sacrum 25-May-2022 17:12:25  Regan Robert

## 2022-05-26 NOTE — PROGRESS NOTE ADULT - SUBJECTIVE AND OBJECTIVE BOX
Nephrology progress note       ON events/Subjective:     Allergies:  No Known Allergies    Hospital Medications:   MEDICATIONS  (STANDING):  aspirin  chewable 81 milliGRAM(s) Oral daily  atorvastatin 20 milliGRAM(s) Oral at bedtime  calcium carbonate 1250 mG  + Vitamin D (OsCal 500 + D) 1 Tablet(s) Oral daily  collagenase Ointment 1 Application(s) Topical two times a day  Dakins Solution - 1/2 Strength 1 Application(s) Topical two times a day  dextrose 5%. 1000 milliLiter(s) (100 mL/Hr) IV Continuous <Continuous>  dextrose 5%. 1000 milliLiter(s) (50 mL/Hr) IV Continuous <Continuous>  dextrose 50% Injectable 25 Gram(s) IV Push once  dextrose 50% Injectable 12.5 Gram(s) IV Push once  dextrose 50% Injectable 25 Gram(s) IV Push once  famotidine    Tablet 20 milliGRAM(s) Oral daily  glucagon  Injectable 1 milliGRAM(s) IntraMuscular once  heparin   Injectable 5000 Unit(s) SubCutaneous every 12 hours  insulin glargine Injectable (LANTUS) 40 Unit(s) SubCutaneous at bedtime  insulin lispro (ADMELOG) corrective regimen sliding scale   SubCutaneous three times a day before meals  insulin lispro Injectable (ADMELOG) 10 Unit(s) SubCutaneous every 6 hours  levoFLOXacin  Tablet 500 milliGRAM(s) Oral every 24 hours  levothyroxine 50 MICROGram(s) Oral daily  linezolid    Tablet 600 milliGRAM(s) Oral every 12 hours  melatonin 5 milliGRAM(s) Oral at bedtime  metroNIDAZOLE    Tablet 500 milliGRAM(s) Oral every 8 hours    REVIEW OF SYSTEMS:  CONSTITUTIONAL: No weakness, fevers or chills  EYES/ENT: No visual changes;  No vertigo or throat pain   NECK: No pain or stiffness  RESPIRATORY: No cough, wheezing, hemoptysis; No shortness of breath  CARDIOVASCULAR: No chest pain or palpitations.  GASTROINTESTINAL: No abdominal or epigastric pain. No nausea, vomiting, or hematemesis; No diarrhea or constipation. No melena or hematochezia.  GENITOURINARY: No dysuria, frequency, foamy urine, urinary urgency, incontinence or hematuria  NEUROLOGICAL: No numbness or weakness  SKIN: No itching, burning, rashes, or lesions   VASCULAR: No bilateral lower extremity edema.   All other review of systems is negative unless indicated above.    VITALS:  T(F): 98.2 (05-25-22 @ 19:22), Max: 98.4 (05-25-22 @ 14:51)  HR: 85 (05-26-22 @ 08:00)  BP: 132/68 (05-25-22 @ 19:22)  RR: 18 (05-25-22 @ 19:22)  SpO2: 96% (05-26-22 @ 08:00)  Wt(kg): --    05-24 @ 07:01  -  05-25 @ 07:00  --------------------------------------------------------  IN: 1948 mL / OUT: 800 mL / NET: 1148 mL    05-25 @ 07:01  -  05-26 @ 07:00  --------------------------------------------------------  IN: 874 mL / OUT: 0 mL / NET: 874 mL        PHYSICAL EXAM:  Constitutional: NAD  HEENT: anicteric sclera, oropharynx clear, MMM  Neck: No JVD  Respiratory: CTAB, no wheezes, rales or rhonchi  Cardiovascular: S1, S2, RRR  Gastrointestinal: BS+, soft, NT/ND  Extremities: edema  Neurological: A/O x 3, no focal deficits  Psychiatric: Normal mood, normal affect  : No CVA tenderness. No amin.   Skin: No rashes  Vascular Access:    LABS:  05-26    138  |  103  |  21<H>  ----------------------------<  75  4.7   |  21  |  0.8    Ca    8.9      26 May 2022 07:37  Mg     2.0     05-26    TPro  5.9<L>  /  Alb  2.7<L>  /  TBili  0.5  /  DBili      /  AST  21  /  ALT  16  /  AlkPhos  77  05-26                          7.8    13.54 )-----------( 401      ( 26 May 2022 07:37 )             24.9       Urine Studies:  Creatinine Trend: 0.8<--, 0.8<--, 0.8<--, 0.9<--, 1.0<--, 1.0<--      RADIOLOGY & ADDITIONAL STUDIES:    ·	pt known to me  ·	TELMA improved with ivf (now off)  ·	hypernatremia resolved with ivf  ·	sepsis and likely source infected sacral ulcer s/p dedridement on merrem  ·	anemia  ·	PEG  ·	more alert    1) per id change to oral/PEG abx for 2 weeks linezolid, flagyl, lwvaquin  2) peg feeds and free water  3) am labs with iron studies please  4) for another debridement per surgery possibly today

## 2022-05-26 NOTE — BRIEF OPERATIVE NOTE - NSICDXBRIEFPROCEDURE_GEN_ALL_CORE_FT
PROCEDURES:  Debridement, sacrum 25-May-2022 17:11:28  Regan Robert  
PROCEDURES:  Debridement, sacrum 25-May-2022 17:11:28  Regan Robert

## 2022-05-26 NOTE — PROGRESS NOTE ADULT - ASSESSMENT
ASSESSMENT AND PLAN  77 y/o F w/ pmhx of Anxiety, CAD, Hypothyroidism, IDDM, Schizophrenia, Dementia sent to ED from [facility] found to be septic with lactic acidosis 2/2 infected stage 4 sacral ulcer      # Suspected  metabolic encephalopathy / Infected Stage 4 Sacral Ulcer/ Advanced Dementia/ Functional Quadriplegia  -mental status at baseline , c/w supportive care, prevent falls and aspiration   - maintain sufficient hydration   - dietitian consulted   - off load pressure points, change position Q 2 hours   - local wound care Santyl/Wet Kerlex Packin with 1/4 Dakins/DPD BID  - Burn is following,  s/p  debridements x two at bedside, pt kept NPO for OR today   - wound culture:  pseudomonas and VRE  - on  Zyvox, flagyl and Levofloxacin as per ID   -  ESR/CRP noted, likely pt has an OM     # Iron deficiency anemia   - start IV Venofer 200 mg Q 24 hours for 5 days  - monitor H/H, keep Hb above 7.5  - no active bleeding noted     #Hypernatremia/ TELMA   - resolved, c/w free water via PEG  - monitor BUN/cr and urine output   - likely prerenal TELMA     # Hypothyroidism   - c/w home Levothyroxine     # DLD   - c/w home Lipitor    # IDDM  - carb consistent PEG formula   - monitor finger sticks s  - on Insulin       # GI/DVT prophylaxis     #Progress Note Handoff  Pending (specify): keep NPO for OR today, start IV Venofer 200 mg Q 24 hours for 5 days, monitor H/H, c/w Abx and local wound care   Family discussion: n/a   Disposition: Home___/SNF___/Other________/Unknown at this time_____x ___

## 2022-05-26 NOTE — PROGRESS NOTE ADULT - SUBJECTIVE AND OBJECTIVE BOX
75 y/o F w/ pmhx of Anxiety, CAD, Hypothyroidism, IDDM, Schizophrenia, Dementia sent to ED from [facility] found to be septic with lactic acidosis 2/2 infected stage 4 sacral ulcer. She was consulted by burn, underwent 2 bedside debridements, consulted by ID. On admission pt was found to have TELMA, resolved after IV hydration.  Today pt is awake, looks comfortable, nonverbal.     OBJECTIVE  PAST MEDICAL & SURGICAL HISTORY  Coronary artery disease    Anxiety    Dementia    Insulin dependent diabetes mellitus    Paranoid schizophrenia    Hyperlipidemia    Hypothyroidism    Abscess of right buttock    H/O abdominal surgery      ALLERGIES:  No Known Allergies        HOME MEDICATIONS  Home Medications:  acetaminophen 325 mg oral tablet, disintegratin tab(s) orally every 6 hours, As Needed (20 May 2022 04:21)  aspirin 81 mg oral tablet: 1 tab(s) orally once a day (20 May 2022 04:21)  atorvastatin 20 mg oral tablet: 1 tab(s) orally once a day (at bedtime) (20 May 2022 04:21)  Calcium 600+D 600 mg-200 intl units oral tablet: 2 tab(s) orally once a day (20 May 2022 04:21)  collagenase 250 units/g topical ointment: 1 application topically 2 times a day (20 May 2022 04:21)  famotidine 20 mg oral tablet: 1 tab(s) orally once a day (20 May 2022 04:21)  Haldol Decanoate 50 mg/mL intramuscular solution: 37.5 milligram(s) intramuscular once a month (first Tuesday (20 May 2022 04:21)  insulin glargine 100 units/mL subcutaneous solution: 45 unit(s) subcutaneous once a day (at bedtime) (20 May 2022 04:21)  Januvia 100 mg oral tablet: 1 tab(s) orally once a day (20 May 2022 04:21)  levothyroxine 50 mcg (0.05 mg) oral tablet: 1 tab(s) orally once a day (20 May 2022 04:21)  Melatonin 10 mg oral capsule: 1 cap(s) orally once a day (at bedtime) (20 May 2022 04:21)  Senna 8.6 mg oral tablet: 2 tab(s) orally once a day (at bedtime) (20 May 2022 04:21)    VITAL SIGNS: Last 24 Hours  T(C): 36.8 (25 May 2022 19:22), Max: 36.9 (25 May 2022 14:51)  T(F): 98.2 (25 May 2022 19:22), Max: 98.4 (25 May 2022 14:51)  HR: 85 (26 May 2022 08:00) (79 - 85)  BP: 132/68 (25 May 2022 19:22) (132/68 - 134/63)  BP(mean): --  RR: 18 (25 May 2022 19:22) (18 - 18)  SpO2: 96% (26 May 2022 08:00) (94% - 96%)      PHYSICAL EXAM:  CONSTITUTIONAL: No acute distress, demented, nonverbal   NECK: supple, no JVD   PULMONARY: decreased BS at bases   CARDIOVASCULAR: Regular rate and rhythm; no murmurs, rubs, or gallops  GASTROINTESTINAL: Soft, non-tender, PEG tube noted , BS present   MUSCULOSKELETAL: no LE edema on LE, LUE edema noted with contracted wrist   Back: Stage 4 sacral ulcer dressed       LABS:                                   7.8    13.54 )-----------( 401      ( 26 May 2022 07:37 )             24.9       138  |  103  |  21<H>  ----------------------------<  75  4.7   |  21  |  0.8    Ca    8.9      26 May 2022 07:37  Mg     2.0         TPro  5.9<L>  /  Alb  2.7<L>  /  TBili  0.5  /  DBili  x   /  AST  21  /  ALT  16  /  AlkPhos  77      Culture - Other (22 @ 14:32)   - Vancomycin: R >16   - Levofloxacin: R >4   - Linezolid: S 1   - Daptomycin: SDD 4 The breakpoint for SDD (sensitive dose dependent)is based on a dosage regimen of 8-12 mg/kg administered every 24 h in adults and is intended for serious infections due to E. faecium. Consultation with an infectious diseases specialist is recommended.   - Tetra/Doxy: R >8   - Ampicillin: R >8 Predicts results to ampicillin/sulbactam, amoxacillin-clavulanate and piperacillin-tazobactam.   Specimen Source: .Other sacrum   Culture Results:   Few Pseudomonas aeruginosa Susceptibility to follow.   Numerous Enterococcus faecium (vancomycin resistant)   Normal enteric patricia isolated   Organism Identification: Enterococcus faecium (vancomycin resistant)   Organism: Enterococcus faecium (vancomycin resistant)   Method Type: BARON Culture - Blood (22 @ 21:38)   Specimen Source: .Blood Blood-Peripheral   Culture Results:   No Growth Final     RADIOLOGY:   < from: Xray Chest 1 View-PORTABLE IMMEDIATE (22 @ 22:20) >  Impression:    Left basilar linear subsegmental atelectasis. Otherwise, no evidence of   acute pulmonary process..    < end of copied text >  < from: CT Head No Cont (22 @ 21:17) >  IMPRESSION:    Findings the chest communicating hydrocephalus. Chronic microvascular   ischemic changes.      MEDICATIONS  (STANDING):  aspirin  chewable 81 milliGRAM(s) Oral daily  atorvastatin 20 milliGRAM(s) Oral at bedtime  calcium carbonate 1250 mG  + Vitamin D (OsCal 500 + D) 1 Tablet(s) Oral daily  collagenase Ointment 1 Application(s) Topical two times a day  Dakins Solution - 1/2 Strength 1 Application(s) Topical two times a day  dextrose 5%. 1000 milliLiter(s) (100 mL/Hr) IV Continuous <Continuous>  dextrose 5%. 1000 milliLiter(s) (50 mL/Hr) IV Continuous <Continuous>  dextrose 50% Injectable 25 Gram(s) IV Push once  dextrose 50% Injectable 12.5 Gram(s) IV Push once  dextrose 50% Injectable 25 Gram(s) IV Push once  famotidine    Tablet 20 milliGRAM(s) Oral daily  glucagon  Injectable 1 milliGRAM(s) IntraMuscular once  heparin   Injectable 5000 Unit(s) SubCutaneous every 12 hours  insulin glargine Injectable (LANTUS) 40 Unit(s) SubCutaneous at bedtime  insulin lispro (ADMELOG) corrective regimen sliding scale   SubCutaneous three times a day before meals  insulin lispro Injectable (ADMELOG) 10 Unit(s) SubCutaneous every 6 hours  iron sucrose Injectable 200 milliGRAM(s) IV Push every 24 hours  levoFLOXacin  Tablet 500 milliGRAM(s) Oral every 24 hours  levothyroxine 50 MICROGram(s) Oral daily  linezolid    Tablet 600 milliGRAM(s) Oral every 12 hours  melatonin 5 milliGRAM(s) Oral at bedtime  metroNIDAZOLE    Tablet 500 milliGRAM(s) Oral every 8 hours    MEDICATIONS  (PRN):  acetaminophen     Tablet .. 650 milliGRAM(s) Oral every 6 hours PRN Temp greater or equal to 38C (100.4F), Mild Pain (1 - 3)  dextrose Oral Gel 15 Gram(s) Oral once PRN Blood Glucose LESS THAN 70 milliGRAM(s)/deciliter  morphine  - Injectable 2 milliGRAM(s) IV Push every 6 hours PRN Severe Pain (7 - 10)

## 2022-05-27 NOTE — PROGRESS NOTE ADULT - ASSESSMENT
ASSESSMENT AND PLAN  77 y/o F w/ pmhx of Anxiety, CAD, Hypothyroidism, IDDM, Schizophrenia, Dementia sent to ED from [facility] found to be septic with lactic acidosis 2/2 infected stage 4 sacral ulcer      # Suspected  metabolic encephalopathy / Infected Stage 4 Sacral Ulcer/ Advanced Dementia/ Functional Quadriplegia  - mental status at baseline , c/w supportive care, prevent falls and aspiration   - maintain sufficient hydration   - dietitian consulted   - off load pressure points, change position Q 2 hours   - local wound care Santyl/Wet Kerlex Packin with 1/4 Dakins/DPD BID  - Burn is following,  s/p  debridements x two at bedside and in OR on 5/26  - wound culture:  pseudomonas and VRE  - on  Zyvox, flagyl and Levofloxacin as per ID   -  ESR/CRP noted, likely pt has an OM     # hyperkalemia  - will give one dose of Lokelma today  - f/u repeat potassium level     # Iron deficiency anemia   - on IV Venofer 200 mg Q 24 hours for 5 days  - monitor H/H, keep Hb above 7.5  - no active bleeding noted     #Hypernatremia/ TELMA   - resolved, c/w free water via PEG  - monitor BUN/cr and urine output   - likely prerenal TELMA     # Hypothyroidism   - c/w home Levothyroxine     # DLD   - c/w home Lipitor    # IDDM  - carb consistent PEG formula   - monitor finger sticks s  - on Insulin       # GI/DVT prophylaxis     #Progress Note Handoff  Pending (specify): give Lokelma 5 mg x one dose now, f/u repeat potassium level, burn follow up for dressing change, c/w IV ABx and supportive care, monitor H/H    Family discussion: n/a   Disposition: Home___/SNF___/Other________/Unknown at this time_____x ___       ASSESSMENT AND PLAN  75 y/o F w/ pmhx of Anxiety, CAD, Hypothyroidism, IDDM, Schizophrenia, Dementia sent to ED from [facility] found to be septic with lactic acidosis 2/2 infected stage 4 sacral ulcer      # Suspected  metabolic encephalopathy / Infected Stage 4 Sacral Ulcer/ Advanced Dementia/ Functional Quadriplegia  - mental status at baseline , c/w supportive care, prevent falls and aspiration   - maintain sufficient hydration   - dietitian consulted   - off load pressure points, change position Q 2 hours   - Burn is following,  s/p  debridements x two at bedside and in OR on 5/26  - pt has a wound vac now, will follow up burn recommendations   - wound culture:  pseudomonas and VRE  - on  Zyvox, flagyl and Levofloxacin as per ID   -  ESR/CRP noted, likely pt has an OM     # hyperkalemia  - will give one dose of Lokelma today  - f/u repeat potassium level     # Iron deficiency anemia   - on IV Venofer 200 mg Q 24 hours for 5 days  - monitor H/H, keep Hb above 7.5  - no active bleeding noted     #Hypernatremia/ TELMA   - resolved, c/w free water via PEG  - monitor BUN/cr and urine output   - likely prerenal TELMA     # Hypothyroidism   - c/w home Levothyroxine     # DLD   - c/w home Lipitor    # IDDM  - carb consistent PEG formula   - monitor finger sticks s  - on Insulin       # GI/DVT prophylaxis     #Progress Note Handoff  Pending (specify): give Lokelma 5 mg x one dose now, f/u repeat potassium level, burn follow up in 24 hours , c/w IV ABx and supportive care, monitor H/H    Family discussion: n/a   Disposition: Home___/SNF___/Other________/Unknown at this time_____x ___

## 2022-05-27 NOTE — PROGRESS NOTE ADULT - SUBJECTIVE AND OBJECTIVE BOX
Nephrology progress note     lethargic    ON events/Subjective:     Allergies:  No Known Allergies    Hospital Medications:   MEDICATIONS  (STANDING):  acetaminophen     Tablet .. 650 milliGRAM(s) Oral every 6 hours  aspirin  chewable 81 milliGRAM(s) Oral daily  atorvastatin 20 milliGRAM(s) Oral at bedtime  calcium carbonate 1250 mG  + Vitamin D (OsCal 500 + D) 1 Tablet(s) Oral daily  dextrose 5%. 1000 milliLiter(s) (100 mL/Hr) IV Continuous <Continuous>  dextrose 5%. 1000 milliLiter(s) (50 mL/Hr) IV Continuous <Continuous>  dextrose 50% Injectable 25 Gram(s) IV Push once  dextrose 50% Injectable 12.5 Gram(s) IV Push once  dextrose 50% Injectable 25 Gram(s) IV Push once  famotidine    Tablet 20 milliGRAM(s) Oral daily  glucagon  Injectable 1 milliGRAM(s) IntraMuscular once  heparin   Injectable 5000 Unit(s) SubCutaneous every 12 hours  insulin glargine Injectable (LANTUS) 40 Unit(s) SubCutaneous at bedtime  insulin lispro (ADMELOG) corrective regimen sliding scale   SubCutaneous three times a day before meals  insulin lispro Injectable (ADMELOG) 10 Unit(s) SubCutaneous every 6 hours  iron sucrose IVPB 200 milliGRAM(s) IV Intermittent every 24 hours  levoFLOXacin  Tablet 500 milliGRAM(s) Oral every 24 hours  levothyroxine 50 MICROGram(s) Oral daily  linezolid    Tablet 600 milliGRAM(s) Oral every 12 hours  melatonin 5 milliGRAM(s) Oral at bedtime  metroNIDAZOLE    Tablet 500 milliGRAM(s) Oral every 8 hours  polyethylene glycol 3350 17 Gram(s) Oral daily  senna 2 Tablet(s) Oral at bedtime    REVIEW OF SYSTEMS:  unable to obtain    All other review of systems is negative unless indicated above.    VITALS:  T(F): 97.1 (05-27-22 @ 13:40), Max: 98.6 (05-26-22 @ 20:32)  HR: 95 (05-27-22 @ 13:40)  BP: 125/60 (05-27-22 @ 13:40)  RR: 18 (05-27-22 @ 13:40)  SpO2: 97% (05-27-22 @ 10:11)  Wt(kg): --    05-25 @ 07:01  -  05-26 @ 07:00  --------------------------------------------------------  IN: 874 mL / OUT: 0 mL / NET: 874 mL    05-26 @ 07:01  -  05-27 @ 07:00  --------------------------------------------------------  IN: 0 mL / OUT: 100 mL / NET: -100 mL    05-27 @ 07:01 - 05-27 @ 16:02  --------------------------------------------------------  IN: 874 mL / OUT: 0 mL / NET: 874 mL      Height (cm): 160 (05-26 @ 21:18)  PHYSICAL EXAM:  Constitutional: NAD  HEENT: anicteric sclera, oropharynx clear, MMM  Neck: No JVD  Respiratory: CTAB, no wheezes, rales or rhonchi  Cardiovascular: S1, S2, RRR  Gastrointestinal: BS+, soft, NT/ND  Extremities: edema  Neurological: A/O x 3, no focal deficits  Psychiatric: Normal mood, normal affect  : No CVA tenderness. No amin.   Skin: No rashes  Vascular Access:    LABS:  05-27    134<L>  |  99  |  20  ----------------------------<  228<H>  5.8<H>   |  22  |  0.8    Ca    8.5      27 May 2022 07:00  Mg     2.1     05-27    TPro  5.7<L>  /  Alb  2.5<L>  /  TBili  0.3  /  DBili      /  AST  19  /  ALT  14  /  AlkPhos  67  05-27                          7.8    14.14 )-----------( 494      ( 27 May 2022 07:00 )             26.7       Urine Studies:  Creatinine Trend: 0.8<--, 0.8<--, 0.8<--, 0.8<--, 0.9<--, 1.0<--      RADIOLOGY & ADDITIONAL STUDIES:    ·	pt known to me  ·	TELMA improved with ivf (now off)  ·	hypernatremia resolved with ivf  ·	sepsis and likely source infected sacral ulcer s/p dedridement on oral abx per ID  ·	anemia  ·	PEG  ·	more alert  ·	today with hyperkalemia (s/p cocktail)    1) uncertain of etiology of hyperkalemia - please chack CPK level and repeat.  If persist change to lower potassium feeds

## 2022-05-27 NOTE — PROGRESS NOTE ADULT - SUBJECTIVE AND OBJECTIVE BOX
77 y/o F w/ pmhx of Anxiety, CAD, Hypothyroidism, IDDM, Schizophrenia, Dementia sent to ED from [facility] found to be septic with lactic acidosis 2/2 infected stage 4 sacral ulcer. She was consulted by burn, underwent 2 bedside debridements, went to OR for 3 rd debridement on  late at night, consulted by ID. On admission pt was found to have TELMA, resolved after IV hydration.  Today pt is awake, looks upset, mumbling not cooperative.     OBJECTIVE  PAST MEDICAL & SURGICAL HISTORY  Coronary artery disease    Anxiety    Dementia    Insulin dependent diabetes mellitus    Paranoid schizophrenia    Hyperlipidemia    Hypothyroidism    Abscess of right buttock    H/O abdominal surgery      ALLERGIES:  No Known Allergies        HOME MEDICATIONS  Home Medications:  acetaminophen 325 mg oral tablet, disintegratin tab(s) orally every 6 hours, As Needed (20 May 2022 04:)  aspirin 81 mg oral tablet: 1 tab(s) orally once a day (20 May 2022 04:)  atorvastatin 20 mg oral tablet: 1 tab(s) orally once a day (at bedtime) (20 May 2022 04:21)  Calcium 600+D 600 mg-200 intl units oral tablet: 2 tab(s) orally once a day (20 May 2022 04:21)  collagenase 250 units/g topical ointment: 1 application topically 2 times a day (20 May 2022 04:)  famotidine 20 mg oral tablet: 1 tab(s) orally once a day (20 May 2022 04:21)  Haldol Decanoate 50 mg/mL intramuscular solution: 37.5 milligram(s) intramuscular once a month (first Tuesday (20 May 2022 04:21)  insulin glargine 100 units/mL subcutaneous solution: 45 unit(s) subcutaneous once a day (at bedtime) (20 May 2022 04:21)  Januvia 100 mg oral tablet: 1 tab(s) orally once a day (20 May 2022 04:21)  levothyroxine 50 mcg (0.05 mg) oral tablet: 1 tab(s) orally once a day (20 May 2022 04:21)  Melatonin 10 mg oral capsule: 1 cap(s) orally once a day (at bedtime) (20 May 2022 04:21)  Senna 8.6 mg oral tablet: 2 tab(s) orally once a day (at bedtime) (20 May 2022 04:21)    VITAL SIGNS: Last 24 Hours  T(C): 36.9 (27 May 2022 06:03), Max: 37 (26 May 2022 20:32)  T(F): 98.5 (27 May 2022 06:03), Max: 98.6 (26 May 2022 20:32)  HR: 103 (27 May 2022 06:03) (68 - 103)  BP: 114/51 (27 May 2022 06:03) (106/55 - 145/68)  BP(mean): --  RR: 20 (27 May 2022 06:03) (16 - 22)  SpO2: 97% (27 May 2022 10:11) (90% - 100%)      PHYSICAL EXAM:  CONSTITUTIONAL: No acute distress, demented, nonverbal   NECK: supple, no JVD   PULMONARY: decreased BS at bases   CARDIOVASCULAR: Regular rate and rhythm; no murmurs, rubs, or gallops  GASTROINTESTINAL: Soft, non-tender, PEG tube noted , BS present   MUSCULOSKELETAL: no LE edema on LE, LUE edema noted with contracted wrist   Back: Stage 4 sacral ulcer dressed       LABS:                                   7.8    14.14 )-----------( 494      ( 27 May 2022 07:00 )             26.7   05    134<L>  |  99  |  20  ----------------------------<  228<H>  5.8<H>   |  22  |  0.8    Ca    8.5      27 May 2022 07:00  Mg     2.1         TPro  5.7<L>  /  Alb  2.5<L>  /  TBili  0.3  /  DBili  x   /  AST  19  /  ALT  14  /  AlkPhos  67        Culture - Other (22 @ 14:32)   - Vancomycin: R >16   - Levofloxacin: R >4   - Linezolid: S 1   - Daptomycin: SDD 4 The breakpoint for SDD (sensitive dose dependent)is based on a dosage regimen of 8-12 mg/kg administered every 24 h in adults and is intended for serious infections due to E. faecium. Consultation with an infectious diseases specialist is recommended.   - Tetra/Doxy: R >8   - Ampicillin: R >8 Predicts results to ampicillin/sulbactam, amoxacillin-clavulanate and piperacillin-tazobactam.   Specimen Source: .Other sacrum   Culture Results:   Few Pseudomonas aeruginosa Susceptibility to follow.   Numerous Enterococcus faecium (vancomycin resistant)   Normal enteric patricia isolated   Organism Identification: Enterococcus faecium (vancomycin resistant)   Organism: Enterococcus faecium (vancomycin resistant)   Method Type: BARON Culture - Blood (22 @ 21:38)   Specimen Source: .Blood Blood-Peripheral   Culture Results:   No Growth Final     RADIOLOGY:   < from: Xray Chest 1 View-PORTABLE IMMEDIATE (22 @ 22:20) >  Impression:    Left basilar linear subsegmental atelectasis. Otherwise, no evidence of   acute pulmonary process..    < end of copied text >  < from: CT Head No Cont (22 @ 21:17) >  IMPRESSION:    Findings the chest communicating hydrocephalus. Chronic microvascular   ischemic changes.      MEDICATIONS  (STANDING):  acetaminophen     Tablet .. 650 milliGRAM(s) Oral every 6 hours  aspirin  chewable 81 milliGRAM(s) Oral daily  atorvastatin 20 milliGRAM(s) Oral at bedtime  calcium carbonate 1250 mG  + Vitamin D (OsCal 500 + D) 1 Tablet(s) Oral daily  dextrose 10% Bolus 250 milliLiter(s) IV Bolus once  dextrose 5%. 1000 milliLiter(s) (50 mL/Hr) IV Continuous <Continuous>  dextrose 5%. 1000 milliLiter(s) (100 mL/Hr) IV Continuous <Continuous>  dextrose 50% Injectable 25 Gram(s) IV Push once  dextrose 50% Injectable 12.5 Gram(s) IV Push once  dextrose 50% Injectable 25 Gram(s) IV Push once  dextrose 50% Injectable 50 milliLiter(s) IV Push once  famotidine    Tablet 20 milliGRAM(s) Oral daily  glucagon  Injectable 1 milliGRAM(s) IntraMuscular once  heparin   Injectable 5000 Unit(s) SubCutaneous every 12 hours  insulin glargine Injectable (LANTUS) 40 Unit(s) SubCutaneous at bedtime  insulin lispro (ADMELOG) corrective regimen sliding scale   SubCutaneous three times a day before meals  insulin lispro Injectable (ADMELOG) 10 Unit(s) SubCutaneous every 6 hours  insulin regular  human recombinant 10 Unit(s) IV Push once  iron sucrose IVPB 200 milliGRAM(s) IV Intermittent every 24 hours  levoFLOXacin  Tablet 500 milliGRAM(s) Oral every 24 hours  levothyroxine 50 MICROGram(s) Oral daily  linezolid    Tablet 600 milliGRAM(s) Oral every 12 hours  melatonin 5 milliGRAM(s) Oral at bedtime  metroNIDAZOLE    Tablet 500 milliGRAM(s) Oral every 8 hours  polyethylene glycol 3350 17 Gram(s) Oral daily  senna 2 Tablet(s) Oral at bedtime  sodium zirconium cyclosilicate 10 Gram(s) Oral once  sodium zirconium cyclosilicate 5 Gram(s) Oral once    MEDICATIONS  (PRN):  dextrose Oral Gel 15 Gram(s) Oral once PRN Blood Glucose LESS THAN 70 milliGRAM(s)/deciliter  morphine  - Injectable 2 milliGRAM(s) IV Push every 6 hours PRN Severe Pain (7 - 10)  morphine  - Injectable 1 milliGRAM(s) IV Push every 4 hours PRN Moderate Pain (4 - 6)                       75 y/o F w/ pmhx of Anxiety, CAD, Hypothyroidism, IDDM, Schizophrenia, Dementia sent to ED from [facility] found to be septic with lactic acidosis 2/2 infected stage 4 sacral ulcer. She was consulted by burn, underwent 2 bedside debridements, went to OR for 3 rd debridement on  late at night, consulted by ID. On admission pt was found to have TELMA, resolved after IV hydration.  Today pt is awake, looks upset, mumbling not cooperative.     OBJECTIVE  PAST MEDICAL & SURGICAL HISTORY  Coronary artery disease    Anxiety    Dementia    Insulin dependent diabetes mellitus    Paranoid schizophrenia    Hyperlipidemia    Hypothyroidism    Abscess of right buttock    H/O abdominal surgery      ALLERGIES:  No Known Allergies        HOME MEDICATIONS  Home Medications:  acetaminophen 325 mg oral tablet, disintegratin tab(s) orally every 6 hours, As Needed (20 May 2022 04:)  aspirin 81 mg oral tablet: 1 tab(s) orally once a day (20 May 2022 04:)  atorvastatin 20 mg oral tablet: 1 tab(s) orally once a day (at bedtime) (20 May 2022 04:21)  Calcium 600+D 600 mg-200 intl units oral tablet: 2 tab(s) orally once a day (20 May 2022 04:21)  collagenase 250 units/g topical ointment: 1 application topically 2 times a day (20 May 2022 04:)  famotidine 20 mg oral tablet: 1 tab(s) orally once a day (20 May 2022 04:21)  Haldol Decanoate 50 mg/mL intramuscular solution: 37.5 milligram(s) intramuscular once a month (first Tuesday (20 May 2022 04:21)  insulin glargine 100 units/mL subcutaneous solution: 45 unit(s) subcutaneous once a day (at bedtime) (20 May 2022 04:21)  Januvia 100 mg oral tablet: 1 tab(s) orally once a day (20 May 2022 04:21)  levothyroxine 50 mcg (0.05 mg) oral tablet: 1 tab(s) orally once a day (20 May 2022 04:21)  Melatonin 10 mg oral capsule: 1 cap(s) orally once a day (at bedtime) (20 May 2022 04:21)  Senna 8.6 mg oral tablet: 2 tab(s) orally once a day (at bedtime) (20 May 2022 04:21)    VITAL SIGNS: Last 24 Hours  T(C): 36.9 (27 May 2022 06:03), Max: 37 (26 May 2022 20:32)  T(F): 98.5 (27 May 2022 06:03), Max: 98.6 (26 May 2022 20:32)  HR: 103 (27 May 2022 06:03) (68 - 103)  BP: 114/51 (27 May 2022 06:03) (106/55 - 145/68)  BP(mean): --  RR: 20 (27 May 2022 06:03) (16 - 22)  SpO2: 97% (27 May 2022 10:11) (90% - 100%)      PHYSICAL EXAM:  CONSTITUTIONAL: No acute distress, demented, nonverbal   NECK: supple, no JVD   PULMONARY: decreased BS at bases   CARDIOVASCULAR: Regular rate and rhythm; no murmurs, rubs, or gallops  GASTROINTESTINAL: Soft, non-tender, PEG tube noted , BS present   MUSCULOSKELETAL: no LE edema on LE, LUE edema noted with contracted wrist   Back: wound vac noted over sacral wound       LABS:                                   7.8    14.14 )-----------( 494      ( 27 May 2022 07:00 )             26.7   05    134<L>  |  99  |  20  ----------------------------<  228<H>  5.8<H>   |  22  |  0.8    Ca    8.5      27 May 2022 07:00  Mg     2.1         TPro  5.7<L>  /  Alb  2.5<L>  /  TBili  0.3  /  DBili  x   /  AST  19  /  ALT  14  /  AlkPhos  67        Culture - Other (22 @ 14:32)   - Vancomycin: R >16   - Levofloxacin: R >4   - Linezolid: S 1   - Daptomycin: SDD 4 The breakpoint for SDD (sensitive dose dependent)is based on a dosage regimen of 8-12 mg/kg administered every 24 h in adults and is intended for serious infections due to E. faecium. Consultation with an infectious diseases specialist is recommended.   - Tetra/Doxy: R >8   - Ampicillin: R >8 Predicts results to ampicillin/sulbactam, amoxacillin-clavulanate and piperacillin-tazobactam.   Specimen Source: .Other sacrum   Culture Results:   Few Pseudomonas aeruginosa Susceptibility to follow.   Numerous Enterococcus faecium (vancomycin resistant)   Normal enteric patricia isolated   Organism Identification: Enterococcus faecium (vancomycin resistant)   Organism: Enterococcus faecium (vancomycin resistant)   Method Type: BARON Culture - Blood (22 @ 21:38)   Specimen Source: .Blood Blood-Peripheral   Culture Results:   No Growth Final     RADIOLOGY:   < from: Xray Chest 1 View-PORTABLE IMMEDIATE (22 @ 22:20) >  Impression:    Left basilar linear subsegmental atelectasis. Otherwise, no evidence of   acute pulmonary process..    < end of copied text >  < from: CT Head No Cont (22 @ 21:17) >  IMPRESSION:    Findings the chest communicating hydrocephalus. Chronic microvascular   ischemic changes.      MEDICATIONS  (STANDING):  acetaminophen     Tablet .. 650 milliGRAM(s) Oral every 6 hours  aspirin  chewable 81 milliGRAM(s) Oral daily  atorvastatin 20 milliGRAM(s) Oral at bedtime  calcium carbonate 1250 mG  + Vitamin D (OsCal 500 + D) 1 Tablet(s) Oral daily  dextrose 10% Bolus 250 milliLiter(s) IV Bolus once  dextrose 5%. 1000 milliLiter(s) (50 mL/Hr) IV Continuous <Continuous>  dextrose 5%. 1000 milliLiter(s) (100 mL/Hr) IV Continuous <Continuous>  dextrose 50% Injectable 25 Gram(s) IV Push once  dextrose 50% Injectable 12.5 Gram(s) IV Push once  dextrose 50% Injectable 25 Gram(s) IV Push once  dextrose 50% Injectable 50 milliLiter(s) IV Push once  famotidine    Tablet 20 milliGRAM(s) Oral daily  glucagon  Injectable 1 milliGRAM(s) IntraMuscular once  heparin   Injectable 5000 Unit(s) SubCutaneous every 12 hours  insulin glargine Injectable (LANTUS) 40 Unit(s) SubCutaneous at bedtime  insulin lispro (ADMELOG) corrective regimen sliding scale   SubCutaneous three times a day before meals  insulin lispro Injectable (ADMELOG) 10 Unit(s) SubCutaneous every 6 hours  insulin regular  human recombinant 10 Unit(s) IV Push once  iron sucrose IVPB 200 milliGRAM(s) IV Intermittent every 24 hours  levoFLOXacin  Tablet 500 milliGRAM(s) Oral every 24 hours  levothyroxine 50 MICROGram(s) Oral daily  linezolid    Tablet 600 milliGRAM(s) Oral every 12 hours  melatonin 5 milliGRAM(s) Oral at bedtime  metroNIDAZOLE    Tablet 500 milliGRAM(s) Oral every 8 hours  polyethylene glycol 3350 17 Gram(s) Oral daily  senna 2 Tablet(s) Oral at bedtime  sodium zirconium cyclosilicate 10 Gram(s) Oral once  sodium zirconium cyclosilicate 5 Gram(s) Oral once    MEDICATIONS  (PRN):  dextrose Oral Gel 15 Gram(s) Oral once PRN Blood Glucose LESS THAN 70 milliGRAM(s)/deciliter  morphine  - Injectable 2 milliGRAM(s) IV Push every 6 hours PRN Severe Pain (7 - 10)  morphine  - Injectable 1 milliGRAM(s) IV Push every 4 hours PRN Moderate Pain (4 - 6)

## 2022-05-27 NOTE — PROGRESS NOTE ADULT - ASSESSMENT
Patient is a 76 year old female with PMHx of Anxiety, CAD, Hypothyroidism, IDDM, Schizophrenia, Dementia sent to ED from [facility] for concern over infected sacral ulcer.     Inpatient procedures:  - 5/21: S/p bedside debridement of sacrum   - 5/25: S/p bedside debridement of sacrum   - 5/26: S/p debridement of sacrum + negative pressure wound therapy     Sacral wound   - Wound VAC: placed on 5/26 Thursday, next change --> Saturday 5/28  - Wcx 5/21: Few Pseudomonas aeruginosa, numerous Enterococcus faecium (vancomycin resistant)  - Pain management   - IV abx as per ID recommendations  - Offloading/ positional changes

## 2022-05-27 NOTE — PROGRESS NOTE ADULT - SUBJECTIVE AND OBJECTIVE BOX
GLORIA WATERS 76y Female  MRN#: [id number]   CODE STATUS:________        SUBJECTIVE    No acute events overnight. Patient is s/p debridement POD day 1.                                           ----------------------------------------------------------  OBJECTIVE  PAST MEDICAL & SURGICAL HISTORY  Coronary artery disease    Anxiety    Dementia    Insulin dependent diabetes mellitus    Paranoid schizophrenia    Hyperlipidemia    Hypothyroidism    Abscess of right buttock    H/O abdominal surgery                                              -----------------------------------------------------------  ALLERGIES:  No Known Allergies                                            ------------------------------------------------------------    HOME MEDICATIONS  Home Medications:  acetaminophen 325 mg oral tablet, disintegratin tab(s) orally every 6 hours, As Needed (20 May 2022 04:21)  aspirin 81 mg oral tablet: 1 tab(s) orally once a day (20 May 2022 04:21)  atorvastatin 20 mg oral tablet: 1 tab(s) orally once a day (at bedtime) (20 May 2022 04:21)  Calcium 600+D 600 mg-200 intl units oral tablet: 2 tab(s) orally once a day (20 May 2022 04:21)  collagenase 250 units/g topical ointment: 1 application topically 2 times a day (20 May 2022 04:21)  famotidine 20 mg oral tablet: 1 tab(s) orally once a day (20 May 2022 04:21)  Haldol Decanoate 50 mg/mL intramuscular solution: 37.5 milligram(s) intramuscular once a month (first Tuesday (20 May 2022 04:21)  insulin glargine 100 units/mL subcutaneous solution: 45 unit(s) subcutaneous once a day (at bedtime) (20 May 2022 04:21)  Januvia 100 mg oral tablet: 1 tab(s) orally once a day (20 May 2022 04:21)  levothyroxine 50 mcg (0.05 mg) oral tablet: 1 tab(s) orally once a day (20 May 2022 04:21)  Melatonin 10 mg oral capsule: 1 cap(s) orally once a day (at bedtime) (20 May 2022 04:21)  Senna 8.6 mg oral tablet: 2 tab(s) orally once a day (at bedtime) (20 May 2022 04:21)                           MEDICATIONS:  STANDING MEDICATIONS  acetaminophen     Tablet .. 650 milliGRAM(s) Oral every 6 hours  aspirin  chewable 81 milliGRAM(s) Oral daily  atorvastatin 20 milliGRAM(s) Oral at bedtime  calcium carbonate 1250 mG  + Vitamin D (OsCal 500 + D) 1 Tablet(s) Oral daily  dextrose 5%. 1000 milliLiter(s) IV Continuous <Continuous>  dextrose 5%. 1000 milliLiter(s) IV Continuous <Continuous>  dextrose 50% Injectable 25 Gram(s) IV Push once  dextrose 50% Injectable 12.5 Gram(s) IV Push once  dextrose 50% Injectable 25 Gram(s) IV Push once  famotidine    Tablet 20 milliGRAM(s) Oral daily  glucagon  Injectable 1 milliGRAM(s) IntraMuscular once  heparin   Injectable 5000 Unit(s) SubCutaneous every 12 hours  insulin glargine Injectable (LANTUS) 40 Unit(s) SubCutaneous at bedtime  insulin lispro (ADMELOG) corrective regimen sliding scale   SubCutaneous three times a day before meals  insulin lispro Injectable (ADMELOG) 10 Unit(s) SubCutaneous every 6 hours  iron sucrose IVPB 200 milliGRAM(s) IV Intermittent every 24 hours  levoFLOXacin  Tablet 500 milliGRAM(s) Oral every 24 hours  levothyroxine 50 MICROGram(s) Oral daily  linezolid    Tablet 600 milliGRAM(s) Oral every 12 hours  melatonin 5 milliGRAM(s) Oral at bedtime  metroNIDAZOLE    Tablet 500 milliGRAM(s) Oral every 8 hours  polyethylene glycol 3350 17 Gram(s) Oral daily  senna 2 Tablet(s) Oral at bedtime    PRN MEDICATIONS  dextrose Oral Gel 15 Gram(s) Oral once PRN  morphine  - Injectable 2 milliGRAM(s) IV Push every 6 hours PRN  morphine  - Injectable 1 milliGRAM(s) IV Push every 4 hours PRN                                            ------------------------------------------------------------  VITAL SIGNS: Last 24 Hours  T(C): 36.9 (27 May 2022 06:03), Max: 37 (26 May 2022 20:32)  T(F): 98.5 (27 May 2022 06:03), Max: 98.6 (26 May 2022 20:32)  HR: 103 (27 May 2022 06:03) (68 - 103)  BP: 114/51 (27 May 2022 06:03) (106/55 - 145/68)  BP(mean): --  RR: 20 (27 May 2022 06:03) (16 - 22)  SpO2: 98% (27 May 2022 00:51) (90% - 100%)      22 @ 07:01  -  22 @ 07:00  --------------------------------------------------------  IN: 0 mL / OUT: 100 mL / NET: -100 mL                                             --------------------------------------------------------------  LABS:                        7.8    14.14 )-----------( 494      ( 27 May 2022 07:00 )             26.7     05-    134<L>  |  99  |  20  ----------------------------<  228<H>  5.8<H>   |  22  |  0.8    Ca    8.5      27 May 2022 07:00  Mg     2.1     -    TPro  5.7<L>  /  Alb  2.5<L>  /  TBili  0.3  /  DBili  x   /  AST  19  /  ALT  14  /  AlkPhos  67  05-27            PHYSICAL EXAM:    CONSTITUTIONAL: No acute distress  PULMONARY: Clear to auscultation bilaterally  CARDIOVASCULAR: Regular rate and rhythm; no murmurs, rubs, or gallops  GASTROINTESTINAL: Soft, non-tender  MUSCULOSKELETAL: no LE edema  Back: Stage 4 sacral ulcer    ASSESSMENT AND PLAN  77 y/o F w/ pmhx of Anxiety, CAD, Hypothyroidism, IDDM, Schizophrenia, Dementia sent to ED from [facility] found to be septic with lactic acidosis 2/2 infected stage 4 sacral ulcer    	  # Toxic metabolic encephalopathy 2/2 sepsis POA 2/2 Infected Stage 4 Sacral Ulcer  # Advanced Dementia  # Functional Quadriplegia  - s/p Vanc/ cefepime  - vanc dc due to no h/o MRSA  - Meropenem started due to h/o of ESBL  - local wound care Santyl/Wet Kerlex Packin with 1/4 Dakins/DPD BID  - Burn consult s/p debridement   - wound culture: few pseudomonas and VRE  - Burn re-consulted, performed bedside debridement on    - Per Dr. Roebrt will likely need further debridement in OR on  or   - Kept NPO for possible add-on case  - ID re-consulted in light of wound culture results, will need to adjust abx  - Per ID start Linezolid 600 BID PO, Levaquin 500 QD, and Flagyl 500 TID   -   - CRP 39  - Keep FS under tight control  - Burn to follow up today    # Iron deficiency anemia   - Iron 21/ TIBC 225/ ferritin 562  - start IV Venofer 200 mg Q 24 hours for 5 days  - monitor H/H, keep Hb above 7.5  - Hgb today 7.8  - no active bleeding noted       #Hypernatremia  - c/w free water via PEG 200ml/ feed q4h  - s/p D5W  - hypernatremia resolved  - Na 140 on     # TELMA   - Likely prerenal, resolved  - Cr downtrending 1.6-> 1.4 -> 1.0    # Hypothyroidism   - c/w home Levothyroxine     # DLD  - A1c 9.8   - c/w home Lipitor    # IDDM  - c/w SS+ 40U Lantus (home dose)  - insulin as needed    Diet: Tube feeds      Pending: IV abx

## 2022-05-27 NOTE — PROGRESS NOTE ADULT - SUBJECTIVE AND OBJECTIVE BOX
Patient is a 76 year old female with PMHx of Anxiety, CAD, Hypothyroidism, IDDM, Schizophrenia, Dementia sent to ED from [facility] for concern over infected sacral ulcer.     INTERVAL HPI/OVERNIGHT EVENTS:  - No acute events overnight  - Afebrile   - Wound VAC in place, next change 5/28    Vital Signs Last 24 Hrs  T(C): 36.2 (27 May 2022 13:40), Max: 37 (26 May 2022 20:32)  T(F): 97.1 (27 May 2022 13:40), Max: 98.6 (26 May 2022 20:32)  HR: 95 (27 May 2022 13:40) (68 - 103)  BP: 125/60 (27 May 2022 13:40) (106/55 - 145/68)  RR: 18 (27 May 2022 13:40) (16 - 22)  SpO2: 97% (27 May 2022 10:11) (90% - 100%)    I&O's Summary  26 May 2022 07:01  -  27 May 2022 07:00  --------------------------------------------------------  IN: 0 mL / OUT: 100 mL / NET: -100 mL    27 May 2022 07:01  -  27 May 2022 14:27  --------------------------------------------------------  IN: 874 mL / OUT: 0 mL / NET: 874 mL    LABS:                     7.8    14.14 )-----------( 494      ( 27 May 2022 07:00 )             26.7     05-27    134<L>  |  99  |  20  ----------------------------<  228<H>  5.8<H>   |  22  |  0.8    Ca    8.5      27 May 2022 07:00  Mg     2.1     05-27    TPro  5.7<L>  /  Alb  2.5<L>  /  TBili  0.3  /  DBili  x   /  AST  19  /  ALT  14  /  AlkPhos  67  05-27    CAPILLARY BLOOD GLUCOSE  POCT Blood Glucose.: 162 mg/dL (27 May 2022 11:37)  POCT Blood Glucose.: 240 mg/dL (27 May 2022 07:50)  POCT Blood Glucose.: 242 mg/dL (27 May 2022 05:35)  POCT Blood Glucose.: 126 mg/dL (27 May 2022 00:12)  POCT Blood Glucose.: 112 mg/dL (26 May 2022 20:57)  POCT Blood Glucose.: 116 mg/dL (26 May 2022 16:24)    MEDICATIONS  (STANDING):  acetaminophen     Tablet .. 650 milliGRAM(s) Oral every 6 hours  aspirin  chewable 81 milliGRAM(s) Oral daily  atorvastatin 20 milliGRAM(s) Oral at bedtime  calcium carbonate 1250 mG  + Vitamin D (OsCal 500 + D) 1 Tablet(s) Oral daily  dextrose 5%. 1000 milliLiter(s) (100 mL/Hr) IV Continuous <Continuous>  dextrose 5%. 1000 milliLiter(s) (50 mL/Hr) IV Continuous <Continuous>  dextrose 50% Injectable 25 Gram(s) IV Push once  dextrose 50% Injectable 12.5 Gram(s) IV Push once  dextrose 50% Injectable 25 Gram(s) IV Push once  dextrose 50% Injectable 50 milliLiter(s) IV Push once  famotidine    Tablet 20 milliGRAM(s) Oral daily  glucagon  Injectable 1 milliGRAM(s) IntraMuscular once  heparin   Injectable 5000 Unit(s) SubCutaneous every 12 hours  insulin glargine Injectable (LANTUS) 40 Unit(s) SubCutaneous at bedtime  insulin lispro (ADMELOG) corrective regimen sliding scale   SubCutaneous three times a day before meals  insulin lispro Injectable (ADMELOG) 10 Unit(s) SubCutaneous every 6 hours  iron sucrose IVPB 200 milliGRAM(s) IV Intermittent every 24 hours  levoFLOXacin  Tablet 500 milliGRAM(s) Oral every 24 hours  levothyroxine 50 MICROGram(s) Oral daily  linezolid    Tablet 600 milliGRAM(s) Oral every 12 hours  melatonin 5 milliGRAM(s) Oral at bedtime  metroNIDAZOLE    Tablet 500 milliGRAM(s) Oral every 8 hours  polyethylene glycol 3350 17 Gram(s) Oral daily  senna 2 Tablet(s) Oral at bedtime    MEDICATIONS  (PRN):  dextrose Oral Gel 15 Gram(s) Oral once PRN Blood Glucose LESS THAN 70 milliGRAM(s)/deciliter  morphine  - Injectable 2 milliGRAM(s) IV Push every 6 hours PRN Severe Pain (7 - 10)  morphine  - Injectable 1 milliGRAM(s) IV Push every 4 hours PRN Moderate Pain (4 - 6)    Culture - Other (05.21.22 @ 14:32)    -  Amikacin: S <=16    -  Ampicillin: R >8 Predicts results to ampicillin/sulbactam, amoxacillin-clavulanate and  piperacillin-tazobactam.    -  Aztreonam: S <=4    -  Cefepime: S 4    -  Ceftazidime: S 4    -  Ciprofloxacin: S <=0.25    -  Daptomycin: SDD 4 The breakpoint for SDD (sensitive dose dependent)is based on a dosage regimen of 8-12 mg/kg administered every 24 h in adults and is intended for serious infections due to E. faecium. Consultation with an infectious diseases specialist is recommended.    -  Gentamicin: I 8    -  Imipenem: I 4    -  Levofloxacin: S 1    -  Levofloxacin: R >4    -  Linezolid: S 1    -  Meropenem: S <=1    -  Piperacillin/Tazobactam: S <=8    -  Tetra/Doxy: R >8    -  Tobramycin: S <=2    -  Vancomycin: R >16    Specimen Source: .Other sacrum    Culture Results:   Few Pseudomonas aeruginosa  Numerous Enterococcus faecium (vancomycin resistant)  Normal enteric patricia isolated    Organism Identification: Pseudomonas aeruginosa  Enterococcus faecium (vancomycin resistant)    Organism: Pseudomonas aeruginosa    Organism: Enterococcus faecium (vancomycin resistant)    Method Type: BARON    Method Type: BARON    PHYSICAL EXAM:  GENERAL: patient lying in bed in NAD  CHEST/LUNG: Breathing comfortably on NC, b/l chest rise appreciated  HEART: In no cardiopulmonary distress  Wound:   Sacrum: Wound VAC in place with good seal and functioning properly. No serosanguinous drainage noted in canister.

## 2022-05-28 NOTE — PROGRESS NOTE ADULT - SUBJECTIVE AND OBJECTIVE BOX
NEPHROLOGY FOLLOW UP NOTE    pt seen and examined    PAST MEDICAL & SURGICAL HISTORY:  Coronary artery disease      Anxiety      Dementia      Insulin dependent diabetes mellitus      Paranoid schizophrenia      Hyperlipidemia      Hypothyroidism      Abscess of right buttock      H/O abdominal surgery        Allergies:  No Known Allergies    Home Medications Reviewed    SOCIAL HISTORY:  Denies ETOH,Smoking,   FAMILY HISTORY:  No pertinent family history in first degree relatives          REVIEW OF SYSTEMS:  unobtainable  All other review of systems is negative unless indicated above.    PHYSICAL EXAM:  Constitutional: NAD  HEENT: anicteric sclera, oropharynx clear, MMM  Neck: No JVD  Respiratory: CTAB, no wheezes, rales or rhonchi  Cardiovascular: S1, S2, RRR  Gastrointestinal: BS+, soft, NT/ND + PEG  Extremities: No cyanosis or clubbing. No peripheral edema   : No CVA tenderness   Skin: No rashes    Hospital Medications:   MEDICATIONS  (STANDING):  acetaminophen     Tablet .. 650 milliGRAM(s) Oral every 6 hours  aspirin  chewable 81 milliGRAM(s) Oral daily  atorvastatin 20 milliGRAM(s) Oral at bedtime  calcium carbonate 1250 mG  + Vitamin D (OsCal 500 + D) 1 Tablet(s) Oral daily  dextrose 5%. 1000 milliLiter(s) (100 mL/Hr) IV Continuous <Continuous>  dextrose 5%. 1000 milliLiter(s) (50 mL/Hr) IV Continuous <Continuous>  dextrose 50% Injectable 25 Gram(s) IV Push once  dextrose 50% Injectable 12.5 Gram(s) IV Push once  dextrose 50% Injectable 25 Gram(s) IV Push once  famotidine    Tablet 20 milliGRAM(s) Oral daily  glucagon  Injectable 1 milliGRAM(s) IntraMuscular once  heparin   Injectable 5000 Unit(s) SubCutaneous every 12 hours  insulin glargine Injectable (LANTUS) 40 Unit(s) SubCutaneous at bedtime  insulin lispro (ADMELOG) corrective regimen sliding scale   SubCutaneous three times a day before meals  insulin lispro Injectable (ADMELOG) 10 Unit(s) SubCutaneous every 6 hours  iron sucrose IVPB 200 milliGRAM(s) IV Intermittent every 24 hours  lactobacillus acidophilus 1 Tablet(s) Oral three times a day  levoFLOXacin  Tablet 500 milliGRAM(s) Oral every 24 hours  levothyroxine 50 MICROGram(s) Oral daily  linezolid    Tablet 600 milliGRAM(s) Oral every 12 hours  melatonin 5 milliGRAM(s) Oral at bedtime  metroNIDAZOLE    Tablet 500 milliGRAM(s) Oral every 8 hours  polyethylene glycol 3350 17 Gram(s) Oral daily  senna 2 Tablet(s) Oral at bedtime        VITALS:  T(F): 97.8 (05-28-22 @ 04:42), Max: 98.4 (05-27-22 @ 21:47)  HR: 91 (05-28-22 @ 04:42)  BP: 105/52 (05-28-22 @ 04:42)  RR: 18 (05-28-22 @ 04:42)  SpO2: 95% (05-27-22 @ 19:34)  Wt(kg): --    05-26 @ 07:01  -  05-27 @ 07:00  --------------------------------------------------------  IN: 0 mL / OUT: 100 mL / NET: -100 mL    05-27 @ 07:01  -  05-28 @ 07:00  --------------------------------------------------------  IN: 874 mL / OUT: 0 mL / NET: 874 mL      Height (cm): 160 (05-28 @ 12:14)    LABS:  05-28    134<L>  |  97<L>  |  26<H>  ----------------------------<  157<H>  4.9   |  22  |  0.8    Ca    8.7      28 May 2022 07:35  Mg     2.2     05-28    TPro  5.5<L>  /  Alb  2.4<L>  /  TBili  <0.2  /  DBili      /  AST  14  /  ALT  10  /  AlkPhos  96  05-28                          7.6    11.88 )-----------( 434      ( 28 May 2022 07:35 )             25.3       Urine Studies:        RADIOLOGY & ADDITIONAL STUDIES:

## 2022-05-28 NOTE — PROGRESS NOTE ADULT - ASSESSMENT
ASSESSMENT AND PLAN  77 y/o F w/ pmhx of Anxiety, CAD, Hypothyroidism, IDDM, Schizophrenia, Dementia sent to ED from [facility] found to be septic with lactic acidosis 2/2 infected stage 4 sacral ulcer      # Suspected  metabolic encephalopathy / Infected Stage 4 Sacral Ulcer/ Advanced Dementia/ Functional Quadriplegia  - mental status at baseline , c/w supportive care, prevent falls and aspiration   - maintain sufficient hydration   - dietitian consulted   - off load pressure points, change position Q 2 hours   - Burn is following,  s/p  debridements x two at bedside and in OR on 5/26  - c/w wound vac for 24- 48 hours   - wound culture:  pseudomonas and VRE  - on  Zyvox, flagyl and Levofloxacin as per ID   -  ESR/CRP noted, likely pt has an OM     # Iron deficiency and acute blood loss  anemia   - on IV Venofer 200 mg Q 24 hours for 5 days  - monitor H/H, keep Hb above 7.5  - H/H dropped after surgical excision, likely after acute blood loss     #Hypernatremia/ TELMA   - resolved, c/w free water via PEG  - monitor BUN/cr and urine output       # Hypothyroidism   - c/w home Levothyroxine     # DLD   - c/w home Lipitor    # IDDM  - carb consistent PEG formula   - monitor finger sticks s  - on Insulin       # GI/DVT prophylaxis     #Progress Note Handoff  Pending (specify): c/w wound vac for 24- 48 hours as per burn, c/w IV ABx and supportive care, monitor H/H , keep Hb above 7.5    Family discussion: n/a   Disposition: Home___/SNF___/Other________/Unknown at this time_____x ___

## 2022-05-28 NOTE — PROGRESS NOTE ADULT - SUBJECTIVE AND OBJECTIVE BOX
GLORIA WATERS 76y Female  MRN#: [id number]   CODE STATUS:________        SUBJECTIVE    No acute events overnight. Wound vac fixed by burn yesterday and working well. Continue with pain control.                                               ----------------------------------------------------------  OBJECTIVE  PAST MEDICAL & SURGICAL HISTORY  Coronary artery disease    Anxiety    Dementia    Insulin dependent diabetes mellitus    Paranoid schizophrenia    Hyperlipidemia    Hypothyroidism    Abscess of right buttock    H/O abdominal surgery                                              -----------------------------------------------------------  ALLERGIES:  No Known Allergies                                            ------------------------------------------------------------    HOME MEDICATIONS  Home Medications:  acetaminophen 325 mg oral tablet, disintegratin tab(s) orally every 6 hours, As Needed (20 May 2022 04:)  aspirin 81 mg oral tablet: 1 tab(s) orally once a day (20 May 2022 04:21)  atorvastatin 20 mg oral tablet: 1 tab(s) orally once a day (at bedtime) (20 May 2022 04:21)  Calcium 600+D 600 mg-200 intl units oral tablet: 2 tab(s) orally once a day (20 May 2022 04:21)  collagenase 250 units/g topical ointment: 1 application topically 2 times a day (20 May 2022 04:)  famotidine 20 mg oral tablet: 1 tab(s) orally once a day (20 May 2022 04:21)  Haldol Decanoate 50 mg/mL intramuscular solution: 37.5 milligram(s) intramuscular once a month (first Tuesday (20 May 2022 04:21)  insulin glargine 100 units/mL subcutaneous solution: 45 unit(s) subcutaneous once a day (at bedtime) (20 May 2022 04:21)  Januvia 100 mg oral tablet: 1 tab(s) orally once a day (20 May 2022 04:21)  levothyroxine 50 mcg (0.05 mg) oral tablet: 1 tab(s) orally once a day (20 May 2022 04:21)  Melatonin 10 mg oral capsule: 1 cap(s) orally once a day (at bedtime) (20 May 2022 04:21)  Senna 8.6 mg oral tablet: 2 tab(s) orally once a day (at bedtime) (20 May 2022 04:21)                           MEDICATIONS:  STANDING MEDICATIONS  acetaminophen     Tablet .. 650 milliGRAM(s) Oral every 6 hours  aspirin  chewable 81 milliGRAM(s) Oral daily  atorvastatin 20 milliGRAM(s) Oral at bedtime  calcium carbonate 1250 mG  + Vitamin D (OsCal 500 + D) 1 Tablet(s) Oral daily  dextrose 5%. 1000 milliLiter(s) IV Continuous <Continuous>  dextrose 5%. 1000 milliLiter(s) IV Continuous <Continuous>  dextrose 50% Injectable 25 Gram(s) IV Push once  dextrose 50% Injectable 12.5 Gram(s) IV Push once  dextrose 50% Injectable 25 Gram(s) IV Push once  famotidine    Tablet 20 milliGRAM(s) Oral daily  glucagon  Injectable 1 milliGRAM(s) IntraMuscular once  heparin   Injectable 5000 Unit(s) SubCutaneous every 12 hours  insulin glargine Injectable (LANTUS) 40 Unit(s) SubCutaneous at bedtime  insulin lispro (ADMELOG) corrective regimen sliding scale   SubCutaneous three times a day before meals  insulin lispro Injectable (ADMELOG) 10 Unit(s) SubCutaneous every 6 hours  iron sucrose IVPB 200 milliGRAM(s) IV Intermittent every 24 hours  lactobacillus acidophilus 1 Tablet(s) Oral three times a day  levoFLOXacin  Tablet 500 milliGRAM(s) Oral every 24 hours  levothyroxine 50 MICROGram(s) Oral daily  linezolid    Tablet 600 milliGRAM(s) Oral every 12 hours  melatonin 5 milliGRAM(s) Oral at bedtime  metroNIDAZOLE    Tablet 500 milliGRAM(s) Oral every 8 hours  polyethylene glycol 3350 17 Gram(s) Oral daily  senna 2 Tablet(s) Oral at bedtime    PRN MEDICATIONS  dextrose Oral Gel 15 Gram(s) Oral once PRN  morphine  - Injectable 2 milliGRAM(s) IV Push every 6 hours PRN  morphine  - Injectable 1 milliGRAM(s) IV Push every 4 hours PRN                                            ------------------------------------------------------------  VITAL SIGNS: Last 24 Hours  T(C): 36.6 (28 May 2022 04:42), Max: 36.9 (27 May 2022 21:47)  T(F): 97.8 (28 May 2022 04:42), Max: 98.4 (27 May 2022 21:47)  HR: 91 (28 May 2022 04:42) (81 - 95)  BP: 105/52 (28 May 2022 04:42) (105/52 - 129/60)  BP(mean): --  RR: 18 (28 May 2022 04:42) (18 - 18)  SpO2: 95% (27 May 2022 19:34) (92% - 97%)      22 @ 07:01  -  - @ 07:00  --------------------------------------------------------  IN: 874 mL / OUT: 0 mL / NET: 874 mL                                             --------------------------------------------------------------  LABS:                        7.6    11.88 )-----------( 434      ( 28 May 2022 07:35 )             25.3     05-27    131<L>  |  97<L>  |  26<H>  ----------------------------<  203<H>  4.9   |  19  |  0.9    Ca    8.4<L>      27 May 2022 17:54  Mg     2.1     -    TPro  5.7<L>  /  Alb  2.5<L>  /  TBili  0.3  /  DBili  x   /  AST  19  /  ALT  14  /  AlkPhos  67                  Culture - Fungal, Tissue (collected 26 May 2022 22:11)  Source: .Tissue None  Preliminary Report (27 May 2022 15:08):    Testing in progress    Culture - Acid Fast - Tissue w/Smear (collected 26 May 2022 22:11)  Source: .Tissue None    Culture - Tissue with Gram Stain (collected 26 May 2022 22:11)  Source: .Tissue None  Gram Stain (27 May 2022 12:17):    Moderate polymorphonuclear leukocytes per low power field    Few Gram Negative Rods per oil power field    Few Gram positive cocci in pairs per oil power field          PHYSICAL EXAM:       CONSTITUTIONAL: No acute distress  PULMONARY: Clear to auscultation bilaterally  CARDIOVASCULAR: Regular rate and rhythm; no murmurs, rubs, or gallops  GASTROINTESTINAL: Soft, non-tender  MUSCULOSKELETAL: no LE edema  Back: Stage 4 sacral ulcer    ASSESSMENT AND PLAN  77 y/o F w/ pmhx of Anxiety, CAD, Hypothyroidism, IDDM, Schizophrenia, Dementia sent to ED from [facility] found to be septic with lactic acidosis 2/2 infected stage 4 sacral ulcer    	  # Toxic metabolic encephalopathy 2/2 sepsis POA 2/2 Infected Stage 4 Sacral Ulcer  # Advanced Dementia  # Functional Quadriplegia  - s/p Vanc/ cefepime  - vanc dc due to no h/o MRSA  - Meropenem started due to h/o of ESBL  - local wound care Santyl/Wet Kerlex Packin with 1/4 Dakins/DPD BID  - Burn consult s/p debridement   - wound culture: few pseudomonas and VRE  - Burn re-consulted, performed bedside debridement on    - Per Dr. Robert will likely need further debridement in OR on  or   - Kept NPO for possible add-on case  - ID re-consulted in light of wound culture results, will need to adjust abx  - Per ID start Linezolid 600 BID PO, Levaquin 500 QD, and Flagyl 500 TID for 14 days  -   - CRP 39  - Keep FS under tight control  - Burn to follow up today for wound vac change    # Iron deficiency anemia   - Iron 21/ TIBC 225/ ferritin 562  - start IV Venofer 200 mg Q 24 hours for 5 days (-)  - monitor H/H, keep Hb above 7.5  - Hgb today 7.6  - no active bleeding noted       #Hypernatremia  - c/w free water via PEG 200ml/ feed q4h  - s/p D5W  - hypernatremia resolved  - Na 140 on     # TELMA   - Likely prerenal, resolved  - Cr downtrending 1.6-> 1.4 -> 1.0    # Hypothyroidism   - c/w home Levothyroxine     # DLD  - A1c 9.8   - c/w home Lipitor    # IDDM  - c/w SS+ 40U Lantus (home dose)  - insulin as needed    Diet: Tube feeds

## 2022-05-28 NOTE — PROGRESS NOTE ADULT - ASSESSMENT
large dressing change --> sacrum with viable tissue and areas nonviable tissue -->will hold wound vac 24-48 hours    start santyl ointment and dakins solution qd    no further surgery needed

## 2022-05-28 NOTE — PROGRESS NOTE ADULT - SUBJECTIVE AND OBJECTIVE BOX
75 y/o F w/ pmhx of Anxiety, CAD, Hypothyroidism, IDDM, Schizophrenia, Dementia sent to ED from [facility] found to be septic with lactic acidosis 2/2 infected stage 4 sacral ulcer. She was consulted by burn, underwent 2 bedside debridements, went to OR for 3 rd debridement on  late at night, consulted by ID. On admission pt was found to have TELMA, resolved after IV hydration.  Today pt is awake, nonverbal today, pale, looks comfortable.     OBJECTIVE  PAST MEDICAL & SURGICAL HISTORY  Coronary artery disease    Anxiety    Dementia    Insulin dependent diabetes mellitus    Paranoid schizophrenia    Hyperlipidemia    Hypothyroidism    Abscess of right buttock    H/O abdominal surgery      ALLERGIES:  No Known Allergies        HOME MEDICATIONS  Home Medications:  acetaminophen 325 mg oral tablet, disintegratin tab(s) orally every 6 hours, As Needed (20 May 2022 04:)  aspirin 81 mg oral tablet: 1 tab(s) orally once a day (20 May 2022 04:)  atorvastatin 20 mg oral tablet: 1 tab(s) orally once a day (at bedtime) (20 May 2022 04:21)  Calcium 600+D 600 mg-200 intl units oral tablet: 2 tab(s) orally once a day (20 May 2022 04:21)  collagenase 250 units/g topical ointment: 1 application topically 2 times a day (20 May 2022 04:)  famotidine 20 mg oral tablet: 1 tab(s) orally once a day (20 May 2022 04:21)  Haldol Decanoate 50 mg/mL intramuscular solution: 37.5 milligram(s) intramuscular once a month (first Tuesday (20 May 2022 04:21)  insulin glargine 100 units/mL subcutaneous solution: 45 unit(s) subcutaneous once a day (at bedtime) (20 May 2022 04:21)  Januvia 100 mg oral tablet: 1 tab(s) orally once a day (20 May 2022 04:21)  levothyroxine 50 mcg (0.05 mg) oral tablet: 1 tab(s) orally once a day (20 May 2022 04:21)  Melatonin 10 mg oral capsule: 1 cap(s) orally once a day (at bedtime) (20 May 2022 04:21)  Senna 8.6 mg oral tablet: 2 tab(s) orally once a day (at bedtime) (20 May 2022 04:21)    VITAL SIGNS: Last 24 Hours  T(C): 36.6 (28 May 2022 04:42), Max: 36.9 (27 May 2022 21:47)  T(F): 97.8 (28 May 2022 04:42), Max: 98.4 (27 May 2022 21:47)  HR: 91 (28 May 2022 04:42) (81 - 95)  BP: 105/52 (28 May 2022 04:42) (105/52 - 129/60)  BP(mean): --  RR: 18 (28 May 2022 04:42) (18 - 18)  SpO2: 95% (27 May 2022 19:34) (92% - 95%)      PHYSICAL EXAM:  CONSTITUTIONAL: No acute distress, demented, nonverbal   NECK: supple, no JVD   PULMONARY: decreased BS at bases   CARDIOVASCULAR: Regular rate and rhythm; no murmurs, rubs, or gallops  GASTROINTESTINAL: Soft, non-tender, PEG tube noted , BS present   MUSCULOSKELETAL: no LE edema on LE, LUE edema noted with contracted wrist   Back: wound vac noted over sacral ulcer       LABS:                                   7.6    11.88 )-----------( 434      ( 28 May 2022 07:35 )             25.3       134<L>  |  97<L>  |  26<H>  ----------------------------<  157<H>  4.9   |  22  |  0.8    Ca    8.7      28 May 2022 07:35  Mg     2.2         TPro  5.5<L>  /  Alb  2.4<L>  /  TBili  <0.2  /  DBili  x   /  AST  14  /  ALT  10  /  AlkPhos  96      Culture - Other (22 @ 14:32)   - Vancomycin: R >16   - Levofloxacin: R >4   - Linezolid: S 1   - Daptomycin: SDD 4 The breakpoint for SDD (sensitive dose dependent)is based on a dosage regimen of 8-12 mg/kg administered every 24 h in adults and is intended for serious infections due to E. faecium. Consultation with an infectious diseases specialist is recommended.   - Tetra/Doxy: R >8   - Ampicillin: R >8 Predicts results to ampicillin/sulbactam, amoxacillin-clavulanate and piperacillin-tazobactam.   Specimen Source: .Other sacrum   Culture Results:   Few Pseudomonas aeruginosa Susceptibility to follow.   Numerous Enterococcus faecium (vancomycin resistant)   Normal enteric patricia isolated   Organism Identification: Enterococcus faecium (vancomycin resistant)   Organism: Enterococcus faecium (vancomycin resistant)   Method Type: BARON Culture - Blood (22 @ 21:38)   Specimen Source: .Blood Blood-Peripheral   Culture Results:   No Growth Final     RADIOLOGY:   < from: Xray Chest 1 View-PORTABLE IMMEDIATE (22 @ 22:20) >  Impression:    Left basilar linear subsegmental atelectasis. Otherwise, no evidence of   acute pulmonary process..    < end of copied text >  < from: CT Head No Cont (22 @ 21:17) >  IMPRESSION:    Findings the chest communicating hydrocephalus. Chronic microvascular   ischemic changes.      MEDICATIONS  (STANDING):  acetaminophen     Tablet .. 650 milliGRAM(s) Oral every 6 hours  aspirin  chewable 81 milliGRAM(s) Oral daily  atorvastatin 20 milliGRAM(s) Oral at bedtime  calcium carbonate 1250 mG  + Vitamin D (OsCal 500 + D) 1 Tablet(s) Oral daily  dextrose 5%. 1000 milliLiter(s) (100 mL/Hr) IV Continuous <Continuous>  dextrose 5%. 1000 milliLiter(s) (50 mL/Hr) IV Continuous <Continuous>  dextrose 50% Injectable 25 Gram(s) IV Push once  dextrose 50% Injectable 12.5 Gram(s) IV Push once  dextrose 50% Injectable 25 Gram(s) IV Push once  famotidine    Tablet 20 milliGRAM(s) Oral daily  glucagon  Injectable 1 milliGRAM(s) IntraMuscular once  heparin   Injectable 5000 Unit(s) SubCutaneous every 12 hours  insulin glargine Injectable (LANTUS) 40 Unit(s) SubCutaneous at bedtime  insulin lispro (ADMELOG) corrective regimen sliding scale   SubCutaneous three times a day before meals  insulin lispro Injectable (ADMELOG) 10 Unit(s) SubCutaneous every 6 hours  iron sucrose IVPB 200 milliGRAM(s) IV Intermittent every 24 hours  lactobacillus acidophilus 1 Tablet(s) Oral three times a day  levoFLOXacin  Tablet 500 milliGRAM(s) Oral every 24 hours  levothyroxine 50 MICROGram(s) Oral daily  linezolid    Tablet 600 milliGRAM(s) Oral every 12 hours  melatonin 5 milliGRAM(s) Oral at bedtime  metroNIDAZOLE    Tablet 500 milliGRAM(s) Oral every 8 hours  polyethylene glycol 3350 17 Gram(s) Oral daily  senna 2 Tablet(s) Oral at bedtime    MEDICATIONS  (PRN):  dextrose Oral Gel 15 Gram(s) Oral once PRN Blood Glucose LESS THAN 70 milliGRAM(s)/deciliter  morphine  - Injectable 2 milliGRAM(s) IV Push every 6 hours PRN Severe Pain (7 - 10)  morphine  - Injectable 1 milliGRAM(s) IV Push every 4 hours PRN Moderate Pain (4 - 6)

## 2022-05-28 NOTE — PROGRESS NOTE ADULT - ASSESSMENT
TELMA, resolved  hypernatremia, resolved  dementia / psych disorder / TME  infected sacral ulcer   DM2  HTN  anemia    plan:    off IVF  free water with PEG feeds   wound care  abx per ID  venofer / prn PRBC transfusion  full code

## 2022-05-29 NOTE — CHART NOTE - NSCHARTNOTEFT_GEN_A_CORE
Registered Dietitian Follow-Up     Patient Profile Reviewed                           Yes [x]   No []  Nutrition History Previously Obtained        Yes [x]  No []      Pertinent Medical Interventions:  77 y/o F w/ pmhx of Anxiety, CAD, Hypothyroidism, IDDM, Schizophrenia, Dementia sent to ED from [facility] found to be septic with lactic acidosis 2/2 infected stage 4 sacral ulcer. She was consulted by burn, underwent 2 bedside debridements, went to OR for 3 rd debridement on 5/26 late at night, consulted by ID. On admission pt was found to have TELMA, resolved after IV hydration.    Nutrition Interval History:   receiving Glucerna 1.2 via PEG @237mL Q4hr + NC Prosource 2x daily: total 1422mL formula, 1830 kcal, 115 g pro, 1152 mL free water.   + receiving 200mL FWF Q4hr for additional 1200mL fluids and total with EN of 2252mL water.   Patient tolerating feeds with no s/s of GI distress    Diet order:   Diet, NPO with Tube Feed:   Tube Feeding Modality: Gastrostomy  Glucerna 1.2 Bertin  Total Volume for 24 Hours (mL): 1422  Bolus  Total Volume of Bolus (mL):  237  Total # of Feeds: 6  Tube Feed Frequency: Every 4 hours   Tube Feed Start Time: 16:00  Bolus Feed Rate (mL per Hour): 237   Bolus Feed Duration (in Hours): 1  Free Water Flush   Total Volume per Flush (mL): 200   Frequency: Every 4 Hours  No Carb Prosource TF     Qty per Day:  2 (05-27-22 @ 00:01) [Active]    Anthropometrics:  Height for BMI (CENTIMETERS)  160.02 Centimeter(s)  Weight for BMI (lbs)  160.9 lb  Weight for BMI (kg)  73 kg  Body Mass Index  28.5    *no weight changes during admission    MEDICATIONS  (STANDING):  acetaminophen     Tablet .. 650 milliGRAM(s) Oral every 6 hours  aspirin  chewable 81 milliGRAM(s) Oral daily  atorvastatin 20 milliGRAM(s) Oral at bedtime  calcium carbonate 1250 mG  + Vitamin D (OsCal 500 + D) 1 Tablet(s) Oral daily  collagenase Ointment 1 Application(s) Topical daily  Dakins Solution - 1/2 Strength 1 Application(s) Topical daily  dextrose 5%. 1000 milliLiter(s) (50 mL/Hr) IV Continuous <Continuous>  dextrose 5%. 1000 milliLiter(s) (100 mL/Hr) IV Continuous <Continuous>  dextrose 50% Injectable 25 Gram(s) IV Push once  dextrose 50% Injectable 12.5 Gram(s) IV Push once  dextrose 50% Injectable 25 Gram(s) IV Push once  famotidine    Tablet 20 milliGRAM(s) Oral daily  glucagon  Injectable 1 milliGRAM(s) IntraMuscular once  heparin   Injectable 5000 Unit(s) SubCutaneous every 12 hours  insulin glargine Injectable (LANTUS) 40 Unit(s) SubCutaneous at bedtime  insulin lispro (ADMELOG) corrective regimen sliding scale   SubCutaneous three times a day before meals  insulin lispro Injectable (ADMELOG) 10 Unit(s) SubCutaneous every 6 hours  iron sucrose IVPB 200 milliGRAM(s) IV Intermittent every 24 hours  lactobacillus acidophilus 1 Tablet(s) Oral three times a day  levoFLOXacin  Tablet 500 milliGRAM(s) Oral every 24 hours  levothyroxine 50 MICROGram(s) Oral daily  linezolid    Tablet 600 milliGRAM(s) Oral every 12 hours  melatonin 5 milliGRAM(s) Oral at bedtime  metroNIDAZOLE    Tablet 500 milliGRAM(s) Oral every 8 hours  polyethylene glycol 3350 17 Gram(s) Oral daily  senna 2 Tablet(s) Oral at bedtime  sodium chloride 0.9%. 1000 milliLiter(s) (75 mL/Hr) IV Continuous <Continuous>    MEDICATIONS  (PRN):  dextrose Oral Gel 15 Gram(s) Oral once PRN Blood Glucose LESS THAN 70 milliGRAM(s)/deciliter  morphine  - Injectable 2 milliGRAM(s) IV Push every 6 hours PRN Severe Pain (7 - 10)  morphine  - Injectable 1 milliGRAM(s) IV Push every 4 hours PRN Moderate Pain (4 - 6)    Pertinent Labs: 05-29 @ 08:10: Na 133<L>, BUN 20, Cr 0.8, <H>, K+ 4.8, Phos --, Mg 1.9, Alk Phos 81, ALT/SGPT 8, AST/SGOT 20, HbA1c --    Finger Sticks:  POCT Blood Glucose.: 149 mg/dL (05-29 @ 12:10)  POCT Blood Glucose.: 166 mg/dL (05-29 @ 07:53)  POCT Blood Glucose.: 183 mg/dL (05-29 @ 06:34)  POCT Blood Glucose.: 88 mg/dL (05-29 @ 00:21)  POCT Blood Glucose.: 135 mg/dL (05-28 @ 21:29)  POCT Blood Glucose.: 211 mg/dL (05-28 @ 16:50)    Physical Findings:  - Appearance: disoriented x4  - GI function: Fecal incontinence. last BM documented x2 on 5/24  - Tubes: PEG tube  - Oral/Mouth cavity: NPO   - Skin: Stage 4 pressure ulcer to sacrum  - Edema: L AND R wrist, L & R arm, L&R hand 3+ on 5/25     Nutrition Requirements:  Weight Used: 73kg     Estimated Energy Needs    Continue [x]  Adjust [] MSJ 1204 x SF 1.3-1.4= 2052-7274 kcal/day (SF r/t stage 4 PU);   Estimated Protein Needs    Continue [x]  Adjust [] pro needs based on 1.25-1.5 g/kg (same reason as above)=  g/day (may be higher given recent debridement and current infection)  Estimated Fluid Needs        Continue []  Adjust [x] 1825mL/day (25mL/kg)    Nutrient Intake: >85%     [x] Previous Nutrition Diagnosis:            [x] Ongoing          [] Resolved  #1 Increased nutrient needs  Goal/Expected Outcome: pt to meet >85% estimated needs within 4 days    Nutrition Intervention:   Enteral Nutrition, Medical Food Supplement, Vitamin Supplement, Nutrition Related Medication, Coordination of Care      Indicator/Monitoring:   Christa Carmona, #[id number] to monitor diet order, energy intake, food and nutrient intake, body composition, weight    Recommendations:  1. CONTIUE NPO with EN via PEG:  Glucerna 1.2, bolus 1 carton (237 mL) 6 x per day, provides 1422 mL/24 hrs, 1710 kcal, 85.2 g pro, 1152 mL free water.  Provide ProSource TF 2 x per day (additional 60 kcal, 15 g pro each).    2. RECOMMEND: Decrease FWF to + 125mL FWF Q4hr for additional 750mL fluids and total with EN of 1902mL water    3. CONTINUE:  iron sucrose IVPB 200 milliGRAM(s) IV Intermittent every 24 hours  lactobacillus acidophilus 1 Tablet(s) Oral three times a day  bowel regimen    MOD Risk, follow up x 6days Registered Dietitian Follow-Up     Patient Profile Reviewed                           Yes [x]   No []  Nutrition History Previously Obtained        Yes [x]  No []      Pertinent Medical Interventions:  75 y/o F w/ pmhx of Anxiety, CAD, Hypothyroidism, IDDM, Schizophrenia, Dementia sent to ED from [facility] found to be septic with lactic acidosis 2/2 infected stage 4 sacral ulcer. She was consulted by burn, underwent 2 bedside debridements, went to OR for 3 rd debridement on 5/26 late at night, consulted by ID. On admission pt was found to have TELMA, resolved after IV hydration.    Nutrition Interval History:   receiving Glucerna 1.2 via PEG @237mL Q4hr + NC Prosource 2x daily: total 1422mL formula, 1830 kcal, 115 g pro, 1152 mL free water.   + receiving 200mL FWF Q4hr for additional 1200mL fluids and total with EN of 2252mL water.   Patient tolerating feeds with no s/s of GI distress    Diet order:   Diet, NPO with Tube Feed:   Tube Feeding Modality: Gastrostomy  Glucerna 1.2 Bertin  Total Volume for 24 Hours (mL): 1422  Bolus  Total Volume of Bolus (mL):  237  Total # of Feeds: 6  Tube Feed Frequency: Every 4 hours   Tube Feed Start Time: 16:00  Bolus Feed Rate (mL per Hour): 237   Bolus Feed Duration (in Hours): 1  Free Water Flush   Total Volume per Flush (mL): 200   Frequency: Every 4 Hours  No Carb Prosource TF     Qty per Day:  2 (05-27-22 @ 00:01) [Active]    Anthropometrics:  Height for BMI (CENTIMETERS)  160.02 Centimeter(s)  Weight for BMI (lbs)  160.9 lb  Weight for BMI (kg)  73 kg  Body Mass Index  28.5    *no weight changes during admission    MEDICATIONS  (STANDING):  acetaminophen     Tablet .. 650 milliGRAM(s) Oral every 6 hours  aspirin  chewable 81 milliGRAM(s) Oral daily  atorvastatin 20 milliGRAM(s) Oral at bedtime  calcium carbonate 1250 mG  + Vitamin D (OsCal 500 + D) 1 Tablet(s) Oral daily  collagenase Ointment 1 Application(s) Topical daily  Dakins Solution - 1/2 Strength 1 Application(s) Topical daily  dextrose 5%. 1000 milliLiter(s) (50 mL/Hr) IV Continuous <Continuous>  dextrose 5%. 1000 milliLiter(s) (100 mL/Hr) IV Continuous <Continuous>  dextrose 50% Injectable 25 Gram(s) IV Push once  dextrose 50% Injectable 12.5 Gram(s) IV Push once  dextrose 50% Injectable 25 Gram(s) IV Push once  famotidine    Tablet 20 milliGRAM(s) Oral daily  glucagon  Injectable 1 milliGRAM(s) IntraMuscular once  heparin   Injectable 5000 Unit(s) SubCutaneous every 12 hours  insulin glargine Injectable (LANTUS) 40 Unit(s) SubCutaneous at bedtime  insulin lispro (ADMELOG) corrective regimen sliding scale   SubCutaneous three times a day before meals  insulin lispro Injectable (ADMELOG) 10 Unit(s) SubCutaneous every 6 hours  iron sucrose IVPB 200 milliGRAM(s) IV Intermittent every 24 hours  lactobacillus acidophilus 1 Tablet(s) Oral three times a day  levoFLOXacin  Tablet 500 milliGRAM(s) Oral every 24 hours  levothyroxine 50 MICROGram(s) Oral daily  linezolid    Tablet 600 milliGRAM(s) Oral every 12 hours  melatonin 5 milliGRAM(s) Oral at bedtime  metroNIDAZOLE    Tablet 500 milliGRAM(s) Oral every 8 hours  polyethylene glycol 3350 17 Gram(s) Oral daily  senna 2 Tablet(s) Oral at bedtime  sodium chloride 0.9%. 1000 milliLiter(s) (75 mL/Hr) IV Continuous <Continuous>    MEDICATIONS  (PRN):  dextrose Oral Gel 15 Gram(s) Oral once PRN Blood Glucose LESS THAN 70 milliGRAM(s)/deciliter  morphine  - Injectable 2 milliGRAM(s) IV Push every 6 hours PRN Severe Pain (7 - 10)  morphine  - Injectable 1 milliGRAM(s) IV Push every 4 hours PRN Moderate Pain (4 - 6)    Pertinent Labs: 05-29 @ 08:10: Na 133<L>, BUN 20, Cr 0.8, <H>, K+ 4.8, Phos --, Mg 1.9, Alk Phos 81, ALT/SGPT 8, AST/SGOT 20, HbA1c --    Finger Sticks:  POCT Blood Glucose.: 149 mg/dL (05-29 @ 12:10)  POCT Blood Glucose.: 166 mg/dL (05-29 @ 07:53)  POCT Blood Glucose.: 183 mg/dL (05-29 @ 06:34)  POCT Blood Glucose.: 88 mg/dL (05-29 @ 00:21)  POCT Blood Glucose.: 135 mg/dL (05-28 @ 21:29)  POCT Blood Glucose.: 211 mg/dL (05-28 @ 16:50)    Physical Findings:  - Appearance: disoriented x4  - GI function: Fecal incontinence. last BM documented x2 on 5/24  - Tubes: PEG tube  - Oral/Mouth cavity: NPO   - Skin: Stage 4 pressure ulcer to sacrum  - Edema: L AND R wrist, L & R arm, L&R hand 3+ on 5/25     Nutrition Requirements:  Weight Used: 73kg     Estimated Energy Needs    Continue [x]  Adjust [] MSJ 1204 x SF 1.3-1.4= 5001-1551 kcal/day (SF r/t stage 4 PU);   Estimated Protein Needs    Continue [x]  Adjust [] pro needs based on 1.25-1.5 g/kg (same reason as above)=  g/day (may be higher given recent debridement and current infection)  Estimated Fluid Needs        Continue []  Adjust [x] 1825mL/day (25mL/kg)    Nutrient Intake: >85%     [x] Previous Nutrition Diagnosis:            [x] Ongoing          [] Resolved  #1 Increased nutrient needs  Goal/Expected Outcome: pt to meet >85% estimated needs within 6 days    Nutrition Intervention:   Enteral Nutrition, Medical Food Supplement, Vitamin Supplement, Nutrition Related Medication, Coordination of Care      Indicator/Monitoring:   Christa Carmona, #[id number] to monitor diet order, energy intake, food and nutrient intake, body composition, weight    Recommendations:  1. CONTIUE NPO with EN via PEG:  Glucerna 1.2, bolus 1 carton (237 mL) 6 x per day, provides 1422 mL/24 hrs, 1710 kcal, 85.2 g pro, 1152 mL free water.  Provide ProSource TF 2 x per day (additional 60 kcal, 15 g pro each).    2. RECOMMEND: Decrease FWF to + 125mL FWF Q4hr for additional 750mL fluids and total with EN of 1902mL water    3. CONTINUE:  iron sucrose IVPB 200 milliGRAM(s) IV Intermittent every 24 hours  lactobacillus acidophilus 1 Tablet(s) Oral three times a day  bowel regimen    MOD Risk, follow up x 6days

## 2022-05-29 NOTE — PROGRESS NOTE ADULT - SUBJECTIVE AND OBJECTIVE BOX
77 y/o F w/ pmhx of Anxiety, CAD, Hypothyroidism, IDDM, Schizophrenia, Dementia sent to ED from [facility], [place], operated by [facility] found to be septic with lactic acidosis 2/2 infected stage 4 sacral ulcer. She was consulted by burn, underwent 2 bedside debridements, went to OR for 3 rd debridement on  late at night, consulted by ID. On admission pt was found to have TELMA, resolved after IV hydration.  Today pt is awake, looks comfortable, does not answer questions.     OBJECTIVE  PAST MEDICAL & SURGICAL HISTORY  Coronary artery disease    Anxiety    Dementia    Insulin dependent diabetes mellitus    Paranoid schizophrenia    Hyperlipidemia    Hypothyroidism    Abscess of right buttock    H/O abdominal surgery      ALLERGIES:  No Known Allergies        HOME MEDICATIONS  Home Medications:  acetaminophen 325 mg oral tablet, disintegratin tab(s) orally every 6 hours, As Needed (20 May 2022 04:21)  aspirin 81 mg oral tablet: 1 tab(s) orally once a day (20 May 2022 04:21)  atorvastatin 20 mg oral tablet: 1 tab(s) orally once a day (at bedtime) (20 May 2022 04:21)  Calcium 600+D 600 mg-200 intl units oral tablet: 2 tab(s) orally once a day (20 May 2022 04:21)  collagenase 250 units/g topical ointment: 1 application topically 2 times a day (20 May 2022 04:21)  famotidine 20 mg oral tablet: 1 tab(s) orally once a day (20 May 2022 04:21)  Haldol Decanoate 50 mg/mL intramuscular solution: 37.5 milligram(s) intramuscular once a month (first Tuesday (20 May 2022 04:21)  insulin glargine 100 units/mL subcutaneous solution: 45 unit(s) subcutaneous once a day (at bedtime) (20 May 2022 04:21)  Januvia 100 mg oral tablet: 1 tab(s) orally once a day (20 May 2022 04:21)  levothyroxine 50 mcg (0.05 mg) oral tablet: 1 tab(s) orally once a day (20 May 2022 04:21)  Melatonin 10 mg oral capsule: 1 cap(s) orally once a day (at bedtime) (20 May 2022 04:21)  Senna 8.6 mg oral tablet: 2 tab(s) orally once a day (at bedtime) (20 May 2022 04:21)    VITAL SIGNS: Last 24 Hours  T(C): 37.7 (29 May 2022 06:22), Max: 38.1 (29 May 2022 04:59)  T(F): 99.9 (29 May 2022 06:22), Max: 100.5 (29 May 2022 04:59)  HR: 100 (29 May 2022 06:22) (100 - 108)  BP: 112/60 (29 May 2022 05:09) (112/48 - 143/63)  BP(mean): --  RR: 18 (29 May 2022 06:22) (18 - 18)  SpO2: 94% (29 May 2022 06:22) (94% - 95%)      PHYSICAL EXAM:  CONSTITUTIONAL: No acute distress, demented, nonverbal   NECK: supple, no JVD   PULMONARY: decreased BS at bases   CARDIOVASCULAR: Regular rate and rhythm; no murmurs, rubs, or gallops  GASTROINTESTINAL: Soft, non-tender, PEG tube noted , BS present   MUSCULOSKELETAL: no LE edema on LE, LUE edema noted with contracted wrist   Back: dressing noted on the sacrum       LABS:                                   8.0    14.42 )-----------( 396      ( 29 May 2022 08:10 )             26.5       133<L>  |  98  |  20  ----------------------------<  146<H>  4.8   |  22  |  0.8    Ca    8.8      29 May 2022 08:10  Mg     1.9         TPro  5.6<L>  /  Alb  2.4<L>  /  TBili  0.3  /  DBili  x   /  AST  20  /  ALT  8   /  AlkPhos  81        Culture - Other (22 @ 14:32)   - Vancomycin: R >16   - Levofloxacin: R >4   - Linezolid: S 1   - Daptomycin: SDD 4 The breakpoint for SDD (sensitive dose dependent)is based on a dosage regimen of 8-12 mg/kg administered every 24 h in adults and is intended for serious infections due to E. faecium. Consultation with an infectious diseases specialist is recommended.   - Tetra/Doxy: R >8   - Ampicillin: R >8 Predicts results to ampicillin/sulbactam, amoxacillin-clavulanate and piperacillin-tazobactam.   Specimen Source: .Other sacrum   Culture Results:   Few Pseudomonas aeruginosa Susceptibility to follow.   Numerous Enterococcus faecium (vancomycin resistant)   Normal enteric patricia isolated   Organism Identification: Enterococcus faecium (vancomycin resistant)   Organism: Enterococcus faecium (vancomycin resistant)   Method Type: BARON Culture - Blood (22 @ 21:38)   Specimen Source: .Blood Blood-Peripheral   Culture Results:   No Growth Final   Culture - Tissue with Gram Stain (22 @ 22:11)   Gram Stain:   Moderate polymorphonuclear leukocytes per low power field   Few Gram Negative Rods per oil power field   Few Gram positive cocci in pairs per oil power field   Specimen Source: .Tissue None   Culture Results:   Moderate Proteus mirabilis   Moderate Klebsiella pneumoniae   Few Enterococcus faecium   RADIOLOGY:   < from: Xray Chest 1 View-PORTABLE IMMEDIATE (22 @ 22:20) >  Impression:    Left basilar linear subsegmental atelectasis. Otherwise, no evidence of   acute pulmonary process..    < end of copied text >  < from: CT Head No Cont (22 @ 21:17) >  IMPRESSION:    Findings the chest communicating hydrocephalus. Chronic microvascular   ischemic changes.      MEDICATIONS  (STANDING):  acetaminophen     Tablet .. 650 milliGRAM(s) Oral every 6 hours  aspirin  chewable 81 milliGRAM(s) Oral daily  atorvastatin 20 milliGRAM(s) Oral at bedtime  calcium carbonate 1250 mG  + Vitamin D (OsCal 500 + D) 1 Tablet(s) Oral daily  collagenase Ointment 1 Application(s) Topical daily  Dakins Solution - 1/2 Strength 1 Application(s) Topical daily  dextrose 5%. 1000 milliLiter(s) (100 mL/Hr) IV Continuous <Continuous>  dextrose 5%. 1000 milliLiter(s) (50 mL/Hr) IV Continuous <Continuous>  dextrose 50% Injectable 25 Gram(s) IV Push once  dextrose 50% Injectable 12.5 Gram(s) IV Push once  dextrose 50% Injectable 25 Gram(s) IV Push once  famotidine    Tablet 20 milliGRAM(s) Oral daily  glucagon  Injectable 1 milliGRAM(s) IntraMuscular once  heparin   Injectable 5000 Unit(s) SubCutaneous every 12 hours  insulin glargine Injectable (LANTUS) 40 Unit(s) SubCutaneous at bedtime  insulin lispro (ADMELOG) corrective regimen sliding scale   SubCutaneous three times a day before meals  insulin lispro Injectable (ADMELOG) 10 Unit(s) SubCutaneous every 6 hours  iron sucrose IVPB 200 milliGRAM(s) IV Intermittent every 24 hours  lactobacillus acidophilus 1 Tablet(s) Oral three times a day  levoFLOXacin  Tablet 500 milliGRAM(s) Oral every 24 hours  levothyroxine 50 MICROGram(s) Oral daily  linezolid    Tablet 600 milliGRAM(s) Oral every 12 hours  melatonin 5 milliGRAM(s) Oral at bedtime  metroNIDAZOLE    Tablet 500 milliGRAM(s) Oral every 8 hours  polyethylene glycol 3350 17 Gram(s) Oral daily  senna 2 Tablet(s) Oral at bedtime  sodium chloride 0.9%. 1000 milliLiter(s) (75 mL/Hr) IV Continuous <Continuous>    MEDICATIONS  (PRN):  dextrose Oral Gel 15 Gram(s) Oral once PRN Blood Glucose LESS THAN 70 milliGRAM(s)/deciliter  morphine  - Injectable 2 milliGRAM(s) IV Push every 6 hours PRN Severe Pain (7 - 10)  morphine  - Injectable 1 milliGRAM(s) IV Push every 4 hours PRN Moderate Pain (4 - 6)

## 2022-05-29 NOTE — PROGRESS NOTE ADULT - ASSESSMENT
TELMA, resolved  hypernatremia, resolved, now mildly hyponatremic  dementia / psych disorder / TME  infected sacral ulcer   DM2  HTN  anemia    plan:    cont free water with PEG feeds, may need to reduce 200cc water q4h if Na+ lowers further  wound care  abx per ID  complete venofer course  prn PRBC transfusion

## 2022-05-29 NOTE — PROGRESS NOTE ADULT - ASSESSMENT
ASSESSMENT AND PLAN  75 y/o F w/ pmhx of Anxiety, CAD, Hypothyroidism, IDDM, Schizophrenia, Dementia sent to ED from [facility] found to be septic with lactic acidosis 2/2 infected stage 4 sacral ulcer      # Suspected  metabolic encephalopathy / Infected Stage 4 Sacral Ulcer/ Advanced Dementia/ Functional Quadriplegia  - mental status at baseline , c/w supportive care, prevent falls and aspiration   - maintain sufficient hydration, will start IV NS at 75 ml/h ( pt developed worsening hyponatremia and leukocytosis after last debridement, has a low grade fever)   - dietitian consulted   - off load pressure points, change position Q 2 hours   - Burn is following,  s/p  debridements x two at bedside and in OR on 5/26  - wound culture:  pseudomonas and VRE, repeat Cx from OR grew Moderate Proteus mirabilis ,Moderate Klebsiella pneumoniae and Enterococcus faecium   - pt needs ID follow up   - on  Zyvox, flagyl and Levofloxacin  -  ESR/CRP noted, likely pt has an OM     # Iron deficiency and acute blood loss  anemia   - on IV Venofer 200 mg Q 24 hours for 5 days  - monitor H/H, keep Hb above 7.5  - H/H dropped after surgical excision, likely after acute blood loss     #Hyponatremia / TELMA   - start NS At 75 ml/h   - monitor Na level   - monitor BUN/cr and urine output       # Hypothyroidism   - c/w home Levothyroxine     # DLD   - c/w home Lipitor    # IDDM  - carb consistent PEG formula   - monitor finger sticks s  - on Insulin       # GI/DVT prophylaxis     #Progress Note Handoff  Pending (specify):start IV fluids, f/u repeat Na level,  c/w IV ABx and supportive care, monitor H/H , keep Hb above 7.5, ID follow up ( repeat Cx partially resulted)     Family discussion: n/a   Disposition: Home___/SNF___/Other________/Unknown at this time_____x ___

## 2022-05-29 NOTE — PROGRESS NOTE ADULT - SUBJECTIVE AND OBJECTIVE BOX
NEPHROLOGY FOLLOW UP NOTE    pt seen and examined  low grade fever  bp's fair  off IVF    PAST MEDICAL & SURGICAL HISTORY:  Coronary artery disease      Anxiety      Dementia      Insulin dependent diabetes mellitus      Paranoid schizophrenia      Hyperlipidemia      Hypothyroidism      Abscess of right buttock      H/O abdominal surgery        Allergies:  No Known Allergies    Home Medications Reviewed    SOCIAL HISTORY:  Denies ETOH,Smoking,   FAMILY HISTORY:  No pertinent family history in first degree relatives          REVIEW OF SYSTEMS:  unobtainable  All other review of systems is negative unless indicated above.    PHYSICAL EXAM:  Constitutional: NAD  HEENT: anicteric sclera, oropharynx clear, MMM  Neck: No JVD  Respiratory: CTAB, no wheezes, rales or rhonchi  Cardiovascular: S1, S2, RRR  Gastrointestinal: BS+, soft, NT/ND + PEG  Extremities: No cyanosis or clubbing. No peripheral edema   : No CVA tenderness   Skin: No rashes    Hospital Medications:   MEDICATIONS  (STANDING):  acetaminophen     Tablet .. 650 milliGRAM(s) Oral every 6 hours  aspirin  chewable 81 milliGRAM(s) Oral daily  atorvastatin 20 milliGRAM(s) Oral at bedtime  calcium carbonate 1250 mG  + Vitamin D (OsCal 500 + D) 1 Tablet(s) Oral daily  dextrose 5%. 1000 milliLiter(s) (100 mL/Hr) IV Continuous <Continuous>  dextrose 5%. 1000 milliLiter(s) (50 mL/Hr) IV Continuous <Continuous>  dextrose 50% Injectable 25 Gram(s) IV Push once  dextrose 50% Injectable 12.5 Gram(s) IV Push once  dextrose 50% Injectable 25 Gram(s) IV Push once  famotidine    Tablet 20 milliGRAM(s) Oral daily  glucagon  Injectable 1 milliGRAM(s) IntraMuscular once  heparin   Injectable 5000 Unit(s) SubCutaneous every 12 hours  insulin glargine Injectable (LANTUS) 40 Unit(s) SubCutaneous at bedtime  insulin lispro (ADMELOG) corrective regimen sliding scale   SubCutaneous three times a day before meals  insulin lispro Injectable (ADMELOG) 10 Unit(s) SubCutaneous every 6 hours  iron sucrose IVPB 200 milliGRAM(s) IV Intermittent every 24 hours  lactobacillus acidophilus 1 Tablet(s) Oral three times a day  levoFLOXacin  Tablet 500 milliGRAM(s) Oral every 24 hours  levothyroxine 50 MICROGram(s) Oral daily  linezolid    Tablet 600 milliGRAM(s) Oral every 12 hours  melatonin 5 milliGRAM(s) Oral at bedtime  metroNIDAZOLE    Tablet 500 milliGRAM(s) Oral every 8 hours  polyethylene glycol 3350 17 Gram(s) Oral daily  senna 2 Tablet(s) Oral at bedtime        VITALS:  T(F): 99.9 (05-29-22 @ 06:22), Max: 100.5 (05-29-22 @ 04:59)  HR: 100 (05-29-22 @ 06:22)  BP: 112/60 (05-29-22 @ 05:09)  RR: 18 (05-29-22 @ 06:22)  SpO2: 94% (05-29-22 @ 06:22)  Wt(kg): --    05-27 @ 07:01  -  05-28 @ 07:00  --------------------------------------------------------  IN: 874 mL / OUT: 0 mL / NET: 874 mL    05-28 @ 07:01  -  05-29 @ 07:00  --------------------------------------------------------  IN: 1805 mL / OUT: 1500 mL / NET: 305 mL      Height (cm): 160 (05-28 @ 12:14)    LABS:  05-29    133<L>  |  98  |  20  ----------------------------<  146<H>  4.8   |  22  |  0.8    Ca    8.7      28 May 2022 07:35  Mg     1.9     05-29    TPro  5.6<L>  /  Alb  2.4<L>  /  TBili  0.3  /  DBili      /  AST  20  /  ALT  8   /  AlkPhos  81  05-29                          8.0    14.42 )-----------( 396      ( 29 May 2022 08:10 )             26.5       Urine Studies:        RADIOLOGY & ADDITIONAL STUDIES:

## 2022-05-30 NOTE — PROGRESS NOTE ADULT - SUBJECTIVE AND OBJECTIVE BOX
75 y/o F w/ pmhx of Anxiety, CAD, Hypothyroidism, IDDM, Schizophrenia, Dementia sent to ED from [facility] found to be septic with lactic acidosis 2/2 infected stage 4 sacral ulcer. She was consulted by burn, underwent 2 bedside debridements, went to OR for 3 rd debridement on  late at night, consulted by ID. On admission pt was found to have TELMA, resolved after IV hydration.  Today pt is awake, coughing, c/o itchy felling in her throat.     OBJECTIVE  PAST MEDICAL & SURGICAL HISTORY  Coronary artery disease    Anxiety    Dementia    Insulin dependent diabetes mellitus    Paranoid schizophrenia    Hyperlipidemia    Hypothyroidism    Abscess of right buttock    H/O abdominal surgery      ALLERGIES:  No Known Allergies        HOME MEDICATIONS  Home Medications:  acetaminophen 325 mg oral tablet, disintegratin tab(s) orally every 6 hours, As Needed (20 May 2022 04:21)  aspirin 81 mg oral tablet: 1 tab(s) orally once a day (20 May 2022 04:21)  atorvastatin 20 mg oral tablet: 1 tab(s) orally once a day (at bedtime) (20 May 2022 04:21)  Calcium 600+D 600 mg-200 intl units oral tablet: 2 tab(s) orally once a day (20 May 2022 04:21)  collagenase 250 units/g topical ointment: 1 application topically 2 times a day (20 May 2022 04:21)  famotidine 20 mg oral tablet: 1 tab(s) orally once a day (20 May 2022 04:21)  Haldol Decanoate 50 mg/mL intramuscular solution: 37.5 milligram(s) intramuscular once a month (first Tuesday (20 May 2022 04:21)  insulin glargine 100 units/mL subcutaneous solution: 45 unit(s) subcutaneous once a day (at bedtime) (20 May 2022 04:21)  Januvia 100 mg oral tablet: 1 tab(s) orally once a day (20 May 2022 04:21)  levothyroxine 50 mcg (0.05 mg) oral tablet: 1 tab(s) orally once a day (20 May 2022 04:21)  Melatonin 10 mg oral capsule: 1 cap(s) orally once a day (at bedtime) (20 May 2022 04:21)  Senna 8.6 mg oral tablet: 2 tab(s) orally once a day (at bedtime) (20 May 2022 04:21)    VITAL SIGNS: Last 24 Hours  T(C): 37.1 (30 May 2022 05:12), Max: 37.4 (29 May 2022 21:03)  T(F): 98.8 (30 May 2022 05:12), Max: 99.4 (29 May 2022 21:03)  HR: 102 (30 May 2022 05:33) (89 - 109)  BP: 143/92 (30 May 2022 05:12) (127/60 - 143/92)  BP(mean): --  RR: 18 (30 May 2022 05:33) (18 - 18)  SpO2: 94% (30 May 2022 05:33) (94% - 94%)      PHYSICAL EXAM:  CONSTITUTIONAL: No acute distress, demented, nonverbal   NECK: supple, no JVD   PULMONARY: decreased BS at bases   CARDIOVASCULAR: Regular rate and rhythm; no murmurs, rubs, or gallops  GASTROINTESTINAL: Soft, non-tender, PEG tube noted , BS present   MUSCULOSKELETAL: no LE edema on LE, LUE edema noted with contracted wrist   Back: dressing noted on the sacrum       LABS:                                   8.4    15.75 )-----------( 397      ( 30 May 2022 06:37 )             28.0       132<L>  |  98  |  20  ----------------------------<  212<H>  5.0   |  19  |  0.8    Ca    8.8      30 May 2022 06:37  Mg     1.9         TPro  5.7<L>  /  Alb  2.5<L>  /  TBili  0.4  /  DBili  x   /  AST  41  /  ALT  16  /  AlkPhos  84      Culture - Other (22 @ 14:32)   - Vancomycin: R >16   - Levofloxacin: R >4   - Linezolid: S 1   - Daptomycin: SDD 4 The breakpoint for SDD (sensitive dose dependent)is based on a dosage regimen of 8-12 mg/kg administered every 24 h in adults and is intended for serious infections due to E. faecium. Consultation with an infectious diseases specialist is recommended.   - Tetra/Doxy: R >8   - Ampicillin: R >8 Predicts results to ampicillin/sulbactam, amoxacillin-clavulanate and piperacillin-tazobactam.   Specimen Source: .Other sacrum   Culture Results:   Few Pseudomonas aeruginosa Susceptibility to follow.   Numerous Enterococcus faecium (vancomycin resistant)   Normal enteric patricia isolated   Organism Identification: Enterococcus faecium (vancomycin resistant)   Organism: Enterococcus faecium (vancomycin resistant)   Method Type: BARON Culture - Blood (22 @ 21:38)   Specimen Source: .Blood Blood-Peripheral   Culture Results:   No Growth Final   Culture - Tissue with Gram Stain (22 @ 22:11)   Gram Stain:   Moderate polymorphonuclear leukocytes per low power field   Few Gram Negative Rods per oil power field   Few Gram positive cocci in pairs per oil power field   Specimen Source: .Tissue None   Culture Results:   Moderate Proteus mirabilis   Moderate Klebsiella pneumoniae   Few Enterococcus faecium       RADIOLOGY:   < from: Xray Chest 1 View-PORTABLE IMMEDIATE (22 @ 22:20) >  Impression:    Left basilar linear subsegmental atelectasis. Otherwise, no evidence of   acute pulmonary process..    < end of copied text >  < from: CT Head No Cont (22 @ 21:17) >  IMPRESSION:    Findings the chest communicating hydrocephalus. Chronic microvascular   ischemic changes.      MEDICATIONS  (STANDING):  acetaminophen     Tablet .. 650 milliGRAM(s) Oral every 6 hours  aspirin  chewable 81 milliGRAM(s) Oral daily  atorvastatin 20 milliGRAM(s) Oral at bedtime  calcium carbonate 1250 mG  + Vitamin D (OsCal 500 + D) 1 Tablet(s) Oral daily  collagenase Ointment 1 Application(s) Topical daily  Dakins Solution - 1/2 Strength 1 Application(s) Topical daily  dextrose 5%. 1000 milliLiter(s) (50 mL/Hr) IV Continuous <Continuous>  dextrose 5%. 1000 milliLiter(s) (100 mL/Hr) IV Continuous <Continuous>  dextrose 50% Injectable 25 Gram(s) IV Push once  dextrose 50% Injectable 12.5 Gram(s) IV Push once  dextrose 50% Injectable 25 Gram(s) IV Push once  famotidine    Tablet 20 milliGRAM(s) Oral daily  fluticasone propionate 50 MICROgram(s)/spray Nasal Spray 1 Spray(s) Both Nostrils every 12 hours  glucagon  Injectable 1 milliGRAM(s) IntraMuscular once  heparin   Injectable 5000 Unit(s) SubCutaneous every 12 hours  insulin glargine Injectable (LANTUS) 40 Unit(s) SubCutaneous at bedtime  insulin lispro (ADMELOG) corrective regimen sliding scale   SubCutaneous three times a day before meals  insulin lispro Injectable (ADMELOG) 10 Unit(s) SubCutaneous every 6 hours  iron sucrose IVPB 200 milliGRAM(s) IV Intermittent every 24 hours  lactobacillus acidophilus 1 Tablet(s) Oral three times a day  levoFLOXacin  Tablet 500 milliGRAM(s) Oral every 24 hours  levothyroxine 50 MICROGram(s) Oral daily  linezolid    Tablet 600 milliGRAM(s) Oral every 12 hours  melatonin 5 milliGRAM(s) Oral at bedtime  metroNIDAZOLE    Tablet 500 milliGRAM(s) Oral every 8 hours  polyethylene glycol 3350 17 Gram(s) Oral daily  scopolamine 1 mG/72 Hr(s) Patch 1 Patch Transdermal every 72 hours  senna 2 Tablet(s) Oral at bedtime    MEDICATIONS  (PRN):  dextrose Oral Gel 15 Gram(s) Oral once PRN Blood Glucose LESS THAN 70 milliGRAM(s)/deciliter  morphine  - Injectable 2 milliGRAM(s) IV Push every 6 hours PRN Severe Pain (7 - 10)  morphine  - Injectable 1 milliGRAM(s) IV Push every 4 hours PRN Moderate Pain (4 - 6)

## 2022-05-30 NOTE — PROGRESS NOTE ADULT - ASSESSMENT
ASSESSMENT AND PLAN  77 y/o F w/ pmhx of Anxiety, CAD, Hypothyroidism, IDDM, Schizophrenia, Dementia sent to ED from [facility] found to be septic with lactic acidosis 2/2 infected stage 4 sacral ulcer      # Suspected  metabolic encephalopathy / Infected Stage 4 Sacral Ulcer/ Advanced Dementia/ Functional Quadriplegia  - mental status at baseline , c/w supportive care, prevent falls and aspiration   - will start Scopolamine patch today to reduce secretions   - d/c IV fluids, start Nasonex , chest PT  TID, mouth care   - dietitian consulted   - off load pressure points, change position Q 2 hours   - Burn is following,  s/p  debridements x two at bedside and in OR on 5/26  - wound culture:  pseudomonas and VRE, repeat Cx from OR grew Moderate Proteus mirabilis ,Moderate Klebsiella pneumoniae and Enterococcus faecium   - pt needs ID follow up   - on  Zyvox, flagyl and Levofloxacin  -  ESR/CRP noted, likely pt has an OM     # Iron deficiency and acute blood loss  anemia   - on IV Venofer 200 mg Q 24 hours for 5 days  - monitor H/H, keep Hb above 7.5  - H/H dropped after surgical excision, likely after acute blood loss     #Hyponatremia / TELMA   - d/c IV fluids   - monitor Na level   - monitor BUN/cr and urine output       # Hypothyroidism   - c/w home Levothyroxine     # DLD   - c/w home Lipitor    # IDDM  - carb consistent PEG formula   - monitor finger sticks s  - on Insulin       # GI/DVT prophylaxis     #Progress Note Handoff  Pending (specify):d/c  IV fluids, f/u repeat Na level,  c/w IV ABx and supportive care, monitor H/H , keep Hb above 7.5, ID follow up ( repeat Cx partially resulted), start Scopolamine patch and Nasonex, pt needs chest PT      Family discussion: n/a   Disposition: Home___/SNF___/Other________/Unknown at this time_____x ___

## 2022-05-30 NOTE — PROGRESS NOTE ADULT - SUBJECTIVE AND OBJECTIVE BOX
Nephrology progress note     more alert    ON events/Subjective:     Allergies:  No Known Allergies    Hospital Medications:   MEDICATIONS  (STANDING):  acetaminophen     Tablet .. 650 milliGRAM(s) Oral every 6 hours  aspirin  chewable 81 milliGRAM(s) Oral daily  atorvastatin 20 milliGRAM(s) Oral at bedtime  calcium carbonate 1250 mG  + Vitamin D (OsCal 500 + D) 1 Tablet(s) Oral daily  collagenase Ointment 1 Application(s) Topical daily  Dakins Solution - 1/2 Strength 1 Application(s) Topical daily  dextrose 5%. 1000 milliLiter(s) (50 mL/Hr) IV Continuous <Continuous>  dextrose 5%. 1000 milliLiter(s) (100 mL/Hr) IV Continuous <Continuous>  dextrose 50% Injectable 25 Gram(s) IV Push once  dextrose 50% Injectable 12.5 Gram(s) IV Push once  dextrose 50% Injectable 25 Gram(s) IV Push once  famotidine    Tablet 20 milliGRAM(s) Oral daily  glucagon  Injectable 1 milliGRAM(s) IntraMuscular once  heparin   Injectable 5000 Unit(s) SubCutaneous every 12 hours  insulin glargine Injectable (LANTUS) 40 Unit(s) SubCutaneous at bedtime  insulin lispro (ADMELOG) corrective regimen sliding scale   SubCutaneous three times a day before meals  insulin lispro Injectable (ADMELOG) 10 Unit(s) SubCutaneous every 6 hours  iron sucrose IVPB 200 milliGRAM(s) IV Intermittent every 24 hours  lactobacillus acidophilus 1 Tablet(s) Oral three times a day  levoFLOXacin  Tablet 500 milliGRAM(s) Oral every 24 hours  levothyroxine 50 MICROGram(s) Oral daily  linezolid    Tablet 600 milliGRAM(s) Oral every 12 hours  melatonin 5 milliGRAM(s) Oral at bedtime  metroNIDAZOLE    Tablet 500 milliGRAM(s) Oral every 8 hours  polyethylene glycol 3350 17 Gram(s) Oral daily  senna 2 Tablet(s) Oral at bedtime  sodium chloride 0.9%. 1000 milliLiter(s) (75 mL/Hr) IV Continuous <Continuous>    REVIEW OF SYSTEMS:  CONSTITUTIONAL: No weakness, fevers or chills  EYES/ENT: No visual changes;  No vertigo or throat pain   NECK: No pain or stiffness  RESPIRATORY: No cough, wheezing, hemoptysis; No shortness of breath  CARDIOVASCULAR: No chest pain or palpitations.  GASTROINTESTINAL: No abdominal or epigastric pain. No nausea, vomiting, or hematemesis; No diarrhea or constipation. No melena or hematochezia.  GENITOURINARY: No dysuria, frequency, foamy urine, urinary urgency, incontinence or hematuria  NEUROLOGICAL: No numbness or weakness  SKIN: No itching, burning, rashes, or lesions   VASCULAR: No bilateral lower extremity edema.   All other review of systems is negative unless indicated above.    VITALS:  T(F): 98.8 (05-30-22 @ 05:12), Max: 99.4 (05-29-22 @ 21:03)  HR: 102 (05-30-22 @ 05:33)  BP: 143/92 (05-30-22 @ 05:12)  RR: 18 (05-30-22 @ 05:33)  SpO2: 94% (05-30-22 @ 05:33)  Wt(kg): --    05-28 @ 07:01  -  05-29 @ 07:00  --------------------------------------------------------  IN: 1805 mL / OUT: 1500 mL / NET: 305 mL    05-29 @ 07:01  -  05-30 @ 07:00  --------------------------------------------------------  IN: 1311 mL / OUT: 1750 mL / NET: -439 mL        PHYSICAL EXAM:  Constitutional: NAD  HEENT: anicteric sclera, oropharynx clear, MMM  Neck: No JVD  Respiratory: CTAB, no wheezes, rales or rhonchi  Cardiovascular: S1, S2, RRR  Gastrointestinal: BS+, soft, NT/ND  Extremities: No cyanosis or clubbing. No peripheral edema  Neurological: A/O x 3, no focal deficits  Psychiatric: Normal mood, normal affect  : No CVA tenderness. No amin.   Skin: No rashes  Vascular Access:    LABS:  05-30    132<L>  |  98  |  20  ----------------------------<  212<H>  5.0   |  19  |  0.8    Ca    8.8      30 May 2022 06:37  Mg     1.9     05-30    TPro  5.7<L>  /  Alb  2.5<L>  /  TBili  0.4  /  DBili      /  AST  41  /  ALT  16  /  AlkPhos  84  05-30                          8.4    15.75 )-----------( 397      ( 30 May 2022 06:37 )             28.0       Urine Studies:  Creatinine Trend: 0.8<--, 0.8<--, 0.8<--, 0.9<--, 0.8<--, 0.8<--    ·	pt known to me  ·	TELMA improved with ivf (now off)  ·	hypernatremia resolved with ivf  ·	hyperkalemia improved  ·	sepsis and likely source infected sacral ulcer s/p dedridement on oral abx per ID  ·	anemia on venofer  ·	PEG  ·	more alert    1) please stop ivf  2) am labs  3) surgery f/u? wound vac

## 2022-05-31 NOTE — CHART NOTE - NSCHARTNOTEFT_GEN_A_CORE
Pt seen at bedside for application of wound vacuum to sacral wound. Dressing removed, wound cleaned and irrigated. Negative pressure wound therapy with black sponge applied. Seal obtained, wound vacuum working properly. Pt tolerated well. Next wound vacuum change on Friday 6/3.

## 2022-05-31 NOTE — PROGRESS NOTE ADULT - SUBJECTIVE AND OBJECTIVE BOX
Nephrology progress note     resting comfortably    ON events/Subjective:     Allergies:  No Known Allergies    Hospital Medications:   MEDICATIONS  (STANDING):  acetaminophen     Tablet .. 650 milliGRAM(s) Oral every 6 hours  aspirin  chewable 81 milliGRAM(s) Oral daily  atorvastatin 20 milliGRAM(s) Oral at bedtime  calcium carbonate 1250 mG  + Vitamin D (OsCal 500 + D) 1 Tablet(s) Oral daily  collagenase Ointment 1 Application(s) Topical daily  Dakins Solution - 1/2 Strength 1 Application(s) Topical daily  dextrose 5%. 1000 milliLiter(s) (50 mL/Hr) IV Continuous <Continuous>  dextrose 5%. 1000 milliLiter(s) (100 mL/Hr) IV Continuous <Continuous>  dextrose 50% Injectable 25 Gram(s) IV Push once  dextrose 50% Injectable 25 Gram(s) IV Push once  dextrose 50% Injectable 12.5 Gram(s) IV Push once  famotidine    Tablet 20 milliGRAM(s) Oral daily  fluticasone propionate 50 MICROgram(s)/spray Nasal Spray 1 Spray(s) Both Nostrils every 12 hours  glucagon  Injectable 1 milliGRAM(s) IntraMuscular once  heparin   Injectable 5000 Unit(s) SubCutaneous every 12 hours  insulin glargine Injectable (LANTUS) 40 Unit(s) SubCutaneous at bedtime  insulin lispro (ADMELOG) corrective regimen sliding scale   SubCutaneous three times a day before meals  insulin lispro Injectable (ADMELOG) 10 Unit(s) SubCutaneous every 6 hours  iron sucrose IVPB 200 milliGRAM(s) IV Intermittent every 24 hours  lactobacillus acidophilus 1 Tablet(s) Oral three times a day  levoFLOXacin  Tablet 500 milliGRAM(s) Oral every 24 hours  levothyroxine 50 MICROGram(s) Oral daily  linezolid    Tablet 600 milliGRAM(s) Oral every 12 hours  melatonin 5 milliGRAM(s) Oral at bedtime  metroNIDAZOLE    Tablet 500 milliGRAM(s) Oral every 8 hours  scopolamine 1 mG/72 Hr(s) Patch 1 Patch Transdermal every 72 hours    REVIEW OF SYSTEMS:    unable to obtain    All other review of systems is negative unless indicated above.    VITALS:  T(F): 98.3 (05-31-22 @ 05:20), Max: 98.8 (05-30-22 @ 20:40)  HR: 74 (05-31-22 @ 05:20)  BP: 122/58 (05-31-22 @ 05:20)  RR: 18 (05-31-22 @ 05:20)  SpO2: 95% (05-30-22 @ 20:19)  Wt(kg): --    05-29 @ 07:01  -  05-30 @ 07:00  --------------------------------------------------------  IN: 1311 mL / OUT: 1750 mL / NET: -439 mL    05-30 @ 07:01  -  05-31 @ 07:00  --------------------------------------------------------  IN: 1311 mL / OUT: 600 mL / NET: 711 mL        PHYSICAL EXAM:  Constitutional: NAD  HEENT: anicteric sclera, oropharynx clear, MMM  Neck: No JVD  Respiratory: CTAB, no wheezes, rales or rhonchi  Cardiovascular: S1, S2, RRR  Gastrointestinal: BS+, soft, NT/ND  Extremities: UE edema  Neurological: A/O x 3, no focal deficits  Psychiatric: Normal mood, normal affect  : No CVA tenderness. No amin.   Skin: No rashes  Vascular Access:    LABS:  05-30    132<L>  |  98  |  20  ----------------------------<  212<H>  5.0   |  19  |  0.8    Ca    8.8      30 May 2022 06:37  Mg     1.9     05-30    TPro  5.7<L>  /  Alb  2.5<L>  /  TBili  0.4  /  DBili      /  AST  41  /  ALT  16  /  AlkPhos  84  05-30                          8.4    15.75 )-----------( 397      ( 30 May 2022 06:37 )             28.0       Urine Studies:  Creatinine Trend: 0.8<--, 0.8<--, 0.8<--, 0.9<--, 0.8<--, 0.8<--      RADIOLOGY & ADDITIONAL STUDIES:    ·	pt known to me  ·	TELMA improved with ivf (now off)  ·	mild hyponatremeia and hyperkalemia   ·	sepsis and likely source infected sacral ulcer s/p dedridement on oral abx per ID  ·	still with leukocytosis  ·	anemia on venofer  ·	PEG  ·	more alert    1) please stop ivf  2) am labs  3) surgery f/u? wound vac  4) ID f/u

## 2022-05-31 NOTE — PROGRESS NOTE ADULT - ASSESSMENT
77 y/o F w/ PMH of Anxiety, CAD, Hypothyroidism, IDDM, Schizophrenia, Dementia sent to ED from [facility] found to be septic with lactic acidosis 2/2 infected stage 4 sacral ulcer.      # Suspected  metabolic encephalopathy / Infected Stage 4 Sacral Ulcer/ Advanced Dementia/ Functional Quadriplegia  - mental status at baseline , c/w supportive care, prevent falls and aspiration   - will start Scopolamine patch today to reduce secretions   - d/c IV fluids, start Nasonex , chest PT  TID, mouth care   - dietitian consulted   - off load pressure points, change position Q 2 hours   - Burn is following,  s/p  debridements x two at bedside and in OR on 5/26  - wound culture:  pseudomonas and VRE, repeat Cx from OR grew Moderate Proteus mirabilis ,Moderate Klebsiella pneumoniae and Enterococcus faecium   - Antibiotics changed to meropenem 1 g q8h, and tigecycline 100 mg then 50 mg q12h  -  Zyvox, flagyl and Levofloxacin stopped  -  ESR/CRP noted, likely pt has an OM     # Iron deficiency and acute blood loss  anemia   - on IV Venofer 200 mg Q 24 hours for 5 days  - monitor H/H, keep Hb above 7.5  - H/H dropped after surgical excision, likely after acute blood loss     #Hyponatremia / TELMA   - d/c IV fluids   - monitor Na level   - monitor BUN/cr and urine output       # Hypothyroidism   - c/w home Levothyroxine     # DLD   - c/w home Lipitor    # IDDM  - carb consistent PEG formula   - monitor finger sticks s  - on Insulin       # GI/DVT prophylaxis     #Progress Note Handoff  Pending (specify):d/c  IV fluids, f/u repeat Na level,  c/w IV ABx and supportive care, monitor H/H , keep Hb above 7.5, ID follow up ( repeat Cx partially resulted), start Scopolamine patch and Nasonex, pt needs chest PT      Family discussion: n/a   Disposition: Home___/SNF___/Other________/Unknown at this time_____x ___

## 2022-05-31 NOTE — PROGRESS NOTE ADULT - SUBJECTIVE AND OBJECTIVE BOX
77 y/o F w/ pmhx of Anxiety, CAD, Hypothyroidism, IDDM, Schizophrenia, Dementia sent to ED from [facility] found to be septic with lactic acidosis 2/2 infected stage 4 sacral ulcer. She was consulted by burn, underwent 2 bedside debridements, went to OR for 3 rd debridement on  late at night, consulted by ID. On admission pt was found to have TELMA, resolved after IV hydration.  Today pt is sleepy, looks comfortable, nurse reported multiple episodes of diarrhea     OBJECTIVE  PAST MEDICAL & SURGICAL HISTORY  Coronary artery disease    Anxiety    Dementia    Insulin dependent diabetes mellitus    Paranoid schizophrenia    Hyperlipidemia    Hypothyroidism    Abscess of right buttock    H/O abdominal surgery      ALLERGIES:  No Known Allergies        HOME MEDICATIONS  Home Medications:  acetaminophen 325 mg oral tablet, disintegratin tab(s) orally every 6 hours, As Needed (20 May 2022 04:)  aspirin 81 mg oral tablet: 1 tab(s) orally once a day (20 May 2022 04:)  atorvastatin 20 mg oral tablet: 1 tab(s) orally once a day (at bedtime) (20 May 2022 04:)  Calcium 600+D 600 mg-200 intl units oral tablet: 2 tab(s) orally once a day (20 May 2022 04:)  collagenase 250 units/g topical ointment: 1 application topically 2 times a day (20 May 2022 04:)  famotidine 20 mg oral tablet: 1 tab(s) orally once a day (20 May 2022 04:)  Haldol Decanoate 50 mg/mL intramuscular solution: 37.5 milligram(s) intramuscular once a month (first Tuesday (20 May 2022 04:21)  insulin glargine 100 units/mL subcutaneous solution: 45 unit(s) subcutaneous once a day (at bedtime) (20 May 2022 04:21)  Januvia 100 mg oral tablet: 1 tab(s) orally once a day (20 May 2022 04:)  levothyroxine 50 mcg (0.05 mg) oral tablet: 1 tab(s) orally once a day (20 May 2022 04:)  Melatonin 10 mg oral capsule: 1 cap(s) orally once a day (at bedtime) (20 May 2022 04:)  Senna 8.6 mg oral tablet: 2 tab(s) orally once a day (at bedtime) (20 May 2022 04:21)    VITAL SIGNS: Last 24 Hours  T(C): 36.6 (31 May 2022 12:45), Max: 37.1 (30 May 2022 20:40)  T(F): 97.8 (31 May 2022 12:45), Max: 98.8 (30 May 2022 20:40)  HR: 95 (31 May 2022 12:45) (74 - 95)  BP: 144/62 (31 May 2022 12:45) (122/58 - 144/62)  BP(mean): --  RR: 18 (31 May 2022 12:45) (18 - 18)  SpO2: 95% (30 May 2022 20:19) (95% - 95%)      PHYSICAL EXAM:  CONSTITUTIONAL: No acute distress, demented, nonverbal   NECK: supple, no JVD   PULMONARY: decreased BS at bases   CARDIOVASCULAR: Regular rate and rhythm; no murmurs, rubs, or gallops  GASTROINTESTINAL: Soft, non-tender, PEG tube noted , BS present   MUSCULOSKELETAL: no LE edema on LE, LUE edema noted with contracted wrist   Back: dressing noted on the sacrum       LABS:                                    7.9    11.09 )-----------( 380      ( 31 May 2022 07:50 )             26.2       135  |  100  |  14  ----------------------------<  92  4.3   |  24  |  0.7    Ca    8.7      31 May 2022 07:50  Mg     2.0         TPro  5.5<L>  /  Alb  2.5<L>  /  TBili  0.3  /  DBili  x   /  AST  27  /  ALT  21  /  AlkPhos  69        Culture - Other (22 @ 14:32)   - Vancomycin: R >16   - Levofloxacin: R >4   - Linezolid: S 1   - Daptomycin: SDD 4 The breakpoint for SDD (sensitive dose dependent)is based on a dosage regimen of 8-12 mg/kg administered every 24 h in adults and is intended for serious infections due to E. faecium. Consultation with an infectious diseases specialist is recommended.   - Tetra/Doxy: R >8   - Ampicillin: R >8 Predicts results to ampicillin/sulbactam, amoxacillin-clavulanate and piperacillin-tazobactam.   Specimen Source: .Other sacrum   Culture Results:   Few Pseudomonas aeruginosa Susceptibility to follow.   Numerous Enterococcus faecium (vancomycin resistant)   Normal enteric patricia isolated   Organism Identification: Enterococcus faecium (vancomycin resistant)   Organism: Enterococcus faecium (vancomycin resistant)   Method Type: BARON Culture - Blood (22 @ 21:38)   Specimen Source: .Blood Blood-Peripheral   Culture Results:   No Growth Final   Culture - Tissue with Gram Stain (22 @ 22:11)   Gram Stain:   Moderate polymorphonuclear leukocytes per low power field   Few Gram Negative Rods per oil power field   Few Gram positive cocci in pairs per oil power field   Specimen Source: .Tissue None   Culture Results:   Moderate Proteus mirabilis   Moderate Klebsiella pneumoniae   Few Enterococcus faecium   RADIOLOGY:   < from: Xray Chest 1 View-PORTABLE IMMEDIATE (22 @ 22:20) >  Impression:    Left basilar linear subsegmental atelectasis. Otherwise, no evidence of   acute pulmonary process..    < end of copied text >  < from: CT Head No Cont (22 @ 21:17) >  IMPRESSION:    Findings the chest communicating hydrocephalus. Chronic microvascular   ischemic changes.      MEDICATIONS  (STANDING):  acetaminophen     Tablet .. 650 milliGRAM(s) Oral every 6 hours  aspirin  chewable 81 milliGRAM(s) Oral daily  atorvastatin 20 milliGRAM(s) Oral at bedtime  calcium carbonate 1250 mG  + Vitamin D (OsCal 500 + D) 1 Tablet(s) Oral daily  collagenase Ointment 1 Application(s) Topical daily  Dakins Solution - 1/2 Strength 1 Application(s) Topical daily  dextrose 5%. 1000 milliLiter(s) (100 mL/Hr) IV Continuous <Continuous>  dextrose 5%. 1000 milliLiter(s) (50 mL/Hr) IV Continuous <Continuous>  dextrose 50% Injectable 25 Gram(s) IV Push once  dextrose 50% Injectable 12.5 Gram(s) IV Push once  dextrose 50% Injectable 25 Gram(s) IV Push once  famotidine    Tablet 20 milliGRAM(s) Oral daily  fluticasone propionate 50 MICROgram(s)/spray Nasal Spray 1 Spray(s) Both Nostrils every 12 hours  glucagon  Injectable 1 milliGRAM(s) IntraMuscular once  heparin   Injectable 5000 Unit(s) SubCutaneous every 12 hours  insulin glargine Injectable (LANTUS) 40 Unit(s) SubCutaneous at bedtime  insulin lispro (ADMELOG) corrective regimen sliding scale   SubCutaneous three times a day before meals  insulin lispro Injectable (ADMELOG) 10 Unit(s) SubCutaneous every 6 hours  iron sucrose IVPB 200 milliGRAM(s) IV Intermittent every 24 hours  lactobacillus acidophilus 1 Tablet(s) Oral three times a day  levothyroxine 50 MICROGram(s) Oral daily  melatonin 5 milliGRAM(s) Oral at bedtime  meropenem  IVPB      meropenem  IVPB 1000 milliGRAM(s) IV Intermittent every 8 hours  scopolamine 1 mG/72 Hr(s) Patch 1 Patch Transdermal every 72 hours  tigecycline IVPB 50 milliGRAM(s) IV Intermittent every 12 hours  tigecycline IVPB        MEDICATIONS  (PRN):  dextrose Oral Gel 15 Gram(s) Oral once PRN Blood Glucose LESS THAN 70 milliGRAM(s)/deciliter  morphine  - Injectable 2 milliGRAM(s) IV Push every 6 hours PRN Severe Pain (7 - 10)  morphine  - Injectable 1 milliGRAM(s) IV Push every 4 hours PRN Moderate Pain (4 - 6)

## 2022-05-31 NOTE — PROGRESS NOTE ADULT - SUBJECTIVE AND OBJECTIVE BOX
GLORIA WATERS  76y, Female  Allergy: No Known Allergies      LOS  11d    CHIEF COMPLAINT: Sepsis with lactic acidosis (31 May 2022 12:55)      INTERVAL EVENTS/HPI  - No acute events overnight  - T(F): , Max: 98.8 (05-30-22 @ 20:40)  - WBC Count: 11.09 (05-31-22 @ 07:50)  WBC Count: 15.75 (05-30-22 @ 06:37)     - Creatinine, Serum: 0.7 (05-31-22 @ 07:50)  Creatinine, Serum: 0.8 (05-30-22 @ 06:37)       ROS  unable to obtain history secondary to patient's mental status and/or sedation     VITALS:  T(F): 97.8, Max: 98.8 (05-30-22 @ 20:40)  HR: 95  BP: 144/62  RR: 18Vital Signs Last 24 Hrs  T(C): 36.6 (31 May 2022 12:45), Max: 37.1 (30 May 2022 20:40)  T(F): 97.8 (31 May 2022 12:45), Max: 98.8 (30 May 2022 20:40)  HR: 95 (31 May 2022 12:45) (74 - 95)  BP: 144/62 (31 May 2022 12:45) (122/58 - 144/62)  BP(mean): --  RR: 18 (31 May 2022 12:45) (18 - 18)  SpO2: 95% (30 May 2022 20:19) (95% - 95%)    PHYSICAL EXAM:  Gen: NAD, resting in bed  HEENT: Normocephalic, atraumatic  Neck: supple, no lymphadenopathy  CV: Regular rate & regular rhythm  Lungs: decreased BS at bases, no fremitus  Abdomen: Soft, BS present  Ext: Warm, well perfused  Neuro: non focal, awake  Skin: no rash, no erythema  Lines: no phlebitis  Sacrum deferred     FH: Non-contributory  Social Hx: Non-contributory    TESTS & MEASUREMENTS:                        7.9    11.09 )-----------( 380      ( 31 May 2022 07:50 )             26.2     05-31    135  |  100  |  14  ----------------------------<  92  4.3   |  24  |  0.7    Ca    8.7      31 May 2022 07:50  Mg     2.0     05-31    TPro  5.5<L>  /  Alb  2.5<L>  /  TBili  0.3  /  DBili  x   /  AST  27  /  ALT  21  /  AlkPhos  69  05-31      LIVER FUNCTIONS - ( 31 May 2022 07:50 )  Alb: 2.5 g/dL / Pro: 5.5 g/dL / ALK PHOS: 69 U/L / ALT: 21 U/L / AST: 27 U/L / GGT: x               Culture - Fungal, Tissue (collected 05-26-22 @ 22:11)  Source: .Tissue None  Preliminary Report (05-27-22 @ 15:08):    Testing in progress    Culture - Acid Fast - Tissue w/Smear (collected 05-26-22 @ 22:11)  Source: .Tissue None  Preliminary Report (05-28-22 @ 15:05):    Culture is being performed.    Culture - Tissue with Gram Stain (collected 05-26-22 @ 22:11)  Source: .Tissue None  Gram Stain (05-27-22 @ 12:17):    Moderate polymorphonuclear leukocytes per low power field    Few Gram Negative Rods per oil power field    Few Gram positive cocci in pairs per oil power field  Preliminary Report (05-30-22 @ 12:24):    Few Enterococcus faecium (vancomycin resistant)    Moderate Proteus mirabilis ESBL    Moderate Klebsiella pneumoniae (Carbapenem Resistant)  Organism: Proteus mirabilis ESBL  Klepne MDRO  Enterococcus faecium (vancomycin resistant) (05-30-22 @ 12:23)  Organism: Klepne MDRO (05-30-22 @ 12:23)      -  Amikacin: I 32      -  Amoxicillin/Clavulanic Acid: R >16/8      -  Ampicillin: R >16 These ampicillin results predict results for amoxicillin      -  Ampicillin/Sulbactam: R >16/8 Enterobacter, Klebsiella aerogenes, Citrobacter, and Serratia may develop resistance during prolonged therapy (3-4 days)      -  Aztreonam: R >16      -  Cefazolin: R >16 Enterobacter, Klebsiella aerogenes, Citrobacter, and Serratia may develop resistance during prolonged therapy (3-4 days)      -  Cefepime: R >16      -  Cefoxitin: R >16      -  Ceftazidime/Avibactam: S <=4      -  Ceftolozane/tazobactam: R >8      -  Ceftriaxone: R >32 Enterobacter, Klebsiella aerogenes, Citrobacter, and Serratia may develop resistance during prolonged therapy      -  Ciprofloxacin: R >2      -  Ertapenem: R >1      -  Gentamicin: S <=2      -  Imipenem: R >8      -  Levofloxacin: R >4      -  Meropenem: R >8      -  Piperacillin/Tazobactam: R >64      -  Tobramycin: R >8      -  Trimethoprim/Sulfamethoxazole: S 2/38      Method Type: BARON  Organism: Proteus mirabilis ESBL (05-30-22 @ 11:39)      -  Amikacin: I 32      -  Amoxicillin/Clavulanic Acid: S <=8/4      -  Ampicillin: R >16 These ampicillin results predict results for amoxicillin      -  Ampicillin/Sulbactam: R 8/4 Enterobacter, Klebsiella aerogenes, Citrobacter, and Serratia may develop resistance during prolonged therapy (3-4 days)      -  Aztreonam: R >16      -  Cefazolin: R >16 Enterobacter, Klebsiella aerogenes, Citrobacter, and Serratia may develop resistance during prolonged therapy (3-4 days)      -  Cefepime: R >16      -  Ceftriaxone: R >32 Enterobacter, Klebsiella aerogenes, Citrobacter, and Serratia may develop resistance during prolonged therapy      -  Ciprofloxacin: R >2      -  Ertapenem: S <=0.5      -  Gentamicin: R >8      -  Levofloxacin: R >4      -  Meropenem: S <=1      -  Piperacillin/Tazobactam: R <=8      -  Tobramycin: R >8      -  Trimethoprim/Sulfamethoxazole: R >2/38      Method Type: BARON  Organism: Enterococcus faecium (vancomycin resistant) (05-29-22 @ 14:53)      -  Ampicillin: R >8 Predicts results to ampicillin/sulbactam, amoxacillin-clavulanate and  piperacillin-tazobactam.      -  Daptomycin: SDD 4 The breakpoint for SDD (sensitive dose dependent)is based on a dosage regimen of 8-12 mg/kg administered every 24 h in adults and is intended for serious infections due to E. faecium. Consultation with an infectious diseases specialist is recommended.      -  Levofloxacin: R >4      -  Linezolid: S <=0.5      -  Tetra/Doxy: R >8      -  Vancomycin: R >16      Method Type: BARON    Culture - Other (collected 05-21-22 @ 14:32)  Source: .Other sacrum  Final Report (05-25-22 @ 12:40):    Few Pseudomonas aeruginosa    Numerous Enterococcus faecium (vancomycin resistant)    Normal enteric patricia isolated  Organism: Pseudomonas aeruginosa  Enterococcus faecium (vancomycin resistant) (05-25-22 @ 12:40)  Organism: Enterococcus faecium (vancomycin resistant) (05-25-22 @ 12:40)      -  Ampicillin: R >8 Predicts results to ampicillin/sulbactam, amoxacillin-clavulanate and  piperacillin-tazobactam.      -  Daptomycin: SDD 4 The breakpoint for SDD (sensitive dose dependent)is based on a dosage regimen of 8-12 mg/kg administered every 24 h in adults and is intended for serious infections due to E. faecium. Consultation with an infectious diseases specialist is recommended.      -  Levofloxacin: R >4      -  Linezolid: S 1      -  Tetra/Doxy: R >8      -  Vancomycin: R >16      Method Type: BARON  Organism: Pseudomonas aeruginosa (05-25-22 @ 12:40)      -  Amikacin: S <=16      -  Aztreonam: S <=4      -  Cefepime: S 4      -  Ceftazidime: S 4      -  Ciprofloxacin: S <=0.25      -  Gentamicin: I 8      -  Imipenem: I 4      -  Levofloxacin: S 1      -  Meropenem: S <=1      -  Piperacillin/Tazobactam: S <=8      -  Tobramycin: S <=2      Method Type: BARON    Culture - Blood (collected 05-19-22 @ 21:38)  Source: .Blood Blood-Peripheral  Final Report (05-25-22 @ 02:00):    No Growth Final    Culture - Blood (collected 05-19-22 @ 21:22)  Source: .Blood Blood-Peripheral  Final Report (05-25-22 @ 02:00):    No Growth Final            INFECTIOUS DISEASES TESTING  COVID-19 PCR: NotDetec (05-24-22 @ 10:00)  Rapid RVP Result: NotDetec (05-19-22 @ 21:22)      INFLAMMATORY MARKERS  Sedimentation Rate, Erythrocyte: 121 mm/Hr (05-25-22 @ 10:40)  C-Reactive Protein, Serum: 39 mg/L (05-25-22 @ 10:40)      RADIOLOGY & ADDITIONAL TESTS:  I have personally reviewed the last available Chest xray  CXR      CT      CARDIOLOGY TESTING  12 Lead ECG:   Ventricular Rate 90 BPM    Atrial Rate 90 BPM    P-R Interval 202 ms    QRS Duration 78 ms    Q-T Interval 380 ms    QTC Calculation(Bazett) 464 ms    P Axis 54 degrees    R Axis 259 degrees    T Axis 49 degrees    Diagnosis Line Normal sinus rhythm  Right superior axis deviation  Pulmonary disease pattern  Right ventricular hypertrophy  Nonspecific T wave abnormality  Abnormal ECG    Confirmed by Josh Shine ([id number]) on 5/28/2022 11:17:02 AM (05-27-22 @ 13:20)  12 Lead ECG:   Ventricular Rate 79 BPM    Atrial Rate 79 BPM    P-R Interval 192 ms    QRS Duration 80 ms    Q-T Interval 394 ms    QTC Calculation(Bazett) 451 ms    P Axis 75 degrees    R Axis -7 degrees    T Axis 51 degrees    Diagnosis Line Normal sinus rhythm  Low voltage QRS  Cannot rule out Anterior infarct , age undetermined  Abnormal ECG    Confirmed by Ramon Beck ([id number]) on 5/20/2022 7:50:19 AM (05-19-22 @ 21:37)      MEDICATIONS  acetaminophen     Tablet .. 650 Oral every 6 hours  aspirin  chewable 81 Oral daily  atorvastatin 20 Oral at bedtime  calcium carbonate 1250 mG  + Vitamin D (OsCal 500 + D) 1 Oral daily  collagenase Ointment 1 Topical daily  Dakins Solution - 1/2 Strength 1 Topical daily  dextrose 5%. 1000 IV Continuous <Continuous>  dextrose 5%. 1000 IV Continuous <Continuous>  dextrose 50% Injectable 25 IV Push once  dextrose 50% Injectable 12.5 IV Push once  dextrose 50% Injectable 25 IV Push once  famotidine    Tablet 20 Oral daily  fluticasone propionate 50 MICROgram(s)/spray Nasal Spray 1 Both Nostrils every 12 hours  glucagon  Injectable 1 IntraMuscular once  heparin   Injectable 5000 SubCutaneous every 12 hours  insulin glargine Injectable (LANTUS) 40 SubCutaneous at bedtime  insulin lispro (ADMELOG) corrective regimen sliding scale  SubCutaneous three times a day before meals  insulin lispro Injectable (ADMELOG) 10 SubCutaneous every 6 hours  iron sucrose IVPB 200 IV Intermittent every 24 hours  lactobacillus acidophilus 1 Oral three times a day  levothyroxine 50 Oral daily  melatonin 5 Oral at bedtime  meropenem  IVPB     meropenem  IVPB 1000 IV Intermittent every 8 hours  scopolamine 1 mG/72 Hr(s) Patch 1 Transdermal every 72 hours  tigecycline IVPB 50 IV Intermittent every 12 hours  tigecycline IVPB         WEIGHT  Weight (kg): 73 (05-20-22 @ 04:24)  Creatinine, Serum: 0.7 mg/dL (05-31-22 @ 07:50)      ANTIBIOTICS:  meropenem  IVPB 1000 milliGRAM(s) IV Intermittent every 8 hours  meropenem  IVPB      tigecycline IVPB      tigecycline IVPB 50 milliGRAM(s) IV Intermittent every 12 hours      All available historical records have been reviewed

## 2022-05-31 NOTE — PROGRESS NOTE ADULT - SUBJECTIVE AND OBJECTIVE BOX
SUBJECTIVE:    Patient is a 76y old Female who presents with a chief complaint of sacral ulcer, TELMA (31 May 2022 09:36)    Overnight Events: No overnight events. Cultures from debrided tissue grew P mirabilis, E faecium and K pneumoniae.   ID f/u for necessity of changing antibiotics.     PAST MEDICAL & SURGICAL HISTORY  Coronary artery disease    Anxiety    Dementia    Insulin dependent diabetes mellitus    Paranoid schizophrenia    Hyperlipidemia    Hypothyroidism    Abscess of right buttock    H/O abdominal surgery      SOCIAL HISTORY:  Negative for smoking/alcohol/drug use.     ALLERGIES:  No Known Allergies    MEDICATIONS:  STANDING MEDICATIONS  acetaminophen     Tablet .. 650 milliGRAM(s) Oral every 6 hours  aspirin  chewable 81 milliGRAM(s) Oral daily  atorvastatin 20 milliGRAM(s) Oral at bedtime  calcium carbonate 1250 mG  + Vitamin D (OsCal 500 + D) 1 Tablet(s) Oral daily  collagenase Ointment 1 Application(s) Topical daily  Dakins Solution - 1/2 Strength 1 Application(s) Topical daily  dextrose 5%. 1000 milliLiter(s) IV Continuous <Continuous>  dextrose 5%. 1000 milliLiter(s) IV Continuous <Continuous>  dextrose 50% Injectable 25 Gram(s) IV Push once  dextrose 50% Injectable 12.5 Gram(s) IV Push once  dextrose 50% Injectable 25 Gram(s) IV Push once  famotidine    Tablet 20 milliGRAM(s) Oral daily  fluticasone propionate 50 MICROgram(s)/spray Nasal Spray 1 Spray(s) Both Nostrils every 12 hours  glucagon  Injectable 1 milliGRAM(s) IntraMuscular once  heparin   Injectable 5000 Unit(s) SubCutaneous every 12 hours  insulin glargine Injectable (LANTUS) 40 Unit(s) SubCutaneous at bedtime  insulin lispro (ADMELOG) corrective regimen sliding scale   SubCutaneous three times a day before meals  insulin lispro Injectable (ADMELOG) 10 Unit(s) SubCutaneous every 6 hours  iron sucrose IVPB 200 milliGRAM(s) IV Intermittent every 24 hours  lactobacillus acidophilus 1 Tablet(s) Oral three times a day  levothyroxine 50 MICROGram(s) Oral daily  melatonin 5 milliGRAM(s) Oral at bedtime  meropenem  IVPB      meropenem  IVPB 1000 milliGRAM(s) IV Intermittent every 8 hours  scopolamine 1 mG/72 Hr(s) Patch 1 Patch Transdermal every 72 hours  tigecycline IVPB 50 milliGRAM(s) IV Intermittent every 12 hours  tigecycline IVPB        PRN MEDICATIONS  dextrose Oral Gel 15 Gram(s) Oral once PRN  morphine  - Injectable 2 milliGRAM(s) IV Push every 6 hours PRN  morphine  - Injectable 1 milliGRAM(s) IV Push every 4 hours PRN    VITALS:   T(F): 97.8, Max: 98.8 (05-30-22 @ 20:40)  HR: 95 (74 - 95)  BP: 144/62 (115/55 - 144/62)  RR: 18 (18 - 20)  SpO2: 95% (95% - 95%)    LABS:                        7.9    11.09 )-----------( 380      ( 31 May 2022 07:50 )             26.2     05-31    135  |  100  |  14  ----------------------------<  92  4.3   |  24  |  0.7    Ca    8.7      31 May 2022 07:50  Mg     2.0     05-31    TPro  5.5<L>  /  Alb  2.5<L>  /  TBili  0.3  /  DBili  x   /  AST  27  /  ALT  21  /  AlkPhos  69  05-31 05-30-22 @ 07:01  -  05-31-22 @ 07:00  --------------------------------------------------------  IN: 1311 mL / OUT: 600 mL / NET: 711 mL          IMAGING/EKG:    No recent imaging    PHYSICAL EXAM:  GEN: NAD, comfortable  LUNGS: CTAB  HEART: RRR,  ABD: soft, NT/ND, +BS  EXT: no edema, PP b/l  NEURO: AAO x1

## 2022-05-31 NOTE — PROGRESS NOTE ADULT - ASSESSMENT
ASSESSMENT AND PLAN  77 y/o F w/ pmhx of Anxiety, CAD, Hypothyroidism, IDDM, Schizophrenia, Dementia sent to ED from [facility] found to be septic with lactic acidosis 2/2 infected stage 4 sacral ulcer    # Diarrhea  - r/o C.dif, will check stool for C.dif and GI PCR  - on Probiotics   - monitor and replete electrolytes     # Suspected  metabolic encephalopathy / Infected Stage 4 Sacral Ulcer/ Advanced Dementia/ Functional Quadriplegia  - mental status at baseline , c/w supportive care, prevent falls and aspiration   - on  Scopolamine patch to control secretions   - c/w  Nasonex , chest PT  TID, mouth care   - dietitian consulted   - off load pressure points, change position Q 2 hours   - Burn is following,  s/p  debridements x two at bedside and in OR on 5/26  - wound culture:  pseudomonas and VRE, repeat Cx from OR grew Moderate Proteus mirabilis ,Moderate Klebsiella pneumoniae and Enterococcus faecium ,  ID follow up   - on  Zyvox, flagyl and Levofloxacin  -  ESR/CRP noted, likely pt has an OM     # Iron deficiency and acute blood loss  anemia   - on IV Venofer 200 mg Q 24 hours for 5 days  - monitor H/H, keep Hb above 7.5  - H/H dropped after surgical excision, likely after acute blood loss     #Hyponatremia / TELMA   - resolved       # Hypothyroidism   - c/w home Levothyroxine     # DLD   - c/w home Lipitor    # IDDM  - carb consistent PEG formula   - monitor finger sticks s  - on Insulin       # GI/DVT prophylaxis     #Progress Note Handoff  Pending (specify): check stool for C.dif and GI PCR,   c/w IV ABx and supportive care, ID follow up,  monitor H/H , keep Hb above 7.5  Family discussion: n/a   Disposition: Home___/SNF___/Other________/Unknown at this time_____x ___

## 2022-05-31 NOTE — PROGRESS NOTE ADULT - ASSESSMENT
ASSESSMENT  77 y/o F w/ pmhx of Anxiety, CAD, Hypothyroidism, IDDM, Schizophrenia, Dementia sent to ED from [facility] for concern over infected sacral ulcer.       IMPRESSION  #Sacral decub    s/p debridement 5/26 WCX   Few Enterococcus faecium (vancomycin resistant)    Moderate Proteus mirabilis ESBL    Moderate Klebsiella pneumoniae (Carbapenem Resistant)    5/21 WCX     Few Pseudomonas aeruginosa    Numerous Enterococcus faecium (vancomycin resistant)    s/p debridement 5/21 to bone    5/19 BCX NGTD     WBC 18    Afebrile  , systolic hypotension     hx ESBL 2019    CXR no PNA  #Lactic acidosis  #Atelectasis  #TELMA  Creatinine, Serum: 1.4 (05-20-22 @ 01:18)  Creatinine, Serum: 0.8 mg/dL (05.25.22 @ 04:30)  QTC Calculation(Bazegeorge) 451 ms  [id number]    Weight (kg): 73 (05-20-22 @ 04:24)    RECOMMENDATIONS  - D/C current antibiotics  - Midline x Xu 1g q8h IV + Tigecycline 100mg x1 then 50mg q12h IV 7 days (unclear if pathogens true or contaminant as clinically improving)  - Burn consult appreciated  - No benefit of long term intravenous antibiotics for sacral osteomyelitis. Continue with nutritional support and offloading     If any questions, please call or send a message on [employer] Teams  Please continue to update ID with any pertinent new laboratory or radiographic findings  Spectra [phone]

## 2022-06-01 NOTE — PROGRESS NOTE ADULT - ASSESSMENT
77 y/o F w/ PMH of Anxiety, CAD, Hypothyroidism, IDDM, Schizophrenia, Dementia sent to ED from [facility] found to be septic with lactic acidosis 2/2 infected stage 4 sacral ulcer.    # Diarrhea    Laxatives stopped, patient did not have the diarrhea before being admitted to the hospital  Receiving PEG tube feeds with the same rate  Will send C diff and GI PCR     # Suspected  metabolic encephalopathy / Infected Stage 4 Sacral Ulcer/ Advanced Dementia/ Functional Quadriplegia  - mental status at baseline , c/w supportive care, prevent falls and aspiration   - will start Scopolamine patch today to reduce secretions   - d/c IV fluids, start Nasonex , chest PT  TID, mouth care   - dietitian consulted   - off load pressure points, change position Q 2 hours   - Burn is following,  s/p  debridements x two at bedside and in OR on 5/26  - wound culture:  pseudomonas and VRE, repeat Cx from OR grew Moderate Proteus mirabilis ,Moderate Klebsiella pneumoniae and Enterococcus faecium   - Antibiotics changed to meropenem 1 g q8h, and tigecycline 100 mg then 50 mg q12h for a total of 7 days  -  Zyvox, flagyl and Levofloxacin stopped  -  ESR/CRP noted, likely pt has an OM     # Iron deficiency and acute blood loss  anemia   - on IV Venofer 200 mg Q 24 hours for 5 days  - monitor H/H, keep Hb above 7.5  - H/H dropped after surgical excision, likely after acute blood loss, Hb is stable now,   - Trend daily H/H, with active T+S     #Hyponatremia / TELMA   - d/c IV fluids   - monitor Na level   - monitor BUN/cr and urine output       # Hypothyroidism   - c/w home Levothyroxine     # DLD   - c/w home Lipitor    # IDDM  - carb consistent PEG formula   - monitor finger sticks s  - on Insulin       # GI/DVT prophylaxis

## 2022-06-01 NOTE — PROGRESS NOTE ADULT - SUBJECTIVE AND OBJECTIVE BOX
Nephrology progress note     awake but not communicative today    ON events/Subjective:     Allergies:  No Known Allergies    Hospital Medications:   MEDICATIONS  (STANDING):  acetaminophen     Tablet .. 650 milliGRAM(s) Oral every 6 hours  aspirin  chewable 81 milliGRAM(s) Oral daily  atorvastatin 20 milliGRAM(s) Oral at bedtime  calcium carbonate 1250 mG  + Vitamin D (OsCal 500 + D) 1 Tablet(s) Oral daily  dextrose 5%. 1000 milliLiter(s) (50 mL/Hr) IV Continuous <Continuous>  dextrose 5%. 1000 milliLiter(s) (100 mL/Hr) IV Continuous <Continuous>  dextrose 50% Injectable 25 Gram(s) IV Push once  dextrose 50% Injectable 12.5 Gram(s) IV Push once  dextrose 50% Injectable 25 Gram(s) IV Push once  famotidine    Tablet 20 milliGRAM(s) Oral daily  fluticasone propionate 50 MICROgram(s)/spray Nasal Spray 1 Spray(s) Both Nostrils every 12 hours  glucagon  Injectable 1 milliGRAM(s) IntraMuscular once  heparin   Injectable 5000 Unit(s) SubCutaneous every 12 hours  insulin glargine Injectable (LANTUS) 40 Unit(s) SubCutaneous at bedtime  insulin lispro (ADMELOG) corrective regimen sliding scale   SubCutaneous three times a day before meals  insulin lispro Injectable (ADMELOG) 10 Unit(s) SubCutaneous every 6 hours  lactobacillus acidophilus 1 Tablet(s) Oral three times a day  levothyroxine 50 MICROGram(s) Oral daily  melatonin 5 milliGRAM(s) Oral at bedtime  meropenem  IVPB      meropenem  IVPB 1000 milliGRAM(s) IV Intermittent every 8 hours  scopolamine 1 mG/72 Hr(s) Patch 1 Patch Transdermal every 72 hours  tigecycline IVPB 50 milliGRAM(s) IV Intermittent every 12 hours  tigecycline IVPB        REVIEW OF SYSTEMS:    unable to obtain    All other review of systems is negative unless indicated above.    VITALS:  T(F): 98.1 (06-01-22 @ 04:52), Max: 98.4 (05-31-22 @ 20:51)  HR: 77 (06-01-22 @ 04:52)  BP: 123/70 (06-01-22 @ 04:52)  RR: 18 (05-31-22 @ 20:51)  SpO2: 96% (05-31-22 @ 20:51)  Wt(kg): --    05-30 @ 07:01  -  05-31 @ 07:00  --------------------------------------------------------  IN: 1311 mL / OUT: 600 mL / NET: 711 mL    05-31 @ 07:01  -  06-01 @ 07:00  --------------------------------------------------------  IN: 874 mL / OUT: 1000 mL / NET: -126 mL        PHYSICAL EXAM:  Constitutional: NAD  HEENT: anicteric sclera, oropharynx clear, MMM  Neck: No JVD  Respiratory: CTAB, no wheezes, rales or rhonchi  Cardiovascular: S1, S2, RRR  Gastrointestinal: BS+, soft, NT/ND  Extremities: edema  Neurological: A/O x 3, no focal deficits  Psychiatric: Normal mood, normal affect  : No CVA tenderness. No amin.   Skin: No rashes  Vascular Access:    LABS:  06-01    130<L>  |  94<L>  |  24<H>  ----------------------------<  125<H>  4.9   |  25  |  0.7    Ca    8.3<L>      01 Jun 2022 08:14  Mg     2.0     06-01    TPro  5.3<L>  /  Alb  2.4<L>  /  TBili  0.2  /  DBili      /  AST  18  /  ALT  18  /  AlkPhos  81  06-01                          7.9    11.86 )-----------( 351      ( 01 Jun 2022 08:14 )             25.3       Urine Studies:  Creatinine Trend: 0.7<--, 0.7<--, 0.8<--, 0.8<--, 0.8<--, 0.9<--      RADIOLOGY & ADDITIONAL STUDIES:    ·	pt known to me  ·	TELMA improved with ivf (now off)  ·	hyponatremeia and hyperkalemia   ·	sepsis and likely source infected sacral ulcer s/p dedridement on tigecylcine and merrem abx per ID  ·	still with leukocytosis  ·	anemia s/p venofer  ·	PEG  ·	more alert    1) peg feeds  2) am labs  3) surgery f/u? wound vac  4) ID f/u

## 2022-06-01 NOTE — PROGRESS NOTE ADULT - SUBJECTIVE AND OBJECTIVE BOX
SUBJECTIVE:    Patient is a 76y old Female who presents with a chief complaint of sepsis (01 Jun 2022 10:28)    Overnight Events: No overnight events. Patient's diarrhea improved when stopping the laxatives.  We changed antibiotics yesterday, afebrile, HD stable.    PAST MEDICAL & SURGICAL HISTORY  Coronary artery disease    Anxiety    Dementia    Insulin dependent diabetes mellitus    Paranoid schizophrenia    Hyperlipidemia    Hypothyroidism    Abscess of right buttock    H/O abdominal surgery      SOCIAL HISTORY:  Negative for smoking/alcohol/drug use.     ALLERGIES:  No Known Allergies    MEDICATIONS:  STANDING MEDICATIONS  acetaminophen     Tablet .. 650 milliGRAM(s) Oral every 6 hours  aspirin  chewable 81 milliGRAM(s) Oral daily  atorvastatin 20 milliGRAM(s) Oral at bedtime  calcium carbonate 1250 mG  + Vitamin D (OsCal 500 + D) 1 Tablet(s) Oral daily  dextrose 5%. 1000 milliLiter(s) IV Continuous <Continuous>  dextrose 5%. 1000 milliLiter(s) IV Continuous <Continuous>  dextrose 50% Injectable 25 Gram(s) IV Push once  dextrose 50% Injectable 12.5 Gram(s) IV Push once  dextrose 50% Injectable 25 Gram(s) IV Push once  famotidine    Tablet 20 milliGRAM(s) Oral daily  fluticasone propionate 50 MICROgram(s)/spray Nasal Spray 1 Spray(s) Both Nostrils every 12 hours  glucagon  Injectable 1 milliGRAM(s) IntraMuscular once  heparin   Injectable 5000 Unit(s) SubCutaneous every 12 hours  insulin glargine Injectable (LANTUS) 40 Unit(s) SubCutaneous at bedtime  insulin lispro (ADMELOG) corrective regimen sliding scale   SubCutaneous three times a day before meals  insulin lispro Injectable (ADMELOG) 10 Unit(s) SubCutaneous every 6 hours  lactobacillus acidophilus 1 Tablet(s) Oral three times a day  levothyroxine 50 MICROGram(s) Oral daily  melatonin 5 milliGRAM(s) Oral at bedtime  meropenem  IVPB      meropenem  IVPB 1000 milliGRAM(s) IV Intermittent every 8 hours  scopolamine 1 mG/72 Hr(s) Patch 1 Patch Transdermal every 72 hours  sodium chloride 0.9%. 1000 milliLiter(s) IV Continuous <Continuous>  tigecycline IVPB      tigecycline IVPB 50 milliGRAM(s) IV Intermittent every 12 hours    PRN MEDICATIONS  dextrose Oral Gel 15 Gram(s) Oral once PRN  morphine  - Injectable 2 milliGRAM(s) IV Push every 6 hours PRN  morphine  - Injectable 1 milliGRAM(s) IV Push every 4 hours PRN    VITALS:   T(F): 98.1, Max: 98.4 (05-31-22 @ 20:51)  HR: 77 (77 - 97)  BP: 123/70 (120/59 - 144/62)  RR: 18 (18 - 18)  SpO2: 96% (95% - 96%)    LABS:                        7.9    11.86 )-----------( 351      ( 01 Jun 2022 08:14 )             25.3     06-01    130<L>  |  94<L>  |  24<H>  ----------------------------<  125<H>  4.9   |  25  |  0.7    Ca    8.3<L>      01 Jun 2022 08:14  Mg     2.0     06-01    TPro  5.3<L>  /  Alb  2.4<L>  /  TBili  0.2  /  DBili  x   /  AST  18  /  ALT  18  /  AlkPhos  81  06-01 05-31-22 @ 07:01  -  06-01-22 @ 07:00  --------------------------------------------------------  IN: 874 mL / OUT: 1000 mL / NET: -126 mL          IMAGING/EKG:    No recent imaging    PHYSICAL EXAM:  GEN: NAD, comfortable  LUNGS: CTAB  HEART: RRR  ABD: soft, NT/ND, +BS, receiving PEG tube feeds  EXT: no edema, PP b/l  NEURO: AAOX1

## 2022-06-01 NOTE — PROGRESS NOTE ADULT - ASSESSMENT
ASSESSMENT AND PLAN  77 y/o F w/ pmhx of Anxiety, CAD, Hypothyroidism, IDDM, Schizophrenia, Dementia sent to ED from [facility] found to be septic with lactic acidosis 2/2 infected stage 4 sacral ulcer    # Diarrhea  - check stool for C.dif and GI PCR  - on Probiotics   - monitor and replete electrolytes   - start IV hydration ( pt developed worsening hyponatremia), NS at 75 ml/h, monitor Na level     # Metabolic encephalopathy / Dementia/  Infected Stage 4 Sacral Ulcer/ Advanced Dementia/ Functional Quadriplegia  - mental status at baseline , c/w supportive care, prevent falls and aspiration   - on  Scopolamine patch to control secretions   - c/w  Nasonex , chest PT  TID, mouth care   - dietitian consulted   - off load pressure points, change position Q 2 hours   - Burn is following,  s/p  debridements x two at bedside and in OR on 5/26  - wound culture:  pseudomonas and VRE, repeat Cx from OR grew Moderate Proteus mirabilis ,Moderate Klebsiella pneumoniae and Enterococcus faecium ,  ID followed up, Abx  changed on 5/31  -  Midline x Xu 1g q8h IV + Tigecycline 100mg x1 then 50mg q12h IV 7 days (unclear if pathogens true or contaminant as clinically improving)  -  ESR/CRP noted, likely pt has an OM     # Iron deficiency and acute blood loss  anemia   - received  IV Venofer 200 mg Q 24 hours for 5 days  - monitor H/H, keep Hb above 7.5  - H/H dropped after surgical excision, likely after acute blood loss     #Hyponatremia / TELMA   - start IV hydration, monitor Na level , BUN/cr and urine output       # Hypothyroidism   - c/w home Levothyroxine     # DLD   - c/w home Lipitor    # IDDM  - carb consistent PEG formula   - monitor finger sticks s  - on Insulin       # GI/DVT prophylaxis     #Progress Note Handoff  Pending (specify): check stool for C.dif and GI PCR, burn follow up to change wound Vac,   monitor H/H , keep Hb above 7.5, start NS at 75 ml/h , monitor Na level, BUN/cr and urine output, pt needs midline for Abx ( see ID recommendations)   Family discussion: n/a   Disposition: Home___/SNF___/Other________/Unknown at this time_____x ___

## 2022-06-01 NOTE — PROGRESS NOTE ADULT - TIME BILLING
I have personally seen and examined this patient.  I have reviewed all pertinent clinical information and reviewed all relevant imaging and diagnostic studies personally.   If possible, I counseled the patient about diagnostic testing and treatment plan.   I discussed my recommendations with the primary team.
chart review, communication with  medical team, chart review
chart review, communication with  medical team, chart review
I have personally seen and examined this patient.  I have reviewed all pertinent clinical information and reviewed all relevant imaging and diagnostic studies personally.   If possible, I counseled the patient about diagnostic testing and treatment plan.   I discussed my recommendations with the primary team.
chart review, communication with  medical team, chart review
wound eval treat
chart review, communication with  medical team, chart review
chart review, communication with  medical team, chart review
wound care
I have personally seen and examined this patient.  I have reviewed all pertinent clinical information and reviewed all relevant imaging and diagnostic studies personally.   If possible, I counseled the patient about diagnostic testing and treatment plan.   I discussed my recommendations with the primary team.
chart review, communication with  medical team, chart review
wound care

## 2022-06-01 NOTE — PROGRESS NOTE ADULT - SUBJECTIVE AND OBJECTIVE BOX
75 y/o F w/ pmhx of Anxiety, CAD, Hypothyroidism, IDDM, Schizophrenia, Dementia sent to ED from [facility] found to be septic with lactic acidosis 2/2 infected stage 4 sacral ulcer. She was consulted by burn, underwent 2 bedside debridements, went to OR for 3 rd debridement on  late at night, consulted by ID. On admission pt was found to have TELMA, resolved after IV hydration.  Today pt is comfortable, awake, minimally verbal.     OBJECTIVE  PAST MEDICAL & SURGICAL HISTORY  Coronary artery disease    Anxiety    Dementia    Insulin dependent diabetes mellitus    Paranoid schizophrenia    Hyperlipidemia    Hypothyroidism    Abscess of right buttock    H/O abdominal surgery      ALLERGIES:  No Known Allergies        HOME MEDICATIONS  Home Medications:  acetaminophen 325 mg oral tablet, disintegratin tab(s) orally every 6 hours, As Needed (20 May 2022 04:21)  aspirin 81 mg oral tablet: 1 tab(s) orally once a day (20 May 2022 04:)  atorvastatin 20 mg oral tablet: 1 tab(s) orally once a day (at bedtime) (20 May 2022 04:21)  Calcium 600+D 600 mg-200 intl units oral tablet: 2 tab(s) orally once a day (20 May 2022 04:21)  collagenase 250 units/g topical ointment: 1 application topically 2 times a day (20 May 2022 04:)  famotidine 20 mg oral tablet: 1 tab(s) orally once a day (20 May 2022 04:21)  Haldol Decanoate 50 mg/mL intramuscular solution: 37.5 milligram(s) intramuscular once a month (first Tuesday (20 May 2022 04:21)  insulin glargine 100 units/mL subcutaneous solution: 45 unit(s) subcutaneous once a day (at bedtime) (20 May 2022 04:21)  Januvia 100 mg oral tablet: 1 tab(s) orally once a day (20 May 2022 04:21)  levothyroxine 50 mcg (0.05 mg) oral tablet: 1 tab(s) orally once a day (20 May 2022 04:21)  Melatonin 10 mg oral capsule: 1 cap(s) orally once a day (at bedtime) (20 May 2022 04:21)  Senna 8.6 mg oral tablet: 2 tab(s) orally once a day (at bedtime) (20 May 2022 04:21)    VITAL SIGNS: Last 24 Hours  T(C): 36.7 (2022 04:52), Max: 36.9 (31 May 2022 20:51)  T(F): 98.1 (2022 04:52), Max: 98.4 (31 May 2022 20:51)  HR: 77 (2022 04:52) (77 - 97)  BP: 123/70 (2022 04:52) (120/59 - 144/62)  BP(mean): --  RR: 18 (31 May 2022 20:51) (18 - 18)  SpO2: 96% (31 May 2022 20:51) (95% - 96%)      PHYSICAL EXAM:  CONSTITUTIONAL: No acute distress, demented, nonverbal   NECK: supple, no JVD   PULMONARY: decreased BS at bases   CARDIOVASCULAR: Regular rate and rhythm; no murmurs, rubs, or gallops  GASTROINTESTINAL: Soft, non-tender, PEG tube noted , BS present   MUSCULOSKELETAL: no LE edema on LE, LUE edema noted with contracted wrist   Back: dressing noted on the sacrum       LABS:                                   7.9    11.86 )-----------( 351      ( 2022 08:14 )             25.3   06-01    130<L>  |  94<L>  |  24<H>  ----------------------------<  125<H>  4.9   |  25  |  0.7    Ca    8.3<L>      2022 08:14  Mg     2.0     06-01    TPro  5.3<L>  /  Alb  2.4<L>  /  TBili  0.2  /  DBili  x   /  AST  18  /  ALT  18  /  AlkPhos  81  06-01    Culture - Other (22 @ 14:32)   - Vancomycin: R >16   - Levofloxacin: R >4   - Linezolid: S 1   - Daptomycin: SDD 4 The breakpoint for SDD (sensitive dose dependent)is based on a dosage regimen of 8-12 mg/kg administered every 24 h in adults and is intended for serious infections due to E. faecium. Consultation with an infectious diseases specialist is recommended.   - Tetra/Doxy: R >8   - Ampicillin: R >8 Predicts results to ampicillin/sulbactam, amoxacillin-clavulanate and piperacillin-tazobactam.   Specimen Source: .Other sacrum   Culture Results:   Few Pseudomonas aeruginosa Susceptibility to follow.   Numerous Enterococcus faecium (vancomycin resistant)   Normal enteric patricia isolated   Organism Identification: Enterococcus faecium (vancomycin resistant)   Organism: Enterococcus faecium (vancomycin resistant)   Method Type: BARON Culture - Blood (22 @ 21:38)   Specimen Source: .Blood Blood-Peripheral   Culture Results:   No Growth Final   Culture - Tissue with Gram Stain (22 @ 22:11)   Gram Stain:   Moderate polymorphonuclear leukocytes per low power field   Few Gram Negative Rods per oil power field   Few Gram positive cocci in pairs per oil power field   Specimen Source: .Tissue None   Culture Results:   Moderate Proteus mirabilis   Moderate Klebsiella pneumoniae   Few Enterococcus faecium   RADIOLOGY:   < from: Xray Chest 1 View-PORTABLE IMMEDIATE (22 @ 22:20) >  Impression:    Left basilar linear subsegmental atelectasis. Otherwise, no evidence of   acute pulmonary process..    < end of copied text >  < from: CT Head No Cont (22 @ 21:17) >  IMPRESSION:    Findings the chest communicating hydrocephalus. Chronic microvascular   ischemic changes.      MEDICATIONS  (STANDING):  acetaminophen     Tablet .. 650 milliGRAM(s) Oral every 6 hours  aspirin  chewable 81 milliGRAM(s) Oral daily  atorvastatin 20 milliGRAM(s) Oral at bedtime  calcium carbonate 1250 mG  + Vitamin D (OsCal 500 + D) 1 Tablet(s) Oral daily  dextrose 5%. 1000 milliLiter(s) (50 mL/Hr) IV Continuous <Continuous>  dextrose 5%. 1000 milliLiter(s) (100 mL/Hr) IV Continuous <Continuous>  dextrose 50% Injectable 25 Gram(s) IV Push once  dextrose 50% Injectable 12.5 Gram(s) IV Push once  dextrose 50% Injectable 25 Gram(s) IV Push once  famotidine    Tablet 20 milliGRAM(s) Oral daily  fluticasone propionate 50 MICROgram(s)/spray Nasal Spray 1 Spray(s) Both Nostrils every 12 hours  glucagon  Injectable 1 milliGRAM(s) IntraMuscular once  heparin   Injectable 5000 Unit(s) SubCutaneous every 12 hours  insulin glargine Injectable (LANTUS) 40 Unit(s) SubCutaneous at bedtime  insulin lispro (ADMELOG) corrective regimen sliding scale   SubCutaneous three times a day before meals  insulin lispro Injectable (ADMELOG) 10 Unit(s) SubCutaneous every 6 hours  lactobacillus acidophilus 1 Tablet(s) Oral three times a day  levothyroxine 50 MICROGram(s) Oral daily  melatonin 5 milliGRAM(s) Oral at bedtime  meropenem  IVPB      meropenem  IVPB 1000 milliGRAM(s) IV Intermittent every 8 hours  scopolamine 1 mG/72 Hr(s) Patch 1 Patch Transdermal every 72 hours  sodium chloride 0.9%. 1000 milliLiter(s) (100 mL/Hr) IV Continuous <Continuous>  tigecycline IVPB      tigecycline IVPB 50 milliGRAM(s) IV Intermittent every 12 hours    MEDICATIONS  (PRN):  dextrose Oral Gel 15 Gram(s) Oral once PRN Blood Glucose LESS THAN 70 milliGRAM(s)/deciliter  morphine  - Injectable 2 milliGRAM(s) IV Push every 6 hours PRN Severe Pain (7 - 10)  morphine  - Injectable 1 milliGRAM(s) IV Push every 4 hours PRN Moderate Pain (4 - 6)

## 2022-06-02 NOTE — PROGRESS NOTE ADULT - ASSESSMENT
75 y/o F w/ PMH of Anxiety, CAD, Hypothyroidism, IDDM, Schizophrenia, Dementia sent to ED from [facility] found to be septic with lactic acidosis 2/2 infected stage 4 sacral ulcer.    # Diarrhea    Laxatives stopped, patient did not have the diarrhea before being admitted to the hospital  Receiving PEG tube feeds with the same rate  Will send C diff and GI PCR   Seems to have improved    # Suspected  metabolic encephalopathy / Infected Stage 4 Sacral Ulcer/ Advanced Dementia/ Functional Quadriplegia  - mental status at baseline , c/w supportive care, prevent falls and aspiration   - will start Scopolamine patch today to reduce secretions   - d/c IV fluids, start Nasonex , chest PT  TID, mouth care   - dietitian consulted   - off load pressure points, change position Q 2 hours   - Burn is following,  s/p  debridements x two at bedside and in OR on 5/26  - wound culture:  pseudomonas and VRE, repeat Cx from OR grew Moderate Proteus mirabilis ,Moderate Klebsiella pneumoniae and Enterococcus faecium   - Antibiotics changed to meropenem 1 g q8h, and tigecycline 50 mg q12h for a total of 7 days  -  Zyvox, flagyl and Levofloxacin stopped  -  ESR/CRP noted, likely pt has an OM   - Wound vac is being changed every 24-48h    # Iron deficiency and acute blood loss  anemia   - on IV Venofer 200 mg Q 24 hours for 5 days  - monitor H/H, keep Hb above 7.5  - H/H dropped after surgical excision, likely after acute blood loss, Hb is stable now,   - Trend daily H/H, with active T+S     #Hyponatremia / TELMA   - d/c IV fluids   - monitor Na level   - monitor BUN/cr and urine output       # Hypothyroidism   - c/w home Levothyroxine     # DLD   - c/w home Lipitor    # IDDM  - carb consistent PEG formula   - monitor finger sticks s  - on Insulin       # GI/DVT prophylaxis

## 2022-06-02 NOTE — PROGRESS NOTE ADULT - SUBJECTIVE AND OBJECTIVE BOX
SUBJECTIVE:    Patient is a 77y old Female who presents with a chief complaint of sepsis (02 Jun 2022 10:16)    Overnight Events: No overnight events. Patient is still receiving meropenem and tigecycline, wound vac will be changed tomorrow.    PAST MEDICAL & SURGICAL HISTORY  Coronary artery disease    Anxiety    Dementia    Insulin dependent diabetes mellitus    Paranoid schizophrenia    Hyperlipidemia    Hypothyroidism    Abscess of right buttock    H/O abdominal surgery      SOCIAL HISTORY:  Negative for smoking/alcohol/drug use.     ALLERGIES:  No Known Allergies    MEDICATIONS:  STANDING MEDICATIONS  acetaminophen     Tablet .. 650 milliGRAM(s) Oral every 6 hours  aspirin  chewable 81 milliGRAM(s) Oral daily  atorvastatin 20 milliGRAM(s) Oral at bedtime  calcium carbonate 1250 mG  + Vitamin D (OsCal 500 + D) 1 Tablet(s) Oral daily  dextrose 5%. 1000 milliLiter(s) IV Continuous <Continuous>  dextrose 5%. 1000 milliLiter(s) IV Continuous <Continuous>  dextrose 50% Injectable 25 Gram(s) IV Push once  dextrose 50% Injectable 12.5 Gram(s) IV Push once  dextrose 50% Injectable 25 Gram(s) IV Push once  famotidine    Tablet 20 milliGRAM(s) Oral daily  fluticasone propionate 50 MICROgram(s)/spray Nasal Spray 1 Spray(s) Both Nostrils every 12 hours  glucagon  Injectable 1 milliGRAM(s) IntraMuscular once  heparin   Injectable 5000 Unit(s) SubCutaneous every 12 hours  insulin glargine Injectable (LANTUS) 40 Unit(s) SubCutaneous at bedtime  insulin lispro (ADMELOG) corrective regimen sliding scale   SubCutaneous three times a day before meals  insulin lispro Injectable (ADMELOG) 10 Unit(s) SubCutaneous every 6 hours  lactobacillus acidophilus 1 Tablet(s) Oral three times a day  levothyroxine 50 MICROGram(s) Oral daily  melatonin 5 milliGRAM(s) Oral at bedtime  meropenem  IVPB      meropenem  IVPB 1000 milliGRAM(s) IV Intermittent every 8 hours  scopolamine 1 mG/72 Hr(s) Patch 1 Patch Transdermal every 72 hours  tigecycline IVPB      tigecycline IVPB 50 milliGRAM(s) IV Intermittent every 12 hours    PRN MEDICATIONS  dextrose Oral Gel 15 Gram(s) Oral once PRN  morphine  - Injectable 1 milliGRAM(s) IV Push every 4 hours PRN  morphine  - Injectable 2 milliGRAM(s) IV Push every 6 hours PRN    VITALS:   T(F): 98.2, Max: 98.4 (06-01-22 @ 22:26)  HR: 84 (68 - 91)  BP: 124/53 (111/62 - 127/69)  RR: 18 (18 - 18)  SpO2: 96% (96% - 96%)    LABS:                        7.7    8.81  )-----------( 301      ( 02 Jun 2022 13:17 )             24.3     06-02    131<L>  |  95<L>  |  28<H>  ----------------------------<  105<H>  4.8   |  25  |  0.5<L>    Ca    8.2<L>      02 Jun 2022 13:16  Mg     2.0     06-02    TPro  5.1<L>  /  Alb  2.2<L>  /  TBili  0.2  /  DBili  x   /  AST  14  /  ALT  13  /  AlkPhos  87  06-02 06-01-22 @ 07:01  -  06-02-22 @ 07:00  --------------------------------------------------------  IN: 0 mL / OUT: 1400 mL / NET: -1400 mL          IMAGING/EKG:    No imaging    PHYSICAL EXAM:  GEN: NAD  LUNGS: CTAB  HEART: RRR  ABD: soft, NT/ND, +BS  EXT: no edema, PP b/l  NEURO: AAO x2 (baseline)

## 2022-06-02 NOTE — PROGRESS NOTE ADULT - SUBJECTIVE AND OBJECTIVE BOX
Nephrology progress note       ON events/Subjective:     Allergies:  No Known Allergies    Hospital Medications:   MEDICATIONS  (STANDING):  acetaminophen     Tablet .. 650 milliGRAM(s) Oral every 6 hours  aspirin  chewable 81 milliGRAM(s) Oral daily  atorvastatin 20 milliGRAM(s) Oral at bedtime  calcium carbonate 1250 mG  + Vitamin D (OsCal 500 + D) 1 Tablet(s) Oral daily  dextrose 5%. 1000 milliLiter(s) (100 mL/Hr) IV Continuous <Continuous>  dextrose 5%. 1000 milliLiter(s) (50 mL/Hr) IV Continuous <Continuous>  dextrose 50% Injectable 25 Gram(s) IV Push once  dextrose 50% Injectable 12.5 Gram(s) IV Push once  dextrose 50% Injectable 25 Gram(s) IV Push once  famotidine    Tablet 20 milliGRAM(s) Oral daily  fluticasone propionate 50 MICROgram(s)/spray Nasal Spray 1 Spray(s) Both Nostrils every 12 hours  glucagon  Injectable 1 milliGRAM(s) IntraMuscular once  heparin   Injectable 5000 Unit(s) SubCutaneous every 12 hours  insulin glargine Injectable (LANTUS) 40 Unit(s) SubCutaneous at bedtime  insulin lispro (ADMELOG) corrective regimen sliding scale   SubCutaneous three times a day before meals  insulin lispro Injectable (ADMELOG) 10 Unit(s) SubCutaneous every 6 hours  lactobacillus acidophilus 1 Tablet(s) Oral three times a day  levothyroxine 50 MICROGram(s) Oral daily  melatonin 5 milliGRAM(s) Oral at bedtime  meropenem  IVPB      meropenem  IVPB 1000 milliGRAM(s) IV Intermittent every 8 hours  scopolamine 1 mG/72 Hr(s) Patch 1 Patch Transdermal every 72 hours  tigecycline IVPB      tigecycline IVPB 50 milliGRAM(s) IV Intermittent every 12 hours    REVIEW OF SYSTEMS:  unable to obtain  All other review of systems is negative unless indicated above.    VITALS:  T(F): 98 (06-02-22 @ 05:31), Max: 98.4 (06-01-22 @ 22:26)  HR: 91 (06-02-22 @ 05:31)  BP: 127/69 (06-02-22 @ 05:31)  RR: 18 (06-02-22 @ 05:31)  SpO2: --  Wt(kg): --    05-31 @ 07:01  -  06-01 @ 07:00  --------------------------------------------------------  IN: 874 mL / OUT: 1000 mL / NET: -126 mL    06-01 @ 07:01  -  06-02 @ 07:00  --------------------------------------------------------  IN: 0 mL / OUT: 1400 mL / NET: -1400 mL        PHYSICAL EXAM:  Constitutional: NAD  HEENT: anicteric sclera, oropharynx clear, MMM  Neck: No JVD  Respiratory: CTAB, no wheezes, rales or rhonchi  Cardiovascular: S1, S2, RRR  Gastrointestinal: BS+, soft, NT/ND  Extremities: edema  Neurological: A/O x 3, no focal deficits  Psychiatric: Normal mood, normal affect  : No CVA tenderness. No amin.   Skin: No rashes  Vascular Access:    LABS:  06-01    130<L>  |  94<L>  |  24<H>  ----------------------------<  125<H>  4.9   |  25  |  0.7    Ca    8.3<L>      01 Jun 2022 08:14  Mg     2.0     06-01    TPro  5.3<L>  /  Alb  2.4<L>  /  TBili  0.2  /  DBili      /  AST  18  /  ALT  18  /  AlkPhos  81  06-01                          7.9    11.86 )-----------( 351      ( 01 Jun 2022 08:14 )             25.3       Urine Studies:  Creatinine Trend: 0.7<--, 0.7<--, 0.8<--, 0.8<--, 0.8<--, 0.9<--      RADIOLOGY & ADDITIONAL STUDIES:    ·	pt known to me  ·	TELMA improved with ivf (now off)  ·	hyponatremeia   ·	sepsis and likely source infected sacral ulcer s/p dedridement on tigecylcine and merrem abx per ID  ·	still with leukocytosis  ·	anemia s/p venofer  ·	PEG  ·	more alert    1) peg feeds  2) am labs  3) surgery f/u? wound vac  4) ID f/u for duration abx  5) suggest to stop ivf

## 2022-06-03 NOTE — PROGRESS NOTE ADULT - SUBJECTIVE AND OBJECTIVE BOX
Nephrology progress note     no complaints    ON events/Subjective:     Allergies:  No Known Allergies    Hospital Medications:   MEDICATIONS  (STANDING):  acetaminophen     Tablet .. 650 milliGRAM(s) Oral every 6 hours  aspirin  chewable 81 milliGRAM(s) Oral daily  atorvastatin 20 milliGRAM(s) Oral at bedtime  calcium carbonate 1250 mG  + Vitamin D (OsCal 500 + D) 1 Tablet(s) Oral daily  dextrose 5%. 1000 milliLiter(s) (50 mL/Hr) IV Continuous <Continuous>  dextrose 5%. 1000 milliLiter(s) (100 mL/Hr) IV Continuous <Continuous>  dextrose 50% Injectable 25 Gram(s) IV Push once  dextrose 50% Injectable 12.5 Gram(s) IV Push once  dextrose 50% Injectable 25 Gram(s) IV Push once  famotidine    Tablet 20 milliGRAM(s) Oral daily  fluticasone propionate 50 MICROgram(s)/spray Nasal Spray 1 Spray(s) Both Nostrils every 12 hours  glucagon  Injectable 1 milliGRAM(s) IntraMuscular once  heparin   Injectable 5000 Unit(s) SubCutaneous every 12 hours  insulin glargine Injectable (LANTUS) 40 Unit(s) SubCutaneous at bedtime  insulin lispro (ADMELOG) corrective regimen sliding scale   SubCutaneous three times a day before meals  insulin lispro Injectable (ADMELOG) 10 Unit(s) SubCutaneous every 6 hours  lactobacillus acidophilus 1 Tablet(s) Oral three times a day  levothyroxine 50 MICROGram(s) Oral daily  melatonin 5 milliGRAM(s) Oral at bedtime  meropenem  IVPB 1000 milliGRAM(s) IV Intermittent every 8 hours  meropenem  IVPB      scopolamine 1 mG/72 Hr(s) Patch 1 Patch Transdermal every 72 hours  tigecycline IVPB      tigecycline IVPB 50 milliGRAM(s) IV Intermittent every 12 hours    REVIEW OF SYSTEMS:  uanble to obtain but no complaints    All other review of systems is negative unless indicated above.    VITALS:  T(F): 97.8 (06-02-22 @ 21:05), Max: 98.2 (06-02-22 @ 15:05)  HR: 77 (06-02-22 @ 21:05)  BP: 145/57 (06-02-22 @ 21:05)  RR: 18 (06-02-22 @ 21:05)  SpO2: 96% (06-02-22 @ 11:23)  Wt(kg): --    06-01 @ 07:01  -  06-02 @ 07:00  --------------------------------------------------------  IN: 0 mL / OUT: 1400 mL / NET: -1400 mL    06-02 @ 07:01  -  06-03 @ 07:00  --------------------------------------------------------  IN: 874 mL / OUT: 5000 mL / NET: -4126 mL        PHYSICAL EXAM:  Constitutional: NAD  HEENT: anicteric sclera, oropharynx clear, MMM  Neck: No JVD  Respiratory: CTAB, no wheezes, rales or rhonchi  Cardiovascular: S1, S2, RRR  Gastrointestinal: BS+, soft, NT/ND  Extremities: edema  Neurological: A/O x 3, no focal deficits  Psychiatric: Normal mood, normal affect  : No CVA tenderness. No amin.   Skin: No rashes  Vascular Access:    LABS:  06-03    134<L>  |  97<L>  |  27<H>  ----------------------------<  72  5.6<H>   |  26  |  0.6<L>    Ca    9.0      03 Jun 2022 08:27  Mg     2.0     06-03    TPro  5.7<L>  /  Alb  2.6<L>  /  TBili  0.3  /  DBili      /  AST  14  /  ALT  13  /  AlkPhos  109  06-03                          8.9    12.14 )-----------( 361      ( 03 Jun 2022 08:27 )             28.4       Urine Studies:  Creatinine Trend: 0.6<--, 0.5<--, 0.6<--, 0.7<--, 0.7<--, 0.8<--      ·	pt known to me  ·	TELMA improved with ivf (now off)  ·	hyponatremeia   ·	hyperkalemia  ·	sepsis and likely source infected sacral ulcer s/p dedridement on tigecylcine and merrem abx per ID  ·	still with leukocytosis  ·	anemia s/p venofer  ·	PEG  ·	more alert    1) peg feeds  2) per hyponatremia and hyperkalemia - doubt adrenal insufficiency - for now maintain peg feeds but if remains elevated change to nepro   2) am labs  3) surgery f/u? wound vac  4) ID f/u on iv abx  5) suggest to stop ivf

## 2022-06-03 NOTE — PROGRESS NOTE ADULT - PROVIDER SPECIALTY LIST ADULT
Infectious Disease
Internal Medicine
Nephrology
Burn
Hospitalist
Internal Medicine
Nephrology
Burn
Hospitalist
Infectious Disease
Nephrology
Internal Medicine
Internal Medicine
Burn
Burn
Hospitalist
Infectious Disease

## 2022-06-03 NOTE — DISCHARGE NOTE PROVIDER - HOSPITAL COURSE
HPI:    77 y/o F w/ pmhx of Anxiety, CAD, Hypothyroidism, IDDM, Schizophrenia, Dementia sent to ED from [facility] for concern over infected sacral ulcer.     Unable to obtain HPI from patient (AOx0):  As per ED note, patient is AOx2 at baseline    Patient has a G-tube; Done in [facility] (as per NH paperwork); However no indication why; Poor PO intake??    Unable to complete ROS due to mental status    ED:   - VS: 99F, HR 71, /53, 99% on RA;  - Labs: WBC 18, Cr. 1.6 (improved to 1.4 w/ IVF), Lac 2.7;  - CXR: Left basilar linear subsegmental atelectasis. Otherwise, no evidence of acute pulmonary process..  - CTH: chest communicating hydrocephalus. Chronic microvascular ischemic changes.    Admit to Medicine;    Hospital Course: HPI:    77 y/o F w/ pmhx of Anxiety, CAD, Hypothyroidism, IDDM, Schizophrenia, Dementia sent to ED from [facility] for concern over infected sacral ulcer.   Unable to obtain HPI from patient (AOx0): As per ED note, patient is AOx2 at baseline. Patient has a G-tube; Done in [facility] (as per NH paperwork); However no indication why; Poor PO intake??    Unable to complete ROS due to mental status    ED:   - VS: 99F, HR 71, /53, 99% on RA;  - Labs: WBC 18, Cr. 1.6 (improved to 1.4 w/ IVF), Lac 2.7;  - CXR: Left basilar linear subsegmental atelectasis. Otherwise, no evidence of acute pulmonary process..  - CTH: chest communicating hydrocephalus. Chronic microvascular ischemic changes.    Hospital Course:    Patient admitted to medicine for stage 4 sacral ulcer. She had two bedside debridements by burn and one in the OR on 5/26. Wound culture grew pseudomonas and VRE. Per UID, she was started on 7 day course of IV meropenem and IV tigecycline (will complete at NH on 6/7). Patient has a wound vac and will continue with wound care at NH. Patient at baseline mental status and stable for discharge to SNF.    # Infected Stage 4 Sacral Ulcer  - per ID, meropenem 1 g q8h, and tigecycline 50 mg q12h for a total of 7 days (ends 6/7)  - Wound vac is being changed every 24-48h; continue wound vac at NH  - pain control as needed    # Advanced Dementia  # Functional Quadriplegia  - mental status at baseline, c/w supportive care, prevent falls and aspiration     # Iron deficiency and acute blood loss anemia   - Hb stable now    #TELMA - resolved   - free water 200cc q6h with tube feeds    # Hypothyroidism   - c/w home Levothyroxine 50mcg qd    # DLD   - c/w home Lipitor    # IDDM  - monitor finger sticks s  - on Insulin 77 y/o F w/ pmhx of Anxiety, CAD, Hypothyroidism, IDDM, Schizophrenia, Dementia sent to ED from [facility] for concern over infected sacral ulcer.   Unable to obtain HPI from patient (AOx0): As per ED note, patient is AOx2 at baseline. Patient has a G-tube; Done in [facility] (as per NH paperwork); However no indication why; Poor PO intake??    Unable to complete ROS due to mental status    ED:   - VS: 99F, HR 71, /53, 99% on RA;  - Labs: WBC 18, Cr. 1.6 (improved to 1.4 w/ IVF), Lac 2.7;  - CXR: Left basilar linear subsegmental atelectasis. Otherwise, no evidence of acute pulmonary process..  - CTH: chest communicating hydrocephalus. Chronic microvascular ischemic changes.    Hospital Course:    Patient admitted to medicine for stage 4 sacral ulcer. She had two bedside debridements by burn and one in the OR on 5/26. Wound culture grew pseudomonas and VRE. Per UID, she was started on 7 day course of IV meropenem and IV tigecycline (will complete at NH on 6/7). Patient has a wound vac and will continue with wound care at NH. Patient at baseline mental status and stable for discharge to SNF.    # Infected Stage 4 Sacral Ulcer  - per ID, meropenem 1 g q8h, and tigecycline 50 mg q12h for a total of 7 days (ends 6/7)  - Wound vac is being changed every 24-48h; continue wound vac at NH  - pain control as needed    # Advanced Dementia  # Functional Quadriplegia  - mental status at baseline, c/w supportive care, prevent falls and aspiration     # Iron deficiency and acute blood loss anemia   - Hb stable now    #TELMA - resolved   - free water 200cc q6h with tube feeds    # Hypothyroidism   - c/w home Levothyroxine 50mcg qd    # DLD   - c/w home Lipitor    # IDDM  - monitor finger sticks s  - on Insulin 75 y/o F w/ pmhx of Anxiety, CAD, Hypothyroidism, IDDM, Schizophrenia, Dementia sent to ED from [facility] for concern over infected sacral ulcer.   Unable to obtain HPI from patient (AOx0): As per ED note, patient is AOx2 at baseline. Patient has a G-tube; Done in [facility] (as per NH paperwork); However no indication why; Poor PO intake??    Unable to complete ROS due to mental status    ED:   - VS: 99F, HR 71, /53, 99% on RA;  - Labs: WBC 18, Cr. 1.6 (improved to 1.4 w/ IVF), Lac 2.7;  - CXR: Left basilar linear subsegmental atelectasis. Otherwise, no evidence of acute pulmonary process..  - CTH: chest communicating hydrocephalus. Chronic microvascular ischemic changes.    Hospital Course:    Patient admitted to medicine for stage 4 sacral ulcer. She had two bedside debridements by burn and one in the OR on 5/26. Wound culture grew pseudomonas and VRE. Per UID, she was started on 7 day course of IV meropenem and IV tigecycline (will complete at NH on 6/7). Patient has a wound vac and will continue with wound care at NH. Patient at baseline mental status and stable for discharge to SNF.    # Infected Stage 4 Sacral Ulcer  - per ID, meropenem 1 g q8h, and tigecycline 50 mg q12h for a total of 7 days (ends 6/7)  - midline was placed on 6/2  - Wound vac is being changed every 24-48h; continue wound vac at NH  - pain control as needed    # Advanced Dementia  # Functional Quadriplegia  - mental status at baseline, c/w supportive care, prevent falls and aspiration     # Iron deficiency and acute blood loss anemia   - Hb stable now    #TELMA - resolved   - free water 200cc q6h with tube feeds  - monitor potassium    # Hypothyroidism   - c/w home Levothyroxine 50mcg qd    # DLD   - c/w home Lipitor    # IDDM  - monitor finger sticks s  - on Insulin

## 2022-06-03 NOTE — DISCHARGE NOTE PROVIDER - CARE PROVIDER_API CALL
Daljit Prather)  Internal Medicine  [address]  [place], [place] [id number]  Phone: [phone]  Fax: [phone]  Follow Up Time:     Regan Robert)  Plastic Surgery  [address] [place] [address]  Phone: [phone]  Fax: [phone]  Follow Up Time:

## 2022-06-03 NOTE — DISCHARGE NOTE PROVIDER - CARE PROVIDERS DIRECT ADDRESSES
[email or web address]

## 2022-06-03 NOTE — DISCHARGE NOTE PROVIDER - NSDCCPCAREPLAN_GEN_ALL_CORE_FT
PRINCIPAL DISCHARGE DIAGNOSIS  Diagnosis: Sacral decubitus ulcer  Assessment and Plan of Treatment: You were found to have an infected sacral ulcer and had debridements by burn. You were seen by infectious disease and based on culture results, you were treated with IV antibiotics in the hospital. Please continue taking IV tigecycline and IV meropenem via midline at nursing home until 6/7. Please follow up with burn as needed and continue with wound care as instructed

## 2022-06-03 NOTE — PROGRESS NOTE ADULT - ATTENDING COMMENTS
75 y/o F w/ PMH of Anxiety, CAD, Hypothyroidism, IDDM, Schizophrenia, Dementia sent to ED from [facility] found to be septic with lactic acidosis 2/2 infected stage 4 sacral ulcer.    # Suspected  metabolic encephalopathy / Infected Stage 4 Sacral Ulcer/ Advanced Dementia/ Functional Quadriplegia  - mental status at baseline, c/w supportive care, prevent falls and aspiration   - Burn is following,  s/p  debridements x two at bedside and in OR on 5/26  - wound culture:  pseudomonas and VRE, repeat Cx from OR grew Moderate Proteus mirabilis ,Moderate Klebsiella pneumoniae and Enterococcus faecium   - Antibiotics changed to meropenem 1 g q8h, and tigecycline 50 mg q12h for a total of 7 days  - Wound vac is being changed every 24-48h    # Iron deficiency and acute blood loss anemia   - IV Venofer 200 mg Q 24 hours for 5 days  - H/H dropped after surgical excision, likely after acute blood loss, Hb is stable now  - Trend daily H/H, with active T+S     #Mild hyperkalemia - Lokelma TID   #TELMA - resolved     # Hypothyroidism   - c/w home Levothyroxine     # DLD   - c/w home Lipitor    # IDDM  - monitor finger sticks s  - on Insulin     DVT prophylaxis    Pending: SNF placement
75 y/o F w/ pmhx of Anxiety, CAD, Hypothyroidism, IDDM, Schizophrenia, Dementia sent to ED from [facility] [place], operated by [facility] for concern over infected sacral ulcer.     # Toxic metabolic encephalopathy 2/2 sepsis POA 2/2 Infected Stage 4 Sacral Ulcer  # Advanced Dementia  # Functional Quadriplegia   - local wound care   - Burn consult   - f/u wound culture   - C/W meropenem as per ID 1g q12h IV    #Hypernatremia  - dc IVF and start free water via PEG     # TELMA   - Likely prerenal, resolved     # Hypothyroidism   - c/w home Levothyroxine     # DLD   - c/w home Lipitor    # IDDM  - insulin as needed     Diet: Tube feeds      Pending: IV abx, f/u wound culture, hypernatremia
77 y/o F w/ PMH of Anxiety, CAD, Hypothyroidism, IDDM, Schizophrenia, Dementia sent to ED from [facility] found to be septic with lactic acidosis 2/2 infected stage 4 sacral ulcer.    # Diarrhea  - Hold laxatives and monitor, if persistent then rule out C diff     # Suspected  metabolic encephalopathy / Infected Stage 4 Sacral Ulcer/ Advanced Dementia/ Functional Quadriplegia  - mental status at baseline, c/w supportive care, prevent falls and aspiration   - Burn is following,  s/p  debridements x two at bedside and in OR on 5/26  - wound culture:  pseudomonas and VRE, repeat Cx from OR grew Moderate Proteus mirabilis ,Moderate Klebsiella pneumoniae and Enterococcus faecium   - Antibiotics changed to meropenem 1 g q8h, and tigecycline 50 mg q12h for a total of 7 days  - Wound vac is being changed every 24-48h    # Iron deficiency and acute blood loss anemia   - IV Venofer 200 mg Q 24 hours for 5 days  - H/H dropped after surgical excision, likely after acute blood loss, Hb is stable now,   - Trend daily H/H, with active T+S     #Hyponatremia   #TELMA - resolved   - low free water with feeds     # Hypothyroidism   - c/w home Levothyroxine     # DLD   - c/w home Lipitor    # IDDM  - monitor finger sticks s  - on Insulin     DVT prophylaxis    Pending: IV abx at [facility]
large dressing change --viable tissue post debridement sacrum--> local wound care

## 2022-06-03 NOTE — CHART NOTE - NSCHARTNOTEFT_GEN_A_CORE
Pt seen at bedside for application of wound vacuum to sacral wound. Black sponge from previous application removed, wound cleaned and irrigated. Negative pressure wound therapy with black sponge applied. Seal obtained, wound vacuum working properly. Pt tolerated well. Next wound vacuum change on Monday 6/6 .

## 2022-06-03 NOTE — DISCHARGE NOTE NURSING/CASE MANAGEMENT/SOCIAL WORK - NSDCPEFALRISK_GEN_ALL_CORE
For information on Fall & Injury Prevention, visit: [email or web address]  [email or web address] OR  https://www.cdc.gov/steadi/patient.html

## 2022-06-03 NOTE — PROGRESS NOTE ADULT - REASON FOR ADMISSION
Lacie
TELMA
TELMA, sepsis
infected ulcer
sepsis
Infected sacral ulcer
Sepsis with lactic acidosis
TELMA
TELMA
Infected sacral ulcer
roberta
sacral ulcer, TELMA
sepsis
sepsis
sepsis, altered MS

## 2022-06-03 NOTE — PROGRESS NOTE ADULT - SUBJECTIVE AND OBJECTIVE BOX
GLORIA WATERS 77y Female  MRN#: [id number]   CODE STATUS: full code  Hospital Day: 14d    Pt is currently admitted with the primary diagnosis of sacral ulcer    SUBJECTIVE  Overnight/Interval Events: No acute events overnight. Patient seen and examined at bedside. She appears comfortable. She had midline placed yesterday for IV antibiotics.    VITAL SIGNS: Last 24 Hours  T(C): 36.6 (2022 21:05), Max: 36.8 (2022 15:05)  T(F): 97.8 (2022 21:05), Max: 98.2 (2022 15:05)  HR: 77 (2022 21:05) (77 - 85)  BP: 145/57 (2022 21:05) (124/53 - 145/57)  BP(mean): --  RR: 18 (2022 21:05) (18 - 18)  SpO2: 96% (2022 11:23) (96% - 96%)      22 @ 07:01  -  22 @ 07:00  --------------------------------------------------------  IN: 874 mL / OUT: 5000 mL / NET: -4126 mL    Present Today:   - Anjel:  yes  - PEG  - Midline                                          ----------------------------------------------------------  OBJECTIVE  PAST MEDICAL & SURGICAL HISTORY  Coronary artery disease  Anxiety  Dementia  Insulin dependent diabetes mellitus  Paranoid schizophrenia  Hyperlipidemia  Hypothyroidism  Abscess of right buttock    H/O abdominal surgery                                            -----------------------------------------------------------  ALLERGIES:  No Known Allergies                                            ------------------------------------------------------------    HOME MEDICATIONS  Home Medications:  acetaminophen 325 mg oral tablet, disintegratin tab(s) orally every 6 hours, As Needed (20 May 2022 04:21)  aspirin 81 mg oral tablet: 1 tab(s) orally once a day (20 May 2022 04:)  atorvastatin 20 mg oral tablet: 1 tab(s) orally once a day (at bedtime) (20 May 2022 04:21)  Calcium 600+D 600 mg-200 intl units oral tablet: 2 tab(s) orally once a day (20 May 2022 04:)  collagenase 250 units/g topical ointment: 1 application topically 2 times a day (20 May 2022 04:)  famotidine 20 mg oral tablet: 1 tab(s) orally once a day (20 May 2022 04:)  Haldol Decanoate 50 mg/mL intramuscular solution: 37.5 milligram(s) intramuscular once a month (first Tuesday (20 May 2022 04:21)  insulin glargine 100 units/mL subcutaneous solution: 45 unit(s) subcutaneous once a day (at bedtime) (20 May 2022 04:21)  Januvia 100 mg oral tablet: 1 tab(s) orally once a day (20 May 2022 04:21)  levothyroxine 50 mcg (0.05 mg) oral tablet: 1 tab(s) orally once a day (20 May 2022 04:)  Melatonin 10 mg oral capsule: 1 cap(s) orally once a day (at bedtime) (20 May 2022 04:21)  Senna 8.6 mg oral tablet: 2 tab(s) orally once a day (at bedtime) (20 May 2022 04:)                           MEDICATIONS:  STANDING MEDICATIONS  acetaminophen     Tablet .. 650 milliGRAM(s) Oral every 6 hours  aspirin  chewable 81 milliGRAM(s) Oral daily  atorvastatin 20 milliGRAM(s) Oral at bedtime  calcium carbonate 1250 mG  + Vitamin D (OsCal 500 + D) 1 Tablet(s) Oral daily  dextrose 5%. 1000 milliLiter(s) IV Continuous <Continuous>  dextrose 5%. 1000 milliLiter(s) IV Continuous <Continuous>  dextrose 50% Injectable 25 Gram(s) IV Push once  dextrose 50% Injectable 12.5 Gram(s) IV Push once  dextrose 50% Injectable 25 Gram(s) IV Push once  famotidine    Tablet 20 milliGRAM(s) Oral daily  fluticasone propionate 50 MICROgram(s)/spray Nasal Spray 1 Spray(s) Both Nostrils every 12 hours  glucagon  Injectable 1 milliGRAM(s) IntraMuscular once  heparin   Injectable 5000 Unit(s) SubCutaneous every 12 hours  insulin glargine Injectable (LANTUS) 40 Unit(s) SubCutaneous at bedtime  insulin lispro (ADMELOG) corrective regimen sliding scale   SubCutaneous three times a day before meals  insulin lispro Injectable (ADMELOG) 10 Unit(s) SubCutaneous every 6 hours  lactobacillus acidophilus 1 Tablet(s) Oral three times a day  levothyroxine 50 MICROGram(s) Oral daily  melatonin 5 milliGRAM(s) Oral at bedtime  meropenem  IVPB      meropenem  IVPB 1000 milliGRAM(s) IV Intermittent every 8 hours  scopolamine 1 mG/72 Hr(s) Patch 1 Patch Transdermal every 72 hours  tigecycline IVPB      tigecycline IVPB 50 milliGRAM(s) IV Intermittent every 12 hours    PRN MEDICATIONS  dextrose Oral Gel 15 Gram(s) Oral once PRN  morphine  - Injectable 1 milliGRAM(s) IV Push every 4 hours PRN                                             --------------------------------------------------------------  LABS:                        8.9    12.14 )-----------( 361      ( 2022 08:27 )             28.4     06-03    134<L>  |  97<L>  |  27<H>  ----------------------------<  72  5.6<H>   |  26  |  0.6<L>    Ca    9.0      2022 08:27  Mg     2.0     06-03    TPro  5.7<L>  /  Alb  2.6<L>  /  TBili  0.3  /  DBili  x   /  AST  14  /  ALT  13  /  AlkPhos  109  06-03                                            --------------------------------------------------------------    PHYSICAL EXAM:  General: no acute distress  HEENT: normocephalic, atraumatic  Lungs: CTAB  Heart: regular rate and rhythm, normal S1 and S2  Abdomen: soft, nontender, nondistended, +PEG  Neuro: AAOx1                                           --------------------------------------------------------------    ASSESSMENT & PLAN    75 y/o F w/ PMH of Anxiety, CAD, Hypothyroidism, IDDM, Schizophrenia, Dementia sent to ED from [facility] found to be septic with lactic acidosis 2/2 infected stage 4 sacral ulcer.    # Diarrhea  - Hold laxatives and monitor, if persistent then rule out C diff     # Suspected  metabolic encephalopathy   # Infected Stage 4 Sacral Ulcer  # Advanced Dementia  # Functional Quadriplegia  -WBC 12K  - mental status at baseline, c/w supportive care, prevent falls and aspiration   - Burn is following,  s/p  debridements x two at bedside and in OR on   - wound culture:  pseudomonas and VRE, repeat Cx from OR grew Moderate Proteus mirabilis ,Moderate Klebsiella pneumoniae and Enterococcus faecium   - per ID, meropenem 1 g q8h, and tigecycline 50 mg q12h for a total of 7 days (ends )  - Wound vac is being changed every 24-48h; clarify with burn if wound vac needed at SNF  - pain control with scopolamine patch and morphine    # Iron deficiency and acute blood loss anemia   - IV Venofer 200 mg Q 24 hours for 5 days  - H/H dropped after surgical excision, likely after acute blood loss, Hb is stable now,   - Trend daily H/H, with active T+S     #Hyponatremia   #TELMA - resolved   -Na 134 in AM, K 5.6 today  - creat 0.6  - free water 200cc q6h with tube feeds    # Hypothyroidism   - c/w home Levothyroxine 50mcg qd    # DLD   - c/w home Lipitor    # IDDM  - monitor finger sticks s  - on Insulin     # DVT prophylaxis: heparin 5000 q12h  # Diet: tube feeds  # Activity Score (AM-PAC): bedrest  # Code status: full code  # Disposition: pending SNF placement, COVID negative; needs IV antibiotics and need to ask burn about wound vac     GLORIA WATERS 77y Female  MRN#: [id number]   CODE STATUS: full code  Hospital Day: 14d    Pt is currently admitted with the primary diagnosis of sacral ulcer    SUBJECTIVE  Overnight/Interval Events: No acute events overnight. Patient seen and examined at bedside. She appears comfortable. She had midline placed yesterday for IV antibiotics. She also has a wound vac.    VITAL SIGNS: Last 24 Hours  T(C): 36.6 (2022 21:05), Max: 36.8 (2022 15:05)  T(F): 97.8 (2022 21:05), Max: 98.2 (2022 15:05)  HR: 77 (2022 21:05) (77 - 85)  BP: 145/57 (2022 21:05) (124/53 - 145/57)  BP(mean): --  RR: 18 (2022 21:05) (18 - 18)  SpO2: 96% (2022 11:23) (96% - 96%)      22 @ 07:01  -  22 @ 07:00  --------------------------------------------------------  IN: 874 mL / OUT: 5000 mL / NET: -4126 mL    Present Today:   - Hobbs:  yes  - PEG  - Midline  - Wound vac                                          ----------------------------------------------------------  OBJECTIVE  PAST MEDICAL & SURGICAL HISTORY  Coronary artery disease  Anxiety  Dementia  Insulin dependent diabetes mellitus  Paranoid schizophrenia  Hyperlipidemia  Hypothyroidism  Abscess of right buttock    H/O abdominal surgery                                            -----------------------------------------------------------  ALLERGIES:  No Known Allergies                                            ------------------------------------------------------------    HOME MEDICATIONS  Home Medications:  acetaminophen 325 mg oral tablet, disintegratin tab(s) orally every 6 hours, As Needed (20 May 2022 04:21)  aspirin 81 mg oral tablet: 1 tab(s) orally once a day (20 May 2022 04:)  atorvastatin 20 mg oral tablet: 1 tab(s) orally once a day (at bedtime) (20 May 2022 04:)  Calcium 600+D 600 mg-200 intl units oral tablet: 2 tab(s) orally once a day (20 May 2022 04:)  collagenase 250 units/g topical ointment: 1 application topically 2 times a day (20 May 2022 04:)  famotidine 20 mg oral tablet: 1 tab(s) orally once a day (20 May 2022 04:)  Haldol Decanoate 50 mg/mL intramuscular solution: 37.5 milligram(s) intramuscular once a month (first Tuesday (20 May 2022 04:)  insulin glargine 100 units/mL subcutaneous solution: 45 unit(s) subcutaneous once a day (at bedtime) (20 May 2022 04:)  Januvia 100 mg oral tablet: 1 tab(s) orally once a day (20 May 2022 04:)  levothyroxine 50 mcg (0.05 mg) oral tablet: 1 tab(s) orally once a day (20 May 2022 04:)  Melatonin 10 mg oral capsule: 1 cap(s) orally once a day (at bedtime) (20 May 2022 04:)  Senna 8.6 mg oral tablet: 2 tab(s) orally once a day (at bedtime) (20 May 2022 04:)                           MEDICATIONS:  STANDING MEDICATIONS  acetaminophen     Tablet .. 650 milliGRAM(s) Oral every 6 hours  aspirin  chewable 81 milliGRAM(s) Oral daily  atorvastatin 20 milliGRAM(s) Oral at bedtime  calcium carbonate 1250 mG  + Vitamin D (OsCal 500 + D) 1 Tablet(s) Oral daily  dextrose 5%. 1000 milliLiter(s) IV Continuous <Continuous>  dextrose 5%. 1000 milliLiter(s) IV Continuous <Continuous>  dextrose 50% Injectable 25 Gram(s) IV Push once  dextrose 50% Injectable 12.5 Gram(s) IV Push once  dextrose 50% Injectable 25 Gram(s) IV Push once  famotidine    Tablet 20 milliGRAM(s) Oral daily  fluticasone propionate 50 MICROgram(s)/spray Nasal Spray 1 Spray(s) Both Nostrils every 12 hours  glucagon  Injectable 1 milliGRAM(s) IntraMuscular once  heparin   Injectable 5000 Unit(s) SubCutaneous every 12 hours  insulin glargine Injectable (LANTUS) 40 Unit(s) SubCutaneous at bedtime  insulin lispro (ADMELOG) corrective regimen sliding scale   SubCutaneous three times a day before meals  insulin lispro Injectable (ADMELOG) 10 Unit(s) SubCutaneous every 6 hours  lactobacillus acidophilus 1 Tablet(s) Oral three times a day  levothyroxine 50 MICROGram(s) Oral daily  melatonin 5 milliGRAM(s) Oral at bedtime  meropenem  IVPB      meropenem  IVPB 1000 milliGRAM(s) IV Intermittent every 8 hours  scopolamine 1 mG/72 Hr(s) Patch 1 Patch Transdermal every 72 hours  tigecycline IVPB      tigecycline IVPB 50 milliGRAM(s) IV Intermittent every 12 hours    PRN MEDICATIONS  dextrose Oral Gel 15 Gram(s) Oral once PRN  morphine  - Injectable 1 milliGRAM(s) IV Push every 4 hours PRN                                             --------------------------------------------------------------  LABS:                        8.9    12.14 )-----------( 361      ( 2022 08:27 )             28.4     06-03    134<L>  |  97<L>  |  27<H>  ----------------------------<  72  5.6<H>   |  26  |  0.6<L>    Ca    9.0      2022 08:27  Mg     2.0     06-03    TPro  5.7<L>  /  Alb  2.6<L>  /  TBili  0.3  /  DBili  x   /  AST  14  /  ALT  13  /  AlkPhos  109  -                                            --------------------------------------------------------------    PHYSICAL EXAM:  General: no acute distress  HEENT: normocephalic, atraumatic  Lungs: CTAB  Heart: regular rate and rhythm, normal S1 and S2  Abdomen: soft, nontender, nondistended, +PEG  Neuro: AAOx1                                           --------------------------------------------------------------    ASSESSMENT & PLAN    75 y/o F w/ PMH of Anxiety, CAD, Hypothyroidism, IDDM, Schizophrenia, Dementia sent to ED from [facility] found to be septic with lactic acidosis 2/2 infected stage 4 sacral ulcer.    # Diarrhea  - Hold laxatives and monitor, if persistent then rule out C diff     # Suspected  metabolic encephalopathy   # Infected Stage 4 Sacral Ulcer  # Advanced Dementia  # Functional Quadriplegia  -WBC 12K  - mental status at baseline, c/w supportive care, prevent falls and aspiration   - Burn is following,  s/p  debridements x two at bedside and in OR on   - wound culture:  pseudomonas and VRE, repeat Cx from OR grew Moderate Proteus mirabilis ,Moderate Klebsiella pneumoniae and Enterococcus faecium   - per ID, meropenem 1 g q8h, and tigecycline 50 mg q12h for a total of 7 days (ends )  - Wound vac is being changed every 24-48h; per burn, she needs wound vac at NH  - pain control with scopolamine patch and morphine    # Iron deficiency and acute blood loss anemia   - IV Venofer 200 mg Q 24 hours for 5 days  - H/H dropped after surgical excision, likely after acute blood loss, Hb is stable now,   - Trend daily H/H, with active T+S     #Hyponatremia   #TELMA - resolved   -Na 134 in AM, K 5.6 today  - creat 0.6  - free water 200cc q6h with tube feeds    # Hypothyroidism   - c/w home Levothyroxine 50mcg qd    # DLD   - c/w home Lipitor    # IDDM  - monitor finger sticks s  - on Insulin     # DVT prophylaxis: heparin 5000 q12h  # Diet: tube feeds  # Activity Score (AM-PAC): bedrest  # Code status: full code  # Disposition: pending SNF placement, COVID negative; needs IV antibiotics and wound vac at SNF

## 2022-06-03 NOTE — DISCHARGE NOTE NURSING/CASE MANAGEMENT/SOCIAL WORK - PATIENT PORTAL LINK FT
You can access the FollowMyHealth Patient Portal offered by [facility] by registering at the following website: [email or web address]. By joining [facility]’s FollowMyHealth portal, you will also be able to view your health information using other applications (apps) compatible with our system.

## 2022-06-03 NOTE — DISCHARGE NOTE PROVIDER - NSDCMRMEDTOKEN_GEN_ALL_CORE_FT
acetaminophen 325 mg oral tablet, disintegratin tab(s) orally every 6 hours, As Needed  aspirin 81 mg oral tablet: 1 tab(s) orally once a day  atorvastatin 20 mg oral tablet: 1 tab(s) orally once a day (at bedtime)  Calcium 600+D 600 mg-200 intl units oral tablet: 2 tab(s) orally once a day  collagenase 250 units/g topical ointment: 1 application topically 2 times a day  famotidine 20 mg oral tablet: 1 tab(s) orally once a day  Haldol Decanoate 50 mg/mL intramuscular solution: 37.5 milligram(s) intramuscular once a month (first Tuesday  insulin glargine 100 units/mL subcutaneous solution: 45 unit(s) subcutaneous once a day (at bedtime)  Januvia 100 mg oral tablet: 1 tab(s) orally once a day  levothyroxine 50 mcg (0.05 mg) oral tablet: 1 tab(s) orally once a day  Melatonin 10 mg oral capsule: 1 cap(s) orally once a day (at bedtime)  Senna 8.6 mg oral tablet: 2 tab(s) orally once a day (at bedtime)   acetaminophen 325 mg oral tablet, disintegratin tab(s) orally every 6 hours, As Needed  Acidophilus oral capsule: 1 cap(s) orally once a day   take until  with antibiotics  Admelog 100 units/mL injectable solution: 10 unit(s) injectable every 6 hours   aspirin 81 mg oral tablet: 1 tab(s) orally once a day  atorvastatin 20 mg oral tablet: 1 tab(s) orally once a day (at bedtime)  Calcium 600+D 600 mg-200 intl units oral tablet: 2 tab(s) orally once a day  collagenase 250 units/g topical ointment: 1 application topically 2 times a day  famotidine 20 mg oral tablet: 1 tab(s) orally once a day  Haldol Decanoate 50 mg/mL intramuscular solution: 37.5 milligram(s) intramuscular once a month (first Tuesday  insulin glargine 100 units/mL subcutaneous solution: 40 unit(s) subcutaneous once a day (at bedtime)  Januvia 100 mg oral tablet: 1 tab(s) orally once a day  levothyroxine 50 mcg (0.05 mg) oral tablet: 1 tab(s) orally once a day  Melatonin 10 mg oral capsule: 1 cap(s) orally once a day (at bedtime)  meropenem: 1 gram(s) intravenously every 8 hours   take until   Senna 8.6 mg oral tablet: 2 tab(s) orally once a day (at bedtime)  tigecycline 50 mg intravenous injection: 50 milligram(s) intravenous every 12 hours   take until

## 2022-06-04 NOTE — CHART NOTE - NSCHARTNOTEFT_GEN_A_CORE
Patient seen and examined at bedside. No acute events overnight. Due to transport delay, patient will leave at 11AM this morning. Patient seen and examined at bedside. She is resting comfortably and there were no acute events overnight.   Patient discharged yesterday. Due to transport delay, patient will leave at 11AM this morning.

## 2022-07-06 PROBLEM — Z00.00 ENCOUNTER FOR PREVENTIVE HEALTH EXAMINATION: Status: ACTIVE | Noted: 2022-01-01

## 2022-07-18 NOTE — ED PROVIDER NOTE - CARE PLAN
1 Principal Discharge DX:	ARF (acute renal failure)  Secondary Diagnosis:	Pneumonia due to COVID-19 virus

## 2022-07-18 NOTE — H&P ADULT - ATTENDING COMMENTS
HPI:  76y F with PMHx of Anxiety, CAD, hypothyroidism, IDDM, schizophrenia, dementia, s/p G-Tube, admitted for COVID + test at nursing home.   Per daughter Kimberlee, patient started having chest tightness/cough on 7/16, and tested + for covid. She is vaccinated. Was sent to ED by NH for SOB after + covid test.   Patient's baseline over the last 2 months has been bed bound, largely non-verbal.   I spoke to daughter Kimberlee extensively, she does not want her mother to be intubated or on a ventilator, or want CPR compressions. patient is DNR/DNI.     ED Course:   Sepsis Present on Admission, 100.7 F, HR 96, /70, 97% on nonrebreather   COVID (+)   Glucose 590, K 7.8 hemolyzed (5.7 non hemolyzed), Cr 3.5, lactate 2.9, AST/ALT 62/11, Anion Gap 26    Patient admitted for sepsis on admission and Hypoxemic respiratory failure  from COVID infection.        (18 Jul 2022 20:29)    REVIEW OF SYSTEMS: see cc/HPI   CONSTITUTIONAL: (+) Lethargic (+) weakness, fevers or chills  EYES/ENT: No visual changes;  No vertigo or throat pain   NECK: No pain or stiffness  RESPIRATORY: (+) cough, wheezing, hemoptysis; (+) shortness of breath, see cc/HPI  CARDIOVASCULAR: No chest pain or palpitations  GASTROINTESTINAL: No abdominal or epigastric pain. No nausea, vomiting, or hematemesis; No diarrhea or constipation. No melena or hematochezia.  GENITOURINARY: No dysuria, frequency or hematuria  NEUROLOGICAL: No numbness or weakness  SKIN: No itching, rashes    Physical Exam:  General: WN/WD NAD  Neurology: A&Ox3, nonfocal, follows commands  Eyes: PERRLA/ EOMI  ENT/Neck: Neck supple, trachea midline, No JVD  Respiratory: B/L rales, No wheezing, rales, rhonchi  CV: Normal rate regular rhythm, S1S2, no murmurs, rubs or gallops  Abdominal: Soft, NT, ND +BS,   Extremities: No edema, + peripheral pulses  Skin: No Rashes, Hematoma, Ecchymosis  Incisions:   Tubes: (+) PEG in place     A/p  Acute resp failure 2/2 COVID-19 pneumonia - poor prognosis   Sepsis w/ elevated WBC 26K  -Isolation   -Agree w/ O2 support ( Patient is DNR/DNI)  -IV dexamethasone   -ID eval   -check inflammatory markers    DM type II / Hyperosmolar state - uncontrolled   -Insulin w/ F/S monitoring   -Glucerna     Hyperkalemia   -s/p Insulin bolus in ED repeat VBG or BMP in the AM    Dementia / Schizophrenia / lethargic   H/o Hypothyroidism   -c/w L - thyroxine     Sacral ulcer   -local wound care and PRN pain Rx     PATIENT SEEN by ATTENDING 7/18/22 ( note revised 07/19) .

## 2022-07-18 NOTE — H&P ADULT - NSHPLABSRESULTS_GEN_ALL_CORE
8.1    25.72 )-----------( 143      ( 18 Jul 2022 15:00 )             28.2   07-18    148<H>  |  110  |  141<HH>  ----------------------------<  525<HH>  5.9<H>   |  19  |  3.0<H>    Ca    8.0<L>      18 Jul 2022 21:22  Mg     3.3     07-18    TPro  6.3  /  Alb  2.4<L>  /  TBili  0.3  /  DBili  x   /  AST  62<H>  /  ALT  11  /  AlkPhos  191<H>  07-18

## 2022-07-18 NOTE — ED PROVIDER NOTE - ATTENDING CONTRIBUTION TO CARE
77-year-old female with history of COVID schizophrenia diabetes with indwelling PEG tube bedbound here for lethargy shortness of breath and altered mental status.  As per daughter patient tested +2 days ago and wants her to get evaluated.  On exam patient no distress S1-S2 bilateral rhonchi no significant tachypnea soft nontender with feeding tube present  Impression  Patient here with hypoxia in the setting of outpatient COVID positive test.  Patient is reportedly DNR, DNI, DNH and will need further clarification.

## 2022-07-18 NOTE — ED PROVIDER NOTE - PROGRESS NOTE DETAILS
s/o Dr. Elizabeth f/u imaging and re-eval I spoke to daughter (listed under Jimmy Jimenez in contact list0 who will rescind Do not hospitalize directive.

## 2022-07-18 NOTE — ED PROVIDER NOTE - OBJECTIVE STATEMENT
77F w/ pmhx of covid+ 2 days ago, schizophrenia, DM, bed bound for 2 years with peg tube, who present for sob and ams. pt is DNI/DNR/DNH from [facility]. history was acquired from NH. pt not providing history.

## 2022-07-18 NOTE — H&P ADULT - CONVERSATION DETAILS
I spoke to daughter Kimberlee extensively, and explained that pt is requiring a high amount of oxygen. If she requires a higher amount, she will likely need to be intubated. I explained to her that patient is lethargic, and may not be able to protect her airway.     Pt does not want her mother to be intubated or on a ventilator, or want CPR compressions. Patient is DNR/DNI.

## 2022-07-18 NOTE — ED PROVIDER NOTE - PHYSICAL EXAMINATION
CONSTITUTIONAL: lethargic ill appearing  SKIN: warm, dry, no cyanosis  HEAD: Normocephalic atraumatic  EYES: no conjunctival erythema eomi peerla 3mm b/l  ENT: no nasal discharge no neck swelling no drooling  CARD: regular rate and rhythm  RESP: normal respiratory effort, no wheezes, rales or rhonchi  ABD: soft, non-distended, non-tender, peg tube in place no surrounding erythema  EXT: moving all extremities spontaneously  NEURO: not responding to verbal stimuli, responds to pain sternal rubs

## 2022-07-18 NOTE — ED PROVIDER NOTE - CLINICAL SUMMARY MEDICAL DECISION MAKING FREE TEXT BOX
78 yo woman with acute hypoxemic respiraotyr failure and TELMA due to COVID PNA.  DNR/DNI and was DNH.  However, family changed their mind about DNH.  Still want medical basic treatments.  IV dex and admission to floor.

## 2022-07-18 NOTE — H&P ADULT - NSHPPHYSICALEXAM_GEN_ALL_CORE
Constitutional: pt is comfortable lying in bed; minimally responsive to pain   HEENT: eyes closed; pupils appear small   Respiratory: (+) sounds in all lung fields; severe rales bilaterally   Cardiovascular: S1 S2 regular rate and rhythm; no murmurs or gallops appreciated  Gastrointestinal:  abdomen is non-distended,   Extremities: extremities are non-edematous  Vascular: Warm and Well perfused UE and LE; no mottling or dusky skin   Neurological: AxO x0, [place] 5   Skin: no raches or ulcerations noted; no scaling present

## 2022-07-19 NOTE — PROGRESS NOTE ADULT - SUBJECTIVE AND OBJECTIVE BOX
T H I S   I S    N O  T   A    F I N A L I Z E D   N O T GLORIA MELÉNDEZ  77y, Female  Allergy: No Known Allergies    Hospital Day: 1d    Patient seen and examined earlier today.     PMH/PSH:  PAST MEDICAL & SURGICAL HISTORY:  Coronary artery disease      Anxiety      Dementia      Insulin dependent diabetes mellitus      Paranoid schizophrenia      Hyperlipidemia      Hypothyroidism      Abscess of right buttock      H/O abdominal surgery          LAST 24-Hr EVENTS:    VITALS:  T(F): 101 (22 @ 11:18), Max: 101.4 (22 @ 07:41)  HR: 96 (22 @ 11:18)  BP: 101/45 (22 @ 11:18) (65/29 - 114/76)  RR: 23 (22 @ 11:18)  SpO2: 96% (22 @ 11:18)    High Flow Nasal Cannula: Routine   <Continuous>   Indication for NPPV: Acute Respiratory Failure (Hypoxemia/Hypercarbia)   FiO2 (%): 40   LPM (L/min): 6   Notify provider for SpO2 (%) BELOW: 88   Notify provider for SpO2 (%) ABOVE: 100   Targeted SpO2 Range (%): 88-97   SUPPLEMENTAL O2 PRN while off High Flow: Nasal Cannula   LPM (L/min) for Supplemental Oxygen: 6   High Flow O2 Titration Guideline: Device O2 Percent Range (%): 21-50%, Device O2 Flow Rate Range (LPM): N/A, O2 Titration Guideline: Increase/Decrease by 5%. Notify provider for any increase in oxygen therapy or inability to achieve targeted SpO2.   Order Duration: 28 hours   Order must be renewed every 28 hours   Order does NOT auto-discontinue (22 @ 00:15)        TESTS & MEASUREMENTS:  Weight/BMI                            8.0    19.16 )-----------( 143      ( 2022 06:33 )             28.7             156<H>  |  115<H>  |  133<HH>  ----------------------------<  477<HH>  6.5<HH>   |  24  |  2.8<H>    Ca    8.2<L>      2022 06:33  Mg     3.0         TPro  6.0  /  Alb  2.4<L>  /  TBili  0.3  /  DBili  x   /  AST  27  /  ALT  12  /  AlkPhos  114      LIVER FUNCTIONS - ( 2022 06:33 )  Alb: 2.4 g/dL / Pro: 6.0 g/dL / ALK PHOS: 114 U/L / ALT: 12 U/L / AST: 27 U/L / GGT: x                 Urinalysis Basic - ( 2022 14:55 )    Color: Judi / Appearance: Turbid / S.024 / pH: x  Gluc: x / Ketone: Trace  / Bili: Small / Urobili: 6 mg/dL   Blood: x / Protein: 100 mg/dL / Nitrite: Negative   Leuk Esterase: Large / RBC: 1 /HPF /  /HPF   Sq Epi: x / Non Sq Epi: 1 /HPF / Bacteria: Few        D-Dimer Assay, Quantitative: 3935 ng/mL DDU (22 @ 06:33)        COVID- PCR: Detected (22 @ 17:10)        A1C with Estimated Average Glucose Result: 9.8 % (22 @ 07:55)          RADIOLOGY, ECG, & ADDITIONAL TESTS:  12 Lead ECG:   Ventricular Rate 96 BPM    Atrial Rate 96 BPM    P-R Interval 162 ms    QRS Duration 78 ms    Q-T Interval 348 ms    QTC Calculation(Bazett) 439 ms    P Axis 59 degrees    R Axis 107 degrees    T Axis 36 degrees    Diagnosis Line Sinus rhythm with Premature atrial complexes  Rightward axis  Low voltage QRS  Cannot rule out Anterior infarct , age undetermined  Abnormal ECG    Confirmed by ISABEL INMAN MD ([id number]) on 2022 1:45:11 PM (22 @ 11:36)      RECENT DIAGNOSTIC ORDERS:  Phosphorus Level, Serum: STAT (22 @ 15:29)  Magnesium, Serum: STAT (22 @ 15:29)  Complete Blood Count + Automated Diff: STAT (22 @ 15:24)  Creatine Kinase, Serum: STAT (22 @ 15:24)  Procalcitonin, Serum: STAT (22 @ 12:58)  Osmolality, Serum: STAT (22 @ 12:58)  Basic Metabolic Panel: STAT (22 @ 12:57)  VA Duplex Lower Ext Vein Scan, Bilat: 10:47 (22 @ 11:34)  US Kidney and Bladder: Urgent   Indication: TELMA r/o obstruction  Transport: Stretcher-Crib (22 @ 10:51)  Total Protein, Random Urine: Routine (22 @ 10:51)  Creatinine, Random Urine: Routine (22 @ 10:51)  Chloride, Random Urine: Routine (22 @ 10:51)  Sodium, Random Urine: Routine (22 @ 10:51)  Osmolality, Random Urine: Routine (22 @ 10:51)  Procalcitonin, Serum: AM Sched. Collection: 2022 04:30 (22 @ 23:57)  Procalcitonin, Serum: AM Sched. Collection: 2022 04:30 (22 @ 23:57)  Beta Hydroxy-Butyrate: 23:00 (22 @ 22:43)      MEDICATIONS:  MEDICATIONS  (STANDING):  ascorbic acid 500 milliGRAM(s) Oral daily  aspirin  chewable 81 milliGRAM(s) Oral daily  atorvastatin 20 milliGRAM(s) Oral at bedtime  dexAMETHasone  Injectable 6 milliGRAM(s) IV Push daily  dextrose 5%. 1000 milliLiter(s) (100 mL/Hr) IV Continuous <Continuous>  dextrose 5%. 1000 milliLiter(s) (50 mL/Hr) IV Continuous <Continuous>  dextrose 50% Injectable 25 Gram(s) IV Push once  dextrose 50% Injectable 12.5 Gram(s) IV Push once  dextrose 50% Injectable 25 Gram(s) IV Push once  famotidine    Tablet 20 milliGRAM(s) Oral daily  glucagon  Injectable 1 milliGRAM(s) IntraMuscular once  heparin SubCutaneous Injection - Peds 5000 Unit(s) SubCutaneous every 12 hours  insulin glargine Injectable (LANTUS) 40 Unit(s) SubCutaneous at bedtime  insulin lispro (ADMELOG) corrective regimen sliding scale   SubCutaneous three times a day before meals  lactated ringers. 1000 milliLiter(s) (100 mL/Hr) IV Continuous <Continuous>  levothyroxine 50 MICROGram(s) Oral daily  melatonin 5 milliGRAM(s) Oral at bedtime  senna 8.6 milliGRAM(s) Oral Tablet - Peds 1 Tablet(s) Oral at bedtime  zinc sulfate 220 milliGRAM(s) Oral daily    MEDICATIONS  (PRN):  acetaminophen     Tablet .. 650 milliGRAM(s) Oral every 6 hours PRN Temp greater or equal to 38C (100.4F), Mild Pain (1 - 3), Moderate Pain (4 - 6)  dextrose Oral Gel 15 Gram(s) Oral once PRN Blood Glucose LESS THAN 70 milliGRAM(s)/deciliter  oxyCODONE    IR 5 milliGRAM(s) Oral three times a day PRN Severe Pain (7 - 10)      HOME MEDICATIONS:  acetaminophen 325 mg oral tablet, disintegrating ()  aspirin 81 mg oral tablet ()  atorvastatin 20 mg oral tablet ()  Calcium 500+D ()  famotidine 20 mg oral tablet ()  Haldol Decanoate 50 mg/mL intramuscular solution ()  insulin glargine 100 units/mL subcutaneous solution ()  Januvia 100 mg oral tablet ()  levothyroxine 50 mcg (0.05 mg) oral tablet ()  Melatonin 10 mg oral capsule ()  NovoLOG 100 units/mL subcutaneous solution ()  Percocet 5/325 oral tablet ()  Senna 8.6 mg oral tablet ()  Vitamin C 500 mg oral tablet ()      PHYSICAL EXAM:  GENERAL:   CHEST/LUNG:   HEART:   ABDOMEN:   EXTREMITIES:               GLORIA WATERS  77y, Female  Allergy: No Known Allergies    Hospital Day: 1d    Patient seen and examined earlier today. she is non verbal, goals of care discussed by the house staff     PMH/PSH:  PAST MEDICAL & SURGICAL HISTORY:  Coronary artery disease      Anxiety      Dementia      Insulin dependent diabetes mellitus      Paranoid schizophrenia      Hyperlipidemia      Hypothyroidism      Abscess of right buttock      H/O abdominal surgery          LAST 24-Hr EVENTS:    VITALS:  T(F): 101 (22 @ 11:18), Max: 101.4 (22 @ 07:41)  HR: 96 (22 @ 11:18)  BP: 101/45 (22 @ 11:18) (65/29 - 114/76)  RR: 23 (22 @ 11:18)  SpO2: 96% (22 @ 11:18)    High Flow Nasal Cannula: Routine   <Continuous>   Indication for NPPV: Acute Respiratory Failure (Hypoxemia/Hypercarbia)   FiO2 (%): 40   LPM (L/min): 6   Notify provider for SpO2 (%) BELOW: 88   Notify provider for SpO2 (%) ABOVE: 100   Targeted SpO2 Range (%): 88-97   SUPPLEMENTAL O2 PRN while off High Flow: Nasal Cannula   LPM (L/min) for Supplemental Oxygen: 6   High Flow O2 Titration Guideline: Device O2 Percent Range (%): 21-50%, Device O2 Flow Rate Range (LPM): N/A, O2 Titration Guideline: Increase/Decrease by 5%. Notify provider for any increase in oxygen therapy or inability to achieve targeted SpO2.   Order Duration: 28 hours   Order must be renewed every 28 hours   Order does NOT auto-discontinue (22 @ 00:15)        TESTS & MEASUREMENTS:  Weight/BMI                            8.0    19.16 )-----------( 143      ( 2022 06:33 )             28.7             156<H>  |  115<H>  |  133<HH>  ----------------------------<  477<HH>  6.5<HH>   |  24  |  2.8<H>    Ca    8.2<L>      2022 06:33  Mg     3.0         TPro  6.0  /  Alb  2.4<L>  /  TBili  0.3  /  DBili  x   /  AST  27  /  ALT  12  /  AlkPhos  114  07-19    LIVER FUNCTIONS - ( 2022 06:33 )  Alb: 2.4 g/dL / Pro: 6.0 g/dL / ALK PHOS: 114 U/L / ALT: 12 U/L / AST: 27 U/L / GGT: x                 Urinalysis Basic - ( 2022 14:55 )    Color: Judi / Appearance: Turbid / S.024 / pH: x  Gluc: x / Ketone: Trace  / Bili: Small / Urobili: 6 mg/dL   Blood: x / Protein: 100 mg/dL / Nitrite: Negative   Leuk Esterase: Large / RBC: 1 /HPF /  /HPF   Sq Epi: x / Non Sq Epi: 1 /HPF / Bacteria: Few        D-Dimer Assay, Quantitative: 3935 ng/mL DDU (22 @ 06:33)        COVID-19 PCR: Detected (22 @ 17:10)        A1C with Estimated Average Glucose Result: 9.8 % (22 @ 07:55)          RADIOLOGY, ECG, & ADDITIONAL TESTS:  12 Lead ECG:   Ventricular Rate 96 BPM    Atrial Rate 96 BPM    P-R Interval 162 ms    QRS Duration 78 ms    Q-T Interval 348 ms    QTC Calculation(Bazett) 439 ms    P Axis 59 degrees    R Axis 107 degrees    T Axis 36 degrees    Diagnosis Line Sinus rhythm with Premature atrial complexes  Rightward axis  Low voltage QRS  Cannot rule out Anterior infarct , age undetermined  Abnormal ECG    Confirmed by ISABEL INMAN MD ([id number]) on 2022 1:45:11 PM (22 @ 11:36)      RECENT DIAGNOSTIC ORDERS:  Phosphorus Level, Serum: STAT (22 @ 15:29)  Magnesium, Serum: STAT (22 @ 15:29)  Complete Blood Count + Automated Diff: STAT (22 @ 15:24)  Creatine Kinase, Serum: STAT (22 @ 15:24)  Procalcitonin, Serum: STAT (22 @ 12:58)  Osmolality, Serum: STAT (22 @ 12:58)  Basic Metabolic Panel: STAT (22 @ 12:57)  VA Duplex Lower Ext Vein Scan, Bilat: 10:47 (22 @ 11:34)  US Kidney and Bladder: Urgent   Indication: TELMA r/o obstruction  Transport: Stretcher-Crib (22 @ 10:51)  Total Protein, Random Urine: Routine (22 @ 10:51)  Creatinine, Random Urine: Routine (22 @ 10:51)  Chloride, Random Urine: Routine (22 @ 10:51)  Sodium, Random Urine: Routine (22 @ 10:51)  Osmolality, Random Urine: Routine (22 @ 10:51)  Procalcitonin, Serum: AM Sched. Collection: 2022 04:30 (22 @ 23:57)  Procalcitonin, Serum: AM Sched. Collection: 2022 04:30 (22 @ 23:57)  Beta Hydroxy-Butyrate: 23:00 (22 @ 22:43)      MEDICATIONS:  MEDICATIONS  (STANDING):  ascorbic acid 500 milliGRAM(s) Oral daily  aspirin  chewable 81 milliGRAM(s) Oral daily  atorvastatin 20 milliGRAM(s) Oral at bedtime  dexAMETHasone  Injectable 6 milliGRAM(s) IV Push daily  dextrose 5%. 1000 milliLiter(s) (100 mL/Hr) IV Continuous <Continuous>  dextrose 5%. 1000 milliLiter(s) (50 mL/Hr) IV Continuous <Continuous>  dextrose 50% Injectable 25 Gram(s) IV Push once  dextrose 50% Injectable 12.5 Gram(s) IV Push once  dextrose 50% Injectable 25 Gram(s) IV Push once  famotidine    Tablet 20 milliGRAM(s) Oral daily  glucagon  Injectable 1 milliGRAM(s) IntraMuscular once  heparin SubCutaneous Injection - Peds 5000 Unit(s) SubCutaneous every 12 hours  insulin glargine Injectable (LANTUS) 40 Unit(s) SubCutaneous at bedtime  insulin lispro (ADMELOG) corrective regimen sliding scale   SubCutaneous three times a day before meals  lactated ringers. 1000 milliLiter(s) (100 mL/Hr) IV Continuous <Continuous>  levothyroxine 50 MICROGram(s) Oral daily  melatonin 5 milliGRAM(s) Oral at bedtime  senna 8.6 milliGRAM(s) Oral Tablet - Peds 1 Tablet(s) Oral at bedtime  zinc sulfate 220 milliGRAM(s) Oral daily    MEDICATIONS  (PRN):  acetaminophen     Tablet .. 650 milliGRAM(s) Oral every 6 hours PRN Temp greater or equal to 38C (100.4F), Mild Pain (1 - 3), Moderate Pain (4 - 6)  dextrose Oral Gel 15 Gram(s) Oral once PRN Blood Glucose LESS THAN 70 milliGRAM(s)/deciliter  oxyCODONE    IR 5 milliGRAM(s) Oral three times a day PRN Severe Pain (7 - 10)      HOME MEDICATIONS:  acetaminophen 325 mg oral tablet, disintegrating ()  aspirin 81 mg oral tablet ()  atorvastatin 20 mg oral tablet ()  Calcium 500+D ()  famotidine 20 mg oral tablet ()  Haldol Decanoate 50 mg/mL intramuscular solution ()  insulin glargine 100 units/mL subcutaneous solution ()  Januvia 100 mg oral tablet ()  levothyroxine 50 mcg (0.05 mg) oral tablet ()  Melatonin 10 mg oral capsule ()  NovoLOG 100 units/mL subcutaneous solution ()  Percocet 5/325 oral tablet ()  Senna 8.6 mg oral tablet ()  Vitamin C 500 mg oral tablet ()      PHYSICAL EXAM:  GENERAL:   CHEST/LUNG:   HEART:   ABDOMEN:   EXTREMITIES:               GLORIA WATERS  77y, Female  Allergy: No Known Allergies    Hospital Day: 1d    Patient seen and examined earlier today. she is non verbal, goals of care discussed by the house staff with the patient family as MOLST form is CMO and DNR/DNI, as per the family they wants to c/w medical management but not aggressive procedure     PMH/PSH:  PAST MEDICAL & SURGICAL HISTORY:  Coronary artery disease      Anxiety      Dementia      Insulin dependent diabetes mellitus      Paranoid schizophrenia      Hyperlipidemia      Hypothyroidism      Abscess of right buttock      H/O abdominal surgery          LAST 24-Hr EVENTS:    VITALS:  T(F): 101 (22 @ 11:18), Max: 101.4 (22 @ 07:41)  HR: 96 (22 @ 11:18)  BP: 101/45 (22 @ 11:18) (65/29 - 114/76)  RR: 23 (22 @ 11:18)  SpO2: 96% (22 @ 11:18)    High Flow Nasal Cannula: Routine   <Continuous>   Indication for NPPV: Acute Respiratory Failure (Hypoxemia/Hypercarbia)   FiO2 (%): 40   LPM (L/min): 6   Notify provider for SpO2 (%) BELOW: 88   Notify provider for SpO2 (%) ABOVE: 100   Targeted SpO2 Range (%): 88-97   SUPPLEMENTAL O2 PRN while off High Flow: Nasal Cannula   LPM (L/min) for Supplemental Oxygen: 6   High Flow O2 Titration Guideline: Device O2 Percent Range (%): 21-50%, Device O2 Flow Rate Range (LPM): N/A, O2 Titration Guideline: Increase/Decrease by 5%. Notify provider for any increase in oxygen therapy or inability to achieve targeted SpO2.   Order Duration: 28 hours   Order must be renewed every 28 hours   Order does NOT auto-discontinue (22 @ 00:15)        TESTS & MEASUREMENTS:  Weight/BMI                            8.0    19.16 )-----------( 143      ( 2022 06:33 )             28.7             156<H>  |  115<H>  |  133<HH>  ----------------------------<  477<HH>  6.5<HH>   |  24  |  2.8<H>    Ca    8.2<L>      2022 06:33  Mg     3.0         TPro  6.0  /  Alb  2.4<L>  /  TBili  0.3  /  DBili  x   /  AST  27  /  ALT  12  /  AlkPhos  114      LIVER FUNCTIONS - ( 2022 06:33 )  Alb: 2.4 g/dL / Pro: 6.0 g/dL / ALK PHOS: 114 U/L / ALT: 12 U/L / AST: 27 U/L / GGT: x                 Urinalysis Basic - ( 2022 14:55 )    Color: Judi / Appearance: Turbid / S.024 / pH: x  Gluc: x / Ketone: Trace  / Bili: Small / Urobili: 6 mg/dL   Blood: x / Protein: 100 mg/dL / Nitrite: Negative   Leuk Esterase: Large / RBC: 1 /HPF /  /HPF   Sq Epi: x / Non Sq Epi: 1 /HPF / Bacteria: Few        D-Dimer Assay, Quantitative: 3935 ng/mL DDU (22 @ 06:33)        COVID- PCR: Detected (22 @ 17:10)        A1C with Estimated Average Glucose Result: 9.8 % (22 @ 07:55)          RADIOLOGY, ECG, & ADDITIONAL TESTS:  12 Lead ECG:   Ventricular Rate 96 BPM    Atrial Rate 96 BPM    P-R Interval 162 ms    QRS Duration 78 ms    Q-T Interval 348 ms    QTC Calculation(Bazett) 439 ms    P Axis 59 degrees    R Axis 107 degrees    T Axis 36 degrees    Diagnosis Line Sinus rhythm with Premature atrial complexes  Rightward axis  Low voltage QRS  Cannot rule out Anterior infarct , age undetermined  Abnormal ECG    Confirmed by ISABEL INMAN MD ([id number]) on 2022 1:45:11 PM (22 @ 11:36)      RECENT DIAGNOSTIC ORDERS:  Phosphorus Level, Serum: STAT (22 @ 15:29)  Magnesium, Serum: STAT (22 @ 15:29)  Complete Blood Count + Automated Diff: STAT (22 @ 15:24)  Creatine Kinase, Serum: STAT (22 @ 15:24)  Procalcitonin, Serum: STAT (22 @ 12:58)  Osmolality, Serum: STAT (22 @ 12:58)  Basic Metabolic Panel: STAT (22 @ 12:57)  VA Duplex Lower Ext Vein Scan, Bilat: 10:47 (22 @ 11:34)  US Kidney and Bladder: Urgent   Indication: TELMA r/o obstruction  Transport: Stretcher-Crib (22 @ 10:51)  Total Protein, Random Urine: Routine (22 @ 10:51)  Creatinine, Random Urine: Routine (22 @ 10:51)  Chloride, Random Urine: Routine (22 @ 10:51)  Sodium, Random Urine: Routine (22 @ 10:51)  Osmolality, Random Urine: Routine (22 @ 10:51)  Procalcitonin, Serum: AM Sched. Collection: 2022 04:30 (22 @ 23:57)  Procalcitonin, Serum: AM Sched. Collection: 2022 04:30 (22 @ 23:57)  Beta Hydroxy-Butyrate: 23:00 (22 @ 22:43)      MEDICATIONS:  MEDICATIONS  (STANDING):  ascorbic acid 500 milliGRAM(s) Oral daily  aspirin  chewable 81 milliGRAM(s) Oral daily  atorvastatin 20 milliGRAM(s) Oral at bedtime  dexAMETHasone  Injectable 6 milliGRAM(s) IV Push daily  dextrose 5%. 1000 milliLiter(s) (100 mL/Hr) IV Continuous <Continuous>  dextrose 5%. 1000 milliLiter(s) (50 mL/Hr) IV Continuous <Continuous>  dextrose 50% Injectable 25 Gram(s) IV Push once  dextrose 50% Injectable 12.5 Gram(s) IV Push once  dextrose 50% Injectable 25 Gram(s) IV Push once  famotidine    Tablet 20 milliGRAM(s) Oral daily  glucagon  Injectable 1 milliGRAM(s) IntraMuscular once  heparin SubCutaneous Injection - Peds 5000 Unit(s) SubCutaneous every 12 hours  insulin glargine Injectable (LANTUS) 40 Unit(s) SubCutaneous at bedtime  insulin lispro (ADMELOG) corrective regimen sliding scale   SubCutaneous three times a day before meals  lactated ringers. 1000 milliLiter(s) (100 mL/Hr) IV Continuous <Continuous>  levothyroxine 50 MICROGram(s) Oral daily  melatonin 5 milliGRAM(s) Oral at bedtime  senna 8.6 milliGRAM(s) Oral Tablet - Peds 1 Tablet(s) Oral at bedtime  zinc sulfate 220 milliGRAM(s) Oral daily    MEDICATIONS  (PRN):  acetaminophen     Tablet .. 650 milliGRAM(s) Oral every 6 hours PRN Temp greater or equal to 38C (100.4F), Mild Pain (1 - 3), Moderate Pain (4 - 6)  dextrose Oral Gel 15 Gram(s) Oral once PRN Blood Glucose LESS THAN 70 milliGRAM(s)/deciliter  oxyCODONE    IR 5 milliGRAM(s) Oral three times a day PRN Severe Pain (7 - 10)      HOME MEDICATIONS:  acetaminophen 325 mg oral tablet, disintegrating ()  aspirin 81 mg oral tablet ()  atorvastatin 20 mg oral tablet ()  Calcium 500+D ()  famotidine 20 mg oral tablet ()  Haldol Decanoate 50 mg/mL intramuscular solution ()  insulin glargine 100 units/mL subcutaneous solution ()  Januvia 100 mg oral tablet ()  levothyroxine 50 mcg (0.05 mg) oral tablet ()  Melatonin 10 mg oral capsule ()  NovoLOG 100 units/mL subcutaneous solution ()  Percocet 5/325 oral tablet ()  Senna 8.6 mg oral tablet ()  Vitamin C 500 mg oral tablet ()      PHYSICAL EXAM:  vitals at the time of eval: HR 99, RR 96 on non rebreather , RR 23, blood pressure 101/45 with MAP 65   GENERAL: pale, chronic ill looking, no verbal, not following command , On Non rebreather , looks tachypneic   CHEST/LUNG: Scattered rhonchi b/l   HEART:  regular rhythm   ABDOMEN:  soft, non tender ,  +distended , + PEG tube  ,+ amin draining dark urine   EXTREMITIES: no edema

## 2022-07-19 NOTE — CHART NOTE - NSCHARTNOTEFT_GEN_A_CORE
Transfer Note    Transfer from: Medicine     Transfer to: (  ) Medicine    (  ) Telemetry    (  ) RCU                               (  ) Palliative    (  ) Stroke Unit    (X ) MICU    (  ) __________________    Accepting Physician: Dr. Best    Signout given to:     HPI / Interval Course:     76y F with PMHx of Anxiety, CAD, hypothyroidism, IDDM, schizophrenia, dementia, s/p G-Tube, admitted for COVID + test at nursing home.   Per daughter Kimberlee, patient started having chest tightness/cough on 7/16, and tested + for covid. She is vaccinated. Was sent to ED by NH for SOB after + covid test.   Patient's baseline over the last 2 months has been bed bound, largely non-verbal.   I spoke to daughter Kimberlee extensively, she does not want her mother to be intubated or on a ventilator, does not want CPR compressions or want any invasive procedures (central lines, hemodialysis etc.) patient is DNR/DNI.     In the ED Sepsis Present on Admission, 100.7 F, HR 96, /70, 97% on nonrebreather, COVID (+)   Glucose 590, K 7.8 hemolyzed (5.7 non hemolyzed), Cr 3.5, lactate 2.9, AST/ALT 62/11, Anion Gap 26  Patient admitted for sepsis on admission and Hypoxemic respiratory failure  from COVID infection.     The patient was given a total of 3x 10 units of regular insulin with  2x  6 doses coverage from sliding scale.   The patient was noted to be           Vital Signs Last 24 Hrs  T(C): 36.8 (19 Jul 2022 16:10), Max: 38.6 (19 Jul 2022 07:41)  T(F): 98.3 (19 Jul 2022 16:10), Max: 101.4 (19 Jul 2022 07:41)  HR: 90 (19 Jul 2022 16:10) (90 - 105)  BP: 92/45 (19 Jul 2022 16:10) (65/29 - 114/76)  BP(mean): --  RR: 86 (19 Jul 2022 16:10) (16 - 86)  SpO2: 98% (19 Jul 2022 16:10) (94% - 100%)    Parameters below as of 19 Jul 2022 16:10  Patient On (Oxygen Delivery Method): mask, nonrebreather        I&O's Summary      Physical Exam:       LABS:   CARDIAC MARKERS ( 19 Jul 2022 15:35 )  x     / x     / 240 U/L / x     / x                                  tnp    x     )-----------( x        ( 19 Jul 2022 15:35 )             tnp        07-19    156<H>  |  115<H>  |  133<HH>  ----------------------------<  477<HH>  6.5<HH>   |  24  |  2.8<H>    Ca    8.2<L>      19 Jul 2022 06:33  Phos  4.4     07-19  Mg     3.3     07-19    TPro  6.0  /  Alb  2.4<L>  /  TBili  0.3  /  DBili  x   /  AST  27  /  ALT  12  /  AlkPhos  114  07-19                ECG:    Telemetry:    Imaging:      ASSESSMENT & PLAN:           FOR FOLLOW UP:  [ ]   [ ]   [ ]     Keli Terry MD PGY1 Transfer Note    Transfer from: Medicine     Transfer to: (  ) Medicine    (  ) Telemetry    (  ) RCU                               (  ) Palliative    (  ) Stroke Unit    (X ) MICU    (  ) __________________    Accepting Physician: Dr. Best    Signout given to:     HPI / Interval Course:     76y F with PMHx of Anxiety, CAD, hypothyroidism, IDDM, schizophrenia, dementia, s/p G-Tube, admitted for COVID + test at nursing home.   Per daughter Kimberlee, patient started having chest tightness/cough on 7/16, and tested + for covid. She is vaccinated. Was sent to ED by NH for SOB after + covid test.   Patient's baseline over the last 2 months has been bed bound, largely non-verbal.   I spoke to daughter Kimberlee extensively, she does not want her mother to be intubated or on a ventilator, does not want CPR compressions or want any invasive procedures (central lines, hemodialysis etc.) patient is DNR/DNI.     In the ED Sepsis Present on Admission, 100.7 F, HR 96, /70, 97% on nonrebreather, COVID (+)   Glucose 590, K 7.8 hemolyzed (5.7 non hemolyzed), Cr 3.5, lactate 2.9, AST/ALT 62/11, Anion Gap 26  Patient admitted for sepsis on admission and Hypoxemic respiratory failure  from COVID infection.     The patient was given a total of 3x 10 units of regular insulin with  2x  6 doses coverage from sliding scale.   This morning patient was noted to be tachypneic ~30s          Vital Signs Last 24 Hrs  T(C): 36.8 (19 Jul 2022 16:10), Max: 38.6 (19 Jul 2022 07:41)  T(F): 98.3 (19 Jul 2022 16:10), Max: 101.4 (19 Jul 2022 07:41)  HR: 90 (19 Jul 2022 16:10) (90 - 105)  BP: 92/45 (19 Jul 2022 16:10) (65/29 - 114/76)  BP(mean): --  RR: 86 (19 Jul 2022 16:10) (16 - 86)  SpO2: 98% (19 Jul 2022 16:10) (94% - 100%)    Parameters below as of 19 Jul 2022 16:10  Patient On (Oxygen Delivery Method): mask, nonrebreather        I&O's Summary      Physical Exam:       LABS:   CARDIAC MARKERS ( 19 Jul 2022 15:35 )  x     / x     / 240 U/L / x     / x                                  tnp    x     )-----------( x        ( 19 Jul 2022 15:35 )             tnp        07-19    156<H>  |  115<H>  |  133<HH>  ----------------------------<  477<HH>  6.5<HH>   |  24  |  2.8<H>    Ca    8.2<L>      19 Jul 2022 06:33  Phos  4.4     07-19  Mg     3.3     07-19    TPro  6.0  /  Alb  2.4<L>  /  TBili  0.3  /  DBili  x   /  AST  27  /  ALT  12  /  AlkPhos  114  07-19                ECG:    Telemetry:    Imaging:      ASSESSMENT & PLAN:           FOR FOLLOW UP:  [ ]   [ ]   [ ]     Keli Terry MD PGY1 Transfer Note    Transfer from: Medicine     Transfer to: (  ) Medicine    (  ) Telemetry    (  ) RCU                               (  ) Palliative    (  ) Stroke Unit    (X ) MICU    (  ) __________________    Accepting Physician: Dr. Best    Signout given to:     HPI / Interval Course:     76y F with PMHx of Anxiety, CAD, hypothyroidism, IDDM, schizophrenia, dementia, s/p G-Tube, admitted for COVID + test at nursing home.   Per daughter Kimberlee, patient started having chest tightness/cough on 7/16, and tested + for covid. She is vaccinated. Was sent to ED by NH for SOB after + covid test.   Patient's baseline over the last 2 months has been bed bound, largely non-verbal.   I spoke to daughter Kimberlee extensively, she does not want her mother to be intubated or on a ventilator, does not want CPR compressions or want any invasive procedures (central lines, hemodialysis etc.) patient is DNR/DNI.     In the ED Sepsis Present on Admission, 100.7 F, HR 96, /70, 97% on nonrebreather, COVID (+)   Glucose 590, K 7.8 hemolyzed (5.7 non hemolyzed), Cr 3.5, lactate 2.9, AST/ALT 62/11, Anion Gap 26  Patient admitted for sepsis on admission and Hypoxemic respiratory failure  from COVID infection.     The patient was given a total of 3x 10 units of regular insulin with  2x  6 doses coverage from sliding scale.   This morning patient was noted to be tachypneic ~30s, saturating 90s on 15L nonrebreather mask with inability to wean, ddimer 3935. Venous duplex and v/q scan ordered to r/o DVT/PE.  Patient most recent BUN/Cr 133/2.8, making urine, Na 156, K 6.5 (hemolyzed), Cl 115, AGAP 17, glycose 477. Clinically appears dehydrated, bedside ultrasound noticed collasped IVC with minimal distension. Fluids were started LR @100cc.   Nephro, ID, Critical care consulted. Patient started on dexamethasone 6mg q24hr, remdesivir, rocephin 2g q24hr.         Vital Signs Last 24 Hrs  T(C): 36.8 (19 Jul 2022 16:10), Max: 38.6 (19 Jul 2022 07:41)  T(F): 98.3 (19 Jul 2022 16:10), Max: 101.4 (19 Jul 2022 07:41)  HR: 90 (19 Jul 2022 16:10) (90 - 105)  BP: 92/45 (19 Jul 2022 16:10) (65/29 - 114/76)  BP(mean): --  RR: 86 (19 Jul 2022 16:10) (16 - 86)  SpO2: 98% (19 Jul 2022 16:10) (94% - 100%)    Parameters below as of 19 Jul 2022 16:10  Patient On (Oxygen Delivery Method): mask, nonrebreather        I&O's Summary      Physical Exam:       LABS:   CARDIAC MARKERS ( 19 Jul 2022 15:35 )  x     / x     / 240 U/L / x     / x                                  tnp    x     )-----------( x        ( 19 Jul 2022 15:35 )             tnp        07-19    156<H>  |  115<H>  |  133<HH>  ----------------------------<  477<HH>  6.5<HH>   |  24  |  2.8<H>    Ca    8.2<L>      19 Jul 2022 06:33  Phos  4.4     07-19  Mg     3.3     07-19    TPro  6.0  /  Alb  2.4<L>  /  TBili  0.3  /  DBili  x   /  AST  27  /  ALT  12  /  AlkPhos  114  07-19                ECG:    Telemetry:    Imaging:      ASSESSMENT & PLAN:           FOR FOLLOW UP:  [ ]   [ ]   [ ]     Keli Terry MD PGY1 Transfer Note    Transfer from: Medicine     Transfer to: (  ) Medicine    (  ) Telemetry    (  ) RCU                               (  ) Palliative    (  ) Stroke Unit    (X ) MICU    (  ) __________________    Accepting Physician: Dr. Best    Signout given to:     HPI / Interval Course:     76y F with PMHx of Anxiety, CAD, hypothyroidism, IDDM, schizophrenia, dementia, s/p G-Tube, admitted for COVID + test at nursing home.   Per daughter Kimberlee, patient started having chest tightness/cough on 7/16, and tested + for covid. She is vaccinated. Was sent to ED by NH for SOB after + covid test.   Patient's baseline over the last 2 months has been bed bound, largely non-verbal.   I spoke to daughter Kimberlee extensively, she does not want her mother to be intubated or on a ventilator, does not want CPR compressions or want any invasive procedures (central lines, hemodialysis etc.) patient is DNR/DNI.     In the ED Sepsis Present on Admission, 100.7 F, HR 96, /70, 97% on nonrebreather, COVID (+)   Glucose 590, K 7.8 hemolyzed (5.7 non hemolyzed), Cr 3.5, lactate 2.9, AST/ALT 62/11, Anion Gap 26  Patient admitted for sepsis on admission and Hypoxemic respiratory failure  from COVID infection.     The patient was given a total of 3x 10 units of regular insulin with  2x  6 doses coverage from sliding scale. Last   This morning patient was noted to be tachypneic ~30s, saturating 90s on 15L nonrebreather mask with inability to wean, ddimer 3935. Venous duplex and v/q scan ordered to r/o DVT/PE.  Patient most recent BUN/Cr 133/2.8, making urine, Na 156, K 6.5 (hemolyzed), Cl 115, HCO3 24, AGAP 17, glucose 477. Clinically appears dehydrated, bedside ultrasound noticed collapsed IVC with minimal distension. No hydronephrosis or obstruction seen on kidney and bladder ultrasound. Fluids were started LR @100cc. EKG notable for sinus rhythm with PACs, 1x dose of calcium gluconate was given.   Nephro, ID, Critical care consulted. Patient started on dexamethasone 6mg q24hr, remdesivir, rocephin 2g q24hr. Will start on D5LR with NaHCO3 and insulin drip to control elevated sugars.         Vital Signs Last 24 Hrs  T(C): 36.8 (19 Jul 2022 16:10), Max: 38.6 (19 Jul 2022 07:41)  T(F): 98.3 (19 Jul 2022 16:10), Max: 101.4 (19 Jul 2022 07:41)  HR: 90 (19 Jul 2022 16:10) (90 - 105)  BP: 92/45 (19 Jul 2022 16:10) (65/29 - 114/76)  BP(mean): --  RR: 86 (19 Jul 2022 16:10) (16 - 86)  SpO2: 98% (19 Jul 2022 16:10) (94% - 100%)    Parameters below as of 19 Jul 2022 16:10  Patient On (Oxygen Delivery Method): mask, nonrebreather        I&O's Summary      Physical Exam:       LABS:   CARDIAC MARKERS ( 19 Jul 2022 15:35 )  x     / x     / 240 U/L / x     / x                                  tnp    x     )-----------( x        ( 19 Jul 2022 15:35 )             tnp        07-19    156<H>  |  115<H>  |  133<HH>  ----------------------------<  477<HH>  6.5<HH>   |  24  |  2.8<H>    Ca    8.2<L>      19 Jul 2022 06:33  Phos  4.4     07-19  Mg     3.3     07-19    TPro  6.0  /  Alb  2.4<L>  /  TBili  0.3  /  DBili  x   /  AST  27  /  ALT  12  /  AlkPhos  114  07-19                ECG:    Telemetry:    Imaging:      ASSESSMENT & PLAN:           FOR FOLLOW UP:  [ ]   [ ]   [ ]     Keli Terry MD PGY1 Transfer Note    Transfer from: Medicine     Transfer to: (  ) Medicine    (  ) Telemetry    (  ) RCU                               (  ) Palliative    (  ) Stroke Unit    (X ) MICU    (  ) __________________    Accepting Physician: Dr. Best    Signout given to:     HPI / Interval Course:     76y F with PMHx of Anxiety, CAD, hypothyroidism, IDDM, schizophrenia, dementia, s/p G-Tube, admitted for COVID + test at nursing home.   Per daughter Kimberlee, patient started having chest tightness/cough on 7/16, and tested + for covid. She is vaccinated. Was sent to ED by NH for SOB after + covid test.   Patient's baseline over the last 2 months has been bed bound, largely non-verbal.   I spoke to daughter Kimberlee extensively, she does not want her mother to be intubated or on a ventilator, does not want CPR compressions or want any invasive procedures (central lines, hemodialysis etc.) patient is DNR/DNI.     In the ED Sepsis Present on Admission, 100.7 F, HR 96, /70, 97% on nonrebreather, COVID (+)   Glucose 590, K 7.8 hemolyzed (5.7 non hemolyzed), Cr 3.5, lactate 2.9, AST/ALT 62/11, Anion Gap 26  Patient admitted for sepsis on admission and Hypoxemic respiratory failure  from COVID infection.     The patient was given a total of 3x 10 units of regular insulin with  2x  6 doses coverage from sliding scale. Last   This morning patient was noted to be tachypneic ~30s, saturating 90s on 15L nonrebreather mask with inability to wean, ddimer 3935. Venous duplex and v/q scan ordered to r/o DVT/PE, currently on heparin 5000SQ BID. Patient most recent BUN/Cr 133/2.8, making urine, Na 156, K 6.5 (hemolyzed), Cl 115, HCO3 24, AGAP 17, glucose 477. Clinically appears dehydrated, bedside ultrasound noticed collapsed IVC with minimal distension. No hydronephrosis or obstruction seen on kidney and bladder ultrasound. Fluids were started LR @100cc. EKG notable for sinus rhythm with PACs, 1x dose of calcium gluconate was given.   Nephro, ID, Critical care consulted. Patient started on dexamethasone 6mg q24hr, remdesivir, rocephin 2g q24hr. Will start on D5LR with NaHCO3 and insulin drip to control elevated sugars. Patient will be transferred to MICU for closer management of concurrent medical conditions.         Vital Signs Last 24 Hrs  T(C): 36.8 (19 Jul 2022 16:10), Max: 38.6 (19 Jul 2022 07:41)  T(F): 98.3 (19 Jul 2022 16:10), Max: 101.4 (19 Jul 2022 07:41)  HR: 90 (19 Jul 2022 16:10) (90 - 105)  BP: 92/45 (19 Jul 2022 16:10) (65/29 - 114/76)  BP(mean): --  RR: 86 (19 Jul 2022 16:10) (16 - 86)  SpO2: 98% (19 Jul 2022 16:10) (94% - 100%)    Parameters below as of 19 Jul 2022 16:10  Patient On (Oxygen Delivery Method): mask, nonrebreather        I&O's Summary      Physical Exam:       LABS:   CARDIAC MARKERS ( 19 Jul 2022 15:35 )  x     / x     / 240 U/L / x     / x                                  tnp    x     )-----------( x        ( 19 Jul 2022 15:35 )             tnp        07-19    156<H>  |  115<H>  |  133<HH>  ----------------------------<  477<HH>  6.5<HH>   |  24  |  2.8<H>    Ca    8.2<L>      19 Jul 2022 06:33  Phos  4.4     07-19  Mg     3.3     07-19    TPro  6.0  /  Alb  2.4<L>  /  TBili  0.3  /  DBili  x   /  AST  27  /  ALT  12  /  AlkPhos  114  07-19          ASSESSMENT & PLAN:   77y F with PMHx of Anxiety, CAD, hypothyroidism, IDDM, schizophrenia, dementia, s/p G-Tube, Covid vaccinated presenting with chest tightness and cough. admitted for COVID Pneumonia    Patient's baseline over the last 2 months has been bed bound, largely non-verbal.   patient is DNR/DNI.       A/p  Acute resp failure 2/2 COVID-19 pneumonia - poor prognosis   Sepsis POA   -Isolation   -c/w  O2 support ( Patient is DNR/DNI)  -IV dexamethasone   -ID eval appreciated, c/w dexa and start remdesivir    ICU consulted , Pt accepted to ICU , Ceftriaxone started as per ICU , discussed with ICU elevated D dimer , would wait for venous duplex for now before AG for now , consider V/Q scan    -f/u and trend  inflammatory markers  monitor resp status     -DM type II / Hyperosmolar state - uncontrolled   -Anion gap metabolic acidosis   -Hyperkalemia   -Hypernatremia/ Hyperchloremia   - Acute Kidney Injury   Ketone in the urine is trace, negative Beta hydroxybutyrate   would start insulin drip for better control, especially she is NPO   IVF as per critical team note   monitor K ans avoid hypokalemia as the acidosis improves   monitor Na ans avoid over correction   -Insulin w/ F/S monitoring   repeat ekg, give one dose calcium gluconate   Chesk CK and Renal US   i/o avoid nephrotoxic meds, and renally dose medications  Consult endocrine and nephrology    check A1c       Dementia / Schizophrenia / lethargic   H/o Hypothyroidism   -c/w L - thyroxine   check TSH , Free t 4     Sacral ulcer   -local wound care and PRN pain Rx     code status DNR/DNI   Consider palliative consult     Hand off:  monitor FS, electrolytes , respiratory status               FOR FOLLOW UP:  [ ]   [ ]   [ ]     Girish Parr MD PGY1 Transfer Note    Transfer from: Medicine     Transfer to: (  ) Medicine    (  ) Telemetry    (  ) RCU                               (  ) Palliative    (  ) Stroke Unit    (X ) MICU    (  ) __________________    Accepting Physician: Dr. Best    Signout given to:     HPI / Interval Course:     76y F with PMHx of Anxiety, CAD, hypothyroidism, IDDM, schizophrenia, dementia, s/p G-Tube, admitted for COVID + test at nursing home.   Per daughter Kimberlee, patient started having chest tightness/cough on 7/16, and tested + for covid. She is vaccinated. Was sent to ED by NH for SOB after + covid test.   Patient's baseline over the last 2 months has been bed bound, largely non-verbal.   I spoke to daughter Kimberlee extensively, she does not want her mother to be intubated or on a ventilator, does not want CPR compressions or want any invasive procedures (central lines, hemodialysis etc.) patient is DNR/DNI.     In the ED Sepsis Present on Admission, 100.7 F, HR 96, /70, 97% on nonrebreather, COVID (+)   Glucose 590, K 7.8 hemolyzed (5.7 non hemolyzed), Cr 3.5, lactate 2.9, AST/ALT 62/11, Anion Gap 26  Patient admitted for sepsis on admission and Hypoxemic respiratory failure  from COVID infection.     The patient was given a total of 3x 10 units of regular insulin with  2x  6 doses coverage from sliding scale. Last . BHB 0.3, trace ketones in urine. Not suspected to be in DKA.  This morning patient was noted to be tachypneic ~30s, saturating 90s on 15L nonrebreather mask with inability to wean, ddimer 3935. Venous duplex and v/q scan ordered to r/o DVT/PE, currently on heparin 5000SQ BID. Patient most recent BUN/Cr 133/2.8, making urine, Na 156, K 6.5 (hemolyzed), Cl 115, HCO3 24, AGAP 17, glucose 477. Clinically appears dehydrated, bedside ultrasound noticed collapsed IVC with minimal distension. No hydronephrosis or obstruction seen on kidney and bladder ultrasound. Fluids were started LR @100cc. EKG notable for sinus rhythm with PACs, 1x dose of calcium gluconate was given.   Nephro, ID, Critical care consulted. Patient started on dexamethasone 6mg q24hr, remdesivir, rocephin 2g q24hr. Will start on D5LR with NaHCO3 and insulin drip to control elevated sugars. Patient will be transferred to MICU for closer management of concurrent medical conditions.         Vital Signs Last 24 Hrs  T(C): 36.8 (19 Jul 2022 16:10), Max: 38.6 (19 Jul 2022 07:41)  T(F): 98.3 (19 Jul 2022 16:10), Max: 101.4 (19 Jul 2022 07:41)  HR: 90 (19 Jul 2022 16:10) (90 - 105)  BP: 92/45 (19 Jul 2022 16:10) (65/29 - 114/76)  BP(mean): --  RR: 86 (19 Jul 2022 16:10) (16 - 86)  SpO2: 98% (19 Jul 2022 16:10) (94% - 100%)    Parameters below as of 19 Jul 2022 16:10  Patient On (Oxygen Delivery Method): mask, nonrebreather        I&O's Summary      Physical Exam:       LABS:   CARDIAC MARKERS ( 19 Jul 2022 15:35 )  x     / x     / 240 U/L / x     / x                                  tnp    x     )-----------( x        ( 19 Jul 2022 15:35 )             tnp        07-19    156<H>  |  115<H>  |  133<HH>  ----------------------------<  477<HH>  6.5<HH>   |  24  |  2.8<H>    Ca    8.2<L>      19 Jul 2022 06:33  Phos  4.4     07-19  Mg     3.3     07-19    TPro  6.0  /  Alb  2.4<L>  /  TBili  0.3  /  DBili  x   /  AST  27  /  ALT  12  /  AlkPhos  114  07-19          ASSESSMENT & PLAN:   77y F with PMHx of Anxiety, CAD, hypothyroidism, IDDM, schizophrenia, dementia, s/p G-Tube, Covid vaccinated presenting with chest tightness and cough. admitted for COVID Pneumonia. Patient is DNR/DNI.     #Acute respiratory failure 2/2 COVID-19 pneumonia - poor prognosis   #Sepsis POA   -Isolation precautions  -c/w O2 support on non-rebreather, titrate O2 goal 92-96%, Patient is DNR/DNI  - ID consulted c/w dexa and start remdesivir    - ICU consulted , Pt accepted to ICU , Ceftriaxone started as per ICU , discussed with ICU elevated D dimer , would wait for venous duplex for now before AG for now , consider V/Q scan    - f/u and trend  inflammatory markers  - monitor resp status     #DM type II / Hyperosmolar state - uncontrolled   #Anion gap metabolic acidosis   #Hyperkalemia   #Hypernatremia/ Hyperchloremia   #Acute Kidney Injury   - Ketone in the urine is trace, negative Beta hydroxybutyrate   - insulin drip in ICU, especially she is NPO   - IVF D5LR with NaHCO3 @250cc  - monitor K ans avoid hypokalemia as the acidosis improves   - monitor Na ans avoid over correction   - Insulin w/ F/S monitoring  q4hr  - repeat ekg, one dose calcium gluconate given  -  and Renal US no hydro  - i/o avoid nephrotoxic meds, and renally dose medications  -  endocrine and nephrology  consulted         #Dementia / Schizophrenia / lethargic   #H/o Hypothyroidism   -c/w L - thyroxine   check TSH , Free t 4     #Sacral ulcer   -local wound care and PRN pain Rx     #code status DNR/DNI   - Consider palliative consult           Girish Parr MD PGY1

## 2022-07-19 NOTE — CONSULT NOTE ADULT - ATTENDING COMMENTS
Counseled staff about diagnostic testing and treatment plan. All questions answered.
IMPRESSION:  Acute hypoxemic respiratory failure   Acute on chronic hypercapnic respiratory failutre  UTI   Sepsis POA  COVID 19 infection / pneumonia    TELMA   Hyperkalemia  Hypernatremia  Hyperglycemia, uncontrolled  H/O Dementia and schizophrenia  H/O Diabetes mellitus    Plan as outlined above

## 2022-07-19 NOTE — PROGRESS NOTE ADULT - ASSESSMENT
6y F with PMHx of Anxiety, CAD, hypothyroidism, IDDM, schizophrenia, dementia, s/p G-Tube, admitted for COVID + test at nursing home.   Per daughter Kimberlee, patient started having chest tightness/cough on 7/16, and tested + for covid. She is vaccinated. Was sent to ED by NH for SOB after + covid test.   Patient's baseline over the last 2 months has been bed bound, largely non-verbal.   patient is DNR/DNI.       A/p  Acute resp failure 2/2 COVID-19 pneumonia - poor prognosis   Sepsis POA   -Isolation   -c/w  O2 support ( Patient is DNR/DNI)  -IV dexamethasone   -ID eval appreciated, c/w dexa and start remdesivir    ICU consulted , Pt accepted to ICU , Ceftriaxone started as per ICU , discussed with ICU elevated D dimer , would wait for venous duplex for now before AG for now , consider V/Q scan    -f/u and trend  inflammatory markers  monitor resp status     -DM type II / Hyperosmolar state - uncontrolled   -Anion gap metabolic acidosis   -Hyperkalemia   -Hypernatremia/ Hyperchloremia   - Acute Kidney Injury   Ketone in the urine is trace, negative Beta hydroxybutyrate   would start insulin drip for better control, especially she is NPO   IVF as per critical team note   monitor K ans avoid hypokalemia as the acidosis improves   monitor Na ans avoid over correction   -Insulin w/ F/S monitoring   repeat ekg, give one dose calcium gluconate   Chesk CK and Renal US   i/o avoid nephrotoxic meds, and renally dose medications  Consult endocrine and nephrology    check A1c       Dementia / Schizophrenia / lethargic   H/o Hypothyroidism   -c/w L - thyroxine   check TSH , Free t 4     Sacral ulcer   -local wound care and PRN pain Rx     code status DNR/DNI   Consider palliative consult     Hand off:  monitor FS, electrolytes , respiratory status

## 2022-07-19 NOTE — CONSULT NOTE ADULT - ASSESSMENT
IMPRESSION:  Acute hypoxemic respiratory failure secondary to COVID-19 pneumonia, on NRB  Cytokine release syndrome  Sepsis secondary to COVID-19 infection  Hyperkalemia  Hypernatremia  Hyperglycemia, uncontrolled  H/O Dementia and schizophrenia  H/O Diabetes mellitus    PLAN:    CNS: Avoid sedatives    HEENT: Oral care    PULMONARY: Continue with non-rebreather. Titrate supplemental O2 to maintain SpO2 92-96%. HOB @ 45 degrees. Aspiration precautions. Repeat chest X-ray    CARDIOVASCULAR: Keep MAP>65.     GI: GI prophylaxis. Feeding: . Bowel regimen    RENAL: STAT ABG with electrolytes. Follow up renal function and lytes. Correct as needed. Repeat BMP Q8H    INFECTIOUS DISEASE: Continue with dexamethasone 6mg IV QD. Monitor vital signs. Follow up cultures. MRSA nares, procalcitonin    HEMATOLOGICAL: DVT prophylaxis per COVID protocol    ENDOCRINE: Follow up fasting sugar. Increase Lantus. Start standing insulin lispro premeal.    MUSCULOSKELETAL: increase as tolerated. Venous duplex of lower extremities.    DISPO: Pending evaluation by attending  Poor prognosis  DNR/DNI IMPRESSION:  Acute hypoxemic respiratory failure   Acute on chronic hypercapnic respiratory failutre  UTI   Sepsis POA  COVID 19 infection / pneumonia    TELMA   Hyperkalemia  Hypernatremia  Hyperglycemia, uncontrolled  H/O Dementia and schizophrenia  H/O Diabetes mellitus    PLAN:    CNS: Avoid sedatives    HEENT: Oral care    PULMONARY: Continue with non-rebreather. Titrate supplemental O2 to maintain SpO2 92-96%. HOB @ 45 degrees. Aspiration precautions. Repeat chest X-ray.  HFNCO2.      CARDIOVASCULAR: Keep MAP>60.  Goal directed fluid resuscitation.  LD5W with 1.5 amps of NAHCO3 250 cc per hour.  Monitor PH.      GI: GI prophylaxis. NPO for now Bowel regimen    RENAL: ABG with electrolytes in 4 hours. Follow up renal function and lytes. Correct as needed. Repeat BMP.  Avoid over correction.  Keep Hobbs.     INFECTIOUS DISEASE: Continue with dexamethasone 6mg IV QD. Monitor vital signs. Follow up cultures. MRSA nares, procalcitonin.  Start Rocephin.      HEMATOLOGICAL: DVT prophylaxis per COVID protocol.  Dimer     ENDOCRINE: IV insulin for now.  Monitor FS.      MUSCULOSKELETAL: increase as tolerated. Duplex stat LE     DISPO: MICU   Poor prognosis  DNR/DNI

## 2022-07-19 NOTE — CONSULT NOTE ADULT - SUBJECTIVE AND OBJECTIVE BOX
Patient is a 77y old  Female who presents with a chief complaint of     HPI:  76y F with PMHx of Anxiety, CAD, hypothyroidism, IDDM, schizophrenia, dementia, s/p G-Tube, admitted for COVID + test at nursing home.   Per daughter Kimberlee, patient started having chest tightness/cough on , and tested + for covid. She is vaccinated. Was sent to ED by NH for SOB after + covid test.   Patient's baseline over the last 2 months has been bed bound, largely non-verbal.   I spoke to daughter Kimberlee extensively, she does not want her mother to be intubated or on a ventilator, or want CPR compressions. patient is DNR/DNI.     ED Course:   Sepsis Present on Admission, 100.7 F, HR 96, /70, 97% on nonrebreather   COVID (+)   Glucose 590, K 7.8 hemolyzed (5.7 non hemolyzed), Cr 3.5, lactate 2.9, AST/ALT 62/11, Anion Gap 26    Patient admitted for sepsis on admission and Hypoxemic respiratory failure  from COVID infection.        (2022 20:29)      PAST MEDICAL & SURGICAL HISTORY:  Coronary artery disease      Anxiety      Dementia      Insulin dependent diabetes mellitus      Paranoid schizophrenia      Hyperlipidemia      Hypothyroidism      Abscess of right buttock      H/O abdominal surgery          Occupational hx:    Social hx:    FAMILY HISTORY:  No pertinent family history in first degree relatives    :  No known cardiovacular family hisotry     ROS:  See HPI     Allergies    No Known Allergies    Intolerances          PHYSICAL EXAM    ICU Vital Signs Last 24 Hrs  T(C): 38.3 (2022 11:18), Max: 38.6 (2022 07:41)  T(F): 101 (2022 11:18), Max: 101.4 (2022 07:41)  HR: 96 (2022 11:18) (87 - 105)  BP: 101/45 (2022 11:18) (65/29 - 114/76)  BP(mean): --  ABP: --  ABP(mean): --  RR: 23 (2022 11:18) (16 - 23)  SpO2: 96% (2022 11:18) (94% - 100%)    O2 Parameters below as of 2022 11:18  Patient On (Oxygen Delivery Method): mask, nonrebreather            CONSTITUTIONAL:  Well nourished.  NAD    ENT:   Airway patent,   No thrush    EYES:   Clear bilaterally,   pupils equal,   round and reactive to light.    CARDIAC:   Normal rate,   regular rhythm.    no edema      CAROTID:   normal systolic impulse  no bruits    RESPIRATORY:   No wheezing  Normal chest expansion  Not tachypneic,  No use of accessory muscles    GASTROINTESTINAL:  Abdomen soft,   non-tender,   no guarding,   + BS    MUSCULOSKELETAL:   range of motion is not limited,  no clubbing, cyanosis    NEUROLOGICAL:   Alert and oriented   no motor deficits.    SKIN:   Skin normal color for race,   No evidence of rash.    PSYCHIATRIC:   normal mood and affect.   no apparent risk to self or others.        LABS:                          8.0    19.16 )-----------( 143      ( 2022 06:33 )             28.7                                               07-    156<H>  |  115<H>  |  133<HH>  ----------------------------<  477<HH>  6.5<HH>   |  24  |  2.8<H>    Ca    8.2<L>      2022 06:33  Mg     3.0     07-    TPro  6.0  /  Alb  2.4<L>  /  TBili  0.3  /  DBili  x   /  AST  27  /  ALT  12  /  AlkPhos  114  07-19                                             Urinalysis Basic - ( 2022 14:55 )    Color: Judi / Appearance: Turbid / S.024 / pH: x  Gluc: x / Ketone: Trace  / Bili: Small / Urobili: 6 mg/dL   Blood: x / Protein: 100 mg/dL / Nitrite: Negative   Leuk Esterase: Large / RBC: 1 /HPF /  /HPF   Sq Epi: x / Non Sq Epi: 1 /HPF / Bacteria: Few                                                  LIVER FUNCTIONS - ( 2022 06:33 )  Alb: 2.4 g/dL / Pro: 6.0 g/dL / ALK PHOS: 114 U/L / ALT: 12 U/L / AST: 27 U/L / GGT: x                                                                                                                                       CXR reviewed by myself    MEDICATIONS  (STANDING):  ascorbic acid 500 milliGRAM(s) Oral daily  aspirin  chewable 81 milliGRAM(s) Oral daily  atorvastatin 20 milliGRAM(s) Oral at bedtime  calcium gluconate IVPB 1 Gram(s) IV Intermittent once  dexAMETHasone  Injectable 6 milliGRAM(s) IV Push daily  dextrose 5%. 1000 milliLiter(s) (100 mL/Hr) IV Continuous <Continuous>  dextrose 5%. 1000 milliLiter(s) (50 mL/Hr) IV Continuous <Continuous>  dextrose 50% Injectable 25 Gram(s) IV Push once  dextrose 50% Injectable 12.5 Gram(s) IV Push once  dextrose 50% Injectable 25 Gram(s) IV Push once  famotidine    Tablet 20 milliGRAM(s) Oral daily  glucagon  Injectable 1 milliGRAM(s) IntraMuscular once  heparin SubCutaneous Injection - Peds 5000 Unit(s) SubCutaneous every 12 hours  insulin glargine Injectable (LANTUS) 40 Unit(s) SubCutaneous at bedtime  insulin lispro (ADMELOG) corrective regimen sliding scale   SubCutaneous three times a day before meals  lactated ringers. 1000 milliLiter(s) (50 mL/Hr) IV Continuous <Continuous>  levothyroxine 50 MICROGram(s) Oral daily  melatonin 5 milliGRAM(s) Oral at bedtime  senna 8.6 milliGRAM(s) Oral Tablet - Peds 1 Tablet(s) Oral at bedtime  zinc sulfate 220 milliGRAM(s) Oral daily    MEDICATIONS  (PRN):  acetaminophen     Tablet .. 650 milliGRAM(s) Oral every 6 hours PRN Temp greater or equal to 38C (100.4F), Mild Pain (1 - 3), Moderate Pain (4 - 6)  dextrose Oral Gel 15 Gram(s) Oral once PRN Blood Glucose LESS THAN 70 milliGRAM(s)/deciliter  oxyCODONE    IR 5 milliGRAM(s) Oral three times a day PRN Severe Pain (7 - 10)         Patient is a 77y old  Female who presents with a chief complaint of     HPI:  76y F with PMHx of Anxiety, CAD, hypothyroidism, IDDM, schizophrenia, dementia, s/p G-Tube, admitted for COVID + test at nursing home.   Per daughter Kimberlee, patient started having chest tightness/cough on , and tested + for covid. She is vaccinated. Was sent to ED by NH for SOB after + covid test.   Patient's baseline over the last 2 months has been bed bound, largely non-verbal.   I spoke to daughter Kimberlee extensively, she does not want her mother to be intubated or on a ventilator, or want CPR compressions. patient is DNR/DNI.     ED Course:   Sepsis Present on Admission, 100.7 F, HR 96, /70, 97% on nonrebreather   COVID (+)   Glucose 590, K 7.8 hemolyzed (5.7 non hemolyzed), Cr 3.5, lactate 2.9, AST/ALT 62/11, Anion Gap 26    Patient admitted for sepsis on admission and Hypoxemic respiratory failure  from COVID infection.        (2022 20:29)      PAST MEDICAL & SURGICAL HISTORY:  Coronary artery disease      Anxiety      Dementia      Insulin dependent diabetes mellitus      Paranoid schizophrenia      Hyperlipidemia      Hypothyroidism      Abscess of right buttock      H/O abdominal surgery          Occupational hx:    Social hx:    FAMILY HISTORY:  No pertinent family history in first degree relatives    :  No known cardiovacular family hisotry     ROS:  See HPI     Allergies    No Known Allergies    Intolerances          PHYSICAL EXAM    ICU Vital Signs Last 24 Hrs  T(C): 38.3 (2022 11:18), Max: 38.6 (2022 07:41)  T(F): 101 (2022 11:18), Max: 101.4 (2022 07:41)  HR: 96 (2022 11:18) (87 - 105)  BP: 101/45 (2022 11:18) (65/29 - 114/76)  BP(mean): --  ABP: --  ABP(mean): --  RR: 23 (2022 11:18) (16 - 23)  SpO2: 96% (2022 11:18) (94% - 100%)    O2 Parameters below as of 2022 11:18  Patient On (Oxygen Delivery Method): mask, nonrebreather            CONSTITUTIONAL:  Ill appearing in NAD    ENT:   Airway patent,   No thrush    EYES:   Clear bilaterally,   pupils equal,     CARDIAC:   Normal rate,   regular rhythm.    no edema    RESPIRATORY:   No wheezing  Normal chest expansion  Not tachypneic,  No use of accessory muscles    GASTROINTESTINAL:  Abdomen soft,   non-tender,   no guarding,   + BS    MUSCULOSKELETAL:   range of motion is not limited,  no clubbing, cyanosis    NEUROLOGICAL:   Lethargic  Does not follow commands     SKIN:   Skin normal color for race,   No evidence of rash.    PSYCHIATRIC:   no apparent risk to self or others.        LABS:                          8.0    19.16 )-----------( 143      ( 2022 06:33 )             28.7                                               07-19    156<H>  |  115<H>  |  133<HH>  ----------------------------<  477<HH>  6.5<HH>   |  24  |  2.8<H>    2022 06:33    156<H>  |  115<H>  |  133<HH>  ----------------------------<  477<HH>  6.5<HH>   |  24     |  2.8<H>  2022 23:12    150<H>  |  111<H>  |  138<HH>  ----------------------------<  563<HH>  6.0<HH>   |  22     |  2.9<H>    Ca    8.2<L>      2022 06:33  Ca    7.9<L>      2022 23:12  Mg     3.0<H>     2022 06:33  Mg     3.3<HH>     2022 15:00    TPro  6.0    /  Alb  2.4<L>  /  TBili  0.3    /  DBili  x      /  AST  27     /  ALT  12     /  AlkPhos  114    2022 06:33  TPro  6.3    /  Alb  2.4<L>  /  TBili  0.3    /  DBili  x      /  AST  62<H>  /  ALT  11     /  AlkPhos  191<H>  2022 15:00      Ca    8.2<L>      2022 06:33  Mg     3.0     -    TPro  6.0  /  Alb  2.4<L>  /  TBili  0.3  /  DBili  x   /  AST  27  /  ALT  12  /  AlkPhos  114  -                                             Urinalysis Basic - ( 2022 14:55 )    Color: Judi / Appearance: Turbid / S.024 / pH: x  Gluc: x / Ketone: Trace  / Bili: Small / Urobili: 6 mg/dL   Blood: x / Protein: 100 mg/dL / Nitrite: Negative   Leuk Esterase: Large / RBC: 1 /HPF /  /HPF   Sq Epi: x / Non Sq Epi: 1 /HPF / Bacteria: Few                                                  LIVER FUNCTIONS - ( 2022 06:33 )  Alb: 2.4 g/dL / Pro: 6.0 g/dL / ALK PHOS: 114 U/L / ALT: 12 U/L / AST: 27 U/L / GGT: x                                                                                                                                       CXR reviewed by myself    MEDICATIONS  (STANDING):  ascorbic acid 500 milliGRAM(s) Oral daily  aspirin  chewable 81 milliGRAM(s) Oral daily  atorvastatin 20 milliGRAM(s) Oral at bedtime  calcium gluconate IVPB 1 Gram(s) IV Intermittent once  dexAMETHasone  Injectable 6 milliGRAM(s) IV Push daily  dextrose 5%. 1000 milliLiter(s) (100 mL/Hr) IV Continuous <Continuous>  dextrose 5%. 1000 milliLiter(s) (50 mL/Hr) IV Continuous <Continuous>  dextrose 50% Injectable 25 Gram(s) IV Push once  dextrose 50% Injectable 12.5 Gram(s) IV Push once  dextrose 50% Injectable 25 Gram(s) IV Push once  famotidine    Tablet 20 milliGRAM(s) Oral daily  glucagon  Injectable 1 milliGRAM(s) IntraMuscular once  heparin SubCutaneous Injection - Peds 5000 Unit(s) SubCutaneous every 12 hours  insulin glargine Injectable (LANTUS) 40 Unit(s) SubCutaneous at bedtime  insulin lispro (ADMELOG) corrective regimen sliding scale   SubCutaneous three times a day before meals  lactated ringers. 1000 milliLiter(s) (50 mL/Hr) IV Continuous <Continuous>  levothyroxine 50 MICROGram(s) Oral daily  melatonin 5 milliGRAM(s) Oral at bedtime  senna 8.6 milliGRAM(s) Oral Tablet - Peds 1 Tablet(s) Oral at bedtime  zinc sulfate 220 milliGRAM(s) Oral daily    MEDICATIONS  (PRN):  acetaminophen     Tablet .. 650 milliGRAM(s) Oral every 6 hours PRN Temp greater or equal to 38C (100.4F), Mild Pain (1 - 3), Moderate Pain (4 - 6)  dextrose Oral Gel 15 Gram(s) Oral once PRN Blood Glucose LESS THAN 70 milliGRAM(s)/deciliter  oxyCODONE    IR 5 milliGRAM(s) Oral three times a day PRN Severe Pain (7 - 10)

## 2022-07-19 NOTE — CONSULT NOTE ADULT - SUBJECTIVE AND OBJECTIVE BOX
GLORIA WATERS  77y, Female  Allergy: No Known Allergies      CHIEF COMPLAINT:     HPI:  77y F with PMHx of Anxiety, CAD, hypothyroidism, IDDM, schizophrenia, dementia, s/p G-Tube, admitted for COVID + test at nursing home.   Per daughter Kimberlee, patient started having chest tightness/cough on , and tested + for COVID. She is vaccinated. Was sent to ED by NH for SOB after + COVID test.   Patient's baseline over the last 2 months has been bed bound, largely non-verbal.     ED Course:   Sepsis Present on Admission, 100.7 F, HR 96, /70, 97% on nonrebreather   COVID (+)   Glucose 590, K 7.8 hemolyzed (5.7 non hemolyzed), Cr 3.5, lactate 2.9, AST/ALT 62/11, Anion Gap 26        Infectious Diseases History:  Old Micro Data/Cultures:     FAMILY HISTORY:  No pertinent family history in first degree relatives      PAST MEDICAL & SURGICAL HISTORY:  Coronary artery disease      Anxiety      Dementia      Insulin dependent diabetes mellitus      Paranoid schizophrenia      Hyperlipidemia      Hypothyroidism      Abscess of right buttock      H/O abdominal surgery          SOCIAL HISTORY  Social History:  NH resident, lifelong psychiatric illnesses (per daughter) (2022 20:29)      Recent Travel:  Other Exposures:     ROS --> Unable to obtain patient is non-verbal      VITALS:  T(F): 101, Max: 101.4 (22 @ 07:41)  HR: 96  BP: 101/45  RR: 23Vital Signs Last 24 Hrs  T(C): 38.3 (2022 11:18), Max: 38.6 (2022 07:41)  T(F): 101 (2022 11:18), Max: 101.4 (2022 07:41)  HR: 96 (2022 11:18) (87 - 105)  BP: 101/45 (2022 11:18) (65/29 - 114/76)  BP(mean): --  RR: 23 (2022 11:18) (16 - 23)  SpO2: 96% (2022 11:18) (94% - 100%)    Parameters below as of 2022 11:18  Patient On (Oxygen Delivery Method): mask, nonrebreather        PHYSICAL EXAM:  CONSTITUTIONAL: lethargic ill appearing  SKIN: warm, dry, no cyanosis  HEAD: Normocephalic atraumatic  CARD: regular rate and rhythm  RESP: normal respiratory effort, no wheezes, rales or rhonchi  ABD: soft, non-distended, non-tender, peg tube in place no surrounding erythema  NEURO: not responding to verbal stimuli.    TESTS & MEASUREMENTS:                        8.0    19.16 )-----------( 143      ( 2022 06:33 )             28.7         156<H>  |  115<H>  |  133<HH>  ----------------------------<  477<HH>  6.5<HH>   |  24  |  2.8<H>    Ca    8.2<L>      2022 06:33  Mg     3.0         TPro  6.0  /  Alb  2.4<L>  /  TBili  0.3  /  DBili  x   /  AST  27  /  ALT  12  /  AlkPhos  114        LIVER FUNCTIONS - ( 2022 06:33 )  Alb: 2.4 g/dL / Pro: 6.0 g/dL / ALK PHOS: 114 U/L / ALT: 12 U/L / AST: 27 U/L / GGT: x           Urinalysis Basic - ( 2022 14:55 )    Color: Judi / Appearance: Turbid / S.024 / pH: x  Gluc: x / Ketone: Trace  / Bili: Small / Urobili: 6 mg/dL   Blood: x / Protein: 100 mg/dL / Nitrite: Negative   Leuk Esterase: Large / RBC: 1 /HPF /  /HPF   Sq Epi: x / Non Sq Epi: 1 /HPF / Bacteria: Few          Blood Gas Venous - Lactate: 2.70 mmol/L (22 @ 18:56)  Lactate, Blood: 2.9 mmol/L (22 @ 15:00)      INFECTIOUS DISEASES TESTING      RADIOLOGY & ADDITIONAL TESTS:  I have personally reviewed the last available Chest xray  CXR      CT      CARDIOLOGY TESTING  12 Lead ECG:   Ventricular Rate 96 BPM    Atrial Rate 96 BPM    P-R Interval 162 ms    QRS Duration 78 ms    Q-T Interval 348 ms    QTC Calculation(Bazett) 439 ms    P Axis 59 degrees    R Axis 107 degrees    T Axis 36 degrees    Diagnosis Line Sinus rhythm with Premature atrial complexes  Rightward axis  Low voltage QRS  Cannot rule out Anterior infarct , age undetermined  Abnormal ECG    Confirmed by ISABEL INMAN MD ([id number]) on 2022 1:45:11 PM (22 @ 11:36)  12 Lead ECG:   Ventricular Rate 97 BPM    Atrial Rate 97 BPM    P-R Interval 170 ms    QRS Duration 78 ms    Q-T Interval 332 ms    QTC Calculation(Bazett) 421 ms    P Axis 77 degrees    R Axis 258 degrees    T Axis 55 degrees    Diagnosis Line Normal sinus rhythm  Right superior axis deviation  Low voltage QRS  Abnormal ECG    Confirmed by ISABEL INMAN MD ([id number]) on 2022 6:33:33 AM (22 @ 01:47)      All available historical records have been reviewed    MEDICATIONS  ascorbic acid 500  aspirin  chewable 81  atorvastatin 20  calcium gluconate IVPB 1  dexAMETHasone  Injectable 6  dextrose 5%. 1000  dextrose 5%. 1000  dextrose 50% Injectable 25  dextrose 50% Injectable 12.5  dextrose 50% Injectable 25  famotidine    Tablet 20  glucagon  Injectable 1  heparin SubCutaneous Injection - Peds 5000  insulin glargine Injectable (LANTUS) 40  insulin lispro (ADMELOG) corrective regimen sliding scale   lactated ringers. 1000  levothyroxine 50  melatonin 5  senna 8.6 milliGRAM(s) Oral Tablet - Peds 1  zinc sulfate 220      ANTIBIOTICS:      All available historical data has been reviewed    ASSESSMENT  77-year-old female with COVID and PMHx of dementia, schizophrenia diabetes with indwelling PEG tube bedbound here for lethargy shortness of breath and altered mental status.  As per daughter patient tested + 2 days ago and brought her to ED for evaluation.    IMPRESSION  #Patient admitted for sepsis on admission and Hypoxemic respiratory failure from COVID infection  - O2 sat 95% on 15 L nonrebreather  -         RECOMMENDATIONS  - C/w Dexamethasone 6mg IV QD for a total of 10 days  - Start Remdesivir 200 mg on day 1, followed by Remdesivir 100mg for 2-5 days    This is a pended note. All final recommendations to follow pending discussion with ID Attending    GLORIA WATERS  77y, Female  Allergy: No Known Allergies        HPI:  77y F with PMHx of Anxiety, CAD, hypothyroidism, IDDM, schizophrenia, dementia, s/p G-Tube, admitted for COVID + test at nursing home.   Per daughter Kimberlee, patient started having chest tightness/cough on , and tested + for COVID. She is vaccinated. Was sent to ED by NH for SOB after + COVID test.   Patient's baseline over the last 2 months has been bed bound, largely non-verbal.     ED Course:   Sepsis Present on Admission, 100.7 F, HR 96, /70, 97% on nonrebreather   COVID (+)   Glucose 590, K 7.8 hemolyzed (5.7 non hemolyzed), Cr 3.5, lactate 2.9, AST/ALT 62/11, Anion Gap 26        Infectious Diseases History:  Old Micro Data/Cultures:     FAMILY HISTORY:  No pertinent family history in first degree relatives      PAST MEDICAL & SURGICAL HISTORY:  Coronary artery disease      Anxiety      Dementia      Insulin dependent diabetes mellitus      Paranoid schizophrenia      Hyperlipidemia      Hypothyroidism      Abscess of right buttock      H/O abdominal surgery          SOCIAL HISTORY  Social History:  NH resident, lifelong psychiatric illnesses (per daughter) (2022 20:29)      Recent Travel: -  Other Exposures: -    ROS --> unable to obtain patient is non-verbal      VITALS:  T(F): 101, Max: 101.4 (22 @ 07:41)  HR: 96  BP: 101/45  RR: 23Vital Signs Last 24 Hrs  T(C): 38.3 (2022 11:18), Max: 38.6 (2022 07:41)  T(F): 101 (2022 11:18), Max: 101.4 (2022 07:41)  HR: 96 (2022 11:18) (87 - 105)  BP: 101/45 (2022 11:18) (65/29 - 114/76)  BP(mean): --  RR: 23 (2022 11:18) (16 - 23)  SpO2: 96% (2022 11:18) (94% - 100%)    Parameters below as of 2022 11:18  Patient On (Oxygen Delivery Method): mask, nonrebreather        PHYSICAL EXAM:  CONSTITUTIONAL: lethargic ill appearing  SKIN: warm, dry, no cyanosis  HEAD: Normocephalic atraumatic  CARD: regular rate and rhythm  RESP: normal respiratory effort, no wheezes, rales or rhonchi  ABD: soft, non-distended, non-tender, peg tube in place no surrounding erythema  NEURO: not responding to verbal stimuli.    TESTS & MEASUREMENTS:                        8.0    19.16 )-----------( 143      ( 2022 06:33 )             28.7         156<H>  |  115<H>  |  133<HH>  ----------------------------<  477<HH>  6.5<HH>   |  24  |  2.8<H>    Ca    8.2<L>      2022 06:33  Mg     3.0         TPro  6.0  /  Alb  2.4<L>  /  TBili  0.3  /  DBili  x   /  AST  27  /  ALT  12  /  AlkPhos  114        LIVER FUNCTIONS - ( 2022 06:33 )  Alb: 2.4 g/dL / Pro: 6.0 g/dL / ALK PHOS: 114 U/L / ALT: 12 U/L / AST: 27 U/L / GGT: x           Urinalysis Basic - ( 2022 14:55 )    Color: Judi / Appearance: Turbid / S.024 / pH: x  Gluc: x / Ketone: Trace  / Bili: Small / Urobili: 6 mg/dL   Blood: x / Protein: 100 mg/dL / Nitrite: Negative   Leuk Esterase: Large / RBC: 1 /HPF /  /HPF   Sq Epi: x / Non Sq Epi: 1 /HPF / Bacteria: Few          Blood Gas Venous - Lactate: 2.70 mmol/L (22 @ 18:56)  Lactate, Blood: 2.9 mmol/L (22 @ 15:00)      INFECTIOUS DISEASES TESTING      RADIOLOGY & ADDITIONAL TESTS:  I have personally reviewed the last available Chest xray  CXR      CT      CARDIOLOGY TESTING  12 Lead ECG:   Ventricular Rate 96 BPM    Atrial Rate 96 BPM    P-R Interval 162 ms    QRS Duration 78 ms    Q-T Interval 348 ms    QTC Calculation(Bazett) 439 ms    P Axis 59 degrees    R Axis 107 degrees    T Axis 36 degrees    Diagnosis Line Sinus rhythm with Premature atrial complexes  Rightward axis  Low voltage QRS  Cannot rule out Anterior infarct , age undetermined  Abnormal ECG    Confirmed by ISABEL INMAN MD ([id number]) on 2022 1:45:11 PM (22 @ 11:36)  12 Lead ECG:   Ventricular Rate 97 BPM    Atrial Rate 97 BPM    P-R Interval 170 ms    QRS Duration 78 ms    Q-T Interval 332 ms    QTC Calculation(Bazett) 421 ms    P Axis 77 degrees    R Axis 258 degrees    T Axis 55 degrees    Diagnosis Line Normal sinus rhythm  Right superior axis deviation  Low voltage QRS  Abnormal ECG    Confirmed by ISABEL INMAN MD ([id number]) on 2022 6:33:33 AM (22 @ 01:47)      All available historical records have been reviewed    MEDICATIONS  ascorbic acid 500  aspirin  chewable 81  atorvastatin 20  calcium gluconate IVPB 1  dexAMETHasone  Injectable 6  dextrose 5%. 1000  dextrose 5%. 1000  dextrose 50% Injectable 25  dextrose 50% Injectable 12.5  dextrose 50% Injectable 25  famotidine    Tablet 20  glucagon  Injectable 1  heparin SubCutaneous Injection - Peds 5000  insulin glargine Injectable (LANTUS) 40  insulin lispro (ADMELOG) corrective regimen sliding scale   lactated ringers. 1000  levothyroxine 50  melatonin 5  senna 8.6 milliGRAM(s) Oral Tablet - Peds 1  zinc sulfate 220      ANTIBIOTICS:      All available historical data has been reviewed    ASSESSMENT  77-year-old female with COVID and PMHx of dementia, schizophrenia diabetes with indwelling PEG tube bedbound here for lethargy shortness of breath and altered mental status.  As per daughter patient tested + 2 days ago and brought her to ED for evaluation.    IMPRESSION  #Patient admitted for sepsis on admission and hypoxemic respiratory failure from severe COVID-19 infection  - On 15 L nonrebreather --> O2 sat 95%  - Patient likely to be in the early viral replicative phase based on timeline/ onset of symptoms   - Ferritin 567, D-dimer 3935,  and .5        RECOMMENDATIONS  - C/w Dexamethasone 6mg IV QD for a total of 10 days  - Start Remdesivir 200 mg on day 1, followed by Remdesivir 100mg for 2-5 days    This is a pended note. All final recommendations to follow pending discussion with ID Attending    GLORIA WATERS  77y, Female  Allergy: No Known Allergies        HPI:  77y F with PMHx of Anxiety, CAD, hypothyroidism, IDDM, schizophrenia, dementia, s/p G-Tube, admitted for COVID + test at nursing home.   Per daughter Kimberlee, patient started having chest tightness/cough on , and tested + for COVID. She is vaccinated. Was sent to ED by NH for SOB after + COVID test.   Patient's baseline over the last 2 months has been bed bound, largely non-verbal.     ED Course:   Sepsis Present on Admission, 100.7 F, HR 96, /70, 97% on nonrebreather   COVID (+)   Glucose 590, K 7.8 hemolyzed (5.7 non hemolyzed), Cr 3.5, lactate 2.9, AST/ALT 62/11, Anion Gap 26        Infectious Diseases History:  Old Micro Data/Cultures:     FAMILY HISTORY:  No pertinent family history in first degree relatives      PAST MEDICAL & SURGICAL HISTORY:  Coronary artery disease      Anxiety      Dementia      Insulin dependent diabetes mellitus      Paranoid schizophrenia      Hyperlipidemia      Hypothyroidism      Abscess of right buttock      H/O abdominal surgery          SOCIAL HISTORY  Social History:  NH resident, lifelong psychiatric illnesses (per daughter) (2022 20:29)      Recent Travel: -  Other Exposures: -    ROS --> unable to obtain patient is non-verbal      VITALS:  T(F): 101, Max: 101.4 (22 @ 07:41)  HR: 96  BP: 101/45  RR: 23Vital Signs Last 24 Hrs  T(C): 38.3 (2022 11:18), Max: 38.6 (2022 07:41)  T(F): 101 (2022 11:18), Max: 101.4 (2022 07:41)  HR: 96 (2022 11:18) (87 - 105)  BP: 101/45 (2022 11:18) (65/29 - 114/76)  BP(mean): --  RR: 23 (2022 11:18) (16 - 23)  SpO2: 96% (2022 11:18) (94% - 100%)    Parameters below as of 2022 11:18  Patient On (Oxygen Delivery Method): mask, nonrebreather        PHYSICAL EXAM:  CONSTITUTIONAL: lethargic ill appearing  SKIN: warm, dry, no cyanosis  HEAD: Normocephalic atraumatic  CARD: regular rate and rhythm  RESP: normal respiratory effort, no wheezes, rales or rhonchi  ABD: soft, non-distended, non-tender, peg tube in place no surrounding erythema  NEURO: not responding to verbal stimuli.    TESTS & MEASUREMENTS:                        8.0    19.16 )-----------( 143      ( 2022 06:33 )             28.7         156<H>  |  115<H>  |  133<HH>  ----------------------------<  477<HH>  6.5<HH>   |  24  |  2.8<H>    Ca    8.2<L>      2022 06:33  Mg     3.0         TPro  6.0  /  Alb  2.4<L>  /  TBili  0.3  /  DBili  x   /  AST  27  /  ALT  12  /  AlkPhos  114        LIVER FUNCTIONS - ( 2022 06:33 )  Alb: 2.4 g/dL / Pro: 6.0 g/dL / ALK PHOS: 114 U/L / ALT: 12 U/L / AST: 27 U/L / GGT: x           Urinalysis Basic - ( 2022 14:55 )    Color: Judi / Appearance: Turbid / S.024 / pH: x  Gluc: x / Ketone: Trace  / Bili: Small / Urobili: 6 mg/dL   Blood: x / Protein: 100 mg/dL / Nitrite: Negative   Leuk Esterase: Large / RBC: 1 /HPF /  /HPF   Sq Epi: x / Non Sq Epi: 1 /HPF / Bacteria: Few          Blood Gas Venous - Lactate: 2.70 mmol/L (22 @ 18:56)  Lactate, Blood: 2.9 mmol/L (22 @ 15:00)      INFECTIOUS DISEASES TESTING      RADIOLOGY & ADDITIONAL TESTS:  I have personally reviewed the last available Chest xray  CXR      CT      CARDIOLOGY TESTING  12 Lead ECG:   Ventricular Rate 96 BPM    Atrial Rate 96 BPM    P-R Interval 162 ms    QRS Duration 78 ms    Q-T Interval 348 ms    QTC Calculation(Bazett) 439 ms    P Axis 59 degrees    R Axis 107 degrees    T Axis 36 degrees    Diagnosis Line Sinus rhythm with Premature atrial complexes  Rightward axis  Low voltage QRS  Cannot rule out Anterior infarct , age undetermined  Abnormal ECG    Confirmed by ISABEL INMAN MD ([id number]) on 2022 1:45:11 PM (22 @ 11:36)  12 Lead ECG:   Ventricular Rate 97 BPM    Atrial Rate 97 BPM    P-R Interval 170 ms    QRS Duration 78 ms    Q-T Interval 332 ms    QTC Calculation(Bazett) 421 ms    P Axis 77 degrees    R Axis 258 degrees    T Axis 55 degrees    Diagnosis Line Normal sinus rhythm  Right superior axis deviation  Low voltage QRS  Abnormal ECG    Confirmed by ISABEL INMAN MD ([id number]) on 2022 6:33:33 AM (22 @ 01:47)      All available historical records have been reviewed    MEDICATIONS  ascorbic acid 500  aspirin  chewable 81  atorvastatin 20  calcium gluconate IVPB 1  dexAMETHasone  Injectable 6  dextrose 5%. 1000  dextrose 5%. 1000  dextrose 50% Injectable 25  dextrose 50% Injectable 12.5  dextrose 50% Injectable 25  famotidine    Tablet 20  glucagon  Injectable 1  heparin SubCutaneous Injection - Peds 5000  insulin glargine Injectable (LANTUS) 40  insulin lispro (ADMELOG) corrective regimen sliding scale   lactated ringers. 1000  levothyroxine 50  melatonin 5  senna 8.6 milliGRAM(s) Oral Tablet - Peds 1  zinc sulfate 220      ANTIBIOTICS:      All available historical data has been reviewed    ASSESSMENT  77-year-old female with COVID and PMHx of dementia, schizophrenia diabetes with indwelling PEG tube bedbound here for lethargy shortness of breath and altered mental status.  As per daughter patient tested + 2 days ago and brought her to ED for evaluation.    IMPRESSION  COVID 19 with severe illness. SpO2 < 94% on RA and need for supplemental O2.  Pt is in the early viral replicative phase based on the timeline/onset of symptoms.  CXR opacities at the bases  On 15 L nonrebreather --> O2 sat 95%  Patient likely to be in the early viral replicative phase based on timeline/ onset of symptoms   Ferritin 567, D-dimer 3935,  and .5        RECOMMENDATIONS  Target SpO2 92 % to 96 %  Dexamethasone 6 mg iv q24h for 10 days.  Monitor for side effects: hyperglycemia, neurological ( agitation/confusion), adrenal suppression, bacterial and fungal infections  Day 1 : Single  mg IV loading dose  Day 2-5 : single  mg IV once daily maintenance dose  Daily CBC,CMP.   Daughter does not wish aggressive measures  Off loading to prevent pressure sores and preventive measures to avoid aspiration

## 2022-07-20 NOTE — CONSULT NOTE ADULT - CONVERSATION DETAILS
Spoke with daughters of patient. Comfirmed that they do  not want intubation, CPR, pressor support, central lines, HD, or any other invasive procedures. Discussed that the patients BP is dropping today. Explained option for CMO/removal of Bipap, and stopping all life sustaining measures, labs, tests, etc. Expained that she will likely pass away in ICU if she is removed from Bipap. Erena and siblings agree to comfort care and would like to start immediately.

## 2022-07-20 NOTE — CONSULT NOTE ADULT - CONSULT REASON
TELMA, Hypernatremia
75 yo female with hypoxic rf 2ry to covid on bipap  dnr dni per family  they want her to be comfortable no hd no pressors or central line  but they want fluids, ivs, stat labs if needed, work up
Electrolyte abnormalities and acute hypoxemic respiratory failure secondary to COVID-19 pneumonia
COVID-19

## 2022-07-20 NOTE — PROGRESS NOTE ADULT - ASSESSMENT
ASSESSMENT  77-year-old female with COVID and PMHx of dementia, schizophrenia diabetes with indwelling PEG tube bedbound here for lethargy shortness of breath and altered mental status.  As per daughter patient tested + 2 days ago and brought her to ED for evaluation.    IMPRESSION  Non responsive on BIPAP  CXR with no new opacities  COVID 19 with severe illness. SpO2 < 94% on RA and need for supplemental O2.  Pt is in the early viral replicative phase based on the timeline/onset of symptoms.  Ferritin 567, D-dimer 3935,  and .5        RECOMMENDATIONS  Target SpO2 92 % to 96 %  Dexamethasone 6 mg iv q24h for 10 days.  Monitor for side effects: hyperglycemia, neurological ( agitation/confusion), adrenal suppression, bacterial and fungal infections  Day 1 : Single  mg IV loading dose  Day 2-5 : single  mg IV once daily maintenance dose  Daily CBC,CMP.   Daughter does not wish aggressive measures  Off loading to prevent pressure sores and preventive measures to avoid aspiration   Prognosis is extremely poor

## 2022-07-20 NOTE — CHART NOTE - NSCHARTNOTEFT_GEN_A_CORE
PALLIATIVE MEDICINE INTERDISCIPLINARY TEAM NOTE    Provider:                                         [  X ] Initial visit        Met with: [  X ] Patient  [   ] Family  [   ] Other:    Primary Language: [ X  ] English [   ] Other*:                      *Interpretation provided by:    SUPPORT DIAGNOSES            (Check all that apply)    [   ] EOL issues  [  X ] Advanced Illness  [   ] Cultural / spiritual concerns  [ X  ] Pain / suffering  [   ] Dementia / AMS  [   ] Other:  [   ] AD issues  [   ] Grief / loss / sadness  [   ] Discharge issues  [ X  ] Distress / coping    PSYCHOSOCIAL ASSESSMENT OF PATIENT         (Check all that apply)    [ X  ] Initial Assessment            [   ] Reassessment          [   ] Not Applicable this visit    Pain/suffering acuity:  [   ] None to mild (0-3)           [   ] Moderate (4-6)        [   ] High (7-10)  [ X ] unable to assess     Mental Status:  [   ] Alert/oriented (x3)          [   ] Confused/Altered(x2/x1)         [  X ] Non-resp: on biPAP    Functional status:  [   ] Independent w ADLs      [   ] Needs Assistance             [ X  ] Bedbound/Full Care: currently     Coping:  [   ] Coping well                     [   ] Coping w/difficulty            [   ] Poor coping    [ X ] unable to assess     Support system:  [   ] Strong                              [   ] Adequate                        [   ] Inadequate   [ X ] unable to assess       Past history and medications for:     [X ] Anxiety       [ ] Depression    [ ] Sleep disorders       SERVICE PROVIDED  [   ]Discharge support / facilitation  [   ]AD / goals of care counseling                                  [   ]EOL / death / bereavement counseling  [   ]Counseling / support                                                [   ] Family meeting  [   ]Prayer / sacrament / ritual                                      [   ] Referral   [  X ]Other                                                                       NOTE and Plan of Care (PoC):    patient is a 77 y/o F with noted pmhx, admitted for COVID + test at NH. visited patient earlier today. patient encountered in bed on biPAP. patient appeared uncomfortable. no family at bedside. will f/u with family [phone].

## 2022-07-20 NOTE — PROGRESS NOTE ADULT - SUBJECTIVE AND OBJECTIVE BOX
Patient is a 77y old  Female who presents with a chief complaint of COVID (2022 09:07)      INTERVAL HPI/OVERNIGHT EVENTS:   No overnight events   Afebrile, hemodynamically stable     ICU Vital Signs Last 24 Hrs  T(C): 37.1 (2022 08:00), Max: 38.3 (2022 11:18)  T(F): 98.7 (2022 08:00), Max: 101 (2022 11:18)  HR: 98 (2022 10:30) (82 - 119)  BP: 88/43 (2022 10:30) (78/35 - 121/50)  BP(mean): 58 (2022 10:30) (48 - 74)  RR: 24 (2022 10:30) (22 - 86)  SpO2: 100% (2022 10:30) (72% - 100%) on BiPAP      2022 07:01  -  2022 07:00  --------------------------------------------------------  IN: 1010 mL / OUT: 1990 mL / NET: -980 mL    2022 07:01  -  2022 10:42  --------------------------------------------------------  IN: 1883 mL / OUT: 45 mL / NET: 1838 mL      LABS:             8.0    23.47 )-----------( 158      ( 2022 04:10 )             28.8     158<H>  |  117<H>  |  117<HH>  ----------------------------<  384<H>  5.0   |  31  |  2.1<H>    Ca    9.3      2022 04:10  Phos  4.4     07-19  Mg     3.2     07-20    TPro  6.1  /  Alb  2.4<L>  /  TBili  <0.2  /  DBili  x   /  AST  19  /  ALT  11  /  AlkPhos  99  07-20    PT/INR - ( 2022 02:40 )   PT: 13.20 sec;   INR: 1.15 ratio         PTT - ( 2022 02:40 )  PTT:29.2 sec  Urinalysis Basic - ( 2022 14:55 )    Color: Judi / Appearance: Turbid / S.024 / pH: x  Gluc: x / Ketone: Trace  / Bili: Small / Urobili: 6 mg/dL   Blood: x / Protein: 100 mg/dL / Nitrite: Negative   Leuk Esterase: Large / RBC: 1 /HPF /  /HPF   Sq Epi: x / Non Sq Epi: 1 /HPF / Bacteria: Few    ABG - ( 2022 02:52 )  pH, Arterial: 7.28  pH, Blood: x     /  pCO2: 59    /  pO2: 106   / HCO3: 28    / Base Excess: 0.6   /  SaO2: 99.6            MEDICATIONS  (STANDING):  ascorbic acid 500 milliGRAM(s) Oral daily  aspirin  chewable 81 milliGRAM(s) Oral daily  atorvastatin 20 milliGRAM(s) Oral at bedtime  cefTRIAXone   IVPB 1000 milliGRAM(s) IV Intermittent every 24 hours  chlorhexidine 2% Cloths 2 Application(s) Topical two times a day  dexAMETHasone  Injectable 6 milliGRAM(s) IV Push daily  dextrose 5%. 1000 milliLiter(s) (100 mL/Hr) IV Continuous <Continuous>  dextrose 5%. 1000 milliLiter(s) (100 mL/Hr) IV Continuous <Continuous>  dextrose 5%. 1000 milliLiter(s) (50 mL/Hr) IV Continuous <Continuous>  dextrose 50% Injectable 25 Gram(s) IV Push once  dextrose 50% Injectable 12.5 Gram(s) IV Push once  dextrose 50% Injectable 25 Gram(s) IV Push once  famotidine    Tablet 20 milliGRAM(s) Oral daily  glucagon  Injectable 1 milliGRAM(s) IntraMuscular once  heparin SubCutaneous Injection - Peds 5000 Unit(s) SubCutaneous every 12 hours  insulin regular Infusion 5 Unit(s)/Hr (5 mL/Hr) IV Continuous <Continuous>  levothyroxine 50 MICROGram(s) Oral daily  melatonin 5 milliGRAM(s) Oral at bedtime  remdesivir  IVPB 100 milliGRAM(s) IV Intermittent every 24 hours  remdesivir  IVPB   IV Intermittent   senna 8.6 milliGRAM(s) Oral Tablet - Peds 1 Tablet(s) Oral at bedtime  zinc sulfate 220 milliGRAM(s) Oral daily    MEDICATIONS  (PRN):  acetaminophen     Tablet .. 650 milliGRAM(s) Oral every 6 hours PRN Temp greater or equal to 38C (100.4F), Mild Pain (1 - 3), Moderate Pain (4 - 6)  dextrose Oral Gel 15 Gram(s) Oral once PRN Blood Glucose LESS THAN 70 milliGRAM(s)/deciliter  morphine  - Injectable 2 milliGRAM(s) IV Push every 4 hours PRN Moderate Pain (4 - 6)      PHYSICAL EXAM:  General: intubated, sedated, in moderate distress  ENT: moist mucous membranes  Neck: supple, no lymphadenopathy  Chest/lung: mechanical breath sounds, no wheezes, rhonchi, rales   Heart: RRR, +S1 S2, no m/r/g  Abdomen: soft, nontender, nondistended  Extremities: warm and well perfused, trace LE pitting edema bilaterally   Neuro: sedated, does not withdraw to pain   Patient is a 77y old  Female who presents with a chief complaint of COVID (2022 09:07)      INTERVAL HPI/OVERNIGHT EVENTS:   NAEON. On BiPAP and insulin drip, tachycardic, hypotensive to 78/35, tachypneic. Family does not want pressors or central lines.     ICU Vital Signs Last 24 Hrs  T(C): 37.1 (2022 08:00), Max: 38.3 (2022 11:18)  T(F): 98.7 (2022 08:00), Max: 101 (2022 11:18)  HR: 98 (2022 10:30) (82 - 119)  BP: 88/43 (2022 10:30) (78/35 - 121/50)  BP(mean): 58 (2022 10:30) (48 - 74)  RR: 24 (2022 10:30) (22 - 86)  SpO2: 100% (2022 10:30) (72% - 100%) on BiPAP      2022 07:01  -  2022 07:00  --------------------------------------------------------  IN: 1010 mL / OUT: 1990 mL / NET: -980 mL    2022 07:01  -  2022 10:42  --------------------------------------------------------  IN: 1883 mL / OUT: 45 mL / NET: 1838 mL      LABS:             8.0    23.47 )-----------( 158      ( 2022 04:10 )             28.8     158<H>  |  117<H>  |  117<HH>  ----------------------------<  384<H>  5.0   |  31  |  2.1<H>    Ca    9.3      2022 04:10  Phos  4.4     07-19  Mg     3.2     07-20    TPro  6.1  /  Alb  2.4<L>  /  TBili  <0.2  /  DBili  x   /  AST  19  /  ALT  11  /  AlkPhos  99  07-20    PT/INR - ( 2022 02:40 )   PT: 13.20 sec;   INR: 1.15 ratio         PTT - ( 2022 02:40 )  PTT:29.2 sec  Urinalysis Basic - ( 2022 14:55 )    Color: Judi / Appearance: Turbid / S.024 / pH: x  Gluc: x / Ketone: Trace  / Bili: Small / Urobili: 6 mg/dL   Blood: x / Protein: 100 mg/dL / Nitrite: Negative   Leuk Esterase: Large / RBC: 1 /HPF /  /HPF   Sq Epi: x / Non Sq Epi: 1 /HPF / Bacteria: Few    ABG - ( 2022 02:52 )  pH, Arterial: 7.28  pH, Blood: x     /  pCO2: 59    /  pO2: 106   / HCO3: 28    / Base Excess: 0.6   /  SaO2: 99.6          MEDICATIONS  (STANDING):  ascorbic acid 500 milliGRAM(s) Oral daily  aspirin  chewable 81 milliGRAM(s) Oral daily  atorvastatin 20 milliGRAM(s) Oral at bedtime  cefTRIAXone   IVPB 1000 milliGRAM(s) IV Intermittent every 24 hours  chlorhexidine 2% Cloths 2 Application(s) Topical two times a day  dexAMETHasone  Injectable 6 milliGRAM(s) IV Push daily  dextrose 5%. 1000 milliLiter(s) (100 mL/Hr) IV Continuous <Continuous>  dextrose 5%. 1000 milliLiter(s) (100 mL/Hr) IV Continuous <Continuous>  dextrose 5%. 1000 milliLiter(s) (50 mL/Hr) IV Continuous <Continuous>  dextrose 50% Injectable 25 Gram(s) IV Push once  dextrose 50% Injectable 12.5 Gram(s) IV Push once  dextrose 50% Injectable 25 Gram(s) IV Push once  famotidine    Tablet 20 milliGRAM(s) Oral daily  glucagon  Injectable 1 milliGRAM(s) IntraMuscular once  heparin SubCutaneous Injection - Peds 5000 Unit(s) SubCutaneous every 12 hours  insulin regular Infusion 5 Unit(s)/Hr (5 mL/Hr) IV Continuous <Continuous>  levothyroxine 50 MICROGram(s) Oral daily  melatonin 5 milliGRAM(s) Oral at bedtime  remdesivir  IVPB 100 milliGRAM(s) IV Intermittent every 24 hours  remdesivir  IVPB   IV Intermittent   senna 8.6 milliGRAM(s) Oral Tablet - Peds 1 Tablet(s) Oral at bedtime  zinc sulfate 220 milliGRAM(s) Oral daily    MEDICATIONS  (PRN):  acetaminophen     Tablet .. 650 milliGRAM(s) Oral every 6 hours PRN Temp greater or equal to 38C (100.4F), Mild Pain (1 - 3), Moderate Pain (4 - 6)  dextrose Oral Gel 15 Gram(s) Oral once PRN Blood Glucose LESS THAN 70 milliGRAM(s)/deciliter  morphine  - Injectable 2 milliGRAM(s) IV Push every 4 hours PRN Moderate Pain (4 - 6)      PHYSICAL EXAM:  General: intubated, sedated, in moderate distress  ENT: moist mucous membranes  Neck: supple, no lymphadenopathy  Chest/lung: mechanical breath sounds, no wheezes, rhonchi, rales   Heart: RRR, +S1 S2, no m/r/g  Abdomen: soft, nontender, nondistended  Extremities: warm and well perfused, trace LE pitting edema bilaterally   Neuro: sedated, does not withdraw to pain

## 2022-07-20 NOTE — PROGRESS NOTE ADULT - ASSESSMENT
Assessment  76F h/o CAD, DMII on insulin, dementia, schizophrenia, hypothyroidism, anxiety who presented from nursing home on 7/18/22 with septic shock, acute hypoxemic respiratory failure due to COVID-19, found to be severely hyperglycemic (serum glucose 525), placed on insulin drip, s/p fluids. Patient's baseline over last 2 months is nonverbal, bed bound. Per daughter patient is DNR/DNI, does not want pressors or central lines. Patient is critically ill, persistently hypotensive in shock, will require goals of care discussions with family.    Plan  CNS  #H/o dementia  #H/o schizophrenia  - avoid sedatives  - PRN sedation for comfort     HEENT: Oral care    PULM  #Acute hypoxemic respiratory failure   #Acute on chronic hypercapnic respiratory failure  #COVID-19 infection  - switch to AVAPS: , PEEP 8, RR 24   - c/w dexamethasone 6mg IV qd  - c/w remdesivir 100mg q24h   - d-dimer 3935  - maintain SpO2 88-92%  - HOB @ 45 degrees, aspiration precautions  - f/u LE duplex venous US stat    CARDIO  #Shock, present on admission  - goal MAP > 60  - 1L LR bolus  - start D5W 100 ml/hr      GI  - GI ppx famotidine 20mg qd  - diet: NPO  - bowel regimen    RENAL  #TELMA, FeNa 0.5% c/w prerenal   * 7/20 urine lytes: Uosm 452, Bella 26, UCr 67  - repeat BMP 11:00  - amin cares  - IVF as above     ID  - c/w ceftriaxone 1g x 7 days   - urine culture: probable contamination    HEME: DVT ppx heparin 5000U q12h    ENDOCRINE  #Hyperglycemia, uncontrolled  #Insulin-dependent DMII  - c/w insulin drip 5U for now      MSK: IAT    CODE STATUS: DNR/DNI    DISPO: MICU Assessment  76F h/o CAD, DMII on insulin, dementia, schizophrenia, hypothyroidism, anxiety who presented from nursing home on 7/18/22 with septic shock, acute hypoxemic respiratory failure due to COVID-19, found to be severely hyperglycemic (serum glucose 525), placed on insulin drip, s/p fluids. Patient's baseline over last 2 months is nonverbal, bed bound. Per daughter patient is DNR/DNI, does not want pressors or central lines. Patient is critically ill, persistently hypotensive in shock, will require goals of care discussions with family.    Plan  CNS  #H/o dementia  #H/o schizophrenia  - avoid sedatives  - PRN sedation for comfort     HEENT: Oral care    PULM  #Acute hypoxemic respiratory failure   #Acute on chronic hypercapnic respiratory failure  #COVID-19 infection  - switch to AVAPS: , PEEP 8, RR 24   - c/w dexamethasone 6mg IV qd  - c/w remdesivir 100mg q24h   - d-dimer 3935  - maintain SpO2 88-92%  - HOB @ 45 degrees, aspiration precautions  - f/u LE duplex venous US stat    CARDIO  #Shock, present on admission  - goal MAP > 60  - 1L LR bolus  - start D5W 100 ml/hr      GI  - GI ppx famotidine 20mg qd  - diet: NPO  - bowel regimen    RENAL  #TELMA, FeNa 0.5% c/w prerenal   * 7/20 urine lytes: Uosm 452, Bella 26, UCr 67  - repeat BMP 11:00  - amni cares  - IVF as above     ID  - c/w ceftriaxone 1g x 7 days   - urine culture: probable contamination    HEME: DVT ppx heparin 5000U q12h    ENDOCRINE  #Hyperglycemia, uncontrolled  #Insulin-dependent DMII  - BHB 0.3  - c/w insulin drip 5U for now      MSK: IAT    CODE STATUS: DNR/DNI    DISPO: MICU

## 2022-07-20 NOTE — GOALS OF CARE CONVERSATION - ADVANCED CARE PLANNING - CONVERSATION DETAILS
Goals of care were discussed with  Family member/ HCP Ms Bills over the phone at   She was made aware of patient's poor prognosis as she was desaturating on non rebreather and will require BiPAP FiO2 100% 14/8.  She was also made aware of the possibility of acute deterioration at anytime during patient's stay.  I informed her about different aspects of goals of care.  Difference between being DNR/DNI and Full Code was addressed.  Decision was made to proceed with DNR/DNI, expressing her rejection of patient being resuscitated or intubated  Daughter also expresses rejection for dialysis, pressors, or invasive procedures such as central line
Pt had previous MOLST form documented. Family was called to confirm goals of care with daughter. She re-confirmed that the patient is DNR/DNI with no aggressive interventions wanted at this time (dialysis/central lines/pressors).

## 2022-07-20 NOTE — CONSULT NOTE ADULT - PROBLEM SELECTOR PROBLEM 3
Acute hypoxemic respiratory failure due to severe acute respiratory syndrome coronavirus 2 (SARS-CoV-2) disease

## 2022-07-20 NOTE — CONSULT NOTE ADULT - ASSESSMENT
77yFemale with PMH including Anxiety, CAD, hypothyroidism, IDDM, schizophrenia, dementia, s/p G-Tube, being evaluated for GOC in the setting of admission for acute respiratory failure 2/2 covid pna. Patients' daughter does not want intubation, CPR, or aggressive life sustaining measures such as pressors or dialysis. This morning, patients respiratory status worsened and she was placed on Bipap.       MEDD (morphine equivalent daily dose):      See Recs below.    Please call [phone] with questions or concerns 24/7.   We will continue to follow.     Discussed with primary MD.   77yFemale with PMH including Anxiety, CAD, hypothyroidism, IDDM, schizophrenia, dementia, s/p G-Tube, being evaluated for GOC in the setting of admission for acute respiratory failure 2/2 covid pna. Patients' daughter does not want intubation, CPR, or aggressive life sustaining measures such as pressors or dialysis. This morning, patients respiratory status worsened and she was placed on Bipap.     Family decided on full comfort care today and Bipap removal, knowing this will result in the patient passing away. Comfort and not prolonging suffering are their priority.   I spoke with all 3 daughters of the patient on the phone.   Patient was given Dilaudid 1 mg IVP and Ativan 0.5 mg IVP prior to removal of Bipap.     MEDD (morphine equivalent daily dose): 0      See Recs below.    Please call [phone] with questions or concerns 24/7.   We will continue to follow.     Discussed with primary MD, RN.

## 2022-07-20 NOTE — CONSULT NOTE ADULT - ASSESSMENT
TELMA, likely prerenal vs ATN  Acute resp failure 2/2 COVID-19 pneumonia - on bipap  Sepsis   Hypernatremia  Hyperkalemia  DM  Anemia    Plan:    Agree with IVF  Monitor LFTs on remdesivir  Palliative eval  ID f/u  Will follow with you

## 2022-07-20 NOTE — PROGRESS NOTE ADULT - ASSESSMENT
IMPRESSION:  Acute hypoxemic respiratory failure   Acute on chronic hypercapnic respiratory failutre  UTI   Sepsis POA  COVID 19 infection / pneumonia    TELMA   Hyperkalemia  Hypernatremia  Hyperglycemia, uncontrolled  H/O Dementia and schizophrenia  H/O Diabetes mellitus    PLAN:    CNS: Avoid sedatives.  PRN sedation for comfort     HEENT: Oral care    PULMONARY: Continue with non-rebreather. Titrate supplemental O2 to maintain SpO2 88-92.  HOB @ 45 degrees. Aspiration precautions. AVAPS .  IPAP max / anton 20/16.  PEEP 8.  RR 24      CARDIOVASCULAR: Keep MAP>60.  Goal directed fluid resuscitation.  LR bolus 1 liter.  Start D5W 100 cc per hour.        GI: GI prophylaxis. NPO for now Bowel regimen    RENAL: FU  electrolytes in 4 hours. Follow up renal function and lytes. Correct as needed. Repeat BMP.  Avoid over correction.  Keep Hobbs.     INFECTIOUS DISEASE: Continue with dexamethasone 6mg IV QD. Continue Rocephin.  FU cultures  Procal noted       HEMATOLOGICAL: DVT prophylaxis per COVID protocol.  Dimer noted     ENDOCRINE: IV insulin for now.  Monitor FS.      MUSCULOSKELETAL: increase as tolerated. Duplex stat LE     DISPO: MICU   Poor prognosis  GOC   DNR/DNI

## 2022-07-20 NOTE — PROGRESS NOTE ADULT - SUBJECTIVE AND OBJECTIVE BOX
Patient is a 77y old  Female who presents with a chief complaint of       Over Night Events:  Remains critically ill.  Off pressors.          ROS:     All ROS are negative except HPI         PHYSICAL EXAM    ICU Vital Signs Last 24 Hrs  T(C): 37.9 (2022 01:27), Max: 38.3 (2022 11:18)  T(F): 100.3 (2022 01:27), Max: 101 (2022 11:18)  HR: 102 (2022 07:00) (82 - 119)  BP: 78/35 (2022 07:00) (78/35 - 121/50)  BP(mean): 48 (2022 07:00) (48 - 68)  ABP: --  ABP(mean): --  RR: 26 (2022 07:00) (22 - 86)  SpO2: 100% (2022 07:00) (72% - 100%)    O2 Parameters below as of 2022 07:00  Patient On (Oxygen Delivery Method): BiPAP/CPAP    O2 Concentration (%): 100        CONSTITUTIONAL:  Ill appearing in moderate distress     ENT:   Airway patent,   Mouth with normal mucosa.       EYES:   Pupils equal,   Round and reactive to light.    CARDIAC:   Tachycardic   Regular rhythm.        RESPIRATORY:   No wheezing  Bilateral crackles   Normal chest expansion  Not tachypneic,  No use of accessory muscles    GASTROINTESTINAL:  Abdomen soft,   Non-tender,   No guarding,   + BS    MUSCULOSKELETAL:   Range of motion is not limited,  No clubbing, cyanosis    NEUROLOGICAL:   Obtunded     SKIN:   Skin normal color for race,   Warm and dry  No evidence of rash.    PSYCHIATRIC:   No apparent risk to self or others.        22 @ 07:  -  22 @ 07:00  --------------------------------------------------------  IN:    dextrose 5% w/ Additives: 900 mL    Insulin: 5 mL    Insulin: 5 mL    IV PiggyBack: 100 mL  Total IN: 1010 mL    OUT:    Indwelling Catheter - Urethral (mL): 1990 mL  Total OUT: 1990 mL    Total NET: -980 mL      22 @ 07:01  -  22 @ 08:18  --------------------------------------------------------  IN:    Lactated Ringers Bolus: 500 mL  Total IN: 500 mL    OUT:  Total OUT: 0 mL    Total NET: 500 mL          LABS:                            8.0    23.47 )-----------( 158      ( 2022 04:10 )             28.8                                               07    158<H>  |  117<H>  |  117<HH>  ----------------------------<  384<H>  5.0   |  31  |  2.1<H>    2022 04:10    158<H>  |  117<H>  |  117<HH>  ----------------------------<  384<H>  5.0     |  31     |  2.1<H>  2022 02:40    155<H>  |  95<L>  |  88<HH>  ----------------------------<  >1400<HH>  3.4<L>   |  >50<HH>  |  1.6<H>    Ca    9.3        2022 04:10  Ca    6.2<L>      2022 02:40  Phos  4.4       2022 15:35  Mg     3.2<HH>     2022 04:10  Mg     2.2       2022 02:40    TPro  6.1    /  Alb  2.4<L>  /  TBili  <0.2   /  DBili  x      /  AST  19     /  ALT  11     /  AlkPhos  99     2022 04:10  TPro  4.2<L>  /  Alb  1.6<L>  /  TBili  <0.2   /  DBili  x      /  AST  16     /  ALT  8      /  AlkPhos  65     2022 02:40      Ca    9.3      2022 04:10  Phos  4.4     -  Mg     3.2     07-20    TPro  6.1  /  Alb  2.4<L>  /  TBili  <0.2  /  DBili  x   /  AST  19  /  ALT  11  /  AlkPhos  99  07-20      PT/INR - ( 2022 02:40 )   PT: 13.20 sec;   INR: 1.15 ratio         PTT - ( 2022 02:40 )  PTT:29.2 sec                                       Urinalysis Basic - ( 2022 14:55 )    Color: Judi / Appearance: Turbid / S.024 / pH: x  Gluc: x / Ketone: Trace  / Bili: Small / Urobili: 6 mg/dL   Blood: x / Protein: 100 mg/dL / Nitrite: Negative   Leuk Esterase: Large / RBC: 1 /HPF /  /HPF   Sq Epi: x / Non Sq Epi: 1 /HPF / Bacteria: Few        CARDIAC MARKERS ( 2022 02:40 )  x     / 0.02 ng/mL / x     / x     / x      CARDIAC MARKERS ( 2022 15:35 )  x     / x     / 240 U/L / x     / x                                                LIVER FUNCTIONS - ( 2022 04:10 )  Alb: 2.4 g/dL / Pro: 6.1 g/dL / ALK PHOS: 99 U/L / ALT: 11 U/L / AST: 19 U/L / GGT: x                                                  Culture - Urine (collected 2022 14:55)  Source: Clean Catch Clean Catch (Midstream)  Final Report (2022 21:06):    >=3 organisms. Probable collection contamination.                                                                                       ABG - ( 2022 02:52 )  pH, Arterial: 7.12  pH, Blood: x     /  pCO2: 111    /  pO2: 148   / HCO3: 28    / Base Excess: 0.6   /  SaO2: 99.6    BiPAP 14/8 Lac 2.6               MEDICATIONS  (STANDING):  ascorbic acid 500 milliGRAM(s) Oral daily  aspirin  chewable 81 milliGRAM(s) Oral daily  atorvastatin 20 milliGRAM(s) Oral at bedtime  cefTRIAXone   IVPB 1000 milliGRAM(s) IV Intermittent every 24 hours  chlorhexidine 2% Cloths 2 Application(s) Topical two times a day  dexAMETHasone  Injectable 6 milliGRAM(s) IV Push daily  dextrose 5% 1000 milliLiter(s) (150 mL/Hr) IV Continuous <Continuous>  dextrose 5%. 1000 milliLiter(s) (100 mL/Hr) IV Continuous <Continuous>  dextrose 5%. 1000 milliLiter(s) (50 mL/Hr) IV Continuous <Continuous>  dextrose 50% Injectable 25 Gram(s) IV Push once  dextrose 50% Injectable 12.5 Gram(s) IV Push once  dextrose 50% Injectable 25 Gram(s) IV Push once  famotidine    Tablet 20 milliGRAM(s) Oral daily  glucagon  Injectable 1 milliGRAM(s) IntraMuscular once  heparin SubCutaneous Injection - Peds 5000 Unit(s) SubCutaneous every 12 hours  insulin regular Infusion 5 Unit(s)/Hr (5 mL/Hr) IV Continuous <Continuous>  levothyroxine 50 MICROGram(s) Oral daily  melatonin 5 milliGRAM(s) Oral at bedtime  remdesivir  IVPB 100 milliGRAM(s) IV Intermittent every 24 hours  remdesivir  IVPB   IV Intermittent   senna 8.6 milliGRAM(s) Oral Tablet - Peds 1 Tablet(s) Oral at bedtime  zinc sulfate 220 milliGRAM(s) Oral daily    MEDICATIONS  (PRN):  acetaminophen     Tablet .. 650 milliGRAM(s) Oral every 6 hours PRN Temp greater or equal to 38C (100.4F), Mild Pain (1 - 3), Moderate Pain (4 - 6)  dextrose Oral Gel 15 Gram(s) Oral once PRN Blood Glucose LESS THAN 70 milliGRAM(s)/deciliter  oxyCODONE    IR 5 milliGRAM(s) Oral three times a day PRN Severe Pain (7 - 10)      New X-rays reviewed:                                                                                  ECHO

## 2022-07-20 NOTE — CHART NOTE - NSCHARTNOTEFT_GEN_A_CORE
- Contacted by nurse since patient was desaturating down to 72% while on NR on arrival from ED  - Vitals were otherwise normal with /37mmHg and HR 103bpm; afebrile  - On bedside evaluation, patient noted to be lethargic with abdominal breathing  - RT was contacted and patient was placed on BiPAP 14/8 FiO2 1005 with improvement in SaO2 to 98%  - STAT chest X Ray and ABGs were ordered. STAT CBC, TS, CMP (to follow up on Na and Glucose), Troponin, and LA were also sent   - Family was contacted (HCP at ) to confirm GOC: DNR DNI as prior GOC (MOLST form completed again)  - Will monitor closely in MICU - Contacted by nurse since patient was desaturating down to 72% while on NR on arrival from ED  - Vitals were otherwise significant for /37mmHg and HR 103bpm; afebrile  - On bedside evaluation, patient noted to be lethargic with abdominal breathing and bilateral crackles/ rhonchi on exam  - RT was contacted and patient was placed on BiPAP 14/8 FiO2 1005 with improvement in SaO2 to 98%  - STAT chest X Ray and ABGs were ordered. STAT CBC, TS, CMP (to follow up on Na and Glucose), Troponin, and LA were also sent   - Family was contacted (HCP at ) to confirm GOC: DNR DNI as prior GOC (MOLST form completed again)  - Will monitor closely in MICU

## 2022-07-20 NOTE — CONSULT NOTE ADULT - PROBLEM SELECTOR RECOMMENDATION 2
2/2 covid pna  Dilaudid 1 mg IVP Q 10 min PRN for dyspnea or pain   No pulse ox  No O2 supplementation

## 2022-07-20 NOTE — CONSULT NOTE ADULT - NS ATTEND AMEND GEN_ALL_CORE FT
Patient declined overnight, on BIPAP  Spoke with patient's daughters above, who wish for comfort measures only   Symptom management recommendations as above  Patient  by the time of attestation of this note

## 2022-07-20 NOTE — PATIENT PROFILE ADULT - FALL HARM RISK - RISK INTERVENTIONS
Assistance OOB with selected safe patient handling equipment/Communicate Fall Risk and Risk Factors to all staff, patient, and family/Discuss with provider need for PT consult/Provide patient with walking aids - walker, cane, crutches/Reinforce activity limits and safety measures with patient and family/Visual Cue: Yellow wristband/Bed in lowest position, wheels locked, appropriate side rails in place/Call bell, personal items and telephone in reach/Instruct patient to call for assistance before getting out of bed or chair/Physically safe environment - no spills, clutter or unnecessary equipment/Purposeful Proactive Rounding/Unable to comprehend

## 2022-07-20 NOTE — DISCHARGE NOTE FOR THE EXPIRED PATIENT - HOSPITAL COURSE
76 year old female with a h/o CAD, DMII on insulin, dementia, schizophrenia, hypothyroidism, anxiety who presented from nursing home on 22 in septic shock, acute hypoxemic respiratory failure due to COVID-19, found to be severely hyperglycemic (serum glucose 525, BHB 0.3), placed on insulin drip, s/p fluids. Patient's baseline over last 2 months is nonverbal, bed bound.     Patient admitted to MICU for management of septic shock and acute hypoxemic respiratory failure secondary to COVID-19 pneumonia. Per daughter, patient was DNR/DNI. Daughter (HCP) rejected the use of pressors, central lines, or other invasive procedures in the patient. Patient was placed on BiPAP then AVAPS, was treated with dexamethasone 6 mg qd, remdesivir 100mg qd, and ceftriaxone 1g qd. On  patient persistently hypotensive despite 1L LR bolus, D5W at 100cc/hr, and additional 1L LR bolus. Daughter was contacted via phone and updated on patient's hemodynamic instability and requirement for blood pressure support, at which point daughter made patient comfort measures only. Palliative care evaluated patient, premedicated patient before AVAPS removal. Patient  22 15:32.

## 2022-07-20 NOTE — CONSULT NOTE ADULT - PROBLEM SELECTOR RECOMMENDATION 9
- no labs  - no IVF  - no artificial feeding  - no vitals  - no pulse ox  - no 02 supplementation  - no injections  - no FS  - comfort feedings OK     Ativan 0.5 mg IV Q 30 min PRN for agitation and anxiety  Glycopyrrolate 0.2 mg IV Q6h PRN for secretions

## 2022-07-20 NOTE — PROGRESS NOTE ADULT - SUBJECTIVE AND OBJECTIVE BOX
GLORIA WATERS  77y, Female    All available historical data reviewed    OVERNIGHT EVENTS:  low grade fevers  bipap  non responsive  no pressors  no diarrhea    ROS:  unable to obtain history secondary to patient's mental status and/or sedation     VITALS:  T(F): 98.2, Max: 100.3 (22 @ 01:27)  HR: 88  BP: 89/41  RR: 24Vital Signs Last 24 Hrs  T(C): 36.8 (2022 12:00), Max: 37.9 (2022 01:27)  T(F): 98.2 (2022 12:00), Max: 100.3 (2022 01:27)  HR: 88 (2022 14:30) (82 - 119)  BP: 89/41 (2022 14:30) (72/41 - 121/50)  BP(mean): 56 (2022 14:30) (47 - 74)  RR: 24 (2022 14:30) (22 - 86)  SpO2: 100% (2022 14:30) (72% - 100%)    Parameters below as of 2022 14:00  Patient On (Oxygen Delivery Method): BiPAP/CPAP    O2 Concentration (%): 100    TESTS & MEASUREMENTS:                        8.0    23.47 )-----------( 158      ( 2022 04:10 )             28.8     07-20    159<H>  |  114<H>  |  115<HH>  ----------------------------<  176<H>  3.9   |  30  |  2.0<H>    Ca    9.2      2022 12:28  Phos  4.4     07-19  Mg     3.2     07-20    TPro  5.7<L>  /  Alb  2.3<L>  /  TBili  <0.2  /  DBili  x   /  AST  30  /  ALT  14  /  AlkPhos  94  07-20    LIVER FUNCTIONS - ( 2022 12:28 )  Alb: 2.3 g/dL / Pro: 5.7 g/dL / ALK PHOS: 94 U/L / ALT: 14 U/L / AST: 30 U/L / GGT: x             Culture - Urine (collected 22 @ 14:55)  Source: Clean Catch Clean Catch (Midstream)  Final Report (22 @ 21:06):    >=3 organisms. Probable collection contamination.      Urinalysis Basic - ( 2022 14:55 )    Color: Judi / Appearance: Turbid / S.024 / pH: x  Gluc: x / Ketone: Trace  / Bili: Small / Urobili: 6 mg/dL   Blood: x / Protein: 100 mg/dL / Nitrite: Negative   Leuk Esterase: Large / RBC: 1 /HPF /  /HPF   Sq Epi: x / Non Sq Epi: 1 /HPF / Bacteria: Few          RADIOLOGY & ADDITIONAL TESTS:  Personal review of radiological diagnostics performed  Echo and EKG results noted when applicable.     MEDICATIONS:  chlorhexidine 2% Cloths 2 Application(s) Topical two times a day  glycopyrrolate Injectable 0.2 milliGRAM(s) IV Push every 6 hours PRN  HYDROmorphone  Injectable 1 milliGRAM(s) IV Push every 10 minutes PRN  LORazepam   Injectable 0.5 milliGRAM(s) IV Push every 30 minutes PRN      ANTIBIOTICS:

## 2022-07-25 LAB — GLUCOSE BLDC GLUCOMTR-MCNC: 435 MG/DL — HIGH (ref 70–99)

## 2023-02-22 NOTE — DIETITIAN INITIAL EVALUATION ADULT. - PHYSICAL APPEARANCE
alert and oriented. BMI: 30.4, IBW: 115#, UBW: ~170#, denies any recent wt loss. no edema noted, unstageable pressure injury to R buttocks/obese Rhomboid Transposition Flap Text: The defect edges were debeveled with a #15 scalpel blade.  Given the location of the defect and the proximity to free margins a rhomboid transposition flap was deemed most appropriate.  Using a sterile surgical marker, an appropriate rhomboid flap was drawn incorporating the defect.    The area thus outlined was incised deep to adipose tissue with a #15 scalpel blade.  The skin margins were undermined to an appropriate distance in all directions utilizing iris scissors.

## 2024-04-08 NOTE — BRIEF OPERATIVE NOTE - NSICDXBRIEFPOSTOP_GEN_ALL_CORE_FT
Patient is scheduled for psychiatry appointment through [facility]    Tuesday, April 23rd at 2:30PM with MEGHAN Kim in-person    [facility]  [address] [place] [id number]  [phone]  
POST-OP DIAGNOSIS:  Pressure sore on sacrum 25-May-2022 17:12:36  Regan Robert  
POST-OP DIAGNOSIS:  Pressure sore on sacrum 25-May-2022 17:12:36  Regan Robert
